# Patient Record
Sex: MALE | Race: WHITE | NOT HISPANIC OR LATINO | Employment: OTHER | URBAN - METROPOLITAN AREA
[De-identification: names, ages, dates, MRNs, and addresses within clinical notes are randomized per-mention and may not be internally consistent; named-entity substitution may affect disease eponyms.]

---

## 2017-09-06 ENCOUNTER — ALLSCRIPTS OFFICE VISIT (OUTPATIENT)
Dept: OTHER | Facility: OTHER | Age: 82
End: 2017-09-06

## 2017-09-06 DIAGNOSIS — I10 ESSENTIAL (PRIMARY) HYPERTENSION: ICD-10-CM

## 2017-09-14 ENCOUNTER — GENERIC CONVERSION - ENCOUNTER (OUTPATIENT)
Dept: OTHER | Facility: OTHER | Age: 82
End: 2017-09-14

## 2018-01-11 NOTE — PROGRESS NOTES
Chief Complaint  Patient here for blood pressure check  History of Present Illness  HPI: 81 y/o M presenting for blood pressure check  Patients only concern today is chest congestion and cold like symptoms that have been going on for 5 weeks  Denies headaches, vision changes, chest pain, palpitation, abdominal pain  Review of Systems    Constitutional: no fever and no chills  ENT: sore throat  Cardiovascular: no chest pain and no palpitations  Respiratory: cough, but no shortness of breath and no wheezing  Gastrointestinal: negative  Neurological: no headache and no dizziness  Active Problems    1  Abscess of buttock, right (682 5) (L02 31)   2  Abscess Of Wrist Bursa (727 89)   3  Acute upper respiratory infection (465 9) (J06 9)   4  Hypertension (401 9) (I10)   5  Presbycusis (388 01) (H91 10)   6  Screening for neurological condition (V80 09) (Z13 89)   7  Sinusitis (473 9) (J32 9)    Current Meds   1  Atenolol 50 MG Oral Tablet; TAKE 1 TABLET DAILY; Therapy: 22JOY9317 to (Evaluate:13May2016)  Requested for: 73UVA7040; Last   Rx:14Jan2016 Ordered   2  Fluticasone Propionate 50 MCG/ACT Nasal Suspension; INSTILL ONE SPRAY INTO   EACH NOSTRIL TWICE DAILY; Therapy: 62MLT1563 to (Evaluate:97Mam6135)  Requested for: 00NXF2183; Last   Rx:14Jan2016 Ordered   3  Lisinopril 40 MG Oral Tablet; take 1 tablet by mouth every day; Therapy: 14YKD5553 to (Evaluate:13May2016)  Requested for: 14MOR2449; Last   Rx:14Jan2016 Ordered   4  Ventolin  (90 Base) MCG/ACT Inhalation Aerosol Solution; INHALE 2 PUFFS   EVERY 4-6 HOURS AS NEEDED; Therapy: 09YNM8114 to (Complete:30Nov2016)  Requested for: 75VNL7899; Last   Rx:95Hgk4750 Ordered    Allergies    1   No Known Drug Allergies    Vitals  Signs [Data Includes: Current Encounter]    Systolic: 153  Diastolic: 72    Physical Exam    Ears, Nose, Mouth, and Throat   External inspection of ears and nose: Normal     Otoscopic examination: Tympanic membrance translucent with normal light reflex  Canals patent without erythema  Erythematous nares with clear mucus  Oropharynx: Abnormal   Oral mucosa was erythematous  Pulmonary   Respiratory effort: No increased work of breathing or signs of respiratory distress  Auscultation of lungs: Clear to auscultation  Cardiovascular   Palpation of heart: Normal PMI, no thrills  Auscultation of heart: Normal rate and rhythm, normal S1 and S2, without murmurs  Abdomen   Abdomen: Non-tender, no masses  Liver and spleen: No hepatomegaly or splenomegaly  Lymphatic   Palpation of lymph nodes in neck: No lymphadenopathy  Neurologic   Cranial nerves: Cranial nerves 2-12 intact  Assessment    1  Chest congestion (786 9) (R09 89)   2  Acute sinusitis (461 9) (J01 90)   3  Hypertension (401 9) (I10)    Plan  Acute sinusitis    · Sulfamethoxazole-Trimethoprim 800-160 MG Oral Tablet; TAKE 1 TABLET TWICE  DAILY  Acute upper respiratory infection    · Ventolin  (90 Base) MCG/ACT Inhalation Aerosol Solution  Chest congestion    · * XR CHEST PA & LATERAL; Status:Active; Requested VTZ:95EEU5085;   Hypertension    · *VB - Follow-up With Nurse or MA For BP Check Evaluation and Treatment  Follow-up   Status: Active - Retrospective By Protocol Authorization  Requested for: 35SKB1867    Discussion/Summary    Informed patient to stop taking his inhaler, Dimetapp and limit the amount of coffee he drinks  No changes will be made with medications at this time  Get x-ray for chest congestion  Treating patient for sinus infection  Follow up in 2 weeks  Informed patient of symptomatic hypertension and when to call or go to ER        Signatures   Electronically signed by : Noe Garcia; Jan 28 2016  4:05PM EST                       (Author)    Electronically signed by : FRANCO Rivers ; Jan 28 2016  4:07PM EST

## 2018-01-12 VITALS
WEIGHT: 130 LBS | OXYGEN SATURATION: 98 % | HEIGHT: 62 IN | RESPIRATION RATE: 20 BRPM | SYSTOLIC BLOOD PRESSURE: 174 MMHG | TEMPERATURE: 97.4 F | HEART RATE: 68 BPM | BODY MASS INDEX: 23.92 KG/M2 | DIASTOLIC BLOOD PRESSURE: 70 MMHG

## 2018-01-14 NOTE — PROGRESS NOTES
Assessment    1  Acute upper respiratory infection (465 9) (J06 9)   2  Hypertension (401 9) (I10)    Plan  Acute upper respiratory infection    · Fluticasone Propionate 50 MCG/ACT Nasal Suspension; USE 1 SPRAY IN EACH  NOSTRIL TWICE DAILY  Hypertension    · Atenolol 50 MG Oral Tablet; TAKE 1 TABLET DAILY   · Lisinopril 40 MG Oral Tablet; take 1 tablet by mouth every day    Discussion/Summary    Use nasal spray and take mucinex to help with congestion  Cut back on the coffee and increase the amount of water  Monitor blood pressure  Make sure you are taking the correct medication dosage  Informed patient symptoms of hypertension and hypotension  Follow up in 1 week to get BP checked  Possible side effects of new medications were reviewed with the patient/guardian today  The treatment plan was reviewed with the patient/guardian  The patient/guardian understands and agrees with the treatment plan   The patient was counseled regarding instructions for management, risks and benefits of treatment options  Chief Complaint    1  Cold Symptoms  Routine HTN F/U, needs med refills  Also complaining of cold x3 weeks, tried OTC meds but did not help  History of Present Illness  79 y/o M presenting to office for refill of blood pressure medication and chest congestion for the last 3 weeks  Patient states that he really has not been coughing but it feels like there is something in his chest  He has not taken anything OTC because his wife was concern about interactions with medications  He was prescribed an inhaler but does not like using it  He states he drinks a lot of coffee and not much water  Patient denies any headaches, dizziness, vision changes, chest pain, palpitations, SOB or abdominal pain  Pain Assessment   the patient states they do not have pain  Abuse And Domestic Violence Screen    Yes, the patient is safe at home  The patient states no one is hurting them  Depression And Suicide Screen  No, the patient has not had thoughts of hurting themself  No, the patient has not felt depressed in the past 7 days  Bernard Riso presents with complaints of cold symptoms  Associated symptoms include nasal congestion and sore throat, but no sneezing, no runny nose, no post nasal drainage, no hoarseness, no dry cough, no productive cough, no facial pressure, no facial pain, no headache, no plugged ear(s), no ear pain, no swollen lymph nodes, no wheezing, no shortness of breath, no fatigue, no weakness, no nausea, no vomiting, no diarrhea, no fever and no chills  Review of Systems    Constitutional: no fever and no chills  Eyes: no eye pain and no eyesight problems  ENT: sore throat, but no earache and no nasal discharge  Cardiovascular: no chest pain and no palpitations  Respiratory: no shortness of breath, no cough and no wheezing  Gastrointestinal: No complaints of abdominal pain, no constipation, no nausea or vomiting, no diarrhea or bloody stools  Neurological: no headache, no numbness, no tingling, no dizziness, no limb weakness and no fainting  Active Problems    1  Abscess of buttock, right (682 5) (L02 31)   2  Abscess Of Wrist Bursa (727 89)   3  Acute upper respiratory infection (465 9) (J06 9)   4  Hypertension (401 9) (I10)   5  Presbycusis (388 01) (H91 10)   6  Sinusitis (473 9) (J32 9)    Surgical History    1  Denied: History Of Prior Surgery    Family History    1  Family history of Mother  At Age 79    2  Family history of Father  At Age 74    1  Family history of Feeling Fine   4  Family history of Feeling Fine    5  Family history of 2nd Sister  At Age ___   10  Family history of 2rd Sister  At Age ___   9  Family history of Feeling Fine    Social History    · Alcohol Use (History)   · Never A Smoker    Current Meds   1  Lisinopril 40 MG Oral Tablet; take 1 tablet by mouth every day;    Therapy: 01OTS1396 to (Evaluate:03Ces5979) Requested for: 12Jan2016; Last   Rx:14Ukk4950; Status: ACTIVE - Renewal Denied Ordered   2  Ventolin  (90 Base) MCG/ACT Inhalation Aerosol Solution; INHALE 2 PUFFS   EVERY 4-6 HOURS AS NEEDED; Therapy: 41CED4674 to (Complete:30Nov2016)  Requested for: 34TDZ2162; Last   Rx:54Kjl2920 Ordered    Allergies    1  No Known Drug Allergies    Vitals  Vital Signs [Data Includes: Current Encounter]    Recorded: 72ZLE6797 04:22PM   Temperature 98 7 F   Heart Rate 76   Respiration 20   Systolic 154   Diastolic 94   Height 5 ft 2 in   Weight 129 lb 9 oz   BMI Calculated 23 7   BSA Calculated 1 59     Physical Exam    Constitutional   General appearance: No acute distress, well appearing and well nourished  Eyes   Conjunctiva and lids: No swelling, erythema, or discharge  Pupils and irises: Equal, round and reactive to light  Ears, Nose, Mouth, and Throat   External inspection of ears and nose: Normal     Otoscopic examination: Tympanic membrance translucent with normal light reflex  Canals patent without erythema  Nasal mucosa, septum, and turbinates: Abnormal   There was clear rhinorrhea from both nares  The bilateral nasal mucosa was edematous  Oropharynx: Normal with no erythema, edema, exudate or lesions  Pulmonary   Respiratory effort: No increased work of breathing or signs of respiratory distress  Auscultation of lungs: Clear to auscultation, equal breath sounds bilaterally, no wheezes, no rales, no rhonci  Cardiovascular   Palpation of heart: Normal PMI, no thrills  Auscultation of heart: Normal rate and rhythm, normal S1 and S2, without murmurs  Carotid pulses: Normal     Abdomen   Abdomen: Non-tender, no masses  Liver and spleen: No hepatomegaly or splenomegaly  Lymphatic   Palpation of lymph nodes in neck: No lymphadenopathy      Musculoskeletal   Gait and station: Normal     Inspection/palpation of joints, bones, and muscles: Normal     Neurologic   Cranial nerves: Cranial nerves 2-12 intact  Reflexes: 2+ and symmetric  Sensation: No sensory loss           Results/Data  Encounter Results   PHQ-2 Adult Depression Screening 08QVN5407 04:28PM User, Ahs     Test Name Result Flag Reference   PHQ-2 Adult Depression Score 0     Q1: 0, Q2: 0   PHQ-2 Adult Depression Screening Negative       Falls Risk Assessment (Dx V80 09 Screen for Neurologic Disorder) 41EKK1340 04:28PM User, Ahs     Test Name Result Flag Reference   Falls Risk      No falls in the past year       Signatures   Electronically signed by : BRYANT Peters; Jan 14 2016  6:36PM EST                       (Author)    Electronically signed by : FRANCO Harvey ; Jonny 15 2016 10:13AM EST

## 2018-01-15 NOTE — MISCELLANEOUS
Dear Coleen Ruiz:    Raj Fish have had  _1_NURSE No-Show appointments at our office (9/13/17 130PM )  A No-Show is defined as any patient who fails to arrive for a scheduled appointment without canceling the appointment prior to the scheduled time  A patient who has more than THREE No-Shows may be dismissed from an [address] [place] [address] practice  Please call in advance to cancel appointments  If you continue to No-Show for scheduled appointments, you may be dismissed from our practice  Thank  You  ted ha tenido _1 NURSE_  No-Show citas en nuestra oficina (9/13/17 130PM)  Un No-Show se define estuardo cualquier paciente que no llega a marco zhang programada sin cancelar la zhang antes de la hora programada  Un paciente que tiene más  de HADLEY No-Show puede ser despedido de un [facility] práctica  Por favor llame con anticipación para cancelar citas  Si continúa la "No-Show"  para las citas programadas, usted puede ser despedido de nuestra práctica  Ome

## 2018-01-16 NOTE — MISCELLANEOUS
Message  Patient's wife called because she was confused about medication that was ordered  She has been giving the patient Atenolol 25 mg daily, when he was increased to 50 mg daily in June 2014  Instead of increasing dose from 50 mg once daily to twice a day, it was changed back to 50 mg daily        Plan  Acute upper respiratory infection    · Fluticasone Propionate 50 MCG/ACT Nasal Suspension; USE 1 SPRAY IN EACH  NOSTRIL TWICE DAILY  Hypertension    · Atenolol 50 MG Oral Tablet; TAKE 1 TABLET DAILY   · Lisinopril 40 MG Oral Tablet; take 1 tablet by mouth every day  Screening for neurological condition    · *VB - Fall Risk Assessment  (Dx V80 09 Screen for Neurologic Disorder);  Status:Complete - Retrospective By Protocol Authorization;   Done: 21SWA5810 04:29PM    Signatures   Electronically signed by : BRYANT Ortiz; Jan 14 2016  6:26PM EST                       (Author)

## 2018-09-04 DIAGNOSIS — I15.9 SECONDARY HYPERTENSION: ICD-10-CM

## 2018-09-04 DIAGNOSIS — I15.9 SECONDARY HYPERTENSION: Primary | ICD-10-CM

## 2018-09-04 RX ORDER — LISINOPRIL 40 MG/1
TABLET ORAL
Qty: 90 TABLET | Refills: 0 | OUTPATIENT
Start: 2018-09-04

## 2018-09-04 RX ORDER — LISINOPRIL 40 MG/1
TABLET ORAL
Qty: 30 TABLET | Refills: 0 | Status: SHIPPED | OUTPATIENT
Start: 2018-09-04 | End: 2018-10-12 | Stop reason: HOSPADM

## 2018-09-17 ENCOUNTER — APPOINTMENT (EMERGENCY)
Dept: CT IMAGING | Facility: HOSPITAL | Age: 83
DRG: 871 | End: 2018-09-17
Payer: MEDICARE

## 2018-09-17 ENCOUNTER — APPOINTMENT (EMERGENCY)
Dept: RADIOLOGY | Facility: HOSPITAL | Age: 83
DRG: 871 | End: 2018-09-17
Payer: MEDICARE

## 2018-09-17 ENCOUNTER — HOSPITAL ENCOUNTER (INPATIENT)
Facility: HOSPITAL | Age: 83
LOS: 11 days | Discharge: NURSING, REHAB OR LONG-TERM CARE | DRG: 871 | End: 2018-09-28
Attending: EMERGENCY MEDICINE | Admitting: INTERNAL MEDICINE
Payer: MEDICARE

## 2018-09-17 DIAGNOSIS — K83.09 ACUTE CHOLANGITIS: ICD-10-CM

## 2018-09-17 DIAGNOSIS — A41.9 SEPSIS (HCC): ICD-10-CM

## 2018-09-17 DIAGNOSIS — N17.9 AKI (ACUTE KIDNEY INJURY) (HCC): ICD-10-CM

## 2018-09-17 DIAGNOSIS — R53.1 GENERALIZED WEAKNESS: Primary | ICD-10-CM

## 2018-09-17 PROBLEM — K80.33 CALCULUS OF BILE DUCT WITH ACUTE CHOLANGITIS WITH OBSTRUCTION: Status: ACTIVE | Noted: 2018-09-17

## 2018-09-17 PROBLEM — E87.2 METABOLIC ACIDOSIS: Status: ACTIVE | Noted: 2018-09-17

## 2018-09-17 PROBLEM — R74.01 TRANSAMINITIS: Status: ACTIVE | Noted: 2018-09-17

## 2018-09-17 PROBLEM — E87.20 METABOLIC ACIDOSIS: Status: ACTIVE | Noted: 2018-09-17

## 2018-09-17 PROBLEM — D68.9 COAGULOPATHY (HCC): Status: ACTIVE | Noted: 2018-09-17

## 2018-09-17 PROBLEM — D69.6 THROMBOCYTOPENIA (HCC): Status: ACTIVE | Noted: 2018-09-17

## 2018-09-17 LAB
ALBUMIN SERPL BCP-MCNC: 2.1 G/DL (ref 3.5–5)
ALP SERPL-CCNC: 323 U/L (ref 46–116)
ALT SERPL W P-5'-P-CCNC: 112 U/L (ref 12–78)
ANION GAP SERPL CALCULATED.3IONS-SCNC: 21 MMOL/L (ref 4–13)
ANISOCYTOSIS BLD QL SMEAR: PRESENT
APTT PPP: 35 SECONDS (ref 24–36)
AST SERPL W P-5'-P-CCNC: 110 U/L (ref 5–45)
BACTERIA UR QL AUTO: ABNORMAL /HPF
BASE EXCESS BLDA CALC-SCNC: -8 MMOL/L (ref -2–3)
BASOPHILS # BLD MANUAL: 0 THOUSAND/UL (ref 0–0.1)
BASOPHILS NFR MAR MANUAL: 0 % (ref 0–1)
BILIRUB SERPL-MCNC: 4.97 MG/DL (ref 0.2–1)
BILIRUB UR QL STRIP: ABNORMAL
BUN SERPL-MCNC: 51 MG/DL (ref 5–25)
CA-I BLD-SCNC: 1.05 MMOL/L (ref 1.12–1.32)
CALCIUM SERPL-MCNC: 7.9 MG/DL (ref 8.3–10.1)
CHLORIDE SERPL-SCNC: 106 MMOL/L (ref 100–108)
CK MB SERPL-MCNC: 2.7 NG/ML (ref 0–5)
CK MB SERPL-MCNC: <1 % (ref 0–2.5)
CK SERPL-CCNC: 1273 U/L (ref 39–308)
CLARITY UR: CLEAR
CO2 SERPL-SCNC: 12 MMOL/L (ref 21–32)
COLOR UR: ABNORMAL
CREAT SERPL-MCNC: 3.09 MG/DL (ref 0.6–1.3)
DS:DELIVERY SYSTEM: ABNORMAL
EOSINOPHIL # BLD MANUAL: 0 THOUSAND/UL (ref 0–0.4)
EOSINOPHIL NFR BLD MANUAL: 0 % (ref 0–6)
ERYTHROCYTE [DISTWIDTH] IN BLOOD BY AUTOMATED COUNT: 14.1 % (ref 11.6–15.1)
FINE GRAN CASTS URNS QL MICRO: ABNORMAL /LPF
FIO2 GAS DIL.REBREATH: 28 L
GFR SERPL CREATININE-BSD FRML MDRD: 17 ML/MIN/1.73SQ M
GLUCOSE SERPL-MCNC: 75 MG/DL (ref 65–140)
GLUCOSE SERPL-MCNC: 84 MG/DL (ref 65–140)
GLUCOSE SERPL-MCNC: 85 MG/DL (ref 65–140)
GLUCOSE UR STRIP-MCNC: NEGATIVE MG/DL
HCO3 BLDA-SCNC: 16.6 MMOL/L (ref 22–28)
HCT VFR BLD AUTO: 34.9 % (ref 36.5–49.3)
HCT VFR BLD CALC: 30 % (ref 36.5–49.3)
HGB BLD-MCNC: 11.3 G/DL (ref 12–17)
HGB BLDA-MCNC: 10.2 G/DL (ref 12–17)
HGB UR QL STRIP.AUTO: ABNORMAL
INR PPP: 2.05 (ref 0.86–1.17)
KETONES UR STRIP-MCNC: ABNORMAL MG/DL
LACTATE SERPL-SCNC: 10.6 MMOL/L (ref 0.5–2)
LACTATE SERPL-SCNC: 5.5 MMOL/L (ref 0.5–2)
LEUKOCYTE ESTERASE UR QL STRIP: NEGATIVE
LIPASE SERPL-CCNC: 72 U/L (ref 73–393)
LYMPHOCYTES # BLD AUTO: 0.24 THOUSAND/UL (ref 0.6–4.47)
LYMPHOCYTES # BLD AUTO: 2 % (ref 14–44)
MACROCYTES BLD QL AUTO: PRESENT
MCH RBC QN AUTO: 32.8 PG (ref 26.8–34.3)
MCHC RBC AUTO-ENTMCNC: 32.4 G/DL (ref 31.4–37.4)
MCV RBC AUTO: 102 FL (ref 82–98)
MONOCYTES # BLD AUTO: 0.24 THOUSAND/UL (ref 0–1.22)
MONOCYTES NFR BLD: 2 % (ref 4–12)
NEUTROPHILS # BLD MANUAL: 11.48 THOUSAND/UL (ref 1.85–7.62)
NEUTS BAND NFR BLD MANUAL: 6 % (ref 0–8)
NEUTS SEG NFR BLD AUTO: 90 % (ref 43–75)
NITRITE UR QL STRIP: NEGATIVE
NON-SQ EPI CELLS URNS QL MICRO: ABNORMAL /HPF
NRBC BLD AUTO-RTO: 0 /100 WBCS
PCO2 BLD: 17 MMOL/L (ref 21–32)
PCO2 BLD: 30.1 MM HG (ref 36–44)
PH BLD: 7.35 [PH] (ref 7.35–7.45)
PH UR STRIP.AUTO: 5.5 [PH] (ref 4.5–8)
PLATELET # BLD AUTO: 44 THOUSANDS/UL (ref 149–390)
PLATELET BLD QL SMEAR: ABNORMAL
PMV BLD AUTO: 11.2 FL (ref 8.9–12.7)
PO2 BLD: 93 MM HG (ref 75–129)
POTASSIUM BLD-SCNC: 3.3 MMOL/L (ref 3.5–5.3)
POTASSIUM SERPL-SCNC: 4.2 MMOL/L (ref 3.5–5.3)
PROT SERPL-MCNC: 5.7 G/DL (ref 6.4–8.2)
PROT UR STRIP-MCNC: ABNORMAL MG/DL
PROTHROMBIN TIME: 23.2 SECONDS (ref 11.8–14.2)
RBC # BLD AUTO: 3.44 MILLION/UL (ref 3.88–5.62)
RBC #/AREA URNS AUTO: ABNORMAL /HPF
SAO2 % BLD FROM PO2: 97 % (ref 95–98)
SODIUM BLD-SCNC: 141 MMOL/L (ref 136–145)
SODIUM SERPL-SCNC: 139 MMOL/L (ref 136–145)
SP GR UR STRIP.AUTO: >=1.03 (ref 1–1.03)
SPECIMEN SOURCE: ABNORMAL
TOTAL CELLS COUNTED SPEC: 100
TROPONIN I SERPL-MCNC: 0.58 NG/ML
UROBILINOGEN UR QL STRIP.AUTO: 1 E.U./DL
WBC # BLD AUTO: 11.96 THOUSAND/UL (ref 4.31–10.16)
WBC #/AREA URNS AUTO: ABNORMAL /HPF

## 2018-09-17 PROCEDURE — 87040 BLOOD CULTURE FOR BACTERIA: CPT | Performed by: EMERGENCY MEDICINE

## 2018-09-17 PROCEDURE — 85014 HEMATOCRIT: CPT

## 2018-09-17 PROCEDURE — 87077 CULTURE AEROBIC IDENTIFY: CPT | Performed by: EMERGENCY MEDICINE

## 2018-09-17 PROCEDURE — 85610 PROTHROMBIN TIME: CPT | Performed by: EMERGENCY MEDICINE

## 2018-09-17 PROCEDURE — 85027 COMPLETE CBC AUTOMATED: CPT | Performed by: EMERGENCY MEDICINE

## 2018-09-17 PROCEDURE — 36415 COLL VENOUS BLD VENIPUNCTURE: CPT | Performed by: EMERGENCY MEDICINE

## 2018-09-17 PROCEDURE — 82948 REAGENT STRIP/BLOOD GLUCOSE: CPT

## 2018-09-17 PROCEDURE — 83690 ASSAY OF LIPASE: CPT | Performed by: EMERGENCY MEDICINE

## 2018-09-17 PROCEDURE — 87493 C DIFF AMPLIFIED PROBE: CPT | Performed by: NURSE PRACTITIONER

## 2018-09-17 PROCEDURE — 71045 X-RAY EXAM CHEST 1 VIEW: CPT

## 2018-09-17 PROCEDURE — 85730 THROMBOPLASTIN TIME PARTIAL: CPT | Performed by: EMERGENCY MEDICINE

## 2018-09-17 PROCEDURE — 99285 EMERGENCY DEPT VISIT HI MDM: CPT

## 2018-09-17 PROCEDURE — 70450 CT HEAD/BRAIN W/O DYE: CPT

## 2018-09-17 PROCEDURE — 81001 URINALYSIS AUTO W/SCOPE: CPT | Performed by: EMERGENCY MEDICINE

## 2018-09-17 PROCEDURE — 96361 HYDRATE IV INFUSION ADD-ON: CPT

## 2018-09-17 PROCEDURE — 96360 HYDRATION IV INFUSION INIT: CPT

## 2018-09-17 PROCEDURE — 84484 ASSAY OF TROPONIN QUANT: CPT | Performed by: EMERGENCY MEDICINE

## 2018-09-17 PROCEDURE — 83605 ASSAY OF LACTIC ACID: CPT | Performed by: EMERGENCY MEDICINE

## 2018-09-17 PROCEDURE — 85007 BL SMEAR W/DIFF WBC COUNT: CPT | Performed by: EMERGENCY MEDICINE

## 2018-09-17 PROCEDURE — 82947 ASSAY GLUCOSE BLOOD QUANT: CPT

## 2018-09-17 PROCEDURE — 84295 ASSAY OF SERUM SODIUM: CPT

## 2018-09-17 PROCEDURE — 82553 CREATINE MB FRACTION: CPT | Performed by: EMERGENCY MEDICINE

## 2018-09-17 PROCEDURE — 99291 CRITICAL CARE FIRST HOUR: CPT | Performed by: NURSE PRACTITIONER

## 2018-09-17 PROCEDURE — 74176 CT ABD & PELVIS W/O CONTRAST: CPT

## 2018-09-17 PROCEDURE — 93005 ELECTROCARDIOGRAM TRACING: CPT

## 2018-09-17 PROCEDURE — 36600 WITHDRAWAL OF ARTERIAL BLOOD: CPT

## 2018-09-17 PROCEDURE — 84132 ASSAY OF SERUM POTASSIUM: CPT

## 2018-09-17 PROCEDURE — 80053 COMPREHEN METABOLIC PANEL: CPT | Performed by: EMERGENCY MEDICINE

## 2018-09-17 PROCEDURE — 87186 SC STD MICRODIL/AGAR DIL: CPT | Performed by: EMERGENCY MEDICINE

## 2018-09-17 PROCEDURE — 82330 ASSAY OF CALCIUM: CPT

## 2018-09-17 PROCEDURE — 82803 BLOOD GASES ANY COMBINATION: CPT

## 2018-09-17 PROCEDURE — 82550 ASSAY OF CK (CPK): CPT | Performed by: EMERGENCY MEDICINE

## 2018-09-17 RX ORDER — METOPROLOL TARTRATE 5 MG/5ML
5 INJECTION INTRAVENOUS EVERY 6 HOURS PRN
Status: DISCONTINUED | OUTPATIENT
Start: 2018-09-17 | End: 2018-09-28 | Stop reason: HOSPADM

## 2018-09-17 RX ORDER — METOPROLOL TARTRATE 50 MG/1
50 TABLET, FILM COATED ORAL EVERY 12 HOURS SCHEDULED
COMMUNITY
End: 2018-10-12 | Stop reason: HOSPADM

## 2018-09-17 RX ORDER — CHLORHEXIDINE GLUCONATE 0.12 MG/ML
15 RINSE ORAL EVERY 12 HOURS SCHEDULED
Status: DISCONTINUED | OUTPATIENT
Start: 2018-09-17 | End: 2018-09-19

## 2018-09-17 RX ORDER — SODIUM CHLORIDE, SODIUM LACTATE, POTASSIUM CHLORIDE, CALCIUM CHLORIDE 600; 310; 30; 20 MG/100ML; MG/100ML; MG/100ML; MG/100ML
125 INJECTION, SOLUTION INTRAVENOUS CONTINUOUS
Status: DISCONTINUED | OUTPATIENT
Start: 2018-09-17 | End: 2018-09-18

## 2018-09-17 RX ORDER — ACETAMINOPHEN 325 MG/1
650 TABLET ORAL ONCE
Status: COMPLETED | OUTPATIENT
Start: 2018-09-17 | End: 2018-09-17

## 2018-09-17 RX ADMIN — SODIUM CHLORIDE, SODIUM LACTATE, POTASSIUM CHLORIDE, AND CALCIUM CHLORIDE 125 ML/HR: .6; .31; .03; .02 INJECTION, SOLUTION INTRAVENOUS at 20:26

## 2018-09-17 RX ADMIN — ACETAMINOPHEN 650 MG: 325 TABLET, FILM COATED ORAL at 16:43

## 2018-09-17 RX ADMIN — SODIUM CHLORIDE 650 ML: 0.9 INJECTION, SOLUTION INTRAVENOUS at 17:40

## 2018-09-17 RX ADMIN — PIPERACILLIN SODIUM AND TAZOBACTAM SODIUM 3.38 G: 36; 4.5 INJECTION, POWDER, FOR SOLUTION INTRAVENOUS at 18:44

## 2018-09-17 RX ADMIN — SODIUM CHLORIDE 1000 ML: 0.9 INJECTION, SOLUTION INTRAVENOUS at 15:44

## 2018-09-17 NOTE — ED NOTES
Yue Stringer, daughter, would like to be called in patient is admitted   [facility], RN  09/17/18 5088

## 2018-09-17 NOTE — ED NOTES
Respiratory contacted at this time in regards to patient's ABG        Deann Atkinson RN  09/17/18 4468

## 2018-09-17 NOTE — SEPSIS NOTE
Sepsis Note   Mariah Peralta 80 y o  male MRN: [id number]  Unit/Bed#: ED 28 Encounter: [id number]            Initial Sepsis Screening     Row Name 09/17/18 9217                Is the patient's history suggestive of a new or worsening infection? (!)  Yes (Proceed)  -ML        Suspected source of infection urinary tract infection  -ML        Are two or more of the following signs & symptoms of infection both present and new to the patient? (!)  Yes (Proceed)  -ML        Indicate SIRS criteria Tachycardia > 90 bpm;Tachypnea > 20 resp per min  -ML        If the answer is yes to both questions, suspicion of sepsis is present          If severe sepsis is present AND tissue hypoperfusion perists in the hour after fluid resuscitation or lactate > 4, the patient meets criteria for SEPTIC SHOCK          Are any of the following organ dysfunction criteria present within 6 hours of suspected infection and SIRS criteria that are NOT considered to be chronic conditions?         Organ dysfunction Lactate > 2 0 mmol/L;Platelet count < 331,623/MOX  -ML        Date of presentation of severe sepsis          Time of presentation of severe sepsis          Tissue hypoperfusion persists in the hour after crystalloid fluid administration, evidenced, by either:          Was hypotension present within one hour of the conclusion of crystalloid fluid administration?  No  -ML        Date of presentation of septic shock          Time of presentation of septic shock            User Key  (r) = Recorded By, (t) = Taken By, (c) = Cosigned By    [address] Name Provider Type    Any Motta MD Physician          Given 30mL per kg of saline, work up for sepsis in progress

## 2018-09-17 NOTE — H&P
History & Physical Exam - Critical Care   Jessy Low 80 y o  male MRN: [id number]  Unit/Bed#: ED 28 Encounter: [id number]      Assessment/Plan:  1  Sepsis likely secondary to choledocholithiasis with possible acute cholangitis  · Given the patient's acute kidney injury and elevated lactic acid level he will be admitted to the step-down unit for evaluation and monitoring  This will likely require greater than a 2 midnight stay therefore the patient will be placed in inpatient status  · The patient was started on Zosyn which we will continue pending his culture results  · We will continue with fluid resuscitation monitoring the patient's blood pressure and urine output closely  · We will consult Gastroenterology given the possible need for ERCP  2  Gallstones with dilated hepatic and common bile duct, possible cholangitis  · Given the patient's elevated liver enzymes, acute renal failure, lactic acidosis and fever on admission we will treat him as if he has acute cholangitis for now  · We will continue Zosyn as noted above  · We will continue with fluid resuscitation  · A GI consult is pending  3  Acute kidney injury on unknown chronic kidney disease status  · The patient does not have baseline renal function labs for comparison however his creatinine is markedly elevated  · We will continue to monitor his renal indices and urine output closely  · We will hold his outpatient lisinopril as this was likely a contributing factor to the development of his acute kidney injury  4  Metabolic acidosis likely related to 1  And 2   · His lactic acid level was markedly elevated on admission but it has improved with fluid administration  · We will continue to monitor this for resolution  5  Transaminitis likely related to 2, possibly a component of shock liver  · We will monitor his liver enzymes closely  6  History of hypertension  · We will hold the patient's outpatient lisinopril for now    His initial blood pressure was in the 698 systolic range and has been in the low 1 100s to 120s  Though his maps are above 65 this may represent a relative hypotension  · We will monitor his blood pressure closely with a goal to maintain his mean arterial pressure greater than 70  · He may require vasopressor therapy      Critical Care Time:  42 minutes  Documented critical care time excludes any procedures documented elsewhere  It also excludes any family updates    _____________________________________________________________________      HPI:    Jessy Low is a 80 y o  male who presents with a complaint of vague gastrointestinal symptoms over the last few days  The patient states his symptoms started after he ate [employer]  He developed nausea and anorexia  He denies any vomiting however he did feel like he had some dry heaving  He denies diarrhea or constipation  He denies chest pain or shortness of breath  Currently he denies abdominal pain  The patient states that he has never had similar symptoms in the past after eating fried or greasy food  A CT scan done in the emergency department reveals a dilated gallbladder with evidence of gallstones and dilated hepatic and common bile duct  Additionally the patient was febrile to 101 5 and was in acute renal failure as evidence by his creatinine of 3 05  Review of Systems:    Full 14 point review of systems was performed  Aside from what was mentioned in the HPI, it is otherwise negative  Historical Information   Past Medical History:   Diagnosis Date    Hypertension     Medical history unknown      Past Surgical History:   Procedure Laterality Date    NO PAST SURGERIES       Social History   History   Alcohol Use    Yes     History   Drug Use No     History   Smoking Status    Never Smoker   Smokeless Tobacco    Never Used       Family History:   History reviewed  No pertinent family history      Medications:  Pertinent medications were reviewed    Current Facility-Administered Medications:  piperacillin-tazobactam 3 375 g Intravenous Q6H [facility] Last Rate: Stopped (09/17/18 1938)         No Known Allergies      Vitals:   /53 (BP Location: Right arm)   Pulse 88   Temp (!) 100 6 °F (38 1 °C) (Rectal)   Resp (!) 30   Wt 54 6 kg (120 lb 5 9 oz)   SpO2 96%   BMI 22 02 kg/m²   Body mass index is 22 02 kg/m²  SpO2: 96 %,   SpO2 Activity: At Rest,   O2 Device: Nasal cannula      Intake/Output Summary (Last 24 hours) at 09/17/18 1946  Last data filed at 09/17/18 1938   Gross per 24 hour   Intake             2350 ml   Output                0 ml   Net             2350 ml     Invasive Devices     Peripheral Intravenous Line            Peripheral IV 09/17/18 Left Hand less than 1 day    Peripheral IV 09/17/18 Right Forearm less than 1 day                Physical Exam:  Gen:  Ill-appearing  HEENT:  Pupils are equal round reactive to light  His sclerae are still icteric  His oropharynx is dry but without erythema  Neck:  Supple negative for lymphadenopathy  Chest:  Diminished in the bases bilaterally  Cor:  Regular rate and rhythm  Abd:  Soft and essentially nontender  Ext:  There is no significant edema clubbing or cyanosis  Neuro:  Ill-appearing but awake and alert  He is able to move his extremities but is weak  Skin:  Warm and dry      Diagnostic Data:  Lab: I have personally reviewed pertinent lab results  ,   CBC:    Results from last 7 days  Lab Units 09/17/18  1836 09/17/18  1546   WBC Thousand/uL  --  11 96*   HEMOGLOBIN g/dL  --  11 3*   I STAT HEMOGLOBIN g/dl 10 2*  --    HEMATOCRIT % 30* 34 9*   PLATELETS Thousands/uL  --  44*      CMP:   Lab Results   Component Value Date     09/17/2018    K 4 2 09/17/2018     09/17/2018    CO2 12 (L) 09/17/2018    BUN 51 (H) 09/17/2018    CREATININE 3 09 (H) 09/17/2018    GLUCOSE 75 09/17/2018    CALCIUM 7 9 (L) 09/17/2018     (H) 09/17/2018     (H) 09/17/2018    ALKPHOS 323 (H) 09/17/2018    EGFR 17 09/17/2018   ,   PT/INR:   Lab Results   Component Value Date    INR 2 05 (H) 09/17/2018   ,   Magnesium: No results found for: MAG,  Phosphorous: No results found for: PHOS    ABG: No results found for: PHART, CVG7QLQ, PO2ART, LJQ1OTV, G2IWMVKO, BEART, SOURCE,     Microbiology:  Blood cultures are pending    Imaging: I have personally reviewed the pertinent imaging studies on the PACS system  CT scan of the abdomen and pelvis was performed and reveals the following: There is marked intrahepatic and extrahepatic biliary dilatation  The common duct measures up to approximately 3 cm in transverse diameter with no calcified stones identified in the common duct      The gallbladder is mildly distended with innumerable small calcified stones layering within the dependent portion of the gallbladder      Correlate for signs of biliary obstruction  Recommend GI consultation      Examination limited by lack of oral and IV contrast and diffuse respiratory motion  Cardiac/EKG/telemetry/Echo:     Results from last 7 days  Lab Units 09/17/18  1549   CK TOTAL U/L 1,273*   TROPONIN I ng/mL 0 58*   CK MB INDEX % <1 0           VTE Prophylaxis:   SCDs    Code Status: No Order    Canda Line, CRNP    Portions of the record may have been created with voice recognition software  Occasional wrong word or "sound a like" substitutions may have occurred due to the inherent limitations of voice recognition software  Read the chart carefully and recognize, using context, where substitutions have occurred

## 2018-09-17 NOTE — SEPSIS NOTE
Sepsis Note   Nino Jerez 80 y o  male MRN: [id number]  Unit/Bed#: ED 28 Encounter: [id number]            Initial Sepsis Screening     Row Name 09/17/18 1638                Is the patient's history suggestive of a new or worsening infection? (!)  Yes (Proceed)  -ML        Suspected source of infection urinary tract infection  -ML        Are two or more of the following signs & symptoms of infection both present and new to the patient? (!)  Yes (Proceed)  -ML        Indicate SIRS criteria Tachycardia > 90 bpm;Tachypnea > 20 resp per min  -ML        If the answer is yes to both questions, suspicion of sepsis is present          If severe sepsis is present AND tissue hypoperfusion perists in the hour after fluid resuscitation or lactate > 4, the patient meets criteria for SEPTIC SHOCK          Are any of the following organ dysfunction criteria present within 6 hours of suspected infection and SIRS criteria that are NOT considered to be chronic conditions?         Organ dysfunction Lactate > 2 0 mmol/L;Platelet count < 243,229/NJG  -ML        Date of presentation of severe sepsis          Time of presentation of severe sepsis          Tissue hypoperfusion persists in the hour after crystalloid fluid administration, evidenced, by either:          Was hypotension present within one hour of the conclusion of crystalloid fluid administration?  No  -ML        Date of presentation of septic shock          Time of presentation of septic shock            User Key  (r) = Recorded By, (t) = Taken By, (c) = Cosigned By    [address] Name Provider Type    Rob Rees MD Physician               Default Flowsheet Data (last 720 hours)      Sepsis Reassess     Row Name 09/17/18 1806                   Repeat Volume Status and Tissue Perfusion Assessment Performed    Repeat Volume Status and Tissue Perfusion Assessment Performed             Volume Status and Tissue Perfusion Post Fluid Resuscitation- Must Document 5 of the Following 7:    Vital Signs Reviewed (HR, RR, BP, T) Yes  -DM        Arterial Oxygen Saturation Reviewed (POx, SaO2 or SpO2)          Cardio Normal S1/S2  -DM        Pulmonary Normal effort;Clear to auscultation  -DM        Capillary Refill Brisk  -DM        Peripheral Pulses Radial  -DM        Peripheral Pulse +2  -DM        Skin Warm;Dry  -DM        Urine output assessed             *OR*   Intensive Monitoring- Must Document One of the Following Four *:    Vital Signs Reviewed          * Central Venous Pressure (CVP or RAP)          * Central Venous Oxygen (SVO2, ScvO2 or Oxygen saturation via central catheter)          * Bedside Cardiovascular US in IVC diameter and % collapse          * Passive Leg Raise OR Crystalloid Challenge            User Key  (r) = Recorded By, (t) = Taken By, (c) = Cosigned By    Initials Name Provider Type    DM Thurlow Cockayne, CRNP Nurse Practitioner

## 2018-09-17 NOTE — ED NOTES
Patient able to void on his own, straight cath not necessary at this time per physician       Lorene Vigil RN  09/17/18 0068

## 2018-09-17 NOTE — ED PROVIDER NOTES
History  Chief Complaint   Patient presents with    Weakness - Generalized     Patient arrived EMS with increased weakness per wife  Patient temperature PTA was 101 3  Patient states he ate [employer] a few days ago and "has not been sitting well"  Patient has had poor appetite for 2 days  Pt  Has a hx of HTN, pt  Don Javiersmheather this morning because of he was weak -he was sitting on his bed, tried to get up and fell back onto the bed  No head injury, no LOC, no headaches  He ate [employer] 2 days ago and since then he's felt nauseous and feels weak  no vomiting/diarrhea/constipation  No urinary symptoms  No cp, no sob  Pt  States that he feels "off"  There was a fever reported pta  Pt  Hasn't eaten since yest , he is drinking fluids  Prior to Admission Medications   Prescriptions Last Dose Informant Patient Reported? Taking?   lisinopril (ZESTRIL) 40 mg tablet   No Yes   Sig: TAKE 1 TABLET BY MOUTH EVERY DAY      Facility-Administered Medications: None       Past Medical History:   Diagnosis Date    Hypertension     Medical history unknown        Past Surgical History:   Procedure Laterality Date    NO PAST SURGERIES         History reviewed  No pertinent family history  I have reviewed and agree with the history as documented  Social History   Substance Use Topics    Smoking status: Never Smoker    Smokeless tobacco: Never Used    Alcohol use Yes        Review of Systems   Constitutional: Positive for appetite change, fatigue and fever  HENT: Negative for rhinorrhea and sore throat  Respiratory: Negative for cough, shortness of breath and wheezing  Cardiovascular: Negative for chest pain and leg swelling  Gastrointestinal: Positive for nausea  Negative for abdominal pain, diarrhea and vomiting  Genitourinary: Negative for dysuria and flank pain  Musculoskeletal: Negative for back pain and neck pain  Skin: Negative for rash  Neurological: Positive for weakness   Negative for syncope and headaches  Psychiatric/Behavioral:        Mood normal       Physical Exam  Physical Exam   Constitutional: He is oriented to person, place, and time  He appears well-developed and well-nourished  HENT:   Head: Normocephalic and atraumatic  Dry MM   Neck: Normal range of motion  Neck supple  Cardiovascular:   tachycardic   Pulmonary/Chest: Effort normal and breath sounds normal  No respiratory distress  He has no wheezes  Abdominal: Soft  There is no tenderness  Genitourinary:   Genitourinary Comments: Rectal exam - heme occult neg , control positive  Musculoskeletal: Normal range of motion  Neurological: He is alert and oriented to person, place, and time  Skin: Skin is warm and dry  Nursing note and vitals reviewed        Vital Signs  ED Triage Vitals [09/17/18 1535]   Temperature Pulse Respirations Blood Pressure SpO2   98 3 °F (36 8 °C) 102 18 125/56 97 %      Temp Source Heart Rate Source Patient Position - Orthostatic VS BP Location FiO2 (%)   Oral Monitor Lying Right arm --      Pain Score       No Pain           Vitals:    09/17/18 1535 09/17/18 1648 09/17/18 1746   BP: 125/56 127/61 102/53   Pulse: 102 88 88   Patient Position - Orthostatic VS: Lying  Lying       Visual Acuity      ED Medications  Medications   piperacillin-tazobactam (ZOSYN) 3 375 g in sodium chloride 0 9 % 50 mL IVPB (not administered)   sodium chloride 0 9 % bolus 1,000 mL (1,000 mL Intravenous New Bag 9/17/18 1544)   sodium chloride 0 9 % bolus 650 mL (650 mL Intravenous New Bag 9/17/18 1740)   acetaminophen (TYLENOL) tablet 650 mg (650 mg Oral Given 9/17/18 1643)       Diagnostic Studies  Results Reviewed     Procedure Component Value Units Date/Time    POCT Blood Gas (CG8+) [[id number]]  (Abnormal) Collected:  09/17/18 1836    Lab Status:  Final result Updated:  09/17/18 1841     pH, Art i-STAT 7 348 (L)     pCO2, Art i-STAT 30 1 (L) mm HG      pO2, ART i-STAT 93 0 mm HG      BE, i-STAT -8 (L) mmol/L      HCO3, Art i-STAT 16 6 (LL) mmol/L      CO2, i-STAT 17 (L) mmol/L      O2 Sat, i-STAT 97 %      SODIUM, I-STAT 141 mmol/l      Potassium, i-STAT 3 3 (L) mmol/L      Calcium, Ionized i-STAT 1 05 (L) mmol/L      Hct, i-STAT 30 (L) %      Hgb, i-STAT 10 2 (L) g/dl      Glucose, i-STAT 75 mg/dl      POC FIO2 28 L      Specimen Type ARTERIAL     Delivery System Nasal Cannula    Lipase [[id number]]     Lab Status:  No result Specimen:  Blood     CKMB [[id number]]  (Normal) Collected:  09/17/18 1549    Lab Status:  Final result Specimen:  Blood from Arm, Right Updated:  09/17/18 1825     CK-MB Index <1 0 %      CK-MB FRACTION 2 7 ng/mL     Lactic Acid x2 [[id number]]  (Abnormal) Collected:  09/17/18 1738    Lab Status:  Final result Specimen:  Blood from Arm, Left Updated:  09/17/18 1810     LACTIC ACID 5 5 (HH) mmol/L     Narrative:         Result may be elevated if tourniquet was used during collection      Blood gas, arterial [[id number]] Updated:  09/17/18 1740    Lab Status:  No result Specimen:  Blood, Arterial from Brachial, Left     CK (with reflex to MB) [[id number]]  (Abnormal) Collected:  09/17/18 1549    Lab Status:  Final result Specimen:  Blood from Arm, Right Updated:  09/17/18 1737     Total CK 1,273 (H) U/L     Urine Microscopic [[id number]]  (Abnormal) Collected:  09/17/18 1649    Lab Status:  Final result Specimen:  Urine from Urine, Clean Catch Updated:  09/17/18 1716     RBC, UA 2-4 (A) /hpf      WBC, UA 1-2 (A) /hpf      Epithelial Cells Occasional /hpf      Bacteria, UA Occasional /hpf      Fine granular casts 1-2 /lpf     UA w Reflex to Microscopic w Reflex to Culture [[id number]]  (Abnormal) Collected:  09/17/18 1649    Lab Status:  Final result Specimen:  Urine from Urine, Clean Catch Updated:  09/17/18 1708     Color, UA Rivka     Clarity, UA Clear     Specific Gravity, UA >=1 030     pH, UA 5 5     Leukocytes, UA Negative     Nitrite, UA Negative     Protein,  (2+) (A) mg/dl      Glucose, UA Negative mg/dl Ketones, UA Trace (A) mg/dl      Urobilinogen, UA 1 0 E U /dl      Bilirubin, UA Interference- unable to analyze (A)     Blood, UA Large (A)    Comprehensive metabolic panel [[id number]]  (Abnormal) Collected:  09/17/18 1546    Lab Status:  Final result Specimen:  Blood from Arm, Right Updated:  09/17/18 1642     Sodium 139 mmol/L      Potassium 4 2 mmol/L      Chloride 106 mmol/L      CO2 12 (L) mmol/L      ANION GAP 21 (H) mmol/L      BUN 51 (H) mg/dL      Creatinine 3 09 (H) mg/dL      Glucose 85 mg/dL      Calcium 7 9 (L) mg/dL       (H) U/L       (H) U/L      Alkaline Phosphatase 323 (H) U/L      Total Protein 5 7 (L) g/dL      Albumin 2 1 (L) g/dL      Total Bilirubin 4 97 (H) mg/dL      eGFR 17 ml/min/1 73sq m     Narrative:         National Kidney Disease Education Program recommendations are as follows:  GFR calculation is accurate only with a steady state creatinine  Chronic Kidney disease less than 60 ml/min/1 73 sq  meters  Kidney failure less than 15 ml/min/1 73 sq  meters  Lactic Acid x2 [[id number]]  (Abnormal) Collected:  09/17/18 1546    Lab Status:  Final result Specimen:  Blood from Arm, Right Updated:  09/17/18 1630     LACTIC ACID 10 6 (HH) mmol/L     Narrative:         Result may be elevated if tourniquet was used during collection      Troponin I [[id number]]  (Abnormal) Collected:  09/17/18 1549    Lab Status:  Final result Specimen:  Blood from Arm, Right Updated:  09/17/18 1623     Troponin I 0 58 (H) ng/mL     CBC and differential [[id number]]  (Abnormal) Collected:  09/17/18 1546    Lab Status:  Final result Specimen:  Blood from Arm, Right Updated:  09/17/18 1620     WBC 11 96 (H) Thousand/uL      RBC 3 44 (L) Million/uL      Hemoglobin 11 3 (L) g/dL      Hematocrit 34 9 (L) %       (H) fL      MCH 32 8 pg      MCHC 32 4 g/dL      RDW 14 1 %      MPV 11 2 fL      Platelets 44 (LL) Thousands/uL      nRBC 0 /100 WBCs     APTT [[id number]]  (Normal) Collected:  09/17/18 1547 Lab Status:  Final result Specimen:  Blood from Arm, Right Updated:  09/17/18 1612     PTT 35 seconds     Protime-INR [[id number]]  (Abnormal) Collected:  09/17/18 1546    Lab Status:  Final result Specimen:  Blood from Arm, Right Updated:  09/17/18 1612     Protime 23 2 (H) seconds      INR 2 05 (H)    Blood culture #2 [[id number]] Collected:  09/17/18 1546    Lab Status: In process Specimen:  Blood from Arm, Right Updated:  09/17/18 1551    Blood culture #1 [[id number]] Collected:  09/17/18 1546    Lab Status:  No result Specimen:  Blood from Arm, Left                  CT abdomen pelvis wo contrast   Final Result by Mariola Calles DO (09/17 1834)      There is marked intrahepatic and extrahepatic biliary dilatation  The common duct measures up to approximately 3 cm in transverse diameter with no calcified stones identified in the common duct  The gallbladder is mildly distended with innumerable small calcified stones layering within the dependent portion of the gallbladder  Correlate for signs of biliary obstruction  Recommend GI consultation  Examination limited by lack of oral and IV contrast and diffuse respiratory motion  Workstation performed: [id number]         CT head without contrast   Final Result by Mariola Calles DO (09/17 1825)      No acute intracranial abnormality  Microangiopathic changes  Workstation performed: [id number]         XR chest 1 view portable   Final Result by Lazarus Robinson, MD (09/17 1737)      Mild right basilar airspace disease in keeping with atelectasis or pneumonia  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              Workstation performed: [id number]                    Procedures  ECG 12 Lead Documentation  Date/Time: 9/17/2018 3:42 PM  Performed by: FRANTZ Cisneros  Authorized by: FRANTZ Cisneros     Rate:     ECG rate:  97    ECG rate assessment: normal    Rhythm: Rhythm: sinus rhythm    ST segments:     ST segments:  Non-specific    CriticalCare Time  Performed by: FRANTZ Segovia  Authorized by: FRANTZ Segovia     Critical care provider statement:     Critical care time (minutes):  120    Critical care time was exclusive of:  Separately billable procedures and treating other patients    Critical care was necessary to treat or prevent imminent or life-threatening deterioration of the following conditions:  Circulatory failure, cardiac failure, dehydration, metabolic crisis, renal failure and sepsis    Critical care was time spent personally by me on the following activities:  Blood draw for specimens, development of treatment plan with patient or surrogate, discussions with primary provider, evaluation of patient's response to treatment, examination of patient, obtaining history from patient or surrogate, discussions with consultants, interpretation of cardiac output measurements, ordering and performing treatments and interventions, ordering and review of laboratory studies, ordering and review of radiographic studies, re-evaluation of patient's condition and review of old charts           Phone Contacts  ED Phone Contact    ED Course                         Initial Sepsis Screening     [address] Name 09/17/18 7986                Is the patient's history suggestive of a new or worsening infection? (!)  Yes (Proceed)  -ML        Suspected source of infection urinary tract infection  -ML        Are two or more of the following signs & symptoms of infection both present and new to the patient?  (!)  Yes (Proceed)  -ML        Indicate SIRS criteria Tachycardia > 90 bpm;Tachypnea > 20 resp per min  -ML        If the answer is yes to both questions, suspicion of sepsis is present          If severe sepsis is present AND tissue hypoperfusion perists in the hour after fluid resuscitation or lactate > 4, the patient meets criteria for SEPTIC SHOCK          Are any of the following organ dysfunction criteria present within 6 hours of suspected infection and SIRS criteria that are NOT considered to be chronic conditions?         Organ dysfunction Lactate > 2 0 mmol/L;Platelet count < 897,259/GOT  -ML        Date of presentation of severe sepsis          Time of presentation of severe sepsis          Tissue hypoperfusion persists in the hour after crystalloid fluid administration, evidenced, by either:          Was hypotension present within one hour of the conclusion of crystalloid fluid administration?  No  -ML        Date of presentation of septic shock          Time of presentation of septic shock            User Key  (r) = Recorded By, (t) = Taken By, (c) = Cosigned By    [address] Name Provider Type    Butch Ortiz MD Physician           Default Flowsheet Data (last 720 hours)      Sepsis Reassess     Row Name 09/17/18 1828 09/17/18 1804                Repeat Volume Status and Tissue Perfusion Assessment Performed    Repeat Volume Status and Tissue Perfusion Assessment Performed Yes  -ML            Volume Status and Tissue Perfusion Post Fluid Resuscitation- Must Document 5 of the Following 7:    Vital Signs Reviewed (HR, RR, BP, T) Yes  -ML Yes  -DM       Arterial Oxygen Saturation Reviewed (POx, SaO2 or SpO2)           Cardio   Normal S1/S2  -DM       Pulmonary   Normal effort;Clear to auscultation  -DM       Capillary Refill   Brisk  -DM       Peripheral Pulses   Radial  -DM       Peripheral Pulse   +2  -DM       Skin   Warm;Dry  -DM       Urine output assessed Adequate  -ML            *OR*   Intensive Monitoring- Must Document One of the Following Four *:    Vital Signs Reviewed           * Central Venous Pressure (CVP or RAP)           * Central Venous Oxygen (SVO2, ScvO2 or Oxygen saturation via central catheter)           * Bedside Cardiovascular US in IVC diameter and % collapse           * Passive Leg Raise OR Crystalloid Challenge  [employer]         User Key  (r) = Recorded By, (t) = Taken By, (c) = Cosigned By    [address] Name Provider Type    Amara Pickett MD Physician    DAVID Brewer, [facility] Nurse Practitioner                MDM  Number of Diagnoses or Management Options  Acute cholangitis:   Generalized weakness:   Sepsis [facility]):      Amount and/or Complexity of Data Reviewed  Clinical lab tests: ordered and reviewed  Tests in the radiology section of CPT®: ordered and reviewed    Risk of Complications, Morbidity, and/or Mortality  Presenting problems: moderate  General comments: Differential dx:  Dehydration, sepsis, cardiac ischemia  Pt  Was made a sepsis alert when lactic acid came back >10  ICU PA came and saw pt  Also agrees with plan  Wrote for iv zosyn for suspected cholangitis on CT a/p  Pt  Does not complain of abd  Pain  Admitted to stepdown on ICU service and they will consult GI  CritCare Time    Disposition  Final diagnoses:   Generalized weakness   Sepsis ([facility] [address] )   Acute cholangitis     Time reflects when diagnosis was documented in both MDM as applicable and the Disposition within this note     Time User Action Codes Description Comment    9/17/2018  4:37 PM Geronimo Harrison Add [R53 1] Generalized weakness     9/17/2018  6:23 PM Geronimo Harrison Add [A41 9] Sepsis ([facility] [address] )     9/17/2018  6:41 PM Geronimo Harrison Add [K83 0] Acute cholangitis       ED Disposition     ED Disposition Condition Comment    Admit  Case was discussed with ICU team and the patient's admission status was agreed to be stepdown/tele      Follow-up Information    None         Patient's Medications   Discharge Prescriptions    No medications on file     No discharge procedures on file      ED Provider  Electronically Signed by           Maria E Solomon MD  09/17/18 4746

## 2018-09-18 ENCOUNTER — APPOINTMENT (INPATIENT)
Dept: RADIOLOGY | Facility: HOSPITAL | Age: 83
DRG: 871 | End: 2018-09-18
Payer: MEDICARE

## 2018-09-18 ENCOUNTER — ANESTHESIA (INPATIENT)
Dept: PERIOP | Facility: HOSPITAL | Age: 83
DRG: 871 | End: 2018-09-18
Payer: MEDICARE

## 2018-09-18 ENCOUNTER — ANESTHESIA EVENT (INPATIENT)
Dept: PERIOP | Facility: HOSPITAL | Age: 83
DRG: 871 | End: 2018-09-18
Payer: MEDICARE

## 2018-09-18 LAB
ABO GROUP BLD: NORMAL
ALBUMIN SERPL BCP-MCNC: 2.2 G/DL (ref 3.5–5)
ALP SERPL-CCNC: 207 U/L (ref 46–116)
ALT SERPL W P-5'-P-CCNC: 115 U/L (ref 12–78)
ANION GAP SERPL CALCULATED.3IONS-SCNC: 14 MMOL/L (ref 4–13)
ANION GAP SERPL CALCULATED.3IONS-SCNC: 15 MMOL/L (ref 4–13)
ANISOCYTOSIS BLD QL SMEAR: PRESENT
AST SERPL W P-5'-P-CCNC: 150 U/L (ref 5–45)
ATRIAL RATE: 97 BPM
BASOPHILS # BLD MANUAL: 0 THOUSAND/UL (ref 0–0.1)
BASOPHILS NFR MAR MANUAL: 0 % (ref 0–1)
BILIRUB SERPL-MCNC: 5.22 MG/DL (ref 0.2–1)
BLD GP AB SCN SERPL QL: NEGATIVE
BUN SERPL-MCNC: 55 MG/DL (ref 5–25)
BUN SERPL-MCNC: 55 MG/DL (ref 5–25)
BURR CELLS BLD QL SMEAR: PRESENT
C DIFF TOX GENS STL QL NAA+PROBE: ABNORMAL
CA-I BLD-SCNC: 1 MMOL/L (ref 1.12–1.32)
CALCIUM SERPL-MCNC: 7.5 MG/DL (ref 8.3–10.1)
CALCIUM SERPL-MCNC: 7.8 MG/DL (ref 8.3–10.1)
CHLORIDE SERPL-SCNC: 107 MMOL/L (ref 100–108)
CHLORIDE SERPL-SCNC: 108 MMOL/L (ref 100–108)
CO2 SERPL-SCNC: 17 MMOL/L (ref 21–32)
CO2 SERPL-SCNC: 20 MMOL/L (ref 21–32)
CREAT SERPL-MCNC: 3.05 MG/DL (ref 0.6–1.3)
CREAT SERPL-MCNC: 3.15 MG/DL (ref 0.6–1.3)
EOSINOPHIL # BLD MANUAL: 0 THOUSAND/UL (ref 0–0.4)
EOSINOPHIL NFR BLD MANUAL: 0 % (ref 0–6)
ERYTHROCYTE [DISTWIDTH] IN BLOOD BY AUTOMATED COUNT: 14.3 % (ref 11.6–15.1)
GFR SERPL CREATININE-BSD FRML MDRD: 17 ML/MIN/1.73SQ M
GFR SERPL CREATININE-BSD FRML MDRD: 18 ML/MIN/1.73SQ M
GLUCOSE SERPL-MCNC: 106 MG/DL (ref 65–140)
GLUCOSE SERPL-MCNC: 107 MG/DL (ref 65–140)
GLUCOSE SERPL-MCNC: 118 MG/DL (ref 65–140)
GLUCOSE SERPL-MCNC: 134 MG/DL (ref 65–140)
GLUCOSE SERPL-MCNC: 55 MG/DL (ref 65–140)
GLUCOSE SERPL-MCNC: 59 MG/DL (ref 65–140)
GLUCOSE SERPL-MCNC: 61 MG/DL (ref 65–140)
GLUCOSE SERPL-MCNC: 85 MG/DL (ref 65–140)
HCT VFR BLD AUTO: 36.7 % (ref 36.5–49.3)
HGB BLD-MCNC: 12 G/DL (ref 12–17)
INR PPP: 1.89 (ref 0.86–1.17)
LYMPHOCYTES # BLD AUTO: 1.08 THOUSAND/UL (ref 0.6–4.47)
LYMPHOCYTES # BLD AUTO: 9 % (ref 14–44)
MAGNESIUM SERPL-MCNC: 1.5 MG/DL (ref 1.6–2.6)
MCH RBC QN AUTO: 32.3 PG (ref 26.8–34.3)
MCHC RBC AUTO-ENTMCNC: 32.7 G/DL (ref 31.4–37.4)
MCV RBC AUTO: 99 FL (ref 82–98)
MONOCYTES # BLD AUTO: 1.08 THOUSAND/UL (ref 0–1.22)
MONOCYTES NFR BLD: 9 % (ref 4–12)
NEUTROPHILS # BLD MANUAL: 9.82 THOUSAND/UL (ref 1.85–7.62)
NEUTS BAND NFR BLD MANUAL: 5 % (ref 0–8)
NEUTS SEG NFR BLD AUTO: 77 % (ref 43–75)
NRBC BLD AUTO-RTO: 0 /100 WBCS
P AXIS: 37 DEGREES
PHOSPHATE SERPL-MCNC: 4.8 MG/DL (ref 2.3–4.1)
PLATELET # BLD AUTO: 11 THOUSANDS/UL (ref 149–390)
PLATELET BLD QL SMEAR: ABNORMAL
POTASSIUM SERPL-SCNC: 3.8 MMOL/L (ref 3.5–5.3)
POTASSIUM SERPL-SCNC: 3.9 MMOL/L (ref 3.5–5.3)
PR INTERVAL: 136 MS
PROT SERPL-MCNC: 5.9 G/DL (ref 6.4–8.2)
PROTHROMBIN TIME: 21.8 SECONDS (ref 11.8–14.2)
QRS AXIS: 35 DEGREES
QRSD INTERVAL: 78 MS
QT INTERVAL: 384 MS
QTC INTERVAL: 487 MS
RBC # BLD AUTO: 3.71 MILLION/UL (ref 3.88–5.62)
RBC MORPH BLD: PRESENT
RH BLD: POSITIVE
SODIUM SERPL-SCNC: 140 MMOL/L (ref 136–145)
SODIUM SERPL-SCNC: 141 MMOL/L (ref 136–145)
SPECIMEN EXPIRATION DATE: NORMAL
T WAVE AXIS: 42 DEGREES
TOTAL CELLS COUNTED SPEC: 100
VENTRICULAR RATE: 97 BPM
WBC # BLD AUTO: 11.98 THOUSAND/UL (ref 4.31–10.16)

## 2018-09-18 PROCEDURE — 84100 ASSAY OF PHOSPHORUS: CPT | Performed by: NURSE PRACTITIONER

## 2018-09-18 PROCEDURE — 86850 RBC ANTIBODY SCREEN: CPT | Performed by: NURSE PRACTITIONER

## 2018-09-18 PROCEDURE — C2617 STENT, NON-COR, TEM W/O DEL: HCPCS | Performed by: INTERNAL MEDICINE

## 2018-09-18 PROCEDURE — 83735 ASSAY OF MAGNESIUM: CPT | Performed by: NURSE PRACTITIONER

## 2018-09-18 PROCEDURE — 82948 REAGENT STRIP/BLOOD GLUCOSE: CPT

## 2018-09-18 PROCEDURE — 93010 ELECTROCARDIOGRAM REPORT: CPT | Performed by: INTERNAL MEDICINE

## 2018-09-18 PROCEDURE — 82330 ASSAY OF CALCIUM: CPT | Performed by: NURSE PRACTITIONER

## 2018-09-18 PROCEDURE — 80048 BASIC METABOLIC PNL TOTAL CA: CPT | Performed by: NURSE PRACTITIONER

## 2018-09-18 PROCEDURE — 86900 BLOOD TYPING SEROLOGIC ABO: CPT | Performed by: NURSE PRACTITIONER

## 2018-09-18 PROCEDURE — C1769 GUIDE WIRE: HCPCS | Performed by: INTERNAL MEDICINE

## 2018-09-18 PROCEDURE — 43274 ERCP DUCT STENT PLACEMENT: CPT | Performed by: INTERNAL MEDICINE

## 2018-09-18 PROCEDURE — 86901 BLOOD TYPING SEROLOGIC RH(D): CPT | Performed by: NURSE PRACTITIONER

## 2018-09-18 PROCEDURE — 80053 COMPREHEN METABOLIC PANEL: CPT | Performed by: NURSE PRACTITIONER

## 2018-09-18 PROCEDURE — 99223 1ST HOSP IP/OBS HIGH 75: CPT | Performed by: INTERNAL MEDICINE

## 2018-09-18 PROCEDURE — 85027 COMPLETE CBC AUTOMATED: CPT | Performed by: NURSE PRACTITIONER

## 2018-09-18 PROCEDURE — 99233 SBSQ HOSP IP/OBS HIGH 50: CPT | Performed by: INTERNAL MEDICINE

## 2018-09-18 PROCEDURE — 86927 PLASMA FRESH FROZEN: CPT

## 2018-09-18 PROCEDURE — P9037 PLATE PHERES LEUKOREDU IRRAD: HCPCS

## 2018-09-18 PROCEDURE — P9017 PLASMA 1 DONOR FRZ W/IN 8 HR: HCPCS

## 2018-09-18 PROCEDURE — 85610 PROTHROMBIN TIME: CPT | Performed by: NURSE PRACTITIONER

## 2018-09-18 PROCEDURE — C2625 STENT, NON-COR, TEM W/DEL SY: HCPCS | Performed by: INTERNAL MEDICINE

## 2018-09-18 PROCEDURE — 74330 X-RAY BILE/PANC ENDOSCOPY: CPT

## 2018-09-18 PROCEDURE — 30233R1 TRANSFUSION OF NONAUTOLOGOUS PLATELETS INTO PERIPHERAL VEIN, PERCUTANEOUS APPROACH: ICD-10-PCS | Performed by: INTERNAL MEDICINE

## 2018-09-18 PROCEDURE — 94640 AIRWAY INHALATION TREATMENT: CPT

## 2018-09-18 PROCEDURE — 43273 ENDOSCOPIC PANCREATOSCOPY: CPT | Performed by: INTERNAL MEDICINE

## 2018-09-18 PROCEDURE — 85007 BL SMEAR W/DIFF WBC COUNT: CPT | Performed by: NURSE PRACTITIONER

## 2018-09-18 PROCEDURE — 0F798DZ DILATION OF COMMON BILE DUCT WITH INTRALUMINAL DEVICE, VIA NATURAL OR ARTIFICIAL OPENING ENDOSCOPIC: ICD-10-PCS | Performed by: INTERNAL MEDICINE

## 2018-09-18 PROCEDURE — 30233K1 TRANSFUSION OF NONAUTOLOGOUS FROZEN PLASMA INTO PERIPHERAL VEIN, PERCUTANEOUS APPROACH: ICD-10-PCS | Performed by: INTERNAL MEDICINE

## 2018-09-18 DEVICE — BILIARY STENT
Type: IMPLANTABLE DEVICE | Site: ABDOMEN | Status: NON-FUNCTIONAL
Brand: ADVANIX™ BILIARY
Removed: 2018-11-23

## 2018-09-18 RX ORDER — LIDOCAINE HYDROCHLORIDE 10 MG/ML
INJECTION, SOLUTION INFILTRATION; PERINEURAL AS NEEDED
Status: DISCONTINUED | OUTPATIENT
Start: 2018-09-18 | End: 2018-09-18 | Stop reason: SURG

## 2018-09-18 RX ORDER — DEXTROSE MONOHYDRATE 25 G/50ML
INJECTION, SOLUTION INTRAVENOUS
Status: COMPLETED
Start: 2018-09-18 | End: 2018-09-18

## 2018-09-18 RX ORDER — ONDANSETRON 2 MG/ML
INJECTION INTRAMUSCULAR; INTRAVENOUS
Status: COMPLETED
Start: 2018-09-18 | End: 2018-09-18

## 2018-09-18 RX ORDER — LEVALBUTEROL 1.25 MG/.5ML
1.25 SOLUTION, CONCENTRATE RESPIRATORY (INHALATION) EVERY 8 HOURS PRN
Status: DISCONTINUED | OUTPATIENT
Start: 2018-09-18 | End: 2018-09-26

## 2018-09-18 RX ORDER — DEXTROSE MONOHYDRATE 25 G/50ML
50 INJECTION, SOLUTION INTRAVENOUS ONCE
Status: COMPLETED | OUTPATIENT
Start: 2018-09-18 | End: 2018-09-18

## 2018-09-18 RX ORDER — SODIUM CHLORIDE 9 MG/ML
INJECTION, SOLUTION INTRAVENOUS CONTINUOUS PRN
Status: DISCONTINUED | OUTPATIENT
Start: 2018-09-18 | End: 2018-09-18 | Stop reason: SURG

## 2018-09-18 RX ORDER — ROCURONIUM BROMIDE 10 MG/ML
INJECTION, SOLUTION INTRAVENOUS AS NEEDED
Status: DISCONTINUED | OUTPATIENT
Start: 2018-09-18 | End: 2018-09-18 | Stop reason: SURG

## 2018-09-18 RX ORDER — FUROSEMIDE 10 MG/ML
20 INJECTION INTRAMUSCULAR; INTRAVENOUS ONCE
Status: COMPLETED | OUTPATIENT
Start: 2018-09-18 | End: 2018-09-18

## 2018-09-18 RX ORDER — PROPOFOL 10 MG/ML
INJECTION, EMULSION INTRAVENOUS AS NEEDED
Status: DISCONTINUED | OUTPATIENT
Start: 2018-09-18 | End: 2018-09-18 | Stop reason: SURG

## 2018-09-18 RX ORDER — ONDANSETRON 2 MG/ML
4 INJECTION INTRAMUSCULAR; INTRAVENOUS ONCE
Status: COMPLETED | OUTPATIENT
Start: 2018-09-18 | End: 2018-09-18

## 2018-09-18 RX ORDER — FENTANYL CITRATE 50 UG/ML
INJECTION, SOLUTION INTRAMUSCULAR; INTRAVENOUS AS NEEDED
Status: DISCONTINUED | OUTPATIENT
Start: 2018-09-18 | End: 2018-09-18 | Stop reason: SURG

## 2018-09-18 RX ORDER — MAGNESIUM SULFATE HEPTAHYDRATE 40 MG/ML
2 INJECTION, SOLUTION INTRAVENOUS ONCE
Status: COMPLETED | OUTPATIENT
Start: 2018-09-18 | End: 2018-09-18

## 2018-09-18 RX ORDER — ONDANSETRON 2 MG/ML
4 INJECTION INTRAMUSCULAR; INTRAVENOUS EVERY 4 HOURS PRN
Status: DISCONTINUED | OUTPATIENT
Start: 2018-09-18 | End: 2018-09-28 | Stop reason: HOSPADM

## 2018-09-18 RX ADMIN — LEVALBUTEROL HYDROCHLORIDE 1.25 MG: 1.25 SOLUTION, CONCENTRATE RESPIRATORY (INHALATION) at 15:17

## 2018-09-18 RX ADMIN — FUROSEMIDE 20 MG: 10 INJECTION, SOLUTION INTRAMUSCULAR; INTRAVENOUS at 15:21

## 2018-09-18 RX ADMIN — PIPERACILLIN SODIUM AND TAZOBACTAM SODIUM 3.38 G: 36; 4.5 INJECTION, POWDER, FOR SOLUTION INTRAVENOUS at 06:03

## 2018-09-18 RX ADMIN — VANCOMYCIN 125 MG: KIT at 13:43

## 2018-09-18 RX ADMIN — ONDANSETRON 4 MG: 2 INJECTION INTRAMUSCULAR; INTRAVENOUS at 06:36

## 2018-09-18 RX ADMIN — PIPERACILLIN SODIUM AND TAZOBACTAM SODIUM 3.38 G: 36; 4.5 INJECTION, POWDER, FOR SOLUTION INTRAVENOUS at 18:42

## 2018-09-18 RX ADMIN — PIPERACILLIN SODIUM AND TAZOBACTAM SODIUM 3.38 G: 36; 4.5 INJECTION, POWDER, FOR SOLUTION INTRAVENOUS at 12:28

## 2018-09-18 RX ADMIN — DEXTROSE MONOHYDRATE 25 ML: 25 INJECTION, SOLUTION INTRAVENOUS at 12:39

## 2018-09-18 RX ADMIN — MAGNESIUM SULFATE HEPTAHYDRATE 2 G: 40 INJECTION, SOLUTION INTRAVENOUS at 06:18

## 2018-09-18 RX ADMIN — FENTANYL CITRATE 50 MCG: 50 INJECTION, SOLUTION INTRAMUSCULAR; INTRAVENOUS at 10:32

## 2018-09-18 RX ADMIN — CHLORHEXIDINE GLUCONATE 0.12% ORAL RINSE 15 ML: 1.2 LIQUID ORAL at 08:14

## 2018-09-18 RX ADMIN — SODIUM CHLORIDE, SODIUM LACTATE, POTASSIUM CHLORIDE, AND CALCIUM CHLORIDE 500 ML: .6; .31; .03; .02 INJECTION, SOLUTION INTRAVENOUS at 01:36

## 2018-09-18 RX ADMIN — ONDANSETRON 4 MG: 2 INJECTION INTRAMUSCULAR; INTRAVENOUS at 13:43

## 2018-09-18 RX ADMIN — IPRATROPIUM BROMIDE 0.5 MG: 0.5 SOLUTION RESPIRATORY (INHALATION) at 15:17

## 2018-09-18 RX ADMIN — LIDOCAINE HYDROCHLORIDE 40 MG: 10 INJECTION, SOLUTION INFILTRATION; PERINEURAL at 10:32

## 2018-09-18 RX ADMIN — PIPERACILLIN SODIUM AND TAZOBACTAM SODIUM 3.38 G: 36; 4.5 INJECTION, POWDER, FOR SOLUTION INTRAVENOUS at 01:03

## 2018-09-18 RX ADMIN — PROPOFOL 50 MG: 10 INJECTION, EMULSION INTRAVENOUS at 10:32

## 2018-09-18 RX ADMIN — VANCOMYCIN 125 MG: KIT at 23:57

## 2018-09-18 RX ADMIN — METOPROLOL TARTRATE 5 MG: 5 INJECTION, SOLUTION INTRAVENOUS at 13:47

## 2018-09-18 RX ADMIN — ROCURONIUM BROMIDE 30 MG: 10 INJECTION INTRAVENOUS at 10:32

## 2018-09-18 RX ADMIN — DEXTROSE MONOHYDRATE 50 ML: 25 INJECTION, SOLUTION INTRAVENOUS at 01:35

## 2018-09-18 RX ADMIN — SODIUM CHLORIDE: 9 INJECTION, SOLUTION INTRAVENOUS at 10:29

## 2018-09-18 RX ADMIN — Medication 1 G: at 06:18

## 2018-09-18 RX ADMIN — PIPERACILLIN SODIUM AND TAZOBACTAM SODIUM 3.38 G: 36; 4.5 INJECTION, POWDER, FOR SOLUTION INTRAVENOUS at 23:57

## 2018-09-18 RX ADMIN — VANCOMYCIN 125 MG: KIT at 18:59

## 2018-09-18 RX ADMIN — IOHEXOL 3 ML: 240 INJECTION, SOLUTION INTRATHECAL; INTRAVASCULAR; INTRAVENOUS; ORAL at 13:04

## 2018-09-18 RX ADMIN — PHENYLEPHRINE HYDROCHLORIDE 20 MCG/MIN: 10 INJECTION INTRAVENOUS at 10:49

## 2018-09-18 RX ADMIN — INDOMETHACIN 100 MG: 50 SUPPOSITORY RECTAL at 11:27

## 2018-09-18 RX ADMIN — SUGAMMADEX 100 MG: 100 INJECTION, SOLUTION INTRAVENOUS at 11:33

## 2018-09-18 NOTE — CASE MANAGEMENT
Initial Clinical Review    Admission: Date/Time/Statement: 9/17/18 @ 1824     Orders Placed This Encounter   Procedures    Inpatient Admission (expected length of stay for this patient is greater than two midnights)     Standing Status:   Standing     Number of Occurrences:   1     Order Specific Question:   Admitting Physician     Answer:   Suzie Brown [[id number]]     Order Specific Question:   Level of Care     Answer:   Level 1 Stepdown [13]     Order Specific Question:   Estimated length of stay     Answer:   More than 2 Midnights     Order Specific Question:   Certification     Answer:   I certify that inpatient services are medically necessary for this patient for a duration of greater than two midnights  See H&P and MD Progress Notes for additional information about the patient's course of treatment  ED: Date/Time/Mode of Arrival:   ED Arrival Information     Expected Arrival Acuity Means of Arrival Escorted By Service Admission Type    - 9/17/2018 15:32 Urgent Ambulance [facility] Critical Care/ICU Urgent    Arrival Complaint    Fall          Chief Complaint:   Chief Complaint   Patient presents with    Weakness - Generalized     Patient arrived EMS with increased weakness per wife  Patient temperature PTA was 101 3  Patient states he ate [employer] a few days ago and "has not been sitting well"  Patient has had poor appetite for 2 days  History of Illness:   Pt  Presents with vague  Gi symptoms  Past week  States he  Ate  At  [facility] a few  Days  Prior  To adm and  Developed nausea and anorexia  Since then  Additionally,   Found with fever and elevated  Creatinine      ED Vital Signs:   ED Triage Vitals [09/17/18 1535]   Temperature Pulse Respirations Blood Pressure SpO2   98 3 °F (36 8 °C) 102 18 125/56 97 %      Temp Source Heart Rate Source Patient Position - Orthostatic VS BP Location FiO2 (%)   Oral Monitor Lying Right arm --      Pain Score       No Pain        Wt Readings from Last 1 Encounters:   09/17/18 54 6 kg (120 lb 5 9 oz)       Vital Signs (abnormal):      Temp max  101 7    Abnormal Labs/Diagnostic Test Results:   Lactic  Acid      10 6  Total  CK   1,273  Lipase   72  U/a    Tr ketone    2+ protein   lg  Blood  CO2  12  AG  21  BUN/Creat    51/3 09  AST  110  ALT   112  Alk phos   323  Albumin   2 1  Total  Bili   4 97  Troponin   0 58  WBC   11 96  H/H   11 3/34 9  Platelets   44  PT   23 2     INR   2 05  Ct  Head:    No acute intracranial abnormality  Ct  Abd/pelvis: There is marked intrahepatic and extrahepatic biliary dilatation   The common duct measures up to approximately 3 cm in transverse diameter with no calcified stones identified in the common duct  The gallbladder is mildly distended with innumerable small calcified stones layering within the dependent portion of the gallbladder  CXR:     Mild right basilar airspace disease in keeping with atelectasis or pneumonia  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      ED Treatment:   Medication Administration from 09/17/2018 1532 to 09/17/2018 2001       Date/Time Order Dose Route Action Action by Comments     09/17/2018 1848 sodium chloride 0 9 % bolus 1,000 mL 0 mL Intravenous Stopped Natasha Castaneda RN      09/17/2018 1544 sodium chloride 0 9 % bolus 1,000 mL 1,000 mL Intravenous New 1263 Rosalio Talley RN      09/17/2018 1848 sodium chloride 0 9 % bolus 650 mL 0 mL Intravenous Stopped Natasha Castaneda RN      09/17/2018 1740 sodium chloride 0 9 % bolus 650 mL 650 mL Intravenous New Bag Natasha Castaneda RN      09/17/2018 1643 acetaminophen (TYLENOL) tablet 650 mg 650 mg Oral Given Lucius Sarah RN      09/17/2018 1938 piperacillin-tazobactam (ZOSYN) 3 375 g in sodium chloride 0 9 % 50 mL IVPB 0 g Intravenous Stopped Natasha Castaneda RN      09/17/2018 1844 piperacillin-tazobactam (ZOSYN) 3 375 g in sodium chloride 0 9 % 50 mL IVPB 3 375 g Intravenous New Bag Leah GAMEZ Cee Jon RN           Past Medical/Surgical History: Active Ambulatory Problems     Diagnosis Date Noted    No Active Ambulatory Problems     Resolved Ambulatory Problems     Diagnosis Date Noted    No Resolved Ambulatory Problems     Past Medical History:   Diagnosis Date    Hypertension     Medical history unknown        Admitting Diagnosis: Acute cholangitis [K83 0]  Weakness [R53 1]  Generalized weakness [R53 1]  Sepsis ([facility] [address] ) [A41 9]    Age/Sex: 80 y o  male    1  Assessment/Plan: Sepsis likely secondary to choledocholithiasis with possible acute cholangitis  · Given the patient's acute kidney injury and elevated lactic acid level he will be admitted to the step-down unit for evaluation and monitoring  This will likely require greater than a 2 midnight stay therefore the patient will be placed in inpatient status  · The patient was started on Zosyn which we will continue pending his culture results  · We will continue with fluid resuscitation monitoring the patient's blood pressure and urine output closely  · We will consult Gastroenterology given the possible need for ERCP  2  Gallstones with dilated hepatic and common bile duct, possible cholangitis  · Given the patient's elevated liver enzymes, acute renal failure, lactic acidosis and fever on admission we will treat him as if he has acute cholangitis for now  · We will continue Zosyn as noted above  · We will continue with fluid resuscitation  · A GI consult is pending  3  Acute kidney injury on unknown chronic kidney disease status  · The patient does not have baseline renal function labs for comparison however his creatinine is markedly elevated  · We will continue to monitor his renal indices and urine output closely  · We will hold his outpatient lisinopril as this was likely a contributing factor to the development of his acute kidney injury  4  Metabolic acidosis likely related to 1    And 2   · His lactic acid level was markedly elevated on admission but it has improved with fluid administration  · We will continue to monitor this for resolution  5  Transaminitis likely related to 2, possibly a component of shock liver  We will monitor his liver enzymes closely   History of hypertension  ? We will hold the patient's outpatient lisinopril for now  His initial blood pressure was in the 784 systolic range and has been in the low 1 100s to 120s  Though his maps are above 65 this may represent a relative hypotension  ? We will monitor his blood pressure closely with a goal to maintain his mean arterial pressure greater than 70   ? He may require vasopressor therapy       Admission Orders:   IP    9/17  @   1825  Scheduled Meds:   Current Facility-Administered Medications:  chlorhexidine 15 mL Swish & Spit Q12H [facility] Kay Tonya, CRNP    lactated ringers 125 mL/hr Intravenous Continuous Kay Shuqualak, CRNP Last Rate: 125 mL/hr (09/17/18 2026)   metoprolol 5 mg Intravenous Q6H PRN Basil Sieving, CRNP    piperacillin-tazobactam 3 375 g Intravenous Q6H Kay Tonya, CRNP Last Rate: 3 375 g (09/18/18 0603)     Continuous Infusions:   lactated ringers 125 mL/hr Last Rate: 125 mL/hr (09/17/18 2026)     PRN Meds: metoprolol     BC  O2  2L  Cons  GI  Stool c/diff  2 U FFP  2  U  Platelets  ERCP    PER    GI CONSULT:  Sepsis secondary to cholangitis: He presented with fever, tachycardia, leukocytosis, lactic acidosis, JOSE E, elevated liver enzymes on presentation  Tmax 101 7  CT abdomen pelvis wo contrast shows marked intrahepatic and extrahepatic biliary dilation with CBD measuring approximately 3 cm  No calcified gallstones identified in the common duct  The gallbladder appears mildly distended with innumerable small calcified stones  He was started on IV Zosyn   His abdominal exam is benign at present time       -Plan for urgent ERCP this morning  -Continue IV Zosyn  -Monitor LFTs  -Monitor WBC count and temperature     2) Thrombocytopenia: Platelets dropped acutely overnight to 11,000--? DIC     -Plan to give 2 units platelets pre-procedure     3) Coagulopathy: INR elevated at 1 89     -Plan to give 2 units FFP pre-procedure    Thank you,  [address]  Utilization Review Department  Phone: [phone]; Fax [phone]  ATTENTION: Please call with any questions or concerns to [phone]  and carefully follow the prompts so that you are directed to the right person  Send all requests for admission clinical reviews, approved or denied determinations and any other requests to fax [phone]   All voicemails are confidential

## 2018-09-18 NOTE — OP NOTE
OPERATIVE REPORT  PATIENT NAME: Katarina Henry    :  1932  MRN: [id number]  Pt Location: [facility] OR ROOM 01    SURGERY DATE: 2018    Surgeon(s) and Role:     * Eve Neal MD - Primary    ENDOSCOPIC RETROGRADE CHOLAGIOPANCREATOGRAPHY    PROCEDURE: ERCP/ Biliary Plastic Stent Placement    INDICATIONS: Choledocholithiasis and cholangitis    POST-OP DIAGNOSIS: See the impression below    SEDATION: Monitored anesthesia care, check anesthesia records    PHYSICAL EXAM:    Blood pressure 135/68, pulse 92, temperature 99 2 °F (37 3 °C), resp  rate 20, height 5' 4" (1 626 m), weight 54 6 kg (120 lb 5 9 oz), SpO2 98 %  Body mass index is 20 66 kg/m²  General: NAD  Heart: S1 & S2 normal, RRR  Lungs: CTA, No rales or rhonchi  Abdomen: Soft, nontender, nondistended, good bowel sounds    CONSENT:  Informed consent was obtained for the procedure, including sedation after explaining the risks and benefits of the procedure  Risks including but not limited to severe pancreatitis, bleeding, perforation, infection, aspiration were discussed in detail  Also explained about less than 100% sensitivity with the exam and other alternatives  PREPARATION:   EKG tracing, pulse oximetry, blood pressure were monitored throughout the procedure  Patient was identified by myself both verbally and by visual inspection of ID band  Patient was given indomethacin 100 mg rectally prior to procedure  DESCRIPTION:   Patient was placed in the prone position and was sedated with the above medication  The standard lateral viewing gastroduodenoscope was introduced in to the oropharynx and the esophagus was intubated under direct visualization using sliding technique  Scope was passed down the esophagus up to the second part of the duodenum  The scope was withdrawn and noted the ampulla on the medial wall  Ampulla was cannulated with sphinctertome catheter and cholangiogram was performed  FINDINGS:    Bulging ampulla noted    Biliary cannulation was achieved on initial attempt  A 3 mL contrast was injected into bile duct  A 12 mm round filling defect was noted in the distal CBD  I did not perform sphincterotomy or sphincteroplasty because of coagulopathy and thrombocytopenia  A 10 [place]ian by 5 cm double pigtail plastic biliary stent was placed with good biliary drainage  Pancreatic duct was not cannulated or injected  IMPRESSIONS:      Choledocholithiasis  Large stone noted in the distal CBD  ERCP with plastic biliary stent placement  No sphincterotomy or sphincteroplasty was performed due to coagulopathy and thrombocytopenia  Good bile drainage noted after procedure  Pancreatic duct was not injected or cannulated  RECOMMENDATIONS:     Monitor LFTs  Continue IV antibiotics  ICU care  Elective ERCP with sphincterotomy and stone extraction  COMPLICATIONS: None; patient tolerated the procedure well          DISPOSITION: PACU           CONDITION: Stable  SIGNATURE: Cinthya Corrales MD  DATE: September 18, 2018  TIME: 11:07 AM

## 2018-09-18 NOTE — PROGRESS NOTES
Progress Note - Critical Care   Estevan Gil 80 y o  male MRN: [id number]  Unit/Bed#: ICU 13 Encounter: [id number]    Assessment/Plan:  1  Sepsis likely secondary to choledocholithiasis with possible acute cholangitis  · Patient has remained stable overnight, his physical exam is notable for tachypnea  He received one IVF bolus for acidosis, which is improving  Patient remained normotensive, afebrile  Still denies abdominal pain  · Continue zosyn  · Awaiting GI input  · Follow blood cultures, temps, WBC    2  Gallstones with dilated hepatic and CBD, possible cholangitis  · Treatment as above    3  Acute kidney injury on unknown kidney status  · Creatinine continues to increase, patient is making urine, approximately 30ml/hr  · Monitor renal indices, urine output  · Continue to hold lisinopril    4  Metabolic acidosis secondary to 1,2  · Improving    5  Transaminitis likely secondary to 2, possibly shock liver  · Will continue to monitor this    6  Thrombocytopenia, likely secondary to sepsis  · Platelets continue to decrease  · Monitor for s/s of bleeding  · Will transfuse platelets if has procedure   · Will consider DIC, TTP work up if patient becomes unstable    7  Coagulopathy  · INR increased at 1 89, ws 2 0  · Will continue to monitor    8  History of hypertension    _____________________________________________________________________    HPI/24hr events:   Patient remained stable overnight with no issues  Medications:    Current Facility-Administered Medications:  chlorhexidine 15 mL Swish & Spit Q12H [address] Essie Severs, CRNP    lactated ringers 125 mL/hr Intravenous Continuous Essie Severs, CRNP Last Rate: 125 mL/hr (09/17/18 2026)   metoprolol 5 mg Intravenous Q6H PRN BROOKLYN Farmer    piperacillin-tazobactam 3 375 g Intravenous Q6H Essie Severs, CRNP Last Rate: 3 375 g (09/18/18 0103)         lactated ringers 125 mL/hr Last Rate: 125 mL/hr (09/17/18 2026)         Physical exam:  Vitals:    Body mass index is 22 02 kg/m²  Blood pressure 139/65, pulse 100, temperature 98 4 °F (36 9 °C), temperature source Temporal, resp  rate (!) 33, weight 54 6 kg (120 lb 5 9 oz), SpO2 93 %  ,  Temp  Min: 98 1 °F (36 7 °C)  Max: 101 7 °F (38 7 °C)  IBW: -88 kg    SpO2: 93 %  SpO2 Activity: At Rest  O2 Device: Nasal cannula      Intake/Output Summary (Last 24 hours) at 09/18/18 0543  Last data filed at 09/18/18 0430   Gross per 24 hour   Intake          3047 92 ml   Output              300 ml   Net          2747 92 ml       Invasive/non-invasive ventilation settings:   Respiratory    Lab Data (Last 4 hours)    None         O2/Vent Data (Last 4 hours)    None              Invasive Devices     Peripheral Intravenous Line            Peripheral IV 09/17/18 Left Hand 1 day    Peripheral IV 09/17/18 Right Forearm less than 1 day                  Physical Exam:  Gen: pleasant, jaundice, ill appearing, in NAD  HEENT: normocephalic, atraumatic, oropharynx clear  Neck: no JVD, no lymphadenopathy, trachea midline  Chest: lung sounds clear, diminished  Cor: distant heart sounds, regular rate, trace edema of bLE  Abd: soft, non tender, non distended, normoactive bowel sounds  Ext: moves all extremities, equally  Neuro: alert and oriented x3, no focal deficits  Skin: jaundice, warm, dry    Diagnostic Data:  Lab: I have personally reviewed pertinent lab results     CBC:     Results from last 7 days  Lab Units 09/18/18  0441 09/17/18  1836 09/17/18  1546   WBC Thousand/uL 11 98*  --  11 96*   HEMOGLOBIN g/dL 12 0  --  11 3*   I STAT HEMOGLOBIN g/dl  --  10 2*  --    HEMATOCRIT % 36 7 30* 34 9*   PLATELETS Thousands/uL 11*  --  44*       CMP:     Results from last 7 days  Lab Units 09/18/18  0442 09/18/18  0016 09/17/18  1836 09/17/18  1546   SODIUM mmol/L 141 140  --  139   POTASSIUM mmol/L 3 8 3 9  --  4 2   CHLORIDE mmol/L 107 108  --  106   CO2 mmol/L 20* 17*  --  12*   BUN mg/dL 55* 55*  --  51*   CREATININE mg/dL 3 15* 3 05*  --  3 09* CALCIUM mg/dL 7 8* 7 5*  --  7 9*   ALK PHOS U/L 207*  --   --  323*   ALT U/L 115*  --   --  112*   AST U/L 150*  --   --  110*   GLUCOSE, ISTAT mg/dl  --   --  75  --      PT/INR:   Lab Results   Component Value Date    INR 1 89 (H) 09/18/2018    INR 2 05 (H) 09/17/2018   ,   Magnesium:     Results from last 7 days  Lab Units 09/18/18  0442   MAGNESIUM mg/dL 1 5*     Phosphorous:     Results from last 7 days  Lab Units 09/18/18  0442   PHOSPHORUS mg/dL 4 8*       Cardiac lab/EKG/telemetry/ECHO:   ST    VTE Prophylaxis: SCD    Code Status: Level 1 - Full Code    BROOKLYN Mccall    Portions of the record may have been created with voice recognition software  Occasional wrong word or "sound a like" substitutions may have occurred due to the inherent limitations of voice recognition software  Read the chart carefully and recognize, using context, where substitutions have occurred

## 2018-09-18 NOTE — PROGRESS NOTES
Patient noted to be C  diff positive, so we ordered oral vancomycin 125 mg every 6 hours  Discussed C  Diff results along with ERCP findings with patient's wife Valeria Pratima over the phone  Left voicemail update for patient's daughter Neeraj Angel

## 2018-09-18 NOTE — CONSULTS
Consultation - [facility]  Mai Brigido 80 y o  male MRN: [id number]  Unit/Bed#: ICU 13 Encounter: [id number]        Inpatient consult to gastroenterology  Consult performed by: Marvel Schultz ordered by: Regina Mendiola          Reason for Consult / Principal Problem: acute cholangitis    HPI: 42-year-old male with hypertension presenting for evaluation of generalized weakness  He ate [employer] 2 days ago and afterward developed nausea and poor appetite  He then developed progressive weakness and fell yesterday morning, which prompted call to EMS  He was noted to have fever of 101  He denies chest pain, SOB, vomiting, abdominal pain, urinary symptoms  He has been having loose stools so he is currently on C  Diff precautions  He was found to have leukocytosis, lactic acidosis, JOSE E, elevated liver enzymes on presentation  Tmax 101 7  CT abdomen pelvis wo contrast shows marked intrahepatic and extrahepatic biliary dilation with CBD measuring approximately 3 cm  No calcified gallstones identified in the common duct  The gallbladder appears mildly distended with innumerable small calcified stones  He was initiated on IV antibiotics for treatment of acute cholangitis  GI has been consulted for ERCP  He denies past abdominal surgeries  No known family history of GI or liver malignancies  No history of smoking, alcohol, or drug use  He reports having colonoscopy several years ago which was unremarkable, per the patient  REVIEW OF SYSTEMS:    CONSTITUTIONAL: + Fever + Poor appetite + Recent weight loss  HEENT: No earache or tinnitus  Denies hearing loss or visual disturbances  CARDIOVASCULAR: No chest pain or palpitations  RESPIRATORY: Denies any cough, hemoptysis, shortness of breath or dyspnea on exertion  GASTROINTESTINAL: As noted in the History of Present Illness  GENITOURINARY: No problems with urination  Denies any hematuria or dysuria    NEUROLOGIC: No dizziness or vertigo, denies headaches  MUSCULOSKELETAL: Denies any muscle or joint pain  SKIN: Denies skin rashes or itching  ENDOCRINE: Denies excessive thirst  Denies intolerance to heat or cold  PSYCHOSOCIAL: Denies depression or anxiety  Denies any recent memory loss  Historical Information   Past Medical History:   Diagnosis Date    Hypertension     Medical history unknown      Past Surgical History:   Procedure Laterality Date    NO PAST SURGERIES       Social History   History   Alcohol Use    Yes     History   Drug Use No     History   Smoking Status    Never Smoker   Smokeless Tobacco    Never Used     History reviewed  No pertinent family history  Meds/Allergies     Prescriptions Prior to Admission   Medication    lisinopril (ZESTRIL) 40 mg tablet    metoprolol tartrate (LOPRESSOR) 50 mg tablet     Current Facility-Administered Medications   Medication Dose Route Frequency    chlorhexidine (PERIDEX) 0 12 % oral rinse 15 mL  15 mL Swish & Spit Q12H [facility]    lactated ringers infusion  125 mL/hr Intravenous Continuous    metoprolol (LOPRESSOR) injection 5 mg  5 mg Intravenous Q6H PRN    piperacillin-tazobactam (ZOSYN) 3 375 g in sodium chloride 0 9 % 50 mL IVPB  3 375 g Intravenous Q6H       No Known Allergies        Objective     Blood pressure 99/50, pulse 91, temperature 98 2 °F (36 8 °C), resp  rate (!) 31, height 5' 4" (1 626 m), weight 54 6 kg (120 lb 5 9 oz), SpO2 94 %        Intake/Output Summary (Last 24 hours) at 09/18/18 0834  Last data filed at 09/18/18 0194   Gross per 24 hour   Intake          3047 92 ml   Output              375 ml   Net          2672 92 ml         PHYSICAL EXAM:      General Appearance:   Alert and oriented x 3, cooperative, no distress, appears stated age    HEENT:   Normocephalic, atraumatic  + Scleral icterus      Neck:  Supple, symmetrical, trachea midline, no adenopathy    Lungs:   Clear to auscultation bilaterally; no rales, rhonchi or wheezing; respirations unlabored    Heart[de-identified]   Tachycardic, regular rhythm   Abdomen:   Soft, non-tender, non-distended; normal bowel sounds; no masses, no organomegaly    Genitalia:   Deferred    Rectal:   Deferred    Extremities:  No cyanosis, clubbing or edema    Pulses:  2+ and symmetric all extremities    Skin:  + Jaundice    Lymph nodes:  No palpable cervical lymphadenopathy        Lab Results:     Results from last 7 days  Lab Units 09/18/18  0441   WBC Thousand/uL 11 98*   HEMOGLOBIN g/dL 12 0   HEMATOCRIT % 36 7   PLATELETS Thousands/uL 11*   LYMPHO PCT % 9*   MONO PCT MAN % 9   EOSINO PCT MANUAL % 0       Results from last 7 days  Lab Units 09/18/18  0442  09/17/18  1836   SODIUM mmol/L 141  < >  --    POTASSIUM mmol/L 3 8  < >  --    CHLORIDE mmol/L 107  < >  --    CO2 mmol/L 20*  < >  --    BUN mg/dL 55*  < >  --    CREATININE mg/dL 3 15*  < >  --    CALCIUM mg/dL 7 8*  < >  --    ALK PHOS U/L 207*  --   --    ALT U/L 115*  --   --    AST U/L 150*  --   --    GLUCOSE, ISTAT mg/dl  --   --  75   < > = values in this interval not displayed  Results from last 7 days  Lab Units 09/18/18  0442   INR  1 89*       Results from last 7 days  Lab Units 09/17/18  1546   LIPASE u/L 72*       Imaging Studies: I have personally reviewed pertinent imaging studies  Ct Abdomen Pelvis Wo Contrast    Result Date: 9/17/2018  Impression: There is marked intrahepatic and extrahepatic biliary dilatation  The common duct measures up to approximately 3 cm in transverse diameter with no calcified stones identified in the common duct  The gallbladder is mildly distended with innumerable small calcified stones layering within the dependent portion of the gallbladder  Correlate for signs of biliary obstruction  Recommend GI consultation  Examination limited by lack of oral and IV contrast and diffuse respiratory motion   Workstation performed: [id number]     Xr Chest 1 View Portable    Result Date: 9/17/2018  Impression: Mild right basilar airspace disease in keeping with atelectasis or pneumonia  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  Workstation performed: [id number]     Ct Head Without Contrast    Result Date: 9/17/2018  Impression: No acute intracranial abnormality  Microangiopathic changes  Workstation performed: [id number]       ASSESSMENT and PLAN:      Plan discussed with ICU team and Dr Angel Mack    I called and updated his wife Afua Don and his daughter Gen Tran    78-year-old male with hypertension presenting for evaluation of generalized weakness and nausea found to have sepsis secondary to cholangitis  1) Sepsis secondary to cholangitis: He presented with fever, tachycardia, leukocytosis, lactic acidosis, JOSE E, elevated liver enzymes on presentation  Tmax 101 7  CT abdomen pelvis wo contrast shows marked intrahepatic and extrahepatic biliary dilation with CBD measuring approximately 3 cm  No calcified gallstones identified in the common duct  The gallbladder appears mildly distended with innumerable small calcified stones  He was started on IV Zosyn  His abdominal exam is benign at present time      -Plan for urgent ERCP this morning  -Continue IV Zosyn  -Monitor LFTs  -Monitor WBC count and temperature    2) Thrombocytopenia: Platelets dropped acutely overnight to 11,000--? DIC    -Plan to give 2 units platelets pre-procedure    3) Coagulopathy: INR elevated at 1 89    -Plan to give 2 units FFP pre-procedure    Patient was seen and examined by Dr Angel Mack  All hunter medical decisions were made by Dr Angel Mack  Thank you for allowing us to participate in the care of this present patient  We will follow-up with you closely

## 2018-09-18 NOTE — ANESTHESIA PREPROCEDURE EVALUATION
Review of Systems/Medical History  Patient summary reviewed  Chart reviewed      Cardiovascular  EKG reviewed, Hypertension controlled,   Comment: EKG: Prolong QT, SR with PAC,  Pulmonary  Negative pulmonary ROS        GI/Hepatic      Comment: Acute Cholangitis - CT abdomen pelvis wo contrast shows marked intrahepatic and extrahepatic biliary dilation with CBD measuring approximately 3 cm    Negative  ROS        Endo/Other  Negative endo/other ROS      GYN       Hematology    Thrombocytopenia,   Comment: Thrombocytopenia - plts 11, receiving plts  INR - 1 89, receiving FFP  Musculoskeletal  Negative musculoskeletal ROS        Neurology  Negative neurology ROS      Psychology   Negative psychology ROS              Physical Exam    Airway    Mallampati score: II  TM Distance: <3 FB  Neck ROM: limited     Dental   upper dentures and lower dentures,     Cardiovascular  Rhythm: regular, Rate: normal,     Pulmonary  Breath sounds clear to auscultation,     Other Findings        Anesthesia Plan  ASA Score- 4 Emergent    Anesthesia Type- general with ASA Monitors  Additional Monitors:   Airway Plan: ETT  Comment: Pt to receive FFP, plts prior to OR  Called patients wife as well to discuss the plan        Plan Factors-Patient not instructed to abstain from smoking on day of procedure  Patient did not smoke on day of surgery  Induction- intravenous  Postoperative Plan-     Informed Consent- Anesthetic plan and risks discussed with patient  I personally reviewed this patient with the CRNA  Discussed and agreed on the Anesthesia Plan with the CRNA  Belinda Bueno

## 2018-09-19 LAB
ABO GROUP BLD BPU: NORMAL
ALBUMIN SERPL BCP-MCNC: 1.6 G/DL (ref 3.5–5)
ALP SERPL-CCNC: 85 U/L (ref 46–116)
ALT SERPL W P-5'-P-CCNC: 59 U/L (ref 12–78)
ANION GAP SERPL CALCULATED.3IONS-SCNC: 14 MMOL/L (ref 4–13)
AST SERPL W P-5'-P-CCNC: 75 U/L (ref 5–45)
BILIRUB DIRECT SERPL-MCNC: 3.13 MG/DL (ref 0–0.2)
BILIRUB SERPL-MCNC: 3.68 MG/DL (ref 0.2–1)
BPU ID: NORMAL
BUN SERPL-MCNC: 74 MG/DL (ref 5–25)
CALCIUM SERPL-MCNC: 7.6 MG/DL (ref 8.3–10.1)
CHLORIDE SERPL-SCNC: 108 MMOL/L (ref 100–108)
CO2 SERPL-SCNC: 20 MMOL/L (ref 21–32)
CREAT SERPL-MCNC: 3.96 MG/DL (ref 0.6–1.3)
ERYTHROCYTE [DISTWIDTH] IN BLOOD BY AUTOMATED COUNT: 14.2 % (ref 11.6–15.1)
GFR SERPL CREATININE-BSD FRML MDRD: 13 ML/MIN/1.73SQ M
GLUCOSE SERPL-MCNC: 81 MG/DL (ref 65–140)
GLUCOSE SERPL-MCNC: 84 MG/DL (ref 65–140)
HCT VFR BLD AUTO: 26.8 % (ref 36.5–49.3)
HGB BLD-MCNC: 9.3 G/DL (ref 12–17)
MAGNESIUM SERPL-MCNC: 2.1 MG/DL (ref 1.6–2.6)
MCH RBC QN AUTO: 33.3 PG (ref 26.8–34.3)
MCHC RBC AUTO-ENTMCNC: 34.7 G/DL (ref 31.4–37.4)
MCV RBC AUTO: 96 FL (ref 82–98)
PLATELET # BLD AUTO: 29 THOUSANDS/UL (ref 149–390)
PMV BLD AUTO: 10.9 FL (ref 8.9–12.7)
POTASSIUM SERPL-SCNC: 3.9 MMOL/L (ref 3.5–5.3)
PROT SERPL-MCNC: 4.7 G/DL (ref 6.4–8.2)
RBC # BLD AUTO: 2.79 MILLION/UL (ref 3.88–5.62)
SODIUM SERPL-SCNC: 142 MMOL/L (ref 136–145)
UNIT DISPENSE STATUS: NORMAL
UNIT PRODUCT CODE: NORMAL
UNIT RH: NORMAL
WBC # BLD AUTO: 19.94 THOUSAND/UL (ref 4.31–10.16)

## 2018-09-19 PROCEDURE — 99233 SBSQ HOSP IP/OBS HIGH 50: CPT | Performed by: INTERNAL MEDICINE

## 2018-09-19 PROCEDURE — 85027 COMPLETE CBC AUTOMATED: CPT | Performed by: NURSE PRACTITIONER

## 2018-09-19 PROCEDURE — 80048 BASIC METABOLIC PNL TOTAL CA: CPT | Performed by: NURSE PRACTITIONER

## 2018-09-19 PROCEDURE — 82948 REAGENT STRIP/BLOOD GLUCOSE: CPT

## 2018-09-19 PROCEDURE — 99232 SBSQ HOSP IP/OBS MODERATE 35: CPT | Performed by: INTERNAL MEDICINE

## 2018-09-19 PROCEDURE — 87040 BLOOD CULTURE FOR BACTERIA: CPT | Performed by: NURSE PRACTITIONER

## 2018-09-19 PROCEDURE — 80076 HEPATIC FUNCTION PANEL: CPT | Performed by: PHYSICIAN ASSISTANT

## 2018-09-19 PROCEDURE — 83735 ASSAY OF MAGNESIUM: CPT | Performed by: NURSE PRACTITIONER

## 2018-09-19 RX ORDER — FUROSEMIDE 10 MG/ML
80 INJECTION INTRAMUSCULAR; INTRAVENOUS ONCE
Status: COMPLETED | OUTPATIENT
Start: 2018-09-19 | End: 2018-09-19

## 2018-09-19 RX ORDER — ALBUMIN, HUMAN INJ 5% 5 %
25 SOLUTION INTRAVENOUS ONCE
Status: COMPLETED | OUTPATIENT
Start: 2018-09-19 | End: 2018-09-19

## 2018-09-19 RX ORDER — ATENOLOL 50 MG/1
TABLET ORAL
Qty: 90 TABLET | Refills: 0 | OUTPATIENT
Start: 2018-09-19

## 2018-09-19 RX ADMIN — VANCOMYCIN 125 MG: KIT at 18:02

## 2018-09-19 RX ADMIN — SODIUM CHLORIDE, SODIUM LACTATE, POTASSIUM CHLORIDE, AND CALCIUM CHLORIDE 1000 ML: .6; .31; .03; .02 INJECTION, SOLUTION INTRAVENOUS at 11:27

## 2018-09-19 RX ADMIN — SODIUM CHLORIDE, SODIUM LACTATE, POTASSIUM CHLORIDE, AND CALCIUM CHLORIDE 2000 ML: .6; .31; .03; .02 INJECTION, SOLUTION INTRAVENOUS at 15:48

## 2018-09-19 RX ADMIN — ALBUMIN HUMAN 25 G: 0.05 INJECTION, SOLUTION INTRAVENOUS at 09:28

## 2018-09-19 RX ADMIN — VANCOMYCIN 125 MG: KIT at 05:03

## 2018-09-19 RX ADMIN — PIPERACILLIN SODIUM AND TAZOBACTAM SODIUM 3.38 G: 36; 4.5 INJECTION, POWDER, FOR SOLUTION INTRAVENOUS at 18:02

## 2018-09-19 RX ADMIN — VANCOMYCIN 125 MG: KIT at 12:31

## 2018-09-19 RX ADMIN — FUROSEMIDE 80 MG: 10 INJECTION, SOLUTION INTRAMUSCULAR; INTRAVENOUS at 18:28

## 2018-09-19 RX ADMIN — PIPERACILLIN SODIUM AND TAZOBACTAM SODIUM 3.38 G: 36; 4.5 INJECTION, POWDER, FOR SOLUTION INTRAVENOUS at 05:46

## 2018-09-19 RX ADMIN — PIPERACILLIN SODIUM AND TAZOBACTAM SODIUM 3.38 G: 36; 4.5 INJECTION, POWDER, FOR SOLUTION INTRAVENOUS at 11:31

## 2018-09-19 NOTE — PROGRESS NOTES
Progress Note - Critical Care   Vida Winslow 80 y o  male MRN: [id number]  Unit/Bed#: ICU 13 Encounter: [id number]    Assessment/Plan:  1  Sepsis, gram negative bacteremia, multifactorial with components of choledocholithiasis, cholangitis and cdiff colitis; s/p ERCP with stent placement yesterday  · Patient remains stable  · Continue zosyn  · Follow cultures  · Monitor WBC, temps, procalcitonin  · GI following    2  Choledocholithiasis and cholangitis s/p ERCP with stent placment  · Treatment as above    3  C diff colitis  · Frequency of stools has decreased  · Continue oral vancomycin    4  Metabolic acidosis  · Serum bicarb continues to improve    5  Thrombocytopenia  · Received 2 units of platelets yesterday for procedure    6  Acute kidney injury, unknown kidney status  · Urine output is marginal  · Fluid bolus administered overnight given his low oral intake  · Renal indices worse this am    7  Coagulopathy  · Received FFP yesterday    8  History of hypertension    _____________________________________________________________________    HPI/24hr events:   No events overnight  Medications:    Current Facility-Administered Medications:  chlorhexidine 15 mL Swish & Spit Q12H [address] BROOKLYN Morales    ipratropium 0 5 mg Nebulization Q8H PRN Marty Dean MD    levalbuterol 1 25 mg Nebulization Q8H PRN BROOKLYN Russell    metoprolol 5 mg Intravenous Q6H PRN BROOKLYN Blakely    ondansetron 4 mg Intravenous Q4H PRN BROOKLYN Russell    piperacillin-tazobactam 3 375 g Intravenous Q6H BROOKLYN Morales Last Rate: 3 375 g (09/19/18 0546)   vancomycin 125 mg Oral Q6H [address] Dayan Tyler PA-C               Physical exam:  Vitals: Body mass index is 28 12 kg/m²  Blood pressure (!) 153/103, pulse 66, temperature 97 7 °F (36 5 °C), temperature source Tympanic, resp  rate 18, height 5' 4" (1 626 m), weight 74 3 kg (163 lb 12 8 oz), SpO2 99 %  ,  Temp  Min: 97 5 °F (36 4 °C)  Max: 101 7 °F (38 7 °C)  IBW: 59 2 kg    SpO2: 99 %  SpO2 Activity: At Rest  O2 Device: Nasal cannula      Intake/Output Summary (Last 24 hours) at 09/19/18 0735  Last data filed at 09/19/18 0001   Gross per 24 hour   Intake          2073 92 ml   Output              200 ml   Net          1873 92 ml       Invasive/non-invasive ventilation settings:   Respiratory    Lab Data (Last 4 hours)    None         O2/Vent Data (Last 4 hours)    None              Invasive Devices     Peripheral Intravenous Line            Peripheral IV 09/17/18 Left Hand 2 days    Peripheral IV 09/18/18 Right Forearm less than 1 day                  Physical Exam:  Gen: jaundice, unkept, in NAD  HEENT: normocephalic,atraumatic, oropharynx  Neck: no JVD, no lymphadenopathy, trachea clear  Chest: lung sounds clear, equal  Cor: audible S1,S2, no edema  Abd: soft, non tender, non distended, normoactive bowel sounds  Ext: moves all extremities, equally  Neuro: alert and oriented x 3, no focal deficits  Skin: warm, dry, multiple ecchymoses on BUEs      Diagnostic Data:  Lab: I have personally reviewed pertinent lab results     CBC:     Results from last 7 days  Lab Units 09/19/18  0456 09/18/18  0441 09/17/18  1836 09/17/18  1546   WBC Thousand/uL 19 94* 11 98*  --  11 96*   HEMOGLOBIN g/dL 9 3* 12 0  --  11 3*   I STAT HEMOGLOBIN g/dl  --   --  10 2*  --    HEMATOCRIT % 26 8* 36 7 30* 34 9*   PLATELETS Thousands/uL 29* 11*  --  44*       CMP:     Results from last 7 days  Lab Units 09/19/18  0456 09/18/18  0442 09/18/18  0016 09/17/18  1836 09/17/18  1546   SODIUM mmol/L 142 141 140  --  139   POTASSIUM mmol/L 3 9 3 8 3 9  --  4 2   CHLORIDE mmol/L 108 107 108  --  106   CO2 mmol/L 20* 20* 17*  --  12*   BUN mg/dL 74* 55* 55*  --  51*   CREATININE mg/dL 3 96* 3 15* 3 05*  --  3 09*   CALCIUM mg/dL 7 6* 7 8* 7 5*  --  7 9*   ALK PHOS U/L 85 207*  --   --  323*   ALT U/L 59 115*  --   --  112*   AST U/L 75* 150*  --   --  110*   GLUCOSE, ISTAT mg/dl  --   --   --  75  -- PT/INR:   No results found for: PT, INR,   Magnesium:     Results from last 7 days  Lab Units 09/18/18  0442   MAGNESIUM mg/dL 1 5*     Phosphorous:     Results from last 7 days  Lab Units 09/18/18  0442   PHOSPHORUS mg/dL 4 8*       Microbiology:    Results from last 7 days  Lab Units 09/17/18  2206 09/17/18  1546   GRAM STAIN RESULT   --  Gram negative rods  Gram negative rods   C DIFF TOXIN B  POSITIVE for C difficle toxin by PCR  *  --        Cardiac lab/EKG/telemetry/ECHO:   NSR    VTE Prophylaxis: SCD    Code Status: Level 1 - Full Code    Orval Mouse, CRNP    Portions of the record may have been created with voice recognition software  Occasional wrong word or "sound a like" substitutions may have occurred due to the inherent limitations of voice recognition software  Read the chart carefully and recognize, using context, where substitutions have occurred

## 2018-09-19 NOTE — PROGRESS NOTES
Progress Note - Diogenes Kern 80 y o  male MRN: [id number]    Unit/Bed#: ICU 13 Encounter: [id number]    Subjective:     Patient seen and examined at bedside  He reports feeling well today  He denies abdominal pain  His nurse reports a small smear of stool this morning  Objective:     Vitals: Blood pressure 114/58, pulse 62, temperature 97 7 °F (36 5 °C), temperature source Tympanic, resp  rate 16, height 5' 4" (1 626 m), weight 74 3 kg (163 lb 12 8 oz), SpO2 97 %  ,Body mass index is 28 12 kg/m²        Intake/Output Summary (Last 24 hours) at 09/19/18 1023  Last data filed at 09/19/18 0001   Gross per 24 hour   Intake           906 17 ml   Output              200 ml   Net           706 17 ml       Physical Exam:     General Appearance: Alert pleasant male, wearing nasal cannula, appears stated age and cooperative  Lungs: Clear to auscultation bilaterally, no rales or rhonchi, no labored breathing/accessory muscle use  Heart: Regular rate and rhythm, S1, S2 normal, no murmur, click, rub or gallop  Abdomen: Soft, non-tender, non-distended; bowel sounds normal; no masses or no organomegaly  Extremities: No cyanosis, edema    Invasive Devices     Peripheral Intravenous Line            Peripheral IV 09/17/18 Left Hand 2 days    Peripheral IV 09/18/18 Right Forearm 1 day                Lab Results:    Results from last 7 days  Lab Units 09/19/18  0456 09/18/18  0441   WBC Thousand/uL 19 94* 11 98*   HEMOGLOBIN g/dL 9 3* 12 0   HEMATOCRIT % 26 8* 36 7   PLATELETS Thousands/uL 29* 11*   LYMPHO PCT %  --  9*   MONO PCT MAN %  --  9   EOSINO PCT MANUAL %  --  0       Results from last 7 days  Lab Units 09/19/18  0456  09/17/18  1836   SODIUM mmol/L 142  < >  --    POTASSIUM mmol/L 3 9  < >  --    CHLORIDE mmol/L 108  < >  --    CO2 mmol/L 20*  < >  --    BUN mg/dL 74*  < >  --    CREATININE mg/dL 3 96*  < >  --    CALCIUM mg/dL 7 6*  < >  --    ALK PHOS U/L 85  < >  --    ALT U/L 59  < >  --    AST U/L 75*  < >  -- GLUCOSE, ISTAT mg/dl  --   --  75   < > = values in this interval not displayed  Results from last 7 days  Lab Units 09/18/18  0442   INR  1 89*       Results from last 7 days  Lab Units 09/17/18  1546   LIPASE u/L 72*       Imaging Studies: I have personally reviewed pertinent imaging studies  Ct Abdomen Pelvis Wo Contrast    Result Date: 9/17/2018  Impression: There is marked intrahepatic and extrahepatic biliary dilatation  The common duct measures up to approximately 3 cm in transverse diameter with no calcified stones identified in the common duct  The gallbladder is mildly distended with innumerable small calcified stones layering within the dependent portion of the gallbladder  Correlate for signs of biliary obstruction  Recommend GI consultation  Examination limited by lack of oral and IV contrast and diffuse respiratory motion  Workstation performed: [id number]     Xr Chest 1 View Portable    Result Date: 9/17/2018  Impression: Mild right basilar airspace disease in keeping with atelectasis or pneumonia  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  Workstation performed: [id number]     Ct Head Without Contrast    Result Date: 9/17/2018  Impression: No acute intracranial abnormality  Microangiopathic changes  Workstation performed: [id number]     Fl Ercp Biliary And Pancreatic    Result Date: 9/18/2018  Impression: Fluoroscopic guidance provided for surgical procedure  Please refer to the separate procedure notes for additional details  Workstation performed: [id number]       Assessment and Plan:    1) Sepsis secondary to cholangitis: Status post ERCP yesterday which showed a large stone in the distal CBD for which a plastic biliary stent was placed  No sphincterotomy or sphincteroplasty was performed due to coagulopathy and thrombocytopenia  Fevers resolved  WBC count increased to 20,000   Liver enzymes down trending today total bilirubin 3 68, Alk Phos 85, AST 75, ALT 59  He is hemodynamically stable       -Continue IV Zosyn  -Monitor LFTs  -Monitor WBC count and temperature  -Advance to regular diet today  -Patient will need elective ERCP with sphincterotomy, stone, and stent extraction in 6-8 weeks    2) C  Diff infection: His wife reports diarrhea for the past few days  He did test positive for C  Diff yesterday   His diarrhea is improving on antibiotics     -Continue IV Zosyn and PO vancomycin 125 every 6 hours  -Monitor stool output

## 2018-09-20 ENCOUNTER — APPOINTMENT (INPATIENT)
Dept: ULTRASOUND IMAGING | Facility: HOSPITAL | Age: 83
DRG: 871 | End: 2018-09-20
Payer: MEDICARE

## 2018-09-20 ENCOUNTER — TELEPHONE (OUTPATIENT)
Dept: FAMILY MEDICINE CLINIC | Facility: CLINIC | Age: 83
End: 2018-09-20

## 2018-09-20 LAB
ABO GROUP BLD BPU: NORMAL
ANION GAP SERPL CALCULATED.3IONS-SCNC: 14 MMOL/L (ref 4–13)
ANION GAP SERPL CALCULATED.3IONS-SCNC: 16 MMOL/L (ref 4–13)
APTT PPP: 37 SECONDS (ref 24–36)
BACTERIA BLD CULT: ABNORMAL
BLD SMEAR INTERP: NORMAL
BPU ID: NORMAL
BUN SERPL-MCNC: 83 MG/DL (ref 5–25)
BUN SERPL-MCNC: 87 MG/DL (ref 5–25)
CALCIUM SERPL-MCNC: 7.8 MG/DL (ref 8.3–10.1)
CALCIUM SERPL-MCNC: 8 MG/DL (ref 8.3–10.1)
CHLORIDE SERPL-SCNC: 107 MMOL/L (ref 100–108)
CHLORIDE SERPL-SCNC: 109 MMOL/L (ref 100–108)
CO2 SERPL-SCNC: 20 MMOL/L (ref 21–32)
CO2 SERPL-SCNC: 20 MMOL/L (ref 21–32)
CREAT SERPL-MCNC: 4.84 MG/DL (ref 0.6–1.3)
CREAT SERPL-MCNC: 5.14 MG/DL (ref 0.6–1.3)
DEPRECATED AT III PPP: 31 % OF NORMAL (ref 92–136)
DEPRECATED D DIMER PPP: ABNORMAL NG/ML (FEU) (ref 0–424)
ERYTHROCYTE [DISTWIDTH] IN BLOOD BY AUTOMATED COUNT: 14.4 % (ref 11.6–15.1)
FDP BLD QL AGGL: >40 <80
FIBRINOGEN PPP-MCNC: 393 MG/DL (ref 227–495)
GFR SERPL CREATININE-BSD FRML MDRD: 10 ML/MIN/1.73SQ M
GFR SERPL CREATININE-BSD FRML MDRD: 9 ML/MIN/1.73SQ M
GLUCOSE SERPL-MCNC: 84 MG/DL (ref 65–140)
GLUCOSE SERPL-MCNC: 92 MG/DL (ref 65–140)
GRAM STN SPEC: ABNORMAL
GRAM STN SPEC: ABNORMAL
HCT VFR BLD AUTO: 27.4 % (ref 36.5–49.3)
HGB BLD-MCNC: 9.6 G/DL (ref 12–17)
INR PPP: 1.22 (ref 0.86–1.17)
MCH RBC QN AUTO: 32.8 PG (ref 26.8–34.3)
MCHC RBC AUTO-ENTMCNC: 35 G/DL (ref 31.4–37.4)
MCV RBC AUTO: 94 FL (ref 82–98)
PLATELET # BLD AUTO: 10 THOUSANDS/UL (ref 149–390)
PLATELET # BLD AUTO: 9 THOUSANDS/UL (ref 149–390)
POTASSIUM SERPL-SCNC: 3.4 MMOL/L (ref 3.5–5.3)
POTASSIUM SERPL-SCNC: 4 MMOL/L (ref 3.5–5.3)
PROTHROMBIN TIME: 15.5 SECONDS (ref 11.8–14.2)
RBC # BLD AUTO: 2.93 MILLION/UL (ref 3.88–5.62)
SODIUM SERPL-SCNC: 143 MMOL/L (ref 136–145)
SODIUM SERPL-SCNC: 143 MMOL/L (ref 136–145)
TPA PPP QL CHRO: 45 % OF NORMAL (ref 77–138)
UNIT DISPENSE STATUS: NORMAL
UNIT PRODUCT CODE: NORMAL
UNIT RH: NORMAL
WBC # BLD AUTO: 14.27 THOUSAND/UL (ref 4.31–10.16)

## 2018-09-20 PROCEDURE — 85384 FIBRINOGEN ACTIVITY: CPT | Performed by: NURSE PRACTITIONER

## 2018-09-20 PROCEDURE — 94640 AIRWAY INHALATION TREATMENT: CPT

## 2018-09-20 PROCEDURE — 85300 ANTITHROMBIN III ACTIVITY: CPT | Performed by: NURSE PRACTITIONER

## 2018-09-20 PROCEDURE — 85049 AUTOMATED PLATELET COUNT: CPT | Performed by: NURSE PRACTITIONER

## 2018-09-20 PROCEDURE — 80048 BASIC METABOLIC PNL TOTAL CA: CPT | Performed by: NURSE PRACTITIONER

## 2018-09-20 PROCEDURE — P9037 PLATE PHERES LEUKOREDU IRRAD: HCPCS

## 2018-09-20 PROCEDURE — 85027 COMPLETE CBC AUTOMATED: CPT | Performed by: NURSE PRACTITIONER

## 2018-09-20 PROCEDURE — 85362 FIBRIN DEGRADATION PRODUCTS: CPT | Performed by: NURSE PRACTITIONER

## 2018-09-20 PROCEDURE — 85420 FIBRINOLYTIC PLASMINOGEN: CPT | Performed by: NURSE PRACTITIONER

## 2018-09-20 PROCEDURE — 85610 PROTHROMBIN TIME: CPT | Performed by: NURSE PRACTITIONER

## 2018-09-20 PROCEDURE — 76770 US EXAM ABDO BACK WALL COMP: CPT

## 2018-09-20 PROCEDURE — 99232 SBSQ HOSP IP/OBS MODERATE 35: CPT | Performed by: INTERNAL MEDICINE

## 2018-09-20 PROCEDURE — 85379 FIBRIN DEGRADATION QUANT: CPT | Performed by: NURSE PRACTITIONER

## 2018-09-20 PROCEDURE — 85730 THROMBOPLASTIN TIME PARTIAL: CPT | Performed by: NURSE PRACTITIONER

## 2018-09-20 PROCEDURE — 99233 SBSQ HOSP IP/OBS HIGH 50: CPT | Performed by: INTERNAL MEDICINE

## 2018-09-20 RX ORDER — POTASSIUM CHLORIDE 20 MEQ/1
40 TABLET, EXTENDED RELEASE ORAL 2 TIMES DAILY
Status: DISCONTINUED | OUTPATIENT
Start: 2018-09-20 | End: 2018-09-20

## 2018-09-20 RX ORDER — POTASSIUM CHLORIDE 20MEQ/15ML
40 LIQUID (ML) ORAL ONCE
Status: COMPLETED | OUTPATIENT
Start: 2018-09-20 | End: 2018-09-20

## 2018-09-20 RX ORDER — FUROSEMIDE 10 MG/ML
20 SYRINGE (ML) INJECTION CONTINUOUS
Status: DISCONTINUED | OUTPATIENT
Start: 2018-09-20 | End: 2018-09-21

## 2018-09-20 RX ORDER — HYDRALAZINE HYDROCHLORIDE 20 MG/ML
10 INJECTION INTRAMUSCULAR; INTRAVENOUS EVERY 6 HOURS PRN
Status: DISCONTINUED | OUTPATIENT
Start: 2018-09-20 | End: 2018-09-28 | Stop reason: HOSPADM

## 2018-09-20 RX ORDER — DEXTROSE AND SODIUM CHLORIDE 5; .45 G/100ML; G/100ML
50 INJECTION, SOLUTION INTRAVENOUS CONTINUOUS
Status: DISCONTINUED | OUTPATIENT
Start: 2018-09-20 | End: 2018-09-21

## 2018-09-20 RX ADMIN — POTASSIUM CHLORIDE 40 MEQ: 20 SOLUTION ORAL at 18:17

## 2018-09-20 RX ADMIN — Medication 20 MG/HR: at 14:32

## 2018-09-20 RX ADMIN — HYDRALAZINE HYDROCHLORIDE 10 MG: 20 INJECTION INTRAMUSCULAR; INTRAVENOUS at 09:22

## 2018-09-20 RX ADMIN — Medication 20 MG/HR: at 01:03

## 2018-09-20 RX ADMIN — METOPROLOL TARTRATE 25 MG: 25 TABLET ORAL at 10:29

## 2018-09-20 RX ADMIN — ONDANSETRON 4 MG: 2 INJECTION INTRAMUSCULAR; INTRAVENOUS at 02:19

## 2018-09-20 RX ADMIN — VANCOMYCIN 125 MG: KIT at 05:26

## 2018-09-20 RX ADMIN — DEXTROSE AND SODIUM CHLORIDE 50 ML/HR: 5; .45 INJECTION, SOLUTION INTRAVENOUS at 16:43

## 2018-09-20 RX ADMIN — PIPERACILLIN SODIUM AND TAZOBACTAM SODIUM 3.38 G: 36; 4.5 INJECTION, POWDER, FOR SOLUTION INTRAVENOUS at 05:30

## 2018-09-20 RX ADMIN — IPRATROPIUM BROMIDE 0.5 MG: 0.5 SOLUTION RESPIRATORY (INHALATION) at 09:13

## 2018-09-20 RX ADMIN — PIPERACILLIN SODIUM AND TAZOBACTAM SODIUM 3.38 G: 36; 4.5 INJECTION, POWDER, FOR SOLUTION INTRAVENOUS at 00:02

## 2018-09-20 RX ADMIN — CEFAZOLIN SODIUM 1000 MG: 1 SOLUTION INTRAVENOUS at 10:14

## 2018-09-20 RX ADMIN — METOPROLOL TARTRATE 25 MG: 25 TABLET ORAL at 20:25

## 2018-09-20 RX ADMIN — POTASSIUM CHLORIDE 40 MEQ: 1500 TABLET, EXTENDED RELEASE ORAL at 13:29

## 2018-09-20 RX ADMIN — VANCOMYCIN 125 MG: KIT at 17:28

## 2018-09-20 RX ADMIN — METOPROLOL TARTRATE 5 MG: 5 INJECTION, SOLUTION INTRAVENOUS at 08:13

## 2018-09-20 RX ADMIN — VANCOMYCIN 125 MG: KIT at 00:02

## 2018-09-20 RX ADMIN — LEVALBUTEROL HYDROCHLORIDE 1.25 MG: 1.25 SOLUTION, CONCENTRATE RESPIRATORY (INHALATION) at 09:13

## 2018-09-20 RX ADMIN — HYDRALAZINE HYDROCHLORIDE 10 MG: 20 INJECTION INTRAMUSCULAR; INTRAVENOUS at 15:08

## 2018-09-20 RX ADMIN — VANCOMYCIN 125 MG: KIT at 11:15

## 2018-09-20 NOTE — PROGRESS NOTES
Progress Note - Randall Portillo 80 y o  male MRN: [id number]    Unit/Bed#: ICU 13 Encounter: [id number]    Subjective:     Patient seen and examined at bedside  Nursing reports several small loose stools yesterday  He denies abdominal pain  He has been tolerating oral intake  No nausea or vomiting  Objective:     Vitals: Blood pressure (!) 201/97, pulse 80, temperature 97 5 °F (36 4 °C), resp  rate (!) 26, height 5' 4" (1 626 m), weight 74 3 kg (163 lb 12 8 oz), SpO2 95 %  ,Body mass index is 28 12 kg/m²  Intake/Output Summary (Last 24 hours) at 09/20/18 1001  Last data filed at 09/20/18 0900   Gross per 24 hour   Intake           4390 9 ml   Output              465 ml   Net           3925 9 ml       Physical Exam:     General Appearance: Alert, pleasant male, appears stated age and cooperative  Lungs:   Wheezing bilaterally  Heart: Regular rate and rhythm, S1, S2 normal, no murmur, click, rub or gallop  Abdomen: Soft, non-tender, non-distended; bowel sounds normal; no masses or no organomegaly  Extremities: No cyanosis, edema    Invasive Devices     Peripheral Intravenous Line            Peripheral IV 09/19/18 Left Forearm less than 1 day    Peripheral IV 09/20/18 Right Forearm less than 1 day          Drain            Urethral Catheter Temperature probe 16 Fr  less than 1 day                Lab Results:    Results from last 7 days  Lab Units 09/20/18  0755 09/20/18  0543  09/18/18  0441   WBC Thousand/uL  --  14 27*  < > 11 98*   HEMOGLOBIN g/dL  --  9 6*  < > 12 0   HEMATOCRIT %  --  27 4*  < > 36 7   PLATELETS Thousands/uL 10* 9*  < > 11*   LYMPHO PCT %  --   --   --  9*   MONO PCT MAN %  --   --   --  9   EOSINO PCT MANUAL %  --   --   --  0   < > = values in this interval not displayed      Results from last 7 days  Lab Units 09/20/18  0543 09/19/18  0456  09/17/18  1836   SODIUM mmol/L 143 142  < >  --    POTASSIUM mmol/L 4 0 3 9  < >  --    CHLORIDE mmol/L 109* 108  < >  --    CO2 mmol/L 20* 20*  < > --    BUN mg/dL 83* 74*  < >  --    CREATININE mg/dL 4 84* 3 96*  < >  --    CALCIUM mg/dL 7 8* 7 6*  < >  --    ALK PHOS U/L  --  85  < >  --    ALT U/L  --  59  < >  --    AST U/L  --  75*  < >  --    GLUCOSE, ISTAT mg/dl  --   --   --  75   < > = values in this interval not displayed  Results from last 7 days  Lab Units 09/20/18  0754   INR  1 22*       Results from last 7 days  Lab Units 09/17/18  1546   LIPASE u/L 72*       Imaging Studies: I have personally reviewed pertinent imaging studies  Ct Abdomen Pelvis Wo Contrast    Result Date: 9/17/2018  Impression: There is marked intrahepatic and extrahepatic biliary dilatation  The common duct measures up to approximately 3 cm in transverse diameter with no calcified stones identified in the common duct  The gallbladder is mildly distended with innumerable small calcified stones layering within the dependent portion of the gallbladder  Correlate for signs of biliary obstruction  Recommend GI consultation  Examination limited by lack of oral and IV contrast and diffuse respiratory motion  Workstation performed: [id number]     Xr Chest 1 View Portable    Result Date: 9/17/2018  Impression: Mild right basilar airspace disease in keeping with atelectasis or pneumonia  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  Workstation performed: [id number]     Ct Head Without Contrast    Result Date: 9/17/2018  Impression: No acute intracranial abnormality  Microangiopathic changes  Workstation performed: [id number]     Fl Ercp Biliary And Pancreatic    Result Date: 9/18/2018  Impression: Fluoroscopic guidance provided for surgical procedure  Please refer to the separate procedure notes for additional details  Workstation performed: [id number]       Assessment and Plan:    1) Sepsis secondary to GNR bacteremia in setting of cholangitis and C  Diff colitis: Initial blood cultures positive for Klebsiella and E coli  Leukocytosis improving  Patient remains afebrile and hemodynamically stable      -Continue IV Zosyn and PO vancomycin  -Follow-up repeat blood cultures  -Monitor WBC count and temperature    2) Choledocholithiasis and cholangitis: Status post ERCP 9/18 which showed a large stone in the distal CBD for which a plastic biliary stent was placed  No sphincterotomy or sphincteroplasty was performed due to coagulopathy and thrombocytopenia  Fevers resolved  Leukocytosis and liver enzymes improving     -Monitor LFTs  -Continue IV Zosyn  -Patient will need elective ERCP with sphincterotomy, stone, and stent extraction in 6-8 weeks     3) C   Diff colitis: Nursing reports several small loose stools in the past 24 hrs     -Continue IV Zosyn and PO vancomycin 125 every 6 hours x 14 days  -Monitor stool output

## 2018-09-20 NOTE — TELEPHONE ENCOUNTER
Audra Carrion- ICU called looking for Labs prior to this hospital stay for PT  -Specifically labs for Creatinine and Platelet Counts  Caro Malone MA tried helping me find lab results in [employer]  There were labs (dated: 3/2/18)  under ALL ORDERS in [employer]  I also contacted   Labs to assist me but  stated we may not be able to see them because [facility] [phone]

## 2018-09-20 NOTE — PROGRESS NOTES
Progress Note - Critical Care   Spencer Rose 80 y o  male MRN: [id number]  Unit/Bed#: ICU 13 Encounter: [id number]    Assessment/Plan:  1  Sepsis secondary to gram-negative bacteremia in the setting of cholangitis versus C diff colitis  · Initial blood cultures preliminarily positive for Klebsiella and E coli  Continue Zosyn for now pending sensitivities  Repeat blood cultures were drawn are pending  · Remains afebrile and hemodynamically stable  2  Choledocholelithiasis and Cholangitis status post ERCP with stent placement  · GI is following  Management per their recommendations  Plan for elective ERCP after discharge  3  C diff colitis  · Continue oral vancomycin  4  Thrombocytopenia  · Appears chronic  Platelets down to 5196 this morning  Will transfuse platelets  5  Acute kidney injury with oliguria likely secondary to #1  · Patient received a fluid bolus and Lasix 80 mg yesterday the without response  Started on Lasix infusion overnight with improvement in output which is now 20-30mL per hour  Quantification has been somewhat difficult as patient has had episodes of incontinence  · Creatinine worse this morning  Mild metabolic acidosis does not appear to be worsening  Continue to trend renal indices and monitor intake and output    _____________________________________________________________________    HPI/24hr events:   Afebrile  No acute events overnight      Medications:    Current Facility-Administered Medications:  furosemide 20 mg/hr Intravenous Continuous Jeris Kiang Spatzer, CRNP Last Rate: 20 mg/hr (09/20/18 0103)   ipratropium 0 5 mg Nebulization Q8H PRN Josh Subramanian MD    levalbuterol 1 25 mg Nebulization Q8H PRN Aurora Hirsch, BROOKLYN    metoprolol 5 mg Intravenous Q6H PRN BROOKLYN Espinoza    ondansetron 4 mg Intravenous Q4H PRN Elspeverónica Joycelyn, MILAGROSNP    piperacillin-tazobactam 3 375 g Intravenous Q6H BROOKLYN Ulrich Last Rate: Stopped (09/20/18 0055)   vancomycin 125 mg Oral Q6H [address] Elena Díaz PA-C          furosemide 20 mg/hr Last Rate: 20 mg/hr (09/20/18 0103)         Physical exam:  Vitals: Body mass index is 28 12 kg/m²  Blood pressure 115/69, pulse 70, temperature 98 2 °F (36 8 °C), temperature source Temporal, resp  rate 18, height 5' 4" (1 626 m), weight 74 3 kg (163 lb 12 8 oz), SpO2 96 %  ,  Temp  Min: 95 6 °F (35 3 °C)  Max: 101 7 °F (38 7 °C)  IBW: 59 2 kg    SpO2: 96 %  SpO2 Activity: At Rest  O2 Device: Nasal cannula      Intake/Output Summary (Last 24 hours) at 09/20/18 0209  Last data filed at 09/20/18 0055   Gross per 24 hour   Intake             3730 ml   Output               50 ml   Net             3680 ml       Invasive/non-invasive ventilation settings:   Respiratory    Lab Data (Last 4 hours)    None         O2/Vent Data (Last 4 hours)    None              Invasive Devices     Peripheral Intravenous Line            Peripheral IV 09/19/18 Left Forearm less than 1 day                  Physical Exam:  Gen:  Awake, alert, appropriate, no acute distress  HEENT:  Atraumatic, normocephalic, extraocular movements intact, pupils 3 mm equal and reactive, oropharynx clear  Neck:  Supple, trachea midline, no JVD, no lymphadenopathy  Chest:  Clear to auscultation bilaterally, no wheeze, rales, rhonchi  Cor:  Single S1/S2, no murmurs, rubs, gallops, regular rate and rhythm  Abd:  Soft, nontender, nondistended, bowel sounds normoactive  Ext:  No edema, no clubbing or cyanosis  Neuro:  Oriented x3, cranial nerves 2-12 grossly intact, no focal deficits  Skin:  Warm, dry      Diagnostic Data:  Lab: I have personally reviewed pertinent lab results     CBC:     Results from last 7 days  Lab Units 09/19/18  0456 09/18/18  0441 09/17/18  1836 09/17/18  1546   WBC Thousand/uL 19 94* 11 98*  --  11 96*   HEMOGLOBIN g/dL 9 3* 12 0  --  11 3*   I STAT HEMOGLOBIN g/dl  --   --  10 2*  --    HEMATOCRIT % 26 8* 36 7 30* 34 9*   PLATELETS Thousands/uL 29* 11*  --  44*       CMP:     Results from last 7 days  Lab Units 09/19/18  0456 09/18/18  0442 09/18/18  0016 09/17/18  1836 09/17/18  1546   SODIUM mmol/L 142 141 140  --  139   POTASSIUM mmol/L 3 9 3 8 3 9  --  4 2   CHLORIDE mmol/L 108 107 108  --  106   CO2 mmol/L 20* 20* 17*  --  12*   BUN mg/dL 74* 55* 55*  --  51*   CREATININE mg/dL 3 96* 3 15* 3 05*  --  3 09*   CALCIUM mg/dL 7 6* 7 8* 7 5*  --  7 9*   ALK PHOS U/L 85 207*  --   --  323*   ALT U/L 59 115*  --   --  112*   AST U/L 75* 150*  --   --  110*   GLUCOSE, ISTAT mg/dl  --   --   --  75  --      PT/INR:   No results found for: PT, INR,   Magnesium:   Results from last 7 days  Lab Units 09/19/18  0456 09/18/18  0442   MAGNESIUM mg/dL 2 1 1 5*     Phosphorous:   Results from last 7 days  Lab Units 09/18/18  0442   PHOSPHORUS mg/dL 4 8*       Microbiology:    Results from last 7 days  Lab Units 09/17/18  2206 09/17/18  1546   BLOOD CULTURE   --  Klebsiella pneumoniae*  Escherichia coli*  Klebsiella pneumoniae*  Escherichia coli*   GRAM STAIN RESULT   --  Gram negative rods  Gram negative rods   C DIFF TOXIN B  POSITIVE for C difficle toxin by PCR  *  --        Imaging:  No new imaging    Cardiac lab/EKG/telemetry/ECHO:   Normal sinus rhythm on telemetry    VTE Prophylaxis:  SCDs    Code Status: Level 1 - Full Code    Maceo Peto Spatzer, CRNP    Portions of the record may have been created with voice recognition software  Occasional wrong word or "sound a like" substitutions may have occurred due to the inherent limitations of voice recognition software  Read the chart carefully and recognize, using context, where substitutions have occurred

## 2018-09-21 LAB
ABO GROUP BLD BPU: NORMAL
ALBUMIN SERPL BCP-MCNC: 2.1 G/DL (ref 3.5–5)
ALP SERPL-CCNC: 141 U/L (ref 46–116)
ALT SERPL W P-5'-P-CCNC: 49 U/L (ref 12–78)
ANION GAP SERPL CALCULATED.3IONS-SCNC: 15 MMOL/L (ref 4–13)
AST SERPL W P-5'-P-CCNC: 50 U/L (ref 5–45)
BILIRUB DIRECT SERPL-MCNC: 2.21 MG/DL (ref 0–0.2)
BILIRUB SERPL-MCNC: 2.92 MG/DL (ref 0.2–1)
BPU ID: NORMAL
BUN SERPL-MCNC: 94 MG/DL (ref 5–25)
CALCIUM SERPL-MCNC: 8.2 MG/DL (ref 8.3–10.1)
CHLORIDE SERPL-SCNC: 108 MMOL/L (ref 100–108)
CO2 SERPL-SCNC: 19 MMOL/L (ref 21–32)
CREAT SERPL-MCNC: 5.53 MG/DL (ref 0.6–1.3)
ERYTHROCYTE [DISTWIDTH] IN BLOOD BY AUTOMATED COUNT: 14.6 % (ref 11.6–15.1)
GFR SERPL CREATININE-BSD FRML MDRD: 9 ML/MIN/1.73SQ M
GLUCOSE SERPL-MCNC: 118 MG/DL (ref 65–140)
HCT VFR BLD AUTO: 30.2 % (ref 36.5–49.3)
HGB BLD-MCNC: 10.4 G/DL (ref 12–17)
MCH RBC QN AUTO: 31.8 PG (ref 26.8–34.3)
MCHC RBC AUTO-ENTMCNC: 34.4 G/DL (ref 31.4–37.4)
MCV RBC AUTO: 92 FL (ref 82–98)
PLATELET # BLD AUTO: 34 THOUSANDS/UL (ref 149–390)
PMV BLD AUTO: 11.2 FL (ref 8.9–12.7)
POTASSIUM SERPL-SCNC: 4.3 MMOL/L (ref 3.5–5.3)
PROT SERPL-MCNC: 5.6 G/DL (ref 6.4–8.2)
RBC # BLD AUTO: 3.27 MILLION/UL (ref 3.88–5.62)
SODIUM SERPL-SCNC: 142 MMOL/L (ref 136–145)
UNIT DISPENSE STATUS: NORMAL
UNIT PRODUCT CODE: NORMAL
UNIT RH: NORMAL
WBC # BLD AUTO: 10.77 THOUSAND/UL (ref 4.31–10.16)

## 2018-09-21 PROCEDURE — 99233 SBSQ HOSP IP/OBS HIGH 50: CPT | Performed by: INTERNAL MEDICINE

## 2018-09-21 PROCEDURE — 99232 SBSQ HOSP IP/OBS MODERATE 35: CPT | Performed by: INTERNAL MEDICINE

## 2018-09-21 PROCEDURE — 92610 EVALUATE SWALLOWING FUNCTION: CPT

## 2018-09-21 PROCEDURE — 85027 COMPLETE CBC AUTOMATED: CPT | Performed by: NURSE PRACTITIONER

## 2018-09-21 PROCEDURE — 80048 BASIC METABOLIC PNL TOTAL CA: CPT | Performed by: NURSE PRACTITIONER

## 2018-09-21 PROCEDURE — G8996 SWALLOW CURRENT STATUS: HCPCS

## 2018-09-21 PROCEDURE — G8997 SWALLOW GOAL STATUS: HCPCS

## 2018-09-21 PROCEDURE — 99222 1ST HOSP IP/OBS MODERATE 55: CPT | Performed by: INTERNAL MEDICINE

## 2018-09-21 PROCEDURE — 80076 HEPATIC FUNCTION PANEL: CPT | Performed by: PHYSICIAN ASSISTANT

## 2018-09-21 PROCEDURE — G8998 SWALLOW D/C STATUS: HCPCS

## 2018-09-21 RX ADMIN — VANCOMYCIN 500 MG: KIT at 17:52

## 2018-09-21 RX ADMIN — VANCOMYCIN 125 MG: KIT at 11:07

## 2018-09-21 RX ADMIN — VANCOMYCIN 125 MG: KIT at 05:52

## 2018-09-21 RX ADMIN — METOPROLOL TARTRATE 25 MG: 25 TABLET ORAL at 08:13

## 2018-09-21 RX ADMIN — CEFAZOLIN SODIUM 1000 MG: 1 SOLUTION INTRAVENOUS at 11:07

## 2018-09-21 RX ADMIN — SODIUM BICARBONATE 125 ML/HR: 84 INJECTION, SOLUTION INTRAVENOUS at 11:32

## 2018-09-21 RX ADMIN — ONDANSETRON 4 MG: 2 INJECTION INTRAMUSCULAR; INTRAVENOUS at 01:17

## 2018-09-21 RX ADMIN — VANCOMYCIN 125 MG: KIT at 01:00

## 2018-09-21 RX ADMIN — METOPROLOL TARTRATE 25 MG: 25 TABLET ORAL at 21:25

## 2018-09-21 RX ADMIN — Medication 20 MG/HR: at 05:52

## 2018-09-21 RX ADMIN — HYDRALAZINE HYDROCHLORIDE 10 MG: 20 INJECTION INTRAMUSCULAR; INTRAVENOUS at 02:38

## 2018-09-21 RX ADMIN — SODIUM BICARBONATE 125 ML/HR: 84 INJECTION, SOLUTION INTRAVENOUS at 20:44

## 2018-09-21 NOTE — PROGRESS NOTES
Progress Note - Critical Care   Nikolai Corley 80 y o  male MRN: [id number]  Unit/Bed#: ICU 13 Encounter: [id number]    Assessment/Plan:  1  Severe sepsis secondary to gram-negative bacteremia in the setting of cholangitis and C diff colitis  · Continue Ancef based on culture sensitivities  Today is day 5 of antibiotics  · Remains afebrile and hemodynamically stable  · Follow temps and WBC count  2  Choledocholithiasis and cholangitis status post ERCP with biliary stent placement  · Management per GI  Trend LFTs  3  JOSE E likely secondary to ATN in the setting of sepsis  · Urine output significantly improved on lasix gtt, averaging approximately 100mL/hr  · Creatinine worse this am and pt is more uremic  He is not significantly acidotic  Consider nephrology consult  4  C diff colitis  · Continue PO vancomycin  Diarrhea decreasing  5  Thrombocytopenia  · DIC panel was negative, no schistocytes on peripheral smear  This is likely secondary to sepsis  Uncertain chronicity  · Pt received 2 units of platelets yesterday  Platelets up to 588[address] this am     _____________________________________________________________________    HPI/24hr events:   Afebrile  Episodes of dry heaving overnight  No acute events      Medications:    Current Facility-Administered Medications:  cefazolin 1,000 mg Intravenous Q24H BROOKLYN Murillo Last Rate: Stopped (09/20/18 1044)   dextrose 5 % and sodium chloride 0 45 % 50 mL/hr Intravenous Continuous Blondell Goldberg Bilofsky, CRNP Last Rate: 50 mL/hr (09/20/18 1643)   furosemide 20 mg/hr Intravenous Continuous Antonetta Gimenez Spatzer, CRNP Last Rate: 20 mg/hr (09/21/18 0552)   hydrALAZINE 10 mg Intravenous Q6H PRN Blondell Goldberg Bilofsky, CRNP    ipratropium 0 5 mg Nebulization Q8H PRN Zahira Zarco MD    levalbuterol 1 25 mg Nebulization Q8H PRN BROOKLYN Aguilera    metoprolol 5 mg Intravenous Q6H PRN BROOKLYN Mccall    metoprolol tartrate 25 mg Oral Q12H [address] Ciro Lemus ondansetron 4 mg Intravenous Q4H PRN BROOKLYN Villarreal    vancomycin 125 mg Oral Q6H [facility] Dayan Tyler PA-C          dextrose 5 % and sodium chloride 0 45 % 50 mL/hr Last Rate: 50 mL/hr (09/20/18 1643)   furosemide 20 mg/hr Last Rate: 20 mg/hr (09/21/18 0552)         Physical exam:  Vitals: Body mass index is 28 12 kg/m²  Blood pressure 133/60, pulse 76, temperature (!) 97 2 °F (36 2 °C), resp  rate 20, height 5' 4" (1 626 m), weight 74 3 kg (163 lb 12 8 oz), SpO2 97 %  ,  Temp  Min: 95 6 °F (35 3 °C)  Max: 101 7 °F (38 7 °C)  IBW: 59 2 kg    SpO2: 97 %  SpO2 Activity: At Rest  O2 Device: None (Room air)      Intake/Output Summary (Last 24 hours) at 09/21/18 0647  Last data filed at 09/21/18 0600   Gross per 24 hour   Intake          1363 17 ml   Output             2705 ml   Net         -1341 83 ml       Invasive/non-invasive ventilation settings:   Respiratory    Lab Data (Last 4 hours)    None         O2/Vent Data (Last 4 hours)    None              Invasive Devices     Peripheral Intravenous Line            Peripheral IV 09/19/18 Left Forearm 1 day    Peripheral IV 09/20/18 Right Forearm less than 1 day          Drain            Urethral Catheter Temperature probe 16 Fr  less than 1 day                  Physical Exam:  Gen:  Awake, alert, appropriate, no acute distress  HEENT:  Atraumatic, normocephalic, extraocular movements intact, pupils 2 mm equal and reactive, oropharynx clear  Neck:  Supple, trachea midline, no JVD, no lymphadenopathy  Chest:  Slightly diminished anteriorly with some fine bibasilar crackles posteriorly, no rales or wheeze  Cor:  Single S1/S2, no murmurs, rubs, gallops, regular rate and rhythm  Abd:  Soft, nontender, nondistended, bowel sounds normoactive  Ext:  No edema, no clubbing or cyanosis  Neuro:  Oriented x3, cranial nerves 2-12 grossly intact, grossly nonfocal  Skin:  Warm, dry      Diagnostic Data:  Lab: I have personally reviewed pertinent lab results     CBC:     Results from last 7 days  Lab Units 09/21/18  0446 09/20/18  0755 09/20/18  0543 09/19/18  0456   WBC Thousand/uL 10 77*  --  14 27* 19 94*   HEMOGLOBIN g/dL 10 4*  --  9 6* 9 3*   HEMATOCRIT % 30 2*  --  27 4* 26 8*   PLATELETS Thousands/uL 34* 10* 9* 29*       CMP:     Results from last 7 days  Lab Units 09/21/18  0446 09/20/18  1145 09/20/18  0543 09/19/18  0456 09/18/18  0442  09/17/18  1836 09/17/18  1546   SODIUM mmol/L 142 143 143 142 141  < >  --  139   POTASSIUM mmol/L 4 3 3 4* 4 0 3 9 3 8  < >  --  4 2   CHLORIDE mmol/L 108 107 109* 108 107  < >  --  106   CO2 mmol/L 19* 20* 20* 20* 20*  < >  --  12*   BUN mg/dL 94* 87* 83* 74* 55*  < >  --  51*   CREATININE mg/dL 5 53* 5 14* 4 84* 3 96* 3 15*  < >  --  3 09*   CALCIUM mg/dL 8 2* 8 0* 7 8* 7 6* 7 8*  < >  --  7 9*   ALK PHOS U/L  --   --   --  85 207*  --   --  323*   ALT U/L  --   --   --  59 115*  --   --  112*   AST U/L  --   --   --  75* 150*  --   --  110*   GLUCOSE, ISTAT mg/dl  --   --   --   --   --   --  75  --    < > = values in this interval not displayed  PT/INR:   Lab Results   Component Value Date    INR 1 22 (H) 09/20/2018   ,   Magnesium:     Results from last 7 days  Lab Units 09/19/18  0456 09/18/18  0442   MAGNESIUM mg/dL 2 1 1 5*     Phosphorous:     Results from last 7 days  Lab Units 09/18/18  0442   PHOSPHORUS mg/dL 4 8*       Microbiology:    Results from last 7 days  Lab Units 09/19/18  1746 09/17/18  2206 09/17/18  1546   BLOOD CULTURE  No Growth at 24 hrs  No Growth at 24 hrs   --  Klebsiella pneumoniae*  Escherichia coli*  Klebsiella pneumoniae*  Escherichia coli*   GRAM STAIN RESULT   --   --  Gram negative rods  Gram negative rods   C DIFF TOXIN B   --  POSITIVE for C difficle toxin by PCR  *  --        Imaging:  Renal U/S - No hydronephrosis  Gallstones of perihepatic ascites       Cardiac lab/EKG/telemetry/ECHO:   NSR on telemetry    VTE Prophylaxis: SCDs    Code Status: Level 1 - Full Code    Vijaya CRNP    Portions of the record may have been created with voice recognition software  Occasional wrong word or "sound a like" substitutions may have occurred due to the inherent limitations of voice recognition software  Read the chart carefully and recognize, using context, where substitutions have occurred

## 2018-09-21 NOTE — CASE MANAGEMENT
Continued Stay Review    Date:    9/21/2018    Vital Signs: /72   Pulse 68   Temp (!) 97 2 °F (36 2 °C)   Resp 14   Ht 5' 4" (1 626 m)   Wt 74 3 kg (163 lb 12 8 oz)   SpO2 98%   BMI 28 12 kg/m²     Medications:   Scheduled Meds:   Current Facility-Administered Medications:  cefazolin 1,000 mg Intravenous Q24H BROOKLYN Mera Last Rate: 1,000 mg (09/21/18 1107)   hydrALAZINE 10 mg Intravenous Q6H PRN BROOKLYN Mera    ipratropium 0 5 mg Nebulization Q8H PRN Jluis West MD    levalbuterol 1 25 mg Nebulization Q8H PRN BROOKLYN Cotto    metoprolol 5 mg Intravenous Q6H PRN BROOKLYN Maloney    metoprolol tartrate 25 mg Oral Q12H [facility] Karla BROOKLYN Dasilva    ondansetron 4 mg Intravenous Q4H PRN BROOKLYN Cotto    sodium bicarbonate infusion 125 mL/hr Intravenous Continuous Mary Shaw MD Last Rate: 125 mL/hr (09/21/18 1132)   vancomycin 125 mg Oral Q6H [facility] Dayan Tyler PA-C      Continuous Infusions:   sodium bicarbonate infusion 125 mL/hr Last Rate: 125 mL/hr (09/21/18 1132)     PRN Meds: hydrALAZINE    ipratropium    levalbuterol    metoprolol    ondansetron    Abnormal Labs/Diagnostic Results:    WBC   10 77  H/H  10 4/30 2  Platelets   34  CO2  18  BUN/Creat    94/5 53    Age/Sex: 80 y o  male     1  Assessment/Plan: Severe sepsis secondary to gram-negative bacteremia in the setting of cholangitis and C diff colitis  · Continue Ancef based on culture sensitivities  Today is day 5 of antibiotics  · Remains afebrile and hemodynamically stable  · Follow temps and WBC count  2  Choledocholithiasis and cholangitis status post ERCP with biliary stent placement  · Management per GI  Trend LFTs  3  JOSE E likely secondary to ATN in the setting of sepsis  · Urine output significantly improved on lasix gtt, averaging approximately 100mL/hr  · Creatinine worse this am and pt is more uremic  He is not significantly acidotic  Consider nephrology consult     4  C diff colitis  · Continue PO vancomycin  Diarrhea decreasing  5  Thrombocytopenia  · DIC panel was negative, no schistocytes on peripheral smear  This is likely secondary to sepsis  Uncertain chronicity  Pt received 2 units of platelets yesterday    [place]atelets up to [address] this am     Discharge Plan:    home

## 2018-09-21 NOTE — PLAN OF CARE

## 2018-09-21 NOTE — PLAN OF CARE
Problem: SLP ADULT - SWALLOWING, IMPAIRED  Goal: Initial SLP swallow eval performed  Outcome: Completed Date Met: 09/21/18

## 2018-09-21 NOTE — CONSULTS
2           Consultation - Nephrology   Herminio Otero 80 y o  male MRN: [id number]  Unit/Bed#: ICU 13 Encounter: [id number]      Assessment/Plan     Assessment / Plan:  1  Renal  The patient presented to the hospital was found to have biliary obstruction with fever and septicemia  Earlier in the hospitalization he was admitted with a creatinine of 3 and I can't really find records of previous baseline creatinine although patient says he has no history of kidney problems but he does not appear to see physicians very often  The patient initially had lower urine output but urine output has improved on diuretic infusion  At this point with septicemia the patient likely has nonoliguric ATN  The patient was responsive to diuretic infusion with improved urine output  The patient is hemodynamically stable without pressor support  He has developed some metabolic acidosis likely related to sodium chloride infusion  As well renal failure where impair ability to excrete hydrogen ion  There is no urgent indication for renal replacement therapy  No hydronephrosis was seen  Discontinue IV Lasix drip and monitor urine output  Change IV fluids to half-normal saline with 75 mEq of sodium bicarb per L to make this isotonic for improved intravascular volume expansion  Monitor renal function electrolytes and volume status    2  Biliary obstruction with septicemia  Patient is afebrile hemodynamically stable  On antibiotic treatment status post gastroenterology intervention to relieve biliary obstruction  3   C diff colitis  Diarrhea seems to be slowly improving per GI is note  On oral vancomycin  History of Present Illness   Physician Requesting Consult: Colin Gamboa MD  Reason for Consult / Principal Problem:   Acute renal failure  Hx and PE limited by:   HPI: Herminio Otero is a 80y o  year old male who presented to the hospital 4 days ago with complaints of vague abdominal discomfort    Workup revealed that he had biliary obstruction and fever with acute renal failure  He had distal common bile duct obstruction and underwent ERCP with sphincterotomy and passage of the stone  Since admission he had low urine output was given volume repletion as well as antibiotics and diuretic infusion and had improved urine output but unfortunate creatinine continues to increase and we were asked to see him regarding this  History obtained from chart review and the patient  Consults    Review of Systems   Constitutional: Negative for chills and fever  HENT: Negative  Eyes: Negative  Respiratory: Negative for cough, chest tightness and shortness of breath  Cardiovascular: Negative for chest pain and leg swelling  Gastrointestinal: Negative for abdominal distention, abdominal pain, diarrhea and vomiting  Genitourinary: Ruvalcaba catheter in  Musculoskeletal: Negative  Skin: Negative for rash  Neurological: Negative for dizziness and light-headedness  Historical Information   Patient Active Problem List   Diagnosis    Metabolic acidosis    JOSE E [facility] [address] [facility]    Transaminitis    Coagulopathy (HCC)    Thrombocytopenia (HCC)    Sepsis ([facility] [address] [facility]    Acute cholangitis    Calculus of bile duct with acute cholangitis with obstruction     Past Medical History:   Diagnosis Date    Hypertension     Medical history unknown      Past Surgical History:   Procedure Laterality Date    ERCP N/A 9/18/2018    Procedure: ENDOSCOPIC RETROGRADE CHOLANGIOPANCREATOGRAPHY (ERCP) with stent placement;  Surgeon: Donovan Garland MD;  Location: [facility];  Service: Gastroenterology    NO PAST SURGERIES       Social History   History   Alcohol Use    Yes     History   Drug Use No     History   Smoking Status    Never Smoker   Smokeless Tobacco    Never Used     History reviewed  No pertinent family history      Meds/Allergies   current meds:   Current Facility-Administered Medications   Medication Dose Route Frequency    ceFAZolin (ANCEF) IVPB (premix) 1,000 mg  1,000 mg Intravenous Q24H    hydrALAZINE (APRESOLINE) injection 10 mg  10 mg Intravenous Q6H PRN    ipratropium (ATROVENT) 0 02 % inhalation solution 0 5 mg  0 5 mg Nebulization Q8H PRN    levalbuterol (XOPENEX) inhalation solution 1 25 mg  1 25 mg Nebulization Q8H PRN    metoprolol (LOPRESSOR) injection 5 mg  5 mg Intravenous Q6H PRN    metoprolol tartrate (LOPRESSOR) tablet 25 mg  25 mg Oral Q12H [facility]    ondansetron (ZOFRAN) injection 4 mg  4 mg Intravenous Q4H PRN    sodium bicarbonate 75 mEq in sodium chloride 0 45 % 1,000 mL infusion  125 mL/hr Intravenous Continuous    vancomycin (VANCOCIN) oral solution 125 mg  125 mg Oral Q6H [facility]     No Known Allergies    Objective     Intake/Output Summary (Last 24 hours) at 09/21/18 1022  Last data filed at 09/21/18 1001   Gross per 24 hour   Intake          1063 03 ml   Output             2950 ml   Net         -1886 97 ml     Body mass index is 28 12 kg/m²  Invasive Devices:   Urethral Catheter Temperature probe 16 Fr  (Active)   Reasons to continue Urinary Catheter  Accurate I&O assessment in critically ill patients (48 hr  max) 9/21/2018  8:17 AM   Site Assessment Clean;Skin intact 9/21/2018  8:01 AM   Collection Container Standard drainage bag 9/21/2018  8:01 AM   Securement Method Securing device (Describe) 9/21/2018  8:01 AM   Output (mL) 225 mL 9/21/2018 10:01 AM       PHYSICAL EXAM:  /72   Pulse 68   Temp (!) 97 2 °F (36 2 °C)   Resp 14   Ht 5' 4" (1 626 m)   Wt 74 3 kg (163 lb 12 8 oz)   SpO2 98%   BMI 28 12 kg/m²     Physical Exam   Constitutional: No distress  HENT:   Mouth/Throat: No oropharyngeal exudate  Neck: Neck supple  No JVD present  Cardiovascular: Normal rate and regular rhythm  Exam reveals no friction rub  No significant edema  Pulmonary/Chest: Effort normal and breath sounds normal  No respiratory distress  He has no wheezes  He has no rales  Abdominal: Soft  Bowel sounds are normal  He exhibits no distension  There is no tenderness  There is no rebound  Current Weight: Weight - Scale: 74 3 kg (163 lb 12 8 oz)  First Weight: Weight - Scale: 54 6 kg (120 lb 5 9 oz)    Lab Results:      Results from last 7 days  Lab Units 09/21/18 0446   WBC Thousand/uL 10 77*   HEMOGLOBIN g/dL 10 4*   HEMATOCRIT % 30 2*   PLATELETS Thousands/uL 34*       Results from last 7 days  Lab Units 09/21/18 0446 09/17/18 1836   SODIUM mmol/L 142  < >  --    POTASSIUM mmol/L 4 3  < >  --    CHLORIDE mmol/L 108  < >  --    CO2 mmol/L 19*  < >  --    BUN mg/dL 94*  < >  --    CREATININE mg/dL 5 53*  < >  --    GLUCOSE, ISTAT mg/dl  --   --  75   CALCIUM mg/dL 8 2*  < >  --    < > = values in this interval not displayed  Results from last 7 days  Lab Units 09/21/18 0446 09/19/18 0456 09/17/18 1836   SODIUM mmol/L 142  < > 142  < >  --    POTASSIUM mmol/L 4 3  < > 3 9  < >  --    CHLORIDE mmol/L 108  < > 108  < >  --    CO2 mmol/L 19*  < > 20*  < >  --    BUN mg/dL 94*  < > 74*  < >  --    CREATININE mg/dL 5 53*  < > 3 96*  < >  --    CALCIUM mg/dL 8 2*  < > 7 6*  < >  --    ALK PHOS U/L  --   --  85  < >  --    ALT U/L  --   --  59  < >  --    AST U/L  --   --  75*  < >  --    GLUCOSE, ISTAT mg/dl  --   --   --   --  75   < > = values in this interval not displayed

## 2018-09-21 NOTE — SOCIAL WORK
Cm called the patients wife to do a general SW assessment as the patient was sleeping  The patient lives with his wife in a single story home  Their daughter, Milas Meckel, lives across the street  The patient is independent with ADLs and ambulation  No hx of VNA or STR  They have a RW and BSC at home if needed  PCP identified as Dr Halie Blank  They use the [facility] on [address] in [facility] for rx needs  CM following for any discharge needs

## 2018-09-21 NOTE — MEDICAL STUDENT
Progress Note - Critical Care   Luis Enrique Hemphill 80 y o  male MRN: [id number]  Unit/Bed#: ICU 13 Encounter: [id number]    Assessment/Plan:  1  Severe sepsis secondary to gram negative bacteremia in the setting of c-diff colitis and cholangitis   ·       On admission, pt had temp of 101 7, CK- 1,272, trop 0 58, WBC- 11 96, and lactic- 10 6  · Continue Ancef based on culture sensitivities, today is day 5 of therapy  · Continue PO Vanco for c-diff treatment, patient denies any diarrhea overnight  · Patient afebrile and BP stable  · Repeat blood cultures negative for 24 hours  · WBCs today trending down, 10 77  · Continue to monitor WBCs and temps  2  Choledocholithiasis and cholangitis status post ERCP with biliary stent placement  · GI following, patient will require elective ERCP out-pt for sphincterotomy and stone extraction  · Patient has poor appetite, with dry heaving overnight  Patient was advanced to regular diet, encourage PO fluid intake  · Renal US- showed No hydronephrosis  Gallstones or perihepatic ascites  · LFTs trending down, continue to trend  2  Acute kidney injury secondary to ATN in the setting of sepsis  · On admission creatinine was 3 09, worsening today 5 53  Unsure of baseline  · Continue lasix gtt, will improvement in urine output, with slight IVF hydration  · Metabolic acidsois improving  · Continue to hold lisinopril  · Nephrology consulted for possible HD  · Continue to monitor creatinine  · Continue sandoval cath for accurate Is/Os  3  C-diff colitis  · Continue Po Vanco   · Denies any diarrhea over night  · Continue contact precautions  4  Thrombocytopenia   · On admission platelet count- 44    · Unable to see if this is chronic, patient received multiple units of FFP and platelets  · Today platelet count 34  · No signs of active bleeding  · INR today 1 22  · Hemolysis smear- showed no schistocytes  DIC panel negative     · Continue to monitor CBC      Critical Care Time:  Documented critical care time excludes any procedures documented elsewhere  It also excludes any family updates    _____________________________________________________________________    HPI/24hr events:   Patient afebrile, denies any diarrhea  C/o dry heaving, but no vomiting, denies abdominal pain  No acute events overnight  Medications:    Current Facility-Administered Medications:  cefazolin 1,000 mg Intravenous Q24H BROOK[place]N Henry Last Rate: Stopped (09/20/18 1044)   dextrose 5 % and sodium chloride 0 45 % 50 mL/hr Intravenous Continuous BROOKLYN Curran Last Rate: 50 mL/hr (09/20/18 1643)   furosemide 20 mg/hr Intravenous Continuous Annett Read Spatzer, CRNP Last Rate: 20 mg/hr (09/21/18 0552)   hydrALAZINE 10 mg Intravenous Q6H PRN BROOKLYN Curran    ipratropium 0 5 mg Nebulization Q8H PRN Warren Cabrera MD    levalbuterol 1 25 mg Nebulization Q8H PRN BROOKLYN Carlos    metoprolol 5 mg Intravenous Q6H PRN BROOKLYN Haider    metoprolol tartrate 25 mg Oral Q12H [address] KarlaBROOKLYN Goncalves    ondansetron 4 mg Intravenous Q4H PRN BROOK[place]N Carlos    vancomycin 125 mg Oral Q6H [address] Dayan Tyler PA-C          dextrose 5 % and sodium chloride 0 45 % 50 mL/hr Last Rate: 50 mL/hr (09/20/18 1643)   furosemide 20 mg/hr Last Rate: 20 mg/hr (09/21/18 0552)         Physical exam:  Vitals: Body mass index is 28 12 kg/m²  Blood pressure 156/72, pulse 68, temperature (!) 97 2 °F (36 2 °C), resp   rate 14, height 5' 4" (1 626 m), weight 74 3 kg (163 lb 12 8 oz), SpO2 98 % ,  Temp  Min: 95 6 °F (35 3 °C)  Max: 101 7 °F (38 7 °C)  IBW: 59 2 kg    SpO2: 98 %  SpO2 Activity: At Rest  O2 Device: None (Room air)      Intake/Output Summary (Last 24 hours) at 09/21/18 0818  Last data filed at 09/21/18 0801   Gross per 24 hour   Intake          1460 03 ml   Output             2930 ml   Net         -1469 97 ml       Invasive/non-invasive ventilation settings:   Respiratory    Lab Data (Last 4 hours)    None         O2/Vent Data (Last 4 hours)    None              Invasive Devices     Peripheral Intravenous Line            Peripheral IV 09/19/18 Left Forearm 1 day    Peripheral IV 09/20/18 Right Forearm 1 day          Drain            Urethral Catheter Temperature probe 16 Fr  1 day                  Physical Exam:  Gen: Awake, alert, appropriate, in no acute distress   HEENT: Atraumatic, normocephalic, oropharynx clear  Neck: Supple, trachea midline, no JVD, no lymphadenopathy   Chest: Clear diminished breath sounds, no crackles, rales or wheeze  Cor: Single S1/S2, no murmurs, rubs, gallops, regular rate and rhythm  Abd: Soft, nontender, nondistended, bowel sounds normoactive  Ext: No edema, no clubbing or cyanosis  Neuro: Oriented x3  Skin: Warm, dry, intact, some ecchymosis areas on UEs      Diagnostic Data:  Lab: I have personally reviewed pertinent lab results     CBC:     Results from last 7 days  Lab Units 09/21/18  0446 09/20/18  0755 09/20/18  0543 09/19/18  0456   WBC Thousand/uL 10 77*  --  14 27* 19 94*   HEMOGLOBIN g/dL 10 4*  --  9 6* 9 3*   HEMATOCRIT % 30 2*  --  27 4* 26 8*   PLATELETS Thousands/uL 34* 10* 9* 29*       CMP:     Results from last 7 days  Lab Units 09/21/18  0446 09/20/18  1145 09/20/18  0543 09/19/18  0456 09/18/18  0442  09/17/18  1836 09/17/18  1546   SODIUM mmol/L 142 143 143 142 141  < >  --  139   POTASSIUM mmol/L 4 3 3 4* 4 0 3 9 3 8  < >  --  4 2   CHLORIDE mmol/L 108 107 109* 108 107  < >  --  106   CO2 mmol/L 19* 20* 20* 20* 20*  < >  --  12*   BUN mg/dL 94* 87* 83* 74* 55*  < >  --  51*   CREATININE mg/dL 5 53* 5 14* 4 84* 3 96* 3 15*  < >  --  3 09*   CALCIUM mg/dL 8 2* 8 0* 7 8* 7 6* 7 8*  < >  --  7 9*   ALK PHOS U/L  --   --   --  85 207*  --   --  323*   ALT U/L  --   --   --  59 115*  --   --  112*   AST U/L  --   --   --  75* 150*  --   --  110*   GLUCOSE, ISTAT mg/dl  --   --   --   --   --   --  75  --    < > = values in this interval not displayed  PT/INR:   No results found for: PT, INR,   Magnesium:   Results from last 7 days  Lab Units 09/19/18  0456 09/18/18  0442   MAGNESIUM mg/dL 2 1 1 5*     Phosphorous:   Results from last 7 days  Lab Units 09/18/18  0442   PHOSPHORUS mg/dL 4 8*       Microbiology:    Results from last 7 days  Lab Units 09/19/18  1746 09/17/18  2206 09/17/18  1546   BLOOD CULTURE  No Growth at 24 hrs  No Growth at 24 hrs   --  Klebsiella pneumoniae*  Escherichia coli*  Klebsiella pneumoniae*  Escherichia coli*   GRAM STAIN RESULT   --   --  Gram negative rods  Gram negative rods   C DIFF TOXIN B   --  POSITIVE for C difficle toxin by PCR  *  --        Imaging:  Renal US- No hydronephrosis  Gallstones of perihepatic ascites  CT head w/o contrast- No acute intracranial abnormality   Microangiopathic changes  CT abdomen pelvis w/o contrast-  There is marked intrahepatic and extrahepatic biliary dilatation   The common duct measures up to approximately 3 cm in transverse diameter with no calcified stones identified in the common duct  The gallbladder is mildly distended with innumerable small calcified stones layering within the dependent portion of the gallbladder  Correlate for signs of biliary obstruction       CXR-Mild right basilar airspace disease in keeping with atelectasis or pneumonia  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  Cardiac lab/EKG/telemetry/ECHO:   NSR    VTE Prophylaxis: SCDs    Code Status: Level 1 - Full Code    Britton Black RN    Portions of the record may have been created with voice recognition software  Occasional wrong word or "sound a like" substitutions may have occurred due to the inherent limitations of voice recognition software  Read the chart carefully and recognize, using context, where substitutions have occurred

## 2018-09-21 NOTE — SPEECH THERAPY NOTE
Speech Language/Pathology  Speech/Language Pathology  Assessment    Patient Name: Shalom Barillas  [id number] Date: 9/21/2018     Problem List  Patient Active Problem List   Diagnosis    Metabolic acidosis    JOSE E [facility]) ([facility] [address] )    Transaminitis    Coagulopathy (HCC)    Thrombocytopenia (HCC)    [facility]    Acute cholangitis    Calculus of bile duct with acute cholangitis with obstruction     Past Medical History  Past Medical History:   Diagnosis Date    Hypertension     Medical history unknown      Past Surgical History  Past Surgical History:   Procedure Laterality Date    ERCP N/A 9/18/2018    Procedure: ENDOSCOPIC RETROGRADE CHOLANGIOPANCREATOGRAPHY (ERCP) with stent placement;  Surgeon: Lv Mendez MD;  Location: [facility] OR;  Service: Gastroenterology    NO PAST SURGERIES       HPI:    Shalom Barillas is a 80 y o  male who presents with a complaint of vague gastrointestinal symptoms over the last few days  The patient states his symptoms started after he ate [employer]  He developed nausea and anorexia  He denies any vomiting however he did feel like he had some dry heaving  He denies diarrhea or constipation  He denies chest pain or shortness of breath  Currently he denies abdominal pain  The patient states that he has never had similar symptoms in the past after eating fried or greasy food  A CT scan done in the emergency department reveals a dilated gallbladder with evidence of gallstones and dilated hepatic and common bile duct  Additionally the patient was febrile to 101 5 and was in acute renal failure as evidence by his creatinine of 3 05  Per ICU crnp (not yet cosigned)  Assessment/Plan:  1  Severe sepsis secondary to gram-negative bacteremia in the setting of cholangitis and C diff colitis  · Continue Ancef based on culture sensitivities  Today is day 5 of antibiotics  · Remains afebrile and hemodynamically stable    · Follow temps and WBC count   2  Choledocholithiasis and cholangitis status post ERCP with biliary stent placement  · Management per GI  Trend LFTs  3  JOSE E likely secondary to ATN in the setting of sepsis  · Urine output significantly improved on lasix gtt, averaging approximately 100mL/hr  · Creatinine worse this am and pt is more uremic  He is not significantly acidotic  Consider nephrology consult  4  C diff colitis  · Continue PO vancomycin  Diarrhea decreasing  5  Thrombocytopenia  · DIC panel was negative, no schistocytes on peripheral smear  This is likely secondary to sepsis  Uncertain chronicity  · Pt received 2 units of platelets yesterday  Platelets up to 82937 this am   ____________________________________________________________________  HPI/24hr events:   Afebrile  Episodes of dry heaving overnight  No acute events  Summary:  Pt presents w/ poor appetite "you know when you don't feel good you don't feel like eating, and I don't like the food here, I have krimpets for later"  Refused to eat anything but agreed to drinking  (refused jello and gingerale) Took thin liquids wnl  Nurse reported same  The pt denied he has any difficulty swallowing  States he just doesn't feel like eating  Recommendations:  Diet: ites from home as desired and tolerated  Recommend softer as needed  Liquid:thin  Meds:as tolerated  No concerns from nurse at this time  Supervision: encourage intake  Positioning:Upright  Strategies: Pt to take PO/Meds only when fully alert and upright  Oral care  Aspiration precautions  Reflux precautions  Eval only, No f/u tx indicated  If specific oral/pharyngeal concerns arise, please reconsult  Patient's goal: "id like some fresh water" (done)    Consider consult w/:  Nutrition ? Supplements or clear liquid supplements       Reason for consult:  poor intake    Precautions:  Contact  Current diet:  Dysphagia 3 and thin  Premorbid diet[de-identified]  similar  Previous VBS:  none  O2 requirement:  none  Voice/Speech:  wnl  Social:  Home w wife  Follows commands:  yes                      Cognitive Status:  Alert, pleasant, able to converse appropriately and make needs known  Oral mech exam:  Lower plate, did not want to put it in  Tongue deviated initially  When asked to stick it out straight he was able to  Noted some posterior mucous  Items administered:  Cranberry juice and water  Liquids were taken by straw  Oral stage:  wnl w/ liquids  Pharyngeal stage:   WNL  Swallow promptness: prompt  Laryngeal rise:appeaars adequate  Wet voice:no  Throat clear:no  Cough:no  Secondary swallows:no  Audible swallows:no  No s/s aspiration    Esophageal stage:  No s/s reported    Results d/w:  Pt, nursing

## 2018-09-21 NOTE — PROGRESS NOTES
Progress Note - Jae Lora 80 y o  male MRN: [id number]    Unit/Bed#: ICU 13 Encounter: [id number]    Subjective:     Patient seen and examined at bedside  He reports feeling the same  He denies abdominal pain  He had several episodes of watery/mucousy stool last night and this morning  He denies abdominal pain  No nausea or vomiting  Objective:     Vitals: Blood pressure 156/72, pulse 68, temperature (!) 97 2 °F (36 2 °C), resp  rate 14, height 5' 4" (1 626 m), weight 74 3 kg (163 lb 12 8 oz), SpO2 98 %  ,Body mass index is 28 12 kg/m²  Intake/Output Summary (Last 24 hours) at 09/21/18 1312  Last data filed at 09/21/18 1107   Gross per 24 hour   Intake          1026 23 ml   Output             2690 ml   Net         -1663 77 ml       Physical Exam:     General Appearance: Alert, appears stated age and cooperative  Lungs: Decreased breath sounds bilaterally  Heart: Regular rate and rhythm, S1, S2 normal, no murmur, click, rub or gallop  Abdomen: Non-distended  Normal bowel sounds  Soft  Non-tender to palpation  Extremities: No cyanosis, edema    Invasive Devices     Peripheral Intravenous Line            Peripheral IV 09/19/18 Left Forearm 1 day    Peripheral IV 09/20/18 Right Forearm 1 day          Drain            Urethral Catheter Temperature probe 16 Fr  1 day                Lab Results:    Results from last 7 days  Lab Units 09/21/18 0446  09/18/18  0441   WBC Thousand/uL 10 77*  < > 11 98*   HEMOGLOBIN g/dL 10 4*  < > 12 0   HEMATOCRIT % 30 2*  < > 36 7   PLATELETS Thousands/uL 34*  < > 11*   LYMPHO PCT %  --   --  9*   MONO PCT MAN %  --   --  9   EOSINO PCT MANUAL %  --   --  0   < > = values in this interval not displayed      Results from last 7 days  Lab Units 09/21/18  0446  09/19/18  0456  09/17/18  1836   SODIUM mmol/L 142  < > 142  < >  --    POTASSIUM mmol/L 4 3  < > 3 9  < >  --    CHLORIDE mmol/L 108  < > 108  < >  --    CO2 mmol/L 19*  < > 20*  < >  --    BUN mg/dL 94*  < > 74*  < > --    CREATININE mg/dL 5 53*  < > 3 96*  < >  --    CALCIUM mg/dL 8 2*  < > 7 6*  < >  --    ALK PHOS U/L  --   --  85  < >  --    ALT U/L  --   --  59  < >  --    AST U/L  --   --  75*  < >  --    GLUCOSE, ISTAT mg/dl  --   --   --   --  75   < > = values in this interval not displayed  Results from last 7 days  Lab Units 09/20/18  0754   INR  1 22*       Results from last 7 days  Lab Units 09/17/18  1546   LIPASE u/L 72*       Imaging Studies: I have personally reviewed pertinent imaging studies  Ct Abdomen Pelvis Wo Contrast    Result Date: 9/17/2018  Impression: There is marked intrahepatic and extrahepatic biliary dilatation  The common duct measures up to approximately 3 cm in transverse diameter with no calcified stones identified in the common duct  The gallbladder is mildly distended with innumerable small calcified stones layering within the dependent portion of the gallbladder  Correlate for signs of biliary obstruction  Recommend GI consultation  Examination limited by lack of oral and IV contrast and diffuse respiratory motion  Workstation performed: [id number]     Xr Chest 1 View Portable    Result Date: 9/17/2018  Impression: Mild right basilar airspace disease in keeping with atelectasis or pneumonia  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  Workstation performed: [id number]     Ct Head Without Contrast    Result Date: 9/17/2018  Impression: No acute intracranial abnormality  Microangiopathic changes  Workstation performed: [id number]     Fl Ercp Biliary And Pancreatic    Result Date: 9/18/2018  Impression: Fluoroscopic guidance provided for surgical procedure  Please refer to the separate procedure notes for additional details  Workstation performed: [id number]     Us Kidney And Bladder    Result Date: 9/20/2018  Impression: No hydronephrosis  Gallstones of perihepatic ascites   Workstation performed: [id number]       Assessment and Plan:    1) Sepsis secondary to GNR bacteremia in setting of cholangitis and C  Diff colitis: Initial blood cultures positive for Klebsiella and E coli, but repeat blood cultures negative from 9/19  Leukocytosis continues to improve  Patient remains afebrile and hemodynamically stable      -Continue IV Ancef and PO vancomycin  -Monitor WBC count and temperature     2) Choledocholithiasis and cholangitis: Status post ERCP 9/18 which showed a large stone in the distal CBD for which a plastic biliary stent was placed  No sphincterotomy or sphincteroplasty was performed due to coagulopathy and thrombocytopenia  Fevers resolved  Leukocytosis and liver enzymes improving      -Add on LFTs to this morning's blood work   -Continue IV Ancef  -Patient will need elective ERCP with sphincterotomy, stone, and stent extraction in 6-8 weeks     3) C   Diff colitis: Nursing reports several episodes of watery/mucousy stool last night and this morning      -Continue IV Ancef and change to PO vancomycin 500 every 6 hours x total of 14 days  -Monitor stool output

## 2018-09-22 LAB
ANION GAP SERPL CALCULATED.3IONS-SCNC: 13 MMOL/L (ref 4–13)
BACTERIA UR QL AUTO: ABNORMAL /HPF
BILIRUB UR QL STRIP: NEGATIVE
BUN SERPL-MCNC: 98 MG/DL (ref 5–25)
CALCIUM SERPL-MCNC: 7.8 MG/DL (ref 8.3–10.1)
CHLORIDE SERPL-SCNC: 104 MMOL/L (ref 100–108)
CK SERPL-CCNC: 32 U/L (ref 39–308)
CLARITY UR: ABNORMAL
CO2 SERPL-SCNC: 25 MMOL/L (ref 21–32)
COARSE GRAN CASTS URNS QL MICRO: ABNORMAL /LPF
COLOR UR: YELLOW
CREAT SERPL-MCNC: 5.99 MG/DL (ref 0.6–1.3)
GFR SERPL CREATININE-BSD FRML MDRD: 8 ML/MIN/1.73SQ M
GLUCOSE SERPL-MCNC: 98 MG/DL (ref 65–140)
GLUCOSE UR STRIP-MCNC: NEGATIVE MG/DL
HGB UR QL STRIP.AUTO: ABNORMAL
KETONES UR STRIP-MCNC: NEGATIVE MG/DL
LEUKOCYTE ESTERASE UR QL STRIP: NEGATIVE
NITRITE UR QL STRIP: NEGATIVE
NON-SQ EPI CELLS URNS QL MICRO: ABNORMAL /HPF
PH UR STRIP.AUTO: 6.5 [PH] (ref 4.5–8)
POTASSIUM SERPL-SCNC: 4 MMOL/L (ref 3.5–5.3)
PROT UR STRIP-MCNC: NEGATIVE MG/DL
RBC #/AREA URNS AUTO: ABNORMAL /HPF
SODIUM SERPL-SCNC: 142 MMOL/L (ref 136–145)
SP GR UR STRIP.AUTO: 1.01 (ref 1–1.03)
UROBILINOGEN UR QL STRIP.AUTO: 0.2 E.U./DL
WBC #/AREA URNS AUTO: ABNORMAL /HPF

## 2018-09-22 PROCEDURE — 81001 URINALYSIS AUTO W/SCOPE: CPT | Performed by: PHYSICIAN ASSISTANT

## 2018-09-22 PROCEDURE — 80048 BASIC METABOLIC PNL TOTAL CA: CPT | Performed by: INTERNAL MEDICINE

## 2018-09-22 PROCEDURE — 99232 SBSQ HOSP IP/OBS MODERATE 35: CPT | Performed by: INTERNAL MEDICINE

## 2018-09-22 PROCEDURE — 99233 SBSQ HOSP IP/OBS HIGH 50: CPT | Performed by: INTERNAL MEDICINE

## 2018-09-22 PROCEDURE — 82550 ASSAY OF CK (CPK): CPT | Performed by: PHYSICIAN ASSISTANT

## 2018-09-22 RX ORDER — SODIUM CHLORIDE, SODIUM GLUCONATE, SODIUM ACETATE, POTASSIUM CHLORIDE, MAGNESIUM CHLORIDE, SODIUM PHOSPHATE, DIBASIC, AND POTASSIUM PHOSPHATE .53; .5; .37; .037; .03; .012; .00082 G/100ML; G/100ML; G/100ML; G/100ML; G/100ML; G/100ML; G/100ML
50 INJECTION, SOLUTION INTRAVENOUS CONTINUOUS
Status: DISCONTINUED | OUTPATIENT
Start: 2018-09-22 | End: 2018-09-28 | Stop reason: HOSPADM

## 2018-09-22 RX ADMIN — METOPROLOL TARTRATE 25 MG: 25 TABLET ORAL at 08:26

## 2018-09-22 RX ADMIN — SODIUM BICARBONATE 125 ML/HR: 84 INJECTION, SOLUTION INTRAVENOUS at 11:11

## 2018-09-22 RX ADMIN — VANCOMYCIN 500 MG: KIT at 05:05

## 2018-09-22 RX ADMIN — CEFAZOLIN SODIUM 1000 MG: 1 SOLUTION INTRAVENOUS at 09:49

## 2018-09-22 RX ADMIN — SODIUM BICARBONATE 125 ML/HR: 84 INJECTION, SOLUTION INTRAVENOUS at 05:05

## 2018-09-22 RX ADMIN — VANCOMYCIN 500 MG: KIT at 17:37

## 2018-09-22 RX ADMIN — METOPROLOL TARTRATE 25 MG: 25 TABLET ORAL at 21:14

## 2018-09-22 RX ADMIN — VANCOMYCIN 500 MG: KIT at 00:24

## 2018-09-22 RX ADMIN — SODIUM CHLORIDE, SODIUM GLUCONATE, SODIUM ACETATE, POTASSIUM CHLORIDE, MAGNESIUM CHLORIDE, SODIUM PHOSPHATE, DIBASIC, AND POTASSIUM PHOSPHATE 75 ML/HR: .53; .5; .37; .037; .03; .012; .00082 INJECTION, SOLUTION INTRAVENOUS at 13:27

## 2018-09-22 RX ADMIN — VANCOMYCIN 500 MG: KIT at 11:11

## 2018-09-22 NOTE — PROGRESS NOTES
Progress Note - Vida Winslow 80 y o  male MRN: [id number]    Unit/Bed#: ICU 13 Encounter: [id number]    Assessment/Plan:  1) Sepsis secondary to GNR bacteremia/cholangitis/C  Diff colitis - repeat blood cultures negative from 9/19  Leukocytosis continues to trend down  Continue IV Ancef and PO vancomycin  Monitor CBC with diff      2) Choledocholithiasis/cholangitis: s/p ERCP on 9/18 - large stone in the distal CBD for which a plastic biliary stent was placed  Sphincterotomy or sphincteroplasty was not performed due to coagulopathy/thrombocytopenia  LFTs/bilirubin trending down  Plan for elective ERCP with sphincterotomy/stent extraction in 6-8 weeks     3) C  Diff colitis - no further episodes noted per nursing for the past 2 days  Continue PO Vanco 500mg Q6H x 14 days total   Continue to observe      Problem List Items Addressed This Visit        Digestive    Acute cholangitis    Relevant Orders    Inpatient consult to gastroenterology (Completed)    Case request operating room: ENDOSCOPIC RETROGRADE CHOLANGIOPANCREATOGRAPHY (ERCP) (Completed)    [place] ERCP biliary and pancreatic (Completed)       Genitourinary    JOSE E (acute kidney injury) ([facility] [address] )    Relevant Medications    furosemide (LASIX) injection 20 mg (Completed)    furosemide (LASIX) injection 80 mg (Completed)    Other Relevant Orders    Inpatient consult to Nephrology (Completed)       Other    Sepsis ([facility] [address] )    Relevant Medications    ceFAZolin (ANCEF) IVPB (premix) 1,000 mg      Other Visit Diagnoses     Generalized weakness    -  Primary        Subjective:  Patient denies any loose stools for the past 2 days  Denies abdominal pain  Denies constipation  Denies fever/chills  Denies chest pain or shortness of breath   +tolerating PO diet  Objective:   Vitals: Blood pressure 160/76, pulse 58, temperature 97 6 °F (36 4 °C), temperature source Temporal, resp  rate 12, height 5' 4" (1 626 m), weight 74 3 kg (163 lb 12 8 oz), SpO2 97 %  ,Body mass index is 28 12 kg/m²  Admission on 09/17/2018   No results displayed because visit has over 200 results  Current Facility-Administered Medications:  cefazolin 1,000 mg Intravenous Q24H BROOKLYN Huitron Last Rate: 1,000 mg (09/22/18 0949)   hydrALAZINE 10 mg Intravenous Q6H PRN Héctor Cruz, BROOKLYN    ipratropium 0 5 mg Nebulization Q8H PRN Antonella Cazares MD    levalbuterol 1 25 mg Nebulization Q8H PRN Valentina Ruff, CRNP    metoprolol 5 mg Intravenous Q6H PRN BROOKLYN Arvizu    metoprolol tartrate 25 mg Oral Q12H [address] Karla K Bilofangelic, CRNP    ondansetron 4 mg Intravenous Q4H PRN Valentina Ruff, CRNP    sodium bicarbonate infusion 125 mL/hr Intravenous Continuous Brooklyn Silva MD Last Rate: 125 mL/hr (09/22/18 1111)   vancomycin 500 mg Oral Q6H [address] Dayan Tyler PA-C      hydrALAZINE    ipratropium    levalbuterol    metoprolol    ondansetron    Lab Results:   Admission on 09/17/2018   No results displayed because visit has over 200 results  Imaging: Ct Abdomen Pelvis Wo Contrast    Result Date: 9/17/2018  Narrative: CT ABDOMEN AND PELVIS WITHOUT IV CONTRAST INDICATION:   sepsis, no source  COMPARISON:  None  TECHNIQUE:  CT examination of the abdomen and pelvis was performed without intravenous contrast   Axial, sagittal, and coronal 2D reformatted images were created from the source data and submitted for interpretation  Radiation dose length product (DLP) for this visit:  216 mGy-cm   This examination, like all CT scans performed in the [facility], was performed utilizing techniques to minimize radiation dose exposure, including the use of iterative reconstruction and automated exposure control  Enteric contrast was not administered  FINDINGS: ABDOMEN LOWER CHEST:  Lung bases are limited by patient motion throughout the examination  Mild atelectatic change  Visualized cardiac silhouette is slightly enlarged   LIVER/BILIARY TREE:  No focal parenchymal abnormality identified  There is moderate intrahepatic biliary dilatation  There is pronounced extrahepatic biliary dilatation involving the common hepatic duct and common bile duct  Common duct on coronal imaging measures up to 3 cm  GALLBLADDER:  The gallbladder is moderately distended with numerous calcified gallstones layering in the dependent portion of the gallbladder  SPLEEN:  Unremarkable  PANCREAS:  Unremarkable  ADRENAL GLANDS:  Unremarkable  KIDNEYS/URETERS:  Unremarkable  No hydronephrosis  STOMACH AND BOWEL:  Unremarkable  APPENDIX:  A normal appendix was visualized  ABDOMINOPELVIC CAVITY:  No ascites or free intraperitoneal air  No lymphadenopathy  VESSELS:  Unremarkable for patient's age  PELVIS REPRODUCTIVE ORGANS:  Unremarkable for patient's age  URINARY BLADDER:  Unremarkable  ABDOMINAL WALL/INGUINAL REGIONS:  Unremarkable  OSSEOUS STRUCTURES:  Diffuse thoracolumbar spondylitic degenerative change  Vacuum phenomenon with mild endplate and posterior element hypertrophic change noted at multiple levels  Multilevel mild canal stenosis  Moderate foraminal narrowing at L4-5  Impression: There is marked intrahepatic and extrahepatic biliary dilatation  The common duct measures up to approximately 3 cm in transverse diameter with no calcified stones identified in the common duct  The gallbladder is mildly distended with innumerable small calcified stones layering within the dependent portion of the gallbladder  Correlate for signs of biliary obstruction  Recommend GI consultation  Examination limited by lack of oral and IV contrast and diffuse respiratory motion  Workstation performed: [id number]     Xr Chest 1 View Portable    Result Date: 9/17/2018  Narrative: CHEST INDICATION:   " weakness and poor appetite for the past 2 days"  COMPARISON:  None EXAM PERFORMED/VIEWS:  XR CHEST PORTABLE FINDINGS: Cardiomediastinal silhouette appears unremarkable   Mild right basilar airspace disease in keeping with atelectasis or pneumonia  No pneumothorax or pleural effusion  Advanced bilateral right shoulder degenerative changes with prominent superior migration of the humeral head in keeping with chronic bilateral rotator cuff injuries  Impression: Mild right basilar airspace disease in keeping with atelectasis or pneumonia  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  Workstation performed: [id number]     Ct Head Without Contrast    Result Date: 9/17/2018  Narrative: CT BRAIN - WITHOUT CONTRAST INDICATION:   weak, unable to walk  COMPARISON:  None  TECHNIQUE:  CT examination of the brain was performed  In addition to axial images, coronal 2D reformatted images were created and submitted for interpretation  Radiation dose length product (DLP) for this visit:  1031 mGy-cm   This examination, like all CT scans performed in the [facility], was performed utilizing techniques to minimize radiation dose exposure, including the use of iterative reconstruction and automated exposure control  IMAGE QUALITY:  Diagnostic  FINDINGS: PARENCHYMA: Decreased attenuation is noted in periventricular and subcortical white matter demonstrating an appearance that is statistically most likely to represent mild microangiopathic change  No CT signs of acute infarction  No intracranial mass, mass effect or midline shift  No acute parenchymal hemorrhage  VENTRICLES AND EXTRA-AXIAL SPACES:  Ventricles and extra-axial CSF spaces are prominent commensurate with the degree of volume loss  No hydrocephalus  No acute extra-axial hemorrhage  VISUALIZED ORBITS AND PARANASAL SINUSES:  Unremarkable  CALVARIUM AND EXTRACRANIAL SOFT TISSUES:  Normal      Impression: No acute intracranial abnormality  Microangiopathic changes  Workstation performed: [id number]     Fl Ercp Biliary And Pancreatic    Result Date: 9/18/2018  Narrative: C-ARM - INDICATION: K83 0: Cholangitis    Procedure guidance  COMPARISON:  None TECHNIQUE: FLUOROSCOPY TIME:   33 seconds 4 FLUOROSCOPIC IMAGES FINDINGS: Fluoroscopic guidance provided for surgical procedure  Osseous and soft tissue detail limited by technique  Impression: Fluoroscopic guidance provided for surgical procedure  Please refer to the separate procedure notes for additional details  Workstation performed: [id number]     Us Kidney And Bladder    Result Date: 9/20/2018  Narrative: RENAL ULTRASOUND INDICATION:   renal failure  COMPARISON: CT scan 9/17/2018 TECHNIQUE:   Ultrasound of the retroperitoneum was performed with a curvilinear transducer utilizing volumetric sweeps and still imaging techniques  FINDINGS: KIDNEYS: Symmetric and normal size  Right kidney:  9 3 x 4 2 cm  Left kidney:  8 8 x 4 9 cm  Right kidney Normal echogenicity and contour  No suspicious masses detected  No hydronephrosis  No shadowing calculi  No perinephric fluid collections  Left kidney Normal echogenicity and contour  No suspicious masses detected  No hydronephrosis  No shadowing calculi  No perinephric fluid collections  URETERS: Nonvisualized  BLADDER: Ruvalcaba catheter in the bladder  Gallstones  Perihepatic ascites noted  Impression: No hydronephrosis  Gallstones of perihepatic ascites  Workstation performed: [id number]     Physical Exam:  General: Alert and oriented male in NAD lying down in bed  No focal deficits on exam  Speech clear  Mood and affect WNL  HEENT: atraumatic, normocephalic, sclera anicteric, PERRLA  MMM, tongue midline, no LAD, neck supple  SKIN: warm and dry, no rashes or lesions, mild PVD of BLE  CV: Irregularly regular, no M/R/G  Lungs: CTA b/l respirations easy and non-labored, no wheezes, rhonchi or crackles,   Abd: soft, tenderness to upper quadrants mostly in midepigastric region  Extrem: no c/c/e, pedal pulses palpable    Counseling / Coordination of Care  Total floor / unit time spent today 30 minutes    Greater than 50% of total time was spent with the patient and / or family counseling and / or coordination of care

## 2018-09-22 NOTE — PROGRESS NOTES
NEPHROLOGY PROGRESS NOTE   Pepper Lockett 80 y o  male MRN: [id number]  Unit/Bed#: ICU 13 Encounter: [id number]  Reason for Consult: JOSE E    ASSESSMENT/PLAN:  1  Elevated creatinine, POA- potential etiology could be prerenal/ATN secondary to sepsis vs intrinsic etiology with active UA  - less likely post renal with borderline PVRs but no improvement with sandoval catheter insertion  - unknown baseline creatinine  - nonoliguric, good urine output  - UA: large blood (2-4 rbc), 2+ protein, trace ketones,   - renal ultrasound: no hydronephrosis, normal echogenicity and contour bilaterally  - significantly elevated D dimer  - CPK elevated at 1273 on 9/17, recheck CPK  - consider checking intrinsic workup (will d/w Dr Zaira Robert)  - creatinine continues to worsen, no decrease in rate of rise  - no indication for dialysis, electrolytes stable  2  Metabolic Acidosis- improved with bicarbonate drip, change to isolyte and decrease rate  3  Anemia- likely partially secondary to dilution  4  Hypertension- BP elevated above goal, will monitor for now  - avoid hypotension  5  Sepsis secondary to GNR Bacteremia/Colangitis/C diff- GI following  - on IV ancef and PO vancomycin  - s/p ERCP on 9/18 with biliary stent placement  - eventual sphincterotomy with repeat ERCP in 6-8 weeks    SUBJECTIVE:  Patient feeling well but now complaining of blurry vision in his left eye  Cannot see my fingers far or close to his face with covered right eye  States this has been going on for a few weeks        OBJECTIVE:  Current Weight: Weight - Scale: 74 3 kg (163 lb 12 8 oz)  Vitals:    09/22/18 0307 09/22/18 0500 09/22/18 0900 09/22/18 1100   BP:  147/61 148/96 160/76   Pulse:  68 62 58   Resp:  12 (!) 11 12   Temp: 97 6 °F (36 4 °C)      TempSrc: Temporal      SpO2:  96% 97% 97%   Weight:       Height:           Intake/Output Summary (Last 24 hours) at 09/22/18 1227  Last data filed at 09/22/18 1111   Gross per 24 hour   Intake          2715 84 ml Output             2475 ml   Net           240 84 ml     General: NAD  Skin: no rash  HEENT: left eye blurriness  Neck: supple  Chest: CTAB  Heart: RRR  Abdomen: soft nt nd  Extremities: no edema  Neuro: alert awake  Psych: mood and affect appropriate    Medications:    Current Facility-Administered Medications:     ceFAZolin (ANCEF) IVPB (premix) 1,000 mg, 1,000 mg, Intravenous, Q24H, BROOKLYN Dobson, Stopped at 09/22/18 1019    hydrALAZINE (APRESOLINE) injection 10 mg, 10 mg, Intravenous, Q6H PRN, BROOKLYN Dobson, 10 mg at 09/21/18 0238    ipratropium (ATROVENT) 0 02 % inhalation solution 0 5 mg, 0 5 mg, Nebulization, Q8H PRN, Sandrita Solomon MD, 0 5 mg at 09/20/18 0913    levalbuterol (XOPENEX) inhalation solution 1 25 mg, 1 25 mg, Nebulization, Q8H PRN, BROOKLYN Barrera, 1 25 mg at 09/20/18 0913    metoprolol (LOPRESSOR) injection 5 mg, 5 mg, Intravenous, Q6H PRN, BROOKLYN Baird, 5 mg at 09/20/18 0813    metoprolol tartrate (LOPRESSOR) tablet 25 mg, 25 mg, Oral, Q12H [address], BROOKLYN Dobosn, 25 mg at 09/22/18 0826    ondansetron (ZOFRAN) injection 4 mg, 4 mg, Intravenous, Q4H PRN, BROOKLYN Barrera, 4 mg at 09/21/18 0117    sodium bicarbonate 50 mEq in sodium chloride 0 45 % 1,000 mL infusion, 125 mL/hr, Intravenous, Continuous, Shawna Chapman MD, Stopped at 09/22/18 1145    vancomycin (VANCOCIN) oral solution 500 mg, 500 mg, Oral, Q6H [address], Dayan Tyler PA-C, 500 mg at 09/22/18 1111    Laboratory Results:    Results from last 7 days  Lab Units 09/22/18  0428 09/21/18  0446 09/20/18  1145 09/20/18  0755 09/20/18  0543 09/19/18  0456 09/18/18  0442 09/18/18  0441 09/18/18  0016 09/17/18  1836 09/17/18  1546   WBC Thousand/uL  --  10 77*  --   --  14 27* 19 94*  --  11 98*  --   --  11 96*   HEMOGLOBIN g/dL  --  10 4*  --   --  9 6* 9 3*  --  12 0  --   --  11 3*   I STAT HEMOGLOBIN g/dl  --   --   --   --   --   --   --   --   --  10 2*  --    HEMATOCRIT %  --  30 2*  --   -- 27 4* 26 8*  --  36 7  --  30* 34 9*   PLATELETS Thousands/uL  --  34*  --  10* 9* 29*  --  11*  --   --  44*   SODIUM mmol/L 142 142 143  --  143 142 141  --  140  --  139   POTASSIUM mmol/L 4 0 4 3 3 4*  --  4 0 3 9 3 8  --  3 9  --  4 2   CHLORIDE mmol/L 104 108 107  --  109* 108 107  --  108  --  106   CO2 mmol/L 25 19* 20*  --  20* 20* 20*  --  17*  --  12*   BUN mg/dL 98* 94* 87*  --  83* 74* 55*  --  55*  --  51*   CREATININE mg/dL 5 99* 5 53* 5 14*  --  4 84* 3 96* 3 15*  --  3 05*  --  3 09*   CALCIUM mg/dL 7 8* 8 2* 8 0*  --  7 8* 7 6* 7 8*  --  7 5*  --  7 9*   MAGNESIUM mg/dL  --   --   --   --   --  2 1 1 5*  --   --   --   --    PHOSPHORUS mg/dL  --   --   --   --   --   --  4 8*  --   --   --   --    GLUCOSE, ISTAT mg/dl  --   --   --   --   --   --   --   --   --  75  --

## 2018-09-22 NOTE — PROGRESS NOTES
Progress Note - Critical Care   Anival Escalante 80 y o  male MRN: [id number]  Unit/Bed#: ICU 13 Encounter: [id number]    Assessment/Plan:  1  Severe sepsis secondary to gram negative bacteremia in the setting of c-diff and cholangitis  · Continue ancef, day # 3, total antibiotic day 6  · Monitor temps, WBC  · Follow cultures, repeat blood cultures negative x48 hours    2  Choledocholithiasis and cholangitis s/p ERCP with biliary stent placement  · GI following, patient will require elective ERCP as an outpatient for sphincterotomy and stone extraction  · Continue to trend LFTs    3  Acute kidney injury secondary to ATN in the setting of sepsis  · Acidosis improved, will discontinue bicarb gtt  · Urine output 1000 overnight  · Nephrology following    4  Cdiff colitis  · Continue oral vancomycin, day # 5    5  Thrombocytopenia  · Platelets given yesterday  · Will recheck platelets tomorrow  _____________________________________________________________________    HPI/24hr events:   No events overnight  Medications:    Current Facility-Administered Medications:  cefazolin 1,000 mg Intravenous Q24H BROOKLYN Virgen Last Rate: Stopped (09/21/18 1855)   hydrALAZINE 10 mg Intravenous Q6H PRN BROOKLYN Chaudhry    ipratropium 0 5 mg Nebulization Q8H PRN Janeth Handley MD    levalbuterol 1 25 mg Nebulization Q8H PRN BROOKLYN Chavez    metoprolol 5 mg Intravenous Q6H PRN BROOKLYN Ulloa    metoprolol tartrate 25 mg Oral Q12H [address] BROOKLYN Dobson    ondansetron 4 mg Intravenous Q4H PRN BROOKLYN Chavez    sodium bicarbonate infusion 125 mL/hr Intravenous Continuous Luther Zavaleta MD Last Rate: 125 mL/hr (09/22/18 0505)   vancomycin 500 mg Oral Q6H [address] Dayan Tyler PA-C          sodium bicarbonate infusion 125 mL/hr Last Rate: 125 mL/hr (09/22/18 0505)         Physical exam:  Vitals: Body mass index is 28 12 kg/m²    Blood pressure 147/61, pulse 68, temperature 97 6 °F (36 4 °C), temperature source Temporal, resp  rate 12, height 5' 4" (1 626 m), weight 74 3 kg (163 lb 12 8 oz), SpO2 96 %  ,  Temp  Min: 95 6 °F (35 3 °C)  Max: 101 7 °F (38 7 °C)  IBW: 59 2 kg    SpO2: 96 %  SpO2 Activity: At Rest  O2 Device: None (Room air)      Intake/Output Summary (Last 24 hours) at 09/22/18 0531  Last data filed at 09/22/18 0400   Gross per 24 hour   Intake           2601 4 ml   Output             2625 ml   Net            -23 6 ml       Invasive/non-invasive ventilation settings:   Respiratory    Lab Data (Last 4 hours)    None         O2/Vent Data (Last 4 hours)    None              Invasive Devices     Peripheral Intravenous Line            Peripheral IV 09/19/18 Left Forearm 2 days    Peripheral IV 09/20/18 Right Forearm 1 day          Drain            Urethral Catheter Temperature probe 16 Fr  1 day                  Physical Exam:  Gen: flat affect, no complaints, in NAD  HEENT: normocephalic, atraumatic, oropharynx clear  Neck: no JVD, no lymphadenopathy, trachea midline  Chest: lung sounds clear, equal  Cor: systolic murmur 3/6,  no edema  Abd: soft, non tender, distended, hyperactive bowel sounds  Ext: moves all extremities equally  Neuro: alert and oriented x 3, no focal deficits  Skin: warm, dry      Diagnostic Data:  Lab: I have personally reviewed pertinent lab results     CBC:     Results from last 7 days  Lab Units 09/21/18  0446 09/20/18  0755 09/20/18  0543 09/19/18  0456   WBC Thousand/uL 10 77*  --  14 27* 19 94*   HEMOGLOBIN g/dL 10 4*  --  9 6* 9 3*   HEMATOCRIT % 30 2*  --  27 4* 26 8*   PLATELETS Thousands/uL 34* 10* 9* 29*       CMP:     Results from last 7 days  Lab Units 09/22/18  0428 09/21/18  0446 09/20/18  1145  09/19/18  0456 09/18/18  0442  09/17/18  1836   SODIUM mmol/L 142 142 143  < > 142 141  < >  --    POTASSIUM mmol/L 4 0 4 3 3 4*  < > 3 9 3 8  < >  --    CHLORIDE mmol/L 104 108 107  < > 108 107  < >  --    CO2 mmol/L 25 19* 20*  < > 20* 20*  < >  --    BUN mg/dL 98* 94* 87*  < > 74* 55* < >  --    CREATININE mg/dL 5 99* 5 53* 5 14*  < > 3 96* 3 15*  < >  --    CALCIUM mg/dL 7 8* 8 2* 8 0*  < > 7 6* 7 8*  < >  --    ALK PHOS U/L  --  141*  --   --  85 207*  --   --    ALT U/L  --  49  --   --  59 115*  --   --    AST U/L  --  50*  --   --  75* 150*  --   --    GLUCOSE, ISTAT mg/dl  --   --   --   --   --   --   --  75   < > = values in this interval not displayed  PT/INR:   No results found for: PT, INR,   Magnesium:     Results from last 7 days  Lab Units 09/19/18  0456 09/18/18  0442   MAGNESIUM mg/dL 2 1 1 5*     Phosphorous:     Results from last 7 days  Lab Units 09/18/18  0442   PHOSPHORUS mg/dL 4 8*       Microbiology:    Results from last 7 days  Lab Units 09/19/18  1746 09/17/18  2206 09/17/18  1546   BLOOD CULTURE  No Growth at 48 hrs  No Growth at 48 hrs  --  Klebsiella pneumoniae*  Escherichia coli*  Klebsiella pneumoniae*  Escherichia coli*   GRAM STAIN RESULT   --   --  Gram negative rods  Gram negative rods   C DIFF TOXIN B   --  POSITIVE for C difficle toxin by PCR  *  --        Cardiac lab/EKG/telemetry/ECHO:   NSR    VTE Prophylaxis: SCD    Code Status: Level 1 - Full Code    BROOKLYN Grimaldo    Portions of the record may have been created with voice recognition software  Occasional wrong word or "sound a like" substitutions may have occurred due to the inherent limitations of voice recognition software  Read the chart carefully and recognize, using context, where substitutions have occurred

## 2018-09-23 LAB
ALBUMIN SERPL BCP-MCNC: 1.8 G/DL (ref 3.5–5)
ALP SERPL-CCNC: 151 U/L (ref 46–116)
ALT SERPL W P-5'-P-CCNC: 13 U/L (ref 12–78)
ANION GAP SERPL CALCULATED.3IONS-SCNC: 12 MMOL/L (ref 4–13)
AST SERPL W P-5'-P-CCNC: 24 U/L (ref 5–45)
BILIRUB SERPL-MCNC: 1.64 MG/DL (ref 0.2–1)
BUN SERPL-MCNC: 98 MG/DL (ref 5–25)
CALCIUM SERPL-MCNC: 7.8 MG/DL (ref 8.3–10.1)
CHLORIDE SERPL-SCNC: 101 MMOL/L (ref 100–108)
CO2 SERPL-SCNC: 27 MMOL/L (ref 21–32)
CREAT SERPL-MCNC: 6.21 MG/DL (ref 0.6–1.3)
GFR SERPL CREATININE-BSD FRML MDRD: 8 ML/MIN/1.73SQ M
GLUCOSE SERPL-MCNC: 94 MG/DL (ref 65–140)
POTASSIUM SERPL-SCNC: 3.6 MMOL/L (ref 3.5–5.3)
PROT SERPL-MCNC: 5.2 G/DL (ref 6.4–8.2)
SODIUM SERPL-SCNC: 140 MMOL/L (ref 136–145)

## 2018-09-23 PROCEDURE — 99232 SBSQ HOSP IP/OBS MODERATE 35: CPT | Performed by: INTERNAL MEDICINE

## 2018-09-23 PROCEDURE — 80053 COMPREHEN METABOLIC PANEL: CPT | Performed by: NURSE PRACTITIONER

## 2018-09-23 PROCEDURE — 99233 SBSQ HOSP IP/OBS HIGH 50: CPT | Performed by: INTERNAL MEDICINE

## 2018-09-23 RX ORDER — POTASSIUM CHLORIDE 20MEQ/15ML
40 LIQUID (ML) ORAL ONCE
Status: COMPLETED | OUTPATIENT
Start: 2018-09-23 | End: 2018-09-23

## 2018-09-23 RX ADMIN — VANCOMYCIN 500 MG: KIT at 23:39

## 2018-09-23 RX ADMIN — METOPROLOL TARTRATE 25 MG: 25 TABLET ORAL at 21:37

## 2018-09-23 RX ADMIN — VANCOMYCIN 500 MG: KIT at 17:41

## 2018-09-23 RX ADMIN — CEFAZOLIN SODIUM 1000 MG: 1 SOLUTION INTRAVENOUS at 12:21

## 2018-09-23 RX ADMIN — VANCOMYCIN 500 MG: KIT at 00:57

## 2018-09-23 RX ADMIN — SODIUM CHLORIDE, SODIUM GLUCONATE, SODIUM ACETATE, POTASSIUM CHLORIDE, MAGNESIUM CHLORIDE, SODIUM PHOSPHATE, DIBASIC, AND POTASSIUM PHOSPHATE 75 ML/HR: .53; .5; .37; .037; .03; .012; .00082 INJECTION, SOLUTION INTRAVENOUS at 17:45

## 2018-09-23 RX ADMIN — VANCOMYCIN 500 MG: KIT at 12:21

## 2018-09-23 RX ADMIN — VANCOMYCIN 500 MG: KIT at 05:57

## 2018-09-23 RX ADMIN — SODIUM CHLORIDE, SODIUM GLUCONATE, SODIUM ACETATE, POTASSIUM CHLORIDE, MAGNESIUM CHLORIDE, SODIUM PHOSPHATE, DIBASIC, AND POTASSIUM PHOSPHATE 75 ML/HR: .53; .5; .37; .037; .03; .012; .00082 INJECTION, SOLUTION INTRAVENOUS at 01:00

## 2018-09-23 RX ADMIN — POTASSIUM CHLORIDE 40 MEQ: 20 SOLUTION ORAL at 05:56

## 2018-09-23 RX ADMIN — METOPROLOL TARTRATE 25 MG: 25 TABLET ORAL at 08:03

## 2018-09-23 NOTE — PROGRESS NOTES
NEPHROLOGY PROGRESS NOTE   Jesus White 80 y o  male MRN: [id number]  Unit/Bed#: ICU 13 Encounter: [id number]  Reason for Consult: JOSE E    ASSESSMENT/PLAN:  1  Elevated creatinine, POA- likely etiology is prerenal/ATN secondary to sepsis vs less likely intrinsic etiology  - less likely post renal with borderline PVRs but no improvement with sandoval catheter insertion  - unknown baseline creatinine  - nonoliguric, good urine output  - UA: large blood (2-4 rbc), 2+ protein, trace ketones  - repeat UA: large blood (innumerable RBC), no protein  - renal ultrasound: no hydronephrosis, normal echogenicity and contour bilaterally  - significantly elevated D dimer  - CPK elevated at 1273 on 9/17, repeat CPK better at 32  - creatinine continues to worsen, but seems there is a decrease in the rate of rise, hopeful plateau  - no indication for dialysis, electrolytes stable  2  Metabolic Acidosis- improved with bicarbonate drip, now on isolyte since 9/22  3  Anemia- likely partially secondary to dilution  4  Hypertension- BP labile, continue to monitor  - avoid hypotension  5  Sepsis secondary to GNR Bacteremia/Colangitis/C diff- GI following  - on IV ancef and PO vancomycin  - s/p ERCP on 9/18 with biliary stent placement  - eventual sphincterotomy with repeat ERCP in 6-8 weeks      SUBJECTIVE:  Patient feeling well without acute complaints  States his breathing is good even when he lays flat      OBJECTIVE:  Current Weight: Weight - Scale: 74 3 kg (163 lb 12 8 oz)  Vitals:    09/23/18 0100 09/23/18 0300 09/23/18 0306 09/23/18 0500   BP: (!) 174/81 126/57  145/58   Pulse: 72 62  66   Resp: 19 14  (!) 11   Temp:   98 4 °F (36 9 °C)    TempSrc:       SpO2:    97%   Weight:       Height:           Intake/Output Summary (Last 24 hours) at 09/23/18 0728  Last data filed at 09/23/18 0600   Gross per 24 hour   Intake             2130 ml   Output             2850 ml   Net             -720 ml     General: NAD  Skin: no rash  HEENT: normocephalic  Neck: supple  Chest: CTAB  Heart: RRR  Abdomen: soft nt nd  Extremities: no edema  Neuro: alert awake  Psych: mood and affect appropriate    Medications:    Current Facility-Administered Medications:     ceFAZolin (ANCEF) IVPB (premix) 1,000 mg, 1,000 mg, Intravenous, Q24H, BROOKLYN Dobson, Stopped at 09/22/18 1019    hydrALAZINE (APRESOLINE) injection 10 mg, 10 mg, Intravenous, Q6H PRN, BROOKLYN Dobson, 10 mg at 09/21/18 0238    ipratropium (ATROVENT) 0 02 % inhalation solution 0 5 mg, 0 5 mg, Nebulization, Q8H PRN, Colin Gamboa MD, 0 5 mg at 09/20/18 0913    levalbuterol (XOPENEX) inhalation solution 1 25 mg, 1 25 mg, Nebulization, Q8H PRN, BROOKLYN Denny, 1 25 mg at 09/20/18 0913    metoprolol (LOPRESSOR) injection 5 mg, 5 mg, Intravenous, Q6H PRN, BROOKLYN Oshea, 5 mg at 09/20/18 0813    metoprolol tartrate (LOPRESSOR) tablet 25 mg, 25 mg, Oral, Q12H [address], BROOKLYN Dobson, 25 mg at 09/22/18 2114    multi-electrolyte (ISOLYTE-S PH 7 4 equivalent) IV solution, 75 mL/hr, Intravenous, Continuous, Maryan Fuentes PA-C, Last Rate: 75 mL/hr at 09/23/18 0100, 75 mL/hr at 09/23/18 0100    ondansetron (ZOFRAN) injection 4 mg, 4 mg, Intravenous, Q4H PRN, BROOKLYN Denny, 4 mg at 09/21/18 0117    vancomycin (VANCOCIN) oral solution 500 mg, 500 mg, Oral, Q6H [address], Dayan Tyler PA-C, 500 mg at 09/23/18 0557    Laboratory Results:    Results from last 7 days  Lab Units 09/23/18  0438 09/22/18  0428 09/21/18  0446 09/20/18  1145 09/20/18  0755 09/20/18  0543 09/19/18  0456 09/18/18  0442 09/18/18  0441  09/17/18  1836 09/17/18  1546   WBC Thousand/uL  --   --  10 77*  --   --  14 27* 19 94*  --  11 98*  --   --  11 96*   HEMOGLOBIN g/dL  --   --  10 4*  --   --  9 6* 9 3*  --  12 0  --   --  11 3*   I STAT HEMOGLOBIN g/dl  --   --   --   --   --   --   --   --   --   --  10 2*  --    HEMATOCRIT %  --   --  30 2*  --   --  27 4* 26 8*  --  36 7  --  30* 34 9*   PLATELETS Thousands/uL  --   --  34*  --  10* 9* 29*  --  11*  --   --  44*   SODIUM mmol/L 140 142 142 143  --  143 142 141  --   < >  --  139   POTASSIUM mmol/L 3 6 4 0 4 3 3 4*  --  4 0 3 9 3 8  --   < >  --  4 2   CHLORIDE mmol/L 101 104 108 107  --  109* 108 107  --   < >  --  106   CO2 mmol/L 27 25 19* 20*  --  20* 20* 20*  --   < >  --  12*   BUN mg/dL 98* 98* 94* 87*  --  83* 74* 55*  --   < >  --  51*   CREATININE mg/dL 6 21* 5 99* 5 53* 5 14*  --  4 84* 3 96* 3 15*  --   < >  --  3 09*   CALCIUM mg/dL 7 8* 7 8* 8 2* 8 0*  --  7 8* 7 6* 7 8*  --   < >  --  7 9*   MAGNESIUM mg/dL  --   --   --   --   --   --  2 1 1 5*  --   --   --   --    PHOSPHORUS mg/dL  --   --   --   --   --   --   --  4 8*  --   --   --   --    GLUCOSE, ISTAT mg/dl  --   --   --   --   --   --   --   --   --   --  75  --    < > = values in this interval not displayed

## 2018-09-23 NOTE — PROGRESS NOTES
Progress Note - Critical Care   Pepper Lockett 80 y o  male MRN: [id number]  Unit/Bed#: ICU 13 Encounter: [id number]    Assessment/Plan:  1  Severe sepsis secondary to klebsiella and e coli bacteremia with c-diff colitis and cholangitis  · Blood cultures positive for klebsiella and e coli and treated with Ancef day #4, abx d #6  · Repeat blood cultures now negative  · No signs of septic shock   2  Choledocholithiasis and cholangitis s/p ERCP with bilary stent placement  · GI following  · Plan for ERCP with sphincterectomy in 6 weeks  · Tolerating regular diet  3  Acute kidney injury secondary to ATN   · Urine output has been consistent 100 ml/hr   · Nephrology following creatinine continues to rise  · Continue on isolyte  · Does not appear to be acidotic, hold on bicarb- no urgent need for dialysis  4  C-diff colitis  · Continue on PO vancomycin for 14 days, currently day #6  5  Thrombocytopenia  · Platelets have been stable  · No active bleeding noted    _____________________________________________________________________    HPI/24hr events:   No issues overnight  Urine output 100 ml/hr overnight    Medications:    Current Facility-Administered Medications:  cefazolin 1,000 mg Intravenous Q24H BROOKLYN Huitron Last Rate: Stopped (09/22/18 1019)   hydrALAZINE 10 mg Intravenous Q6H PRN BROOKLYN Huitron    ipratropium 0 5 mg Nebulization Q8H PRN Antonella Cazares MD    levalbuterol 1 25 mg Nebulization Q8H PRN BROOKLYN Horne    metoprolol 5 mg Intravenous Q6H PRN BROOKLYN Arvizu    metoprolol tartrate 25 mg Oral Q12H [address] BROOKLYN Huitron    multi-electrolyte 75 mL/hr Intravenous Continuous Maryan Fuentes PA-C Last Rate: 75 mL/hr (09/23/18 0100)   ondansetron 4 mg Intravenous Q4H PRN [employer], CRNP    vancomycin 500 mg Oral Q6H [address] Dayan Tyler PA-C          multi-electrolyte 75 mL/hr Last Rate: 75 mL/hr (09/23/18 0100)         Physical exam:  Vitals:    Body mass index is 28 12 kg/m²  Blood pressure 126/57, pulse 62, temperature 98 4 °F (36 9 °C), resp  rate 14, height 5' 4" (1 626 m), weight 74 3 kg (163 lb 12 8 oz), SpO2 98 % ,  Temp  Min: 95 6 °F (35 3 °C)  Max: 101 7 °F (38 7 °C)  IBW: 59 2 kg    SpO2: 98 %  SpO2 Activity: At Rest  O2 Device: None (Room air)      Intake/Output Summary (Last 24 hours) at 09/23/18 0514  Last data filed at 09/23/18 0400   Gross per 24 hour   Intake           2172 5 ml   Output             2625 ml   Net           -452 5 ml       Invasive/non-invasive ventilation settings:   Respiratory    Lab Data (Last 4 hours)    None         O2/Vent Data (Last 4 hours)    None              Invasive Devices     Peripheral Intravenous Line            Peripheral IV 09/19/18 Left Forearm 3 days    Peripheral IV 09/20/18 Right Forearm 2 days          Drain            Urethral Catheter Temperature probe 16 Fr  2 days                  Physical Exam:  Gen: Alert and oriented, NAD  HEENT:  PERRL, EOMI, normocephalic, atraumatic, o/p clear   Neck:  Supple, no JVD, no adenopathy  Chest:  CTA  Cor:  RRR, 3/6 murmurs  Abd:  Soft, non-tender, non-distended, bowel sounds present  Ext:  HAGEN, no edema  Neuro:  CN II-XII grossly intact  Skin:  Warm, dry, intact      Diagnostic Data:  Lab: I have personally reviewed pertinent lab results     CBC:     Results from last 7 days  Lab Units 09/21/18  0446 09/20/18  0755 09/20/18  0543 09/19/18  0456   WBC Thousand/uL 10 77*  --  14 27* 19 94*   HEMOGLOBIN g/dL 10 4*  --  9 6* 9 3*   HEMATOCRIT % 30 2*  --  27 4* 26 8*   PLATELETS Thousands/uL 34* 10* 9* 29*       CMP:     Results from last 7 days  Lab Units 09/23/18  0438 09/22/18  0428 09/21/18  0446  09/19/18  0456  09/17/18  1836   SODIUM mmol/L 140 142 142  < > 142  < >  --    POTASSIUM mmol/L 3 6 4 0 4 3  < > 3 9  < >  --    CHLORIDE mmol/L 101 104 108  < > 108  < >  --    CO2 mmol/L 27 25 19*  < > 20*  < >  --    BUN mg/dL 98* 98* 94*  < > 74*  < >  --    CREATININE mg/dL 6 21* 5 99* 5 53*  < > 3 96*  < >  --    CALCIUM mg/dL 7 8* 7 8* 8 2*  < > 7 6*  < >  --    ALK PHOS U/L 151*  --  141*  --  85  < >  --    ALT U/L 13  --  49  --  59  < >  --    AST U/L 24  --  50*  --  75*  < >  --    GLUCOSE, ISTAT mg/dl  --   --   --   --   --   --  75   < > = values in this interval not displayed  PT/INR:   No results found for: PT, INR,   Magnesium:   Results from last 7 days  Lab Units 09/19/18  0456 09/18/18  0442   MAGNESIUM mg/dL 2 1 1 5*     Phosphorous:   Results from last 7 days  Lab Units 09/18/18  0442   PHOSPHORUS mg/dL 4 8*       Microbiology:    Results from last 7 days  Lab Units 09/19/18  1746 09/17/18  2206 09/17/18  1546   BLOOD CULTURE  No Growth at 72 hrs  No Growth at 72 hrs  --  Klebsiella pneumoniae*  Escherichia coli*  Klebsiella pneumoniae*  Escherichia coli*   GRAM STAIN RESULT   --   --  Gram negative rods  Gram negative rods   C DIFF TOXIN B   --  POSITIVE for C difficle toxin by PCR  *  --        Imaging:   No new imaging    Cardiac lab/EKG/telemetry/ECHO:    NSR    VTE Prophylaxis: SCD due to thrombocytopenia    Code Status: Level 1 - Full Code    BROOKLYN [place] Se    Portions of the record may have been created with voice recognition software  Occasional wrong word or "sound a like" substitutions may have occurred due to the inherent limitations of voice recognition software  Read the chart carefully and recognize, using context, where substitutions have occurred

## 2018-09-24 LAB
ANION GAP SERPL CALCULATED.3IONS-SCNC: 12 MMOL/L (ref 4–13)
BACTERIA BLD CULT: NORMAL
BACTERIA BLD CULT: NORMAL
BUN SERPL-MCNC: 96 MG/DL (ref 5–25)
CALCIUM SERPL-MCNC: 7.8 MG/DL (ref 8.3–10.1)
CHLORIDE SERPL-SCNC: 100 MMOL/L (ref 100–108)
CO2 SERPL-SCNC: 28 MMOL/L (ref 21–32)
CREAT SERPL-MCNC: 6.11 MG/DL (ref 0.6–1.3)
ERYTHROCYTE [DISTWIDTH] IN BLOOD BY AUTOMATED COUNT: 14.5 % (ref 11.6–15.1)
GFR SERPL CREATININE-BSD FRML MDRD: 8 ML/MIN/1.73SQ M
GLUCOSE SERPL-MCNC: 116 MG/DL (ref 65–140)
HCT VFR BLD AUTO: 25.8 % (ref 36.5–49.3)
HGB BLD-MCNC: 9 G/DL (ref 12–17)
MCH RBC QN AUTO: 31.8 PG (ref 26.8–34.3)
MCHC RBC AUTO-ENTMCNC: 34.9 G/DL (ref 31.4–37.4)
MCV RBC AUTO: 91 FL (ref 82–98)
PLATELET # BLD AUTO: 50 THOUSANDS/UL (ref 149–390)
PMV BLD AUTO: 12.8 FL (ref 8.9–12.7)
POTASSIUM SERPL-SCNC: 3.4 MMOL/L (ref 3.5–5.3)
RBC # BLD AUTO: 2.83 MILLION/UL (ref 3.88–5.62)
SODIUM SERPL-SCNC: 140 MMOL/L (ref 136–145)
WBC # BLD AUTO: 11.92 THOUSAND/UL (ref 4.31–10.16)

## 2018-09-24 PROCEDURE — G8979 MOBILITY GOAL STATUS: HCPCS

## 2018-09-24 PROCEDURE — G8978 MOBILITY CURRENT STATUS: HCPCS

## 2018-09-24 PROCEDURE — G8988 SELF CARE GOAL STATUS: HCPCS

## 2018-09-24 PROCEDURE — 97167 OT EVAL HIGH COMPLEX 60 MIN: CPT

## 2018-09-24 PROCEDURE — 97110 THERAPEUTIC EXERCISES: CPT

## 2018-09-24 PROCEDURE — 99232 SBSQ HOSP IP/OBS MODERATE 35: CPT | Performed by: INTERNAL MEDICINE

## 2018-09-24 PROCEDURE — 85027 COMPLETE CBC AUTOMATED: CPT | Performed by: NURSE PRACTITIONER

## 2018-09-24 PROCEDURE — G8987 SELF CARE CURRENT STATUS: HCPCS

## 2018-09-24 PROCEDURE — 80048 BASIC METABOLIC PNL TOTAL CA: CPT | Performed by: NURSE PRACTITIONER

## 2018-09-24 PROCEDURE — 97163 PT EVAL HIGH COMPLEX 45 MIN: CPT

## 2018-09-24 RX ORDER — POTASSIUM CHLORIDE 20 MEQ/1
40 TABLET, EXTENDED RELEASE ORAL ONCE
Status: COMPLETED | OUTPATIENT
Start: 2018-09-24 | End: 2018-09-24

## 2018-09-24 RX ADMIN — METOPROLOL TARTRATE 25 MG: 25 TABLET ORAL at 09:01

## 2018-09-24 RX ADMIN — VANCOMYCIN 500 MG: KIT at 05:36

## 2018-09-24 RX ADMIN — SODIUM CHLORIDE, SODIUM GLUCONATE, SODIUM ACETATE, POTASSIUM CHLORIDE, MAGNESIUM CHLORIDE, SODIUM PHOSPHATE, DIBASIC, AND POTASSIUM PHOSPHATE 75 ML/HR: .53; .5; .37; .037; .03; .012; .00082 INJECTION, SOLUTION INTRAVENOUS at 09:20

## 2018-09-24 RX ADMIN — CEFAZOLIN SODIUM 1000 MG: 1 SOLUTION INTRAVENOUS at 11:11

## 2018-09-24 RX ADMIN — VANCOMYCIN 500 MG: KIT at 11:10

## 2018-09-24 RX ADMIN — POTASSIUM CHLORIDE 40 MEQ: 1500 TABLET, EXTENDED RELEASE ORAL at 09:01

## 2018-09-24 RX ADMIN — HYDRALAZINE HYDROCHLORIDE 10 MG: 20 INJECTION INTRAMUSCULAR; INTRAVENOUS at 00:37

## 2018-09-24 RX ADMIN — VANCOMYCIN 500 MG: KIT at 17:47

## 2018-09-24 RX ADMIN — METOPROLOL TARTRATE 25 MG: 25 TABLET ORAL at 21:22

## 2018-09-24 NOTE — PROGRESS NOTES
Patient Name: Nikhil Sal  Patient MRN: [id number]  Date: 09/24/18  Service: [facility]    Subjective   No diarrhea noted since 09/21  Patient reports no abdominal pain, nausea  He does still have depressed appetite  He does eat some food his family brings in from home  He is afebrile  No events reported by RN  Vitals  Blood pressure 141/52, pulse 68, temperature 98 2 °F (36 8 °C), temperature source Probe, resp  rate (!) 11, height 5' 4" (1 626 m), weight 74 3 kg (163 lb 12 8 oz), SpO2 95 %  Physical Exam:     General Appearance:    Awake, alert, oriented x3, no distress, well developed, well    Nourished  Seen sitting OOB  Head:    Normocephalic without obvious abnormality   Eyes:    PERRL, conjunctiva/corneas clear, EOM's intact        Neck:   Supple, no adenopathy   Throat:   Mucous membranes moist   Lungs:     Clear to auscultation bilaterally, no wheezing or rhonchi   Heart:    Regular rate and rhythm, S1 and S2 normal, no murmur   Abdomen:     Soft, non-tender, mildly distended  bowel sounds active  No    masses, rebound or guarding  Extremities:   Extremities normal  No clubbing, cyanosis or edema   Psych  Derm:   Normal affect    No jaundice   Neurologic:   CNII-XII grossly intact   Speech intact         Laboratory Studies    Results from last 7 days  Lab Units 09/24/18  0458 09/21/18  0446 09/20/18  0755 09/20/18  0754 09/20/18  0543 09/19/18  0456 09/18/18  0442 09/18/18  0441 09/17/18  1836 09/17/18  1546   WBC Thousand/uL 11 92* 10 77*  --   --  14 27* 19 94*  --  11 98*  --  11 96*   HEMOGLOBIN g/dL 9 0* 10 4*  --   --  9 6* 9 3*  --  12 0  --  11 3*   I STAT HEMOGLOBIN g/dl  --   --   --   --   --   --   --   --  10 2*  --    HEMATOCRIT % 25 8* 30 2*  --   --  27 4* 26 8*  --  36 7 30* 34 9*   PLATELETS Thousands/uL 50* 34* 10*  --  9* 29*  --  11*  --  44*   INR   --   --   --  1 22*  --   --  1 89*  --   --  2 05*       Results from last 7 days  Lab Units 09/24/18  0458 09/23/18  3477 09/22/18  0428 09/21/18  0446 09/20/18  1145  09/19/18  0456 09/18/18  0442  09/17/18  1836 09/17/18  1546   SODIUM mmol/L 140 140 142 142 143  < > 142 141  < >  --  139   POTASSIUM mmol/L 3 4* 3 6 4 0 4 3 3 4*  < > 3 9 3 8  < >  --  4 2   CHLORIDE mmol/L 100 101 104 108 107  < > 108 107  < >  --  106   CO2 mmol/L 28 27 25 19* 20*  < > 20* 20*  < >  --  12*   BUN mg/dL 96* 98* 98* 94* 87*  < > 74* 55*  < >  --  51*   CREATININE mg/dL 6 11* 6 21* 5 99* 5 53* 5 14*  < > 3 96* 3 15*  < >  --  3 09*   CALCIUM mg/dL 7 8* 7 8* 7 8* 8 2* 8 0*  < > 7 6* 7 8*  < >  --  7 9*   ALK PHOS U/L  --  151*  --  141*  --   --  85 207*  --   --  323*   ALT U/L  --  13  --  49  --   --  59 115*  --   --  112*   AST U/L  --  24  --  50*  --   --  75* 150*  --   --  110*   GLUCOSE, ISTAT mg/dl  --   --   --   --   --   --   --   --   --  75  --    < > = values in this interval not displayed      Imaging and Other Studies      Inhouse Medications     Current Facility-Administered Medications:     ceFAZolin (ANCEF) IVPB (premix) 1,000 mg, 1,000 mg, Intravenous, Q24H, Stopped at 09/23/18 1401    hydrALAZINE (APRESOLINE) injection 10 mg, 10 mg, Intravenous, Q6H PRN, 10 mg at 09/24/18 0037    ipratropium (ATROVENT) 0 02 % inhalation solution 0 5 mg, 0 5 mg, Nebulization, Q8H PRN, 0 5 mg at 09/20/18 0913    levalbuterol (XOPENEX) inhalation solution 1 25 mg, 1 25 mg, Nebulization, Q8H PRN, 1 25 mg at 09/20/18 0913    metoprolol (LOPRESSOR) injection 5 mg, 5 mg, Intravenous, Q6H PRN, 5 mg at 09/20/18 0813    metoprolol tartrate (LOPRESSOR) tablet 25 mg, 25 mg, Oral, Q12H BIGG, 25 mg at 09/24/18 0901    multi-electrolyte (ISOLYTE-S PH 7 4 equivalent) IV solution, 75 mL/hr, Intravenous, Continuous, 75 mL/hr at 09/24/18 0920    ondansetron (ZOFRAN) injection 4 mg, 4 mg, Intravenous, Q4H PRN, 4 mg at 09/21/18 0117    vancomycin (VANCOCIN) oral solution 500 mg, 500 mg, Oral, Q6H [facility], 500 mg at 09/24/18 6203      Assessment/Plan:  1  Sepsis secondary to Klebsiella and E coli bacteremia with cholangitis/C diff colitis  Repeat blood cultures 9/19 negative  Leukocytosis overall trending down  Continue IV Ancef (day 5) and PO Vancomycin (day 8)  Monitor labs  2  Choledocholithiasis/cholangitis status post ERCP with biliary stent 9/18 with large stone in distal CBD noted  Sphincterotomy not performed due to coagulopathy/thrombocytopenia  Liver enzymes trending down  Plan for elective ERCP with sphincterotomy/stone/stent extraction in 6-8 weeks with [facility]  Diet as tolerated  3  C difficile colitis, improving on PO Vanco 500 mg Q6H, day 7/14  Continue to monitor stool output  Okay to discontinue contact precautions-no diarrhea documented since 09/21  4  JOSE E secondary to ATN  Nephrology following  Adequate urine output noted        Montserrat Coreas PA-C

## 2018-09-24 NOTE — PLAN OF CARE
Problem: PHYSICAL THERAPY ADULT  Goal: Performs mobility at highest level of function for planned discharge setting  See evaluation for individualized goals  Treatment/Interventions: Functional transfer training, LE strengthening/ROM, Elevations, Therapeutic exercise, Endurance training, Patient/family training, Equipment eval/education, Bed mobility, Gait training, Spoke to nursing, OT  Equipment Recommended: Brannon Calles (MERLYN)       See flowsheet documentation for full assessment, interventions and recommendations  Prognosis: Fair  Problem List: Decreased strength, Decreased range of motion, Decreased endurance, Impaired balance, Decreased mobility, Decreased safety awareness, Decreased cognition  Assessment: Pt is 80 y o  male seen for PT evaluation s/p admit to [address] on 9/17/2018  Two pt identifiers were used to confirm  Pt presented w/ vague gastrointestinal symptoms after eating  Pt was admitted with a primary dx of: sepsis, gallstones with dilated hepatic and common bial duct, metabolic acidosis,  Pt underwent   PT now consulted for assessment of mobility and d/c needs  Pt OOB with nsg at time of PT eval   Pts current co morbidities effecting treatment include: HTN, and personal factors including MONA home, steps to manage in the home and being alone at times during the day   Pts current clinical presentation is Unstable/ Unpredictable (high complexity) due to Ongoing medical management for primary dx, Increased reliance on more restrictive AD compared to baseline, Decreased activity tolerance compared to baseline, Fall risk, Trending lab values    Prior to admission, pt was I with ambulation without the use of an AD as per pt  Upon evaluation, pt currently is requiring ; min A for transfers and min A for ambulation w/ RW   Pt denies any lightheadedness or dizziness with ambulation   Pt presents at PT eval functioning below baseline and currently w/ overall mobility deficits 2* to: BLE weakness, decreased ROM, impaired balance, decreased endurance, gait deviations, decreased safety awareness, fall risk, decreased cognition  Pt currently at a fall risk 2* to impairments listed above  Based on the aforementioned PT evaluation, pt will continue to benefit from skilled Acute PT interventions to address stated impairments; to maximize functional mobility; for ongoing pt/ family training; and DME needs  At conclusion of PT session pt returned back in chair and chair alarm engaged with phone and call bell within reach  Pt denies any further questions at this time  PT is currently recommending home PT  Pt/ family agreeable to plan and goals as stated on evaluation  PT will continue to follow during hospital stay  Barriers to Discharge: Inaccessible home environment  Barriers to Discharge Comments: MONA home  Recommendation: Home PT     PT - OK to Discharge: No (need to increase ambulation distances and attempt stairs )    See flowsheet documentation for full assessment

## 2018-09-24 NOTE — PHYSICAL THERAPY NOTE
PHYSICAL THERAPY EVALUATION  NAME:  Randall Lot  DATE: 09/24/18    AGE:   80 y o  Mrn:   [id number]  ADMIT DX:  Acute cholangitis [K83 0]  Weakness [R53 1]  Generalized weakness [R53 1]  Sepsis ([facility] [address] ) [A41 9]    Past Medical History:   Diagnosis Date    Hypertension     Medical history unknown        Past Surgical History:   Procedure Laterality Date    ERCP N/A 9/18/2018    Procedure: ENDOSCOPIC RETROGRADE CHOLANGIOPANCREATOGRAPHY (ERCP) with stent placement;  Surgeon: Evelyn Hooks MD;  Location: [facility] OR;  Service: Gastroenterology    NO PAST SURGERIES         Length Of Stay: 7    PHYSICAL THERAPY EVALUATION:      09/24/18 1214   Note Type   Note type Eval/Treat   Pain Assessment   Pain Assessment No/denies pain   Home Living   Type of [address] [place] [address] Two level; Able to live on main level with bedroom/bathroom;Stairs to enter with rails  (1 MONA )   [address] [facility],[address]; Other (Comment)  (BSC)   Additional Comments Pt reports living with wife who is able to assist pt if needed    Prior Function   Level of [place] Independent with ADLs and functional mobility   Lives With Spouse   Receives Help From Family;Friend(s)   Falls in the last 6 months 1 to 4  (1 as per pt )   Vocational Retired   Comments Pt denies the use of an AD for ambulation PTA    Restrictions/Precautions   Other Precautions Contact/isolation;Cognitive;Multiple lines; Fall Risk;Pain; Chair Alarm; Bed Alarm  (chair alarm on post session )   General   Additional Pertinent History Pt OOB with nsg in chair at time of PT eval    Cognition   Overall Cognitive Status Impaired   Arousal/Participation Alert   Orientation Level Oriented to person;Oriented to place;Oriented to time   Memory Within functional limits   Following Commands Follows one step commands without difficulty   RUE Assessment   RUE Assessment X   LUE Assessment   LUE Assessment X   RLE Assessment   RLE Assessment WFL   Strength RLE   RLE Overall Strength 4/5   LLE Assessment   LLE Assessment WFL   Strength LLE   LLE Overall Strength 4/5   Bed Mobility   Additional Comments NA, Pt seated OOB in chair with nsg at time of PT eval    Transfers   Sit to Stand 4  Minimal assistance   Additional items Assist x 1; Increased time required;Verbal cues   Stand to Sit 4  Minimal assistance   Additional items Assist x 1; Increased time required   Additional Comments VC needed for hand placement and safety    Ambulation/Elevation   Gait pattern Excessively slow; Foward flexed; Inconsistent jennyfer   Gait Assistance 4  Minimal assist   Additional items Assist x 1   Assistive Device Rolling walker   Distance 15ft x 2    Balance   Static Sitting Fair +   Static Standing Fair   Ambulatory Fair -   Endurance Deficit   Endurance Deficit Yes   Endurance Deficit Description fatigue    Activity Tolerance   Activity Tolerance Patient limited by fatigue   Nurse Made Aware Pt appropriate to be seen and mobilize per Graham Wilkes RN   Assessment   Prognosis Fair   Problem List Decreased strength;Decreased range of motion;Decreased endurance; Impaired balance;Decreased mobility; Decreased safety awareness;Decreased cognition   Assessment Pt is 80 y o  male seen for PT evaluation s/p admit to [address] [place] [place] [address] on 9/17/2018  Two pt identifiers were used to confirm  Pt presented w/ vague gastrointestinal symptoms after eating  Pt was admitted with a primary dx of: sepsis, gallstones with dilated hepatic and common bial duct, metabolic acidosis,  Pt underwent   PT now consulted for assessment of mobility and d/c needs  Pt OOB with nsg at time of PT eval   Pts current co morbidities effecting treatment include: HTN, and personal factors including MONA home, steps to manage in the home and being alone at times during the day    Pts current clinical presentation is Unstable/ Unpredictable (high complexity) due to Ongoing medical management for primary dx, Increased reliance on more restrictive AD compared to baseline, Decreased activity tolerance compared to baseline, Fall risk, Trending lab values    Prior to admission, pt was I with ambulation without the use of an AD as per pt  Upon evaluation, pt currently is requiring ; min A for transfers and min A for ambulation w/ RW   Pt denies any lightheadedness or dizziness with ambulation  Pt presents at PT eval functioning below baseline and currently w/ overall mobility deficits 2* to: BLE weakness, decreased ROM, impaired balance, decreased endurance, gait deviations, decreased safety awareness, fall risk, decreased cognition  Pt currently at a fall risk 2* to impairments listed above  Based on the aforementioned PT evaluation, pt will continue to benefit from skilled Acute PT interventions to address stated impairments; to maximize functional mobility; for ongoing pt/ family training; and DME needs  At conclusion of PT session pt returned back in chair and chair alarm engaged with phone and call bell within reach  Pt denies any further questions at this time  PT is currently recommending STR  Pt/ family agreeable to plan and goals as stated on evaluation  PT will continue to follow during hospital stay  Barriers to Discharge Inaccessible home environment   Barriers to Discharge Comments MONA home   Goals   Patient Goals to go home    STG Expiration Date 10/04/18   Short Term Goal #1 In 10 days pt will complete: 1) Bed mobility skills with mod I to increase safety and independence as well as decrease caregiver burden  2) Functional transfers with mod I to promote increased independence, safety, and QOL in the home environment  3) Ambulate 150' using least restrictive AD with mod I without LOB and stable vitals so that pt can negotiate home environment safely and promote independence with functional mobility and return to PLOF  4) Stair training up/ down 1 step/s using rail/s with S so that pt can enter/negotiate home environment safely and decrease fall risk   5) Improve balance grades to Good to increase safety with all mobility and decrease fall risk  6) Improve BLE strength by 1/2 grade to help increase overall functional mobility and decrease fall risk  7) PT for ongoing pt and family education; DME needs and D/C planning to promote highest level of function in least restrictive environment  Plan   Treatment/Interventions Functional transfer training;LE strengthening/ROM; Elevations; Therapeutic exercise; Endurance training;Patient/family training;Equipment eval/education; Bed mobility;Gait training;Spoke to nursing;OT   PT Frequency Other (Comment)  (4-5x a week )   Recommendation   Recommendation STR   Equipment Recommended Walker  (RW)   PT - OK to Discharge No  (need to increase ambulation distances and attempt stairs )   Modified Ralph Scale   Modified Ralph Scale 4   Barthel Index   Feeding 10   Bathing 0   Grooming Score 5   Dressing Score 5   Bladder Score 10   Bowels Score 10   Toilet Use Score 5   Transfers (Bed/Chair) Score 10   Mobility (Level Surface) Score 0   Stairs Score 0   Barthel Index Score 55   Tx Note:  Time In: 1158  Time Out:1214  Total Time:16min  S: Pt willing to participate in PT session post PT eval  O: Pt performed LAQ, Ankle pumps, Hip abduction, Marches 2 sets of 10 reps for each exercise  A: Pt agreeable to take part in PT treatment following evaluation  PT treatment to follow to facilitate therex due to strength deficits/ functional mobility deficits noted during evaluation  Pt was able to perform LAQ, Ankle pumps, Hip abduction, Marches  All exercises were performed 2 sets of 10 reps  Pt continues to require VC for proper form and pacing with all exercises  Pt denies any new complaints with therex  At conclusion of PT session pt returned back in chair and chair alarm engaged with all needs within reach  Pt denies any further questions at this time  PT will continue to follow   D/C recommendation when medically cleared is home PT   P: Continue with POC as outlined in pts initial evaluation    Wili Sethi PT

## 2018-09-24 NOTE — PROGRESS NOTES
Progress Note - ICU Transfer to SD/MS tele/MS   Erika Vides 80 y o  male MRN: [id number]  [facility]   Unit/Bed#: ICU 13 Encounter: [id number]    Code Status: Level 1 - Full Code  POA:    Referring Physician: Dr Merly Muro  Accepting Physician: Dr Ariana Blakely  _____________________________________________________________________    Reason for ICU/SD admission: Sepsis due to ecoli bacteremia and Cdiff colitis    History of Present Illness:   Patient is an 80year old male admitted to [place] [address] with abdominal pain and nausea and vomiting  Ct abdomen showed dilated gallbladder with evidence of gallstones    Summary of clinical course: Patient underwent ERCP with stent placement on 9/18/2018  Blood cultures revealed klebsiella and ecoli bactermia  He is also positive for Cdiff  The patient was treated with zoysn and vancomycin PO  On 9/20/2018 zosyn was discontinued and ancef was started  Over th course of his hospital stay the patients urine output decreased and his creatinine increased  Nephrology was consulted  The patient was treated with isolyte and lasix infusion with improvement in urine output  At his time the patient is not requiring lasix and creatine appears to have pleatued       Consultants: Gastroenterology and nephrology  _____________________________________________________________________    Diagnostic Data:  CBC:    Results from last 7 days  Lab Units 09/24/18  0458   WBC Thousand/uL 11 92*   HEMOGLOBIN g/dL 9 0*   HEMATOCRIT % 25 8*   PLATELETS Thousands/uL 50*      CMP: Lab Results   Component Value Date     09/24/2018    K 3 4 (L) 09/24/2018     09/24/2018    CO2 28 09/24/2018    BUN 96 (H) 09/24/2018    CREATININE 6 11 (H) 09/24/2018    CALCIUM 7 8 (L) 09/24/2018    EGFR 8 09/24/2018   ,   PT/INR: No results found for: PT, INR,   Magnesium: No results found for: MAG,  Phosphorous: No results found for: PHOS    ABG: No results found for: PHART, DYH8GBC, PO2ART, BNM5CHB, E6PZAILG, BEART, SOURCE,     Microbiology:    Results from last 7 days  Lab Units 09/19/18  1746 09/17/18  2206 09/17/18  1546   BLOOD CULTURE  No Growth After 4 Days  No Growth After 4 Days  --  Klebsiella pneumoniae*  Escherichia coli*  Klebsiella pneumoniae*  Escherichia coli*   GRAM STAIN RESULT   --   --  Gram negative rods  Gram negative rods   C DIFF TOXIN B   --  POSITIVE for C difficle toxin by PCR  *  --        Imaging: I have personally reviewed the pertinent imaging studies on the PACS system      Cardiac/EKG/telemetry/Echo:     Results from last 7 days  Lab Units 09/22/18  1613 09/17/18  1549   CK TOTAL U/L 32* 1,273*   TROPONIN I ng/mL  --  0 58*   CK MB INDEX %  --  <1 0       _____________________________________________________________________    Recent or scheduled procedures:   9/18/2018 ERCP    Outstanding/pending diagnostics: none     Mobilization Plan: PT/Ot OOB    Home medications that are not reordered and reason why: lisinopril due to renal failure    Spoke with Dr Charla Almonte  regarding transfer  Please call [phone] with any questions or concerns  Portions of the record may have been created with voice recognition software  Occasional wrong word or "sound a like" substitutions may have occurred due to the inherent limitations of voice recognition software  Read the chart carefully and recognize, using context, where substitutions have occurred      BROOKLYN Schumacher

## 2018-09-24 NOTE — OCCUPATIONAL THERAPY NOTE
OccupationalTherapy Evaluation(time=6231-7626)     Patient Name: Jose Martin Harris  FFHAZ'Y Date: 9/24/2018  Problem List  Patient Active Problem List   Diagnosis    Metabolic acidosis    JOSE E (acute kidney injury) ([facility] [address] )    Transaminitis    Coagulopathy (HCC)    Thrombocytopenia (HCC)    Sepsis ([facility] [address] )    Acute cholangitis    Calculus of bile duct with acute cholangitis with obstruction     Past Medical History  Past Medical History:   Diagnosis Date    Hypertension     Medical history unknown      Past Surgical History  Past Surgical History:   Procedure Laterality Date    ERCP N/A 9/18/2018    Procedure: ENDOSCOPIC RETROGRADE CHOLANGIOPANCREATOGRAPHY (ERCP) with stent placement;  Surgeon: Shelley Valladares MD;  Location: [facility];  Service: Gastroenterology    NO PAST SURGERIES             09/24/18 1205   Note Type   Note type Eval only   Restrictions/Precautions   Other Precautions Contact/isolation; Chair Alarm;Cognitive; Fall Risk  (C-diff)   Pain Assessment   Pain Assessment No/denies pain   Home Living   Type of [address] [facility] Two level; Able to live on main level with bedroom/bathroom  (1 sofy)   [address]   Prior Function   Lives With [address] [facility] in the last 6 months 1 to 4   Lifestyle   Autonomy PTA pt states independence with his ADLs, transfers, ambulation--w/o device; neg home alone, +falls=1   Reciprocal Relationships 1 dtr   Service to Others worked as a    Intrinsic Gratification watching [address] ([place]) [address] "My wife can help me at home "   ADL   Where Assessed Chair   Eating Assistance 5  Supervision/Setup   Grooming Assistance 5  Supervision/Setup   [address]  Moderate [address] 3  Moderate Assistance   Transfers   Sit to Stand 4  Minimal assistance   Additional items Assist x 1; Increased time required;Verbal cues   Stand to Sit 4  Minimal assistance   Additional items Assist x 1;Trapeze bar;Verbal cues; Increased time required   Functional Mobility   Functional Mobility 4  Minimal assistance   Additional Comments x1   Additional items Rolling walker   Balance   Static Sitting Fair +   Dynamic Sitting Fair   Static Standing Fair   Dynamic Standing Fair -   Activity Tolerance   Activity Tolerance Patient limited by fatigue   Medical Staff Made Aware nsg, P T     RUE Assessment   RUE Assessment X  (limited shr AROM(i e flex=30-40*)elbow-distal=WFLs)   RUE Strength   RUE Overall Strength (shr=2-/5, elbow-distal=4/5)   LUE Assessment   LUE Assessment X  (limited shr AROM(i e flex=40-50*);elbow-distal=WFLs)   LUE Strength   LUE Overall Strength (shr=3-/5, elbow-distal=4/5)   Hand Function   Gross Motor Coordination Functional   Fine Motor Coordination Functional   Sensation   Light Touch No apparent deficits   Proprioception   Proprioception No apparent deficits   Vision-Basic Assessment   Current Vision Wears glasses all the time   Vision - Complex Assessment   Acuity Able to read clock/calendar on wall without difficulty   Perception   Inattention/Neglect Appears intact   Cognition   Overall Cognitive Status Impaired   Arousal/Participation Alert   Attention Attends with cues to redirect   Orientation Level Oriented X4   Memory Decreased short term memory;Decreased recall of precautions   Following Commands Follows one step commands with increased time or repetition   Assessment   Limitation Decreased ADL status; Decreased UE ROM; Decreased UE strength;Decreased Safe judgement during ADL;Decreased cognition;Decreased endurance;Decreased high-level ADLs   Prognosis Fair   Assessment Pt is a 86y/o male admitted to the hospital 2* abdominal pain  Pt noted with gallstones, metabolic acidosis, and sepsis 2* E-coli and C-diff   Pt required a s/p ERCP with stent placement(9/18)  PTA pt states independence with his ADLs, transfers, ambulation--w/o device; neg home alone, +falls=1  During initial eval, pt demonstrated deficits withh his functional balance, functional mobility, ADL status, activity tolerance(currently fair=15-20mins), b/l UE strength, and cognition(i e memory, problem-solving)  Pt would benefit from continued OT tx for the above deficits  3-5xwk/1-2wks  Goals   Patient Goals "to be able to go home "   STG Time Frame 3-5   Short Term Goal #1 Pt will tolerate continued cognitive/home-safety assessment and appropriate d/c recommendations will be provided  Short Term Goal #2 Pt will demonstrate mod I with their sit-stand transfers to assist with completion of their LE dressing  Short Term Goal  Pt will demonstrate improved activity tolerance to good(20-30mins) and standing tolerance to 3-5mins to assist with ADLs  LTG Time Frame (5-10days)   Long Term Goal #1 Pt will demonstrate proper walker/transfer safety 100% of the time  Long Term Goal #2 Pt will demonstrate g/g- balance with all functional activities  Long Term Goal Pt will demonstrate mod I with their UE and LE bathing/dresssing  Plan   Treatment Interventions ADL retraining;Functional transfer training;UE strengthening/ROM; Endurance training;Cognitive reorientation;Patient/family training;Equipment evaluation/education; Compensatory technique education;Continued evaluation   Goal Expiration Date 10/04/18   Treatment Day 0   OT Frequency 3-5x/wk   Recommendation   OT Discharge Recommendation Short Term Rehab  (Pt prefers home with therapy services)   Barthel Index   Feeding 10   Bathing 0   Grooming Score 5   Dressing Score 5   Bladder Score 10   Bowels Score 10   Toilet Use Score 5   Transfers (Bed/Chair) Score 10   Mobility (Level Surface) Score 0   Stairs Score 0   Barthel Index Score 55   Modified Ralph Scale   Modified [place] Scale 4   Shannon Hollis, OT

## 2018-09-24 NOTE — PROGRESS NOTES
Progress Note - Critical Care   Cass Rowe 80 y o  male MRN: [id number]  Unit/Bed#: ICU 13 Encounter: [id number]    Assessment/Plan:  1  Severe sepsis 2/2 klebsiella and ecoli bacteremia with cdiff colitis and cholangitis  · Continue ancef, day 5, antibiotic day #8  · Repeat blood cultures negative    2  Choledocholithiasis and cholangitis s/p ERCP with biliary stent placement  · GI following  · Plan for ERCP with sphincterotomy in 6 weeks  · Continue regular diet    3  Acute kidney injury secondary to ATN  · Continues to have adequate urine output  · Nephrology is following  · Monitor renal indices and urine output  · Continue isolyte    4  Cdiff colitis  · Continue vancomycin oral for 14 days total, currently day #7    5  Thrombocytopenia  · Platelets have increased  · Will continue to monitor  _____________________________________________________________________    HPI/24hr events:   No events overnight  Medications:    Current Facility-Administered Medications:  cefazolin 1,000 mg Intravenous Q24H BROOKLYN Veronica Last Rate: Stopped (09/23/18 1401)   hydrALAZINE 10 mg Intravenous Q6H PRN BROOKLYN [place]    ipratropium 0 5 mg Nebulization Q8H PRN Chris Aguirre MD    levalbuterol 1 25 mg Nebulization Q8H PRN BROOKLYN Sousa    metoprolol 5 mg Intravenous Q6H PRN BROOKLYN Grimaldo    metoprolol tartrate 25 mg Oral Q12H [address] BROOKLYN [place]    multi-electrolyte 75 mL/hr Intravenous Continuous Maryan Fuentes PA-C Last Rate: 75 mL/hr (09/23/18 1745)   ondansetron 4 mg Intravenous Q4H PRN BROOKLYN Sousa    potassium chloride 40 mEq Oral Once BROOKLYN Grimaldo    vancomycin 500 mg Oral Q6H [address] Dayan Tyler PA-C          multi-electrolyte 75 mL/hr Last Rate: 75 mL/hr (09/23/18 1745)         Physical exam:  Vitals: Body mass index is 28 12 kg/m²  Blood pressure (!) 104/49, pulse 84, temperature 98 4 °F (36 9 °C), resp   rate 13, height 5' 4" (1 626 m), weight 74 3 kg (163 lb 12 8 oz), SpO2 97 %  ,  Temp  Min: 95 6 °F (35 3 °C)  Max: 101 7 °F (38 7 °C)  IBW: 59 2 kg    SpO2: 97 %  SpO2 Activity: At Rest  O2 Device: None (Room air)      Intake/Output Summary (Last 24 hours) at 09/24/18 6602  Last data filed at 09/24/18 0501   Gross per 24 hour   Intake          2026 25 ml   Output             2045 ml   Net           -18 75 ml       Invasive/non-invasive ventilation settings:   Respiratory    Lab Data (Last 4 hours)    None         O2/Vent Data (Last 4 hours)    None              Invasive Devices     Peripheral Intravenous Line            Peripheral IV 09/20/18 Right Forearm 3 days          Drain            Urethral Catheter Temperature probe 16 Fr  3 days                  Physical Exam:  Gen: pleasant, in NAD  HEENT: normocephalic, atraumatic, oropharynx clear  Neck: no JVD, no lymphadenopathy, trachea midline  Chest: lung sounds clear, equal  Cor: audible S1,S2, no edema  Abd: soft, non tender, non distended  Ext: moves all extremities equally  Neuro: alert and oriented x3  Skin: warm, dry, diffuse ecchymoses of BUEs      Diagnostic Data:  Lab: I have personally reviewed pertinent lab results     CBC:     Results from last 7 days  Lab Units 09/21/18  0446 09/20/18  0755 09/20/18  0543 09/19/18  0456   WBC Thousand/uL 10 77*  --  14 27* 19 94*   HEMOGLOBIN g/dL 10 4*  --  9 6* 9 3*   HEMATOCRIT % 30 2*  --  27 4* 26 8*   PLATELETS Thousands/uL 34* 10* 9* 29*       CMP:     Results from last 7 days  Lab Units 09/24/18  0458 09/23/18  0438 09/22/18  0428 09/21/18  0446  09/19/18  0456  09/17/18  1836   SODIUM mmol/L 140 140 142 142  < > 142  < >  --    POTASSIUM mmol/L 3 4* 3 6 4 0 4 3  < > 3 9  < >  --    CHLORIDE mmol/L 100 101 104 108  < > 108  < >  --    CO2 mmol/L 28 27 25 19*  < > 20*  < >  --    BUN mg/dL 96* 98* 98* 94*  < > 74*  < >  --    CREATININE mg/dL 6 11* 6 21* 5 99* 5 53*  < > 3 96*  < >  --    CALCIUM mg/dL 7 8* 7 8* 7 8* 8 2*  < > 7 6*  < >  --    ALK PHOS U/L  --  151*  -- 141*  --  85  < >  --    ALT U/L  --  13  --  49  --  59  < >  --    AST U/L  --  24  --  50*  --  75*  < >  --    GLUCOSE, ISTAT mg/dl  --   --   --   --   --   --   --  75   < > = values in this interval not displayed  PT/INR:   No results found for: PT, INR,   Magnesium:     Results from last 7 days  Lab Units 09/19/18  0456 09/18/18  0442   MAGNESIUM mg/dL 2 1 1 5*     Phosphorous:     Results from last 7 days  Lab Units 09/18/18  0442   PHOSPHORUS mg/dL 4 8*       Microbiology:    Results from last 7 days  Lab Units 09/19/18  1746 09/17/18  2206 09/17/18  1546   BLOOD CULTURE  No Growth After 4 Days  No Growth After 4 Days  --  Klebsiella pneumoniae*  Escherichia coli*  Klebsiella pneumoniae*  Escherichia coli*   GRAM STAIN RESULT   --   --  Gram negative rods  Gram negative rods   C DIFF TOXIN B   --  POSITIVE for C difficle toxin by PCR  *  --        Cardiac lab/EKG/telemetry/ECHO: NSR    VTE Prophylaxis: SCD    Code Status: Level 1 - Full Code    BROOKLYN Jenkins    Portions of the record may have been created with voice recognition software  Occasional wrong word or "sound a like" substitutions may have occurred due to the inherent limitations of voice recognition software  Read the chart carefully and recognize, using context, where substitutions have occurred

## 2018-09-24 NOTE — PLAN OF CARE
Problem: OCCUPATIONAL THERAPY ADULT  Goal: Performs self-care activities at highest level of function for planned discharge setting  See evaluation for individualized goals  Treatment Interventions: ADL retraining, Functional transfer training, UE strengthening/ROM, Endurance training, Cognitive reorientation, Patient/family training, Equipment evaluation/education, Compensatory technique education, Continued evaluation          See flowsheet documentation for full assessment, interventions and recommendations  Limitation: Decreased ADL status, Decreased UE ROM, Decreased UE strength, Decreased Safe judgement during ADL, Decreased cognition, Decreased endurance, Decreased high-level ADLs  Prognosis: Fair  Assessment: Pt is a 84y/o male admitted to the hospital 2* abdominal pain  Pt noted with gallstones, metabolic acidosis, and sepsis 2* E-coli and C-diff  Pt required a s/p ERCP with stent placement(9/18)  PTA pt states independence with his ADLs, transfers, ambulation--w/o device; neg home alone, +falls=1  During initial eval, pt demonstrated deficits withh his functional balance, functional mobility, ADL status, activity tolerance(currently fair=15-20mins), b/l UE strength, and cognition(i e memory, problem-solving)  Pt would benefit from continued OT tx for the above deficits  3-5xwk/1-2wks        OT Discharge Recommendation: Short Term Rehab (Pt prefers home with therapy services)

## 2018-09-24 NOTE — CONSULTS
Pt reported not liking much of the food, discussed supplements to help w/ meeting estimated needs and pt is willing to try as he does like milkshakes and ice cream  Will add ensure enlive vanilla mixed w/ ice cream TID, pt also likes banana and puddings will add banana w/ pb for 10am snack and ensure pudding for 2pm  Offered other snacks like greek yogurt but pt doesn't like yogurt  Consider banatrol (banana flakes w/ probiotics) if Cdiff persist, but per RN pt w/ more formed stool today  Can possibly just add probiotic  Will continue to monitor po and for any snack/supplement changes to help assist pt w/ meeting nutrient needs

## 2018-09-25 LAB
ANION GAP SERPL CALCULATED.3IONS-SCNC: 10 MMOL/L (ref 4–13)
BUN SERPL-MCNC: 89 MG/DL (ref 5–25)
C3 SERPL-MCNC: 59 MG/DL (ref 90–180)
C4 SERPL-MCNC: 23 MG/DL (ref 10–40)
CALCIUM SERPL-MCNC: 8.1 MG/DL (ref 8.3–10.1)
CHLORIDE SERPL-SCNC: 101 MMOL/L (ref 100–108)
CO2 SERPL-SCNC: 30 MMOL/L (ref 21–32)
CREAT SERPL-MCNC: 5.66 MG/DL (ref 0.6–1.3)
ERYTHROCYTE [DISTWIDTH] IN BLOOD BY AUTOMATED COUNT: 14.6 % (ref 11.6–15.1)
GFR SERPL CREATININE-BSD FRML MDRD: 8 ML/MIN/1.73SQ M
GLUCOSE SERPL-MCNC: 108 MG/DL (ref 65–140)
HCT VFR BLD AUTO: 24.2 % (ref 36.5–49.3)
HGB BLD-MCNC: 8.6 G/DL (ref 12–17)
MCH RBC QN AUTO: 32.6 PG (ref 26.8–34.3)
MCHC RBC AUTO-ENTMCNC: 35.5 G/DL (ref 31.4–37.4)
MCV RBC AUTO: 92 FL (ref 82–98)
PLATELET # BLD AUTO: 70 THOUSANDS/UL (ref 149–390)
PMV BLD AUTO: 13.4 FL (ref 8.9–12.7)
POTASSIUM SERPL-SCNC: 3.9 MMOL/L (ref 3.5–5.3)
RBC # BLD AUTO: 2.64 MILLION/UL (ref 3.88–5.62)
SODIUM SERPL-SCNC: 141 MMOL/L (ref 136–145)
WBC # BLD AUTO: 11.59 THOUSAND/UL (ref 4.31–10.16)

## 2018-09-25 PROCEDURE — 99232 SBSQ HOSP IP/OBS MODERATE 35: CPT | Performed by: INTERNAL MEDICINE

## 2018-09-25 PROCEDURE — 85027 COMPLETE CBC AUTOMATED: CPT | Performed by: NURSE PRACTITIONER

## 2018-09-25 PROCEDURE — 97116 GAIT TRAINING THERAPY: CPT

## 2018-09-25 PROCEDURE — 97110 THERAPEUTIC EXERCISES: CPT

## 2018-09-25 PROCEDURE — 97530 THERAPEUTIC ACTIVITIES: CPT

## 2018-09-25 PROCEDURE — 80048 BASIC METABOLIC PNL TOTAL CA: CPT | Performed by: INTERNAL MEDICINE

## 2018-09-25 PROCEDURE — 86160 COMPLEMENT ANTIGEN: CPT | Performed by: INTERNAL MEDICINE

## 2018-09-25 PROCEDURE — 86255 FLUORESCENT ANTIBODY SCREEN: CPT | Performed by: INTERNAL MEDICINE

## 2018-09-25 RX ADMIN — VANCOMYCIN 500 MG: KIT at 05:40

## 2018-09-25 RX ADMIN — VANCOMYCIN 500 MG: KIT at 00:12

## 2018-09-25 RX ADMIN — VANCOMYCIN 500 MG: KIT at 13:54

## 2018-09-25 RX ADMIN — VANCOMYCIN 500 MG: KIT at 18:06

## 2018-09-25 RX ADMIN — SODIUM CHLORIDE, SODIUM GLUCONATE, SODIUM ACETATE, POTASSIUM CHLORIDE, MAGNESIUM CHLORIDE, SODIUM PHOSPHATE, DIBASIC, AND POTASSIUM PHOSPHATE 50 ML/HR: .53; .5; .37; .037; .03; .012; .00082 INJECTION, SOLUTION INTRAVENOUS at 15:41

## 2018-09-25 RX ADMIN — METOPROLOL TARTRATE 25 MG: 25 TABLET ORAL at 09:21

## 2018-09-25 RX ADMIN — SODIUM CHLORIDE, SODIUM GLUCONATE, SODIUM ACETATE, POTASSIUM CHLORIDE, MAGNESIUM CHLORIDE, SODIUM PHOSPHATE, DIBASIC, AND POTASSIUM PHOSPHATE 75 ML/HR: .53; .5; .37; .037; .03; .012; .00082 INJECTION, SOLUTION INTRAVENOUS at 00:02

## 2018-09-25 RX ADMIN — METOPROLOL TARTRATE 25 MG: 25 TABLET ORAL at 20:28

## 2018-09-25 RX ADMIN — CEFAZOLIN SODIUM 1000 MG: 1 SOLUTION INTRAVENOUS at 11:43

## 2018-09-25 NOTE — PROGRESS NOTES
[facility] Internal Medicine Progress Note  Patient: Kay Hernandez 80 y o  male   MRN: [id number]  PCP: Amy Baker PA-C  Unit/Bed#: [facility] 68 2 -01 Encounter: [id number]  Date Of Visit: 09/25/18    Assessment:    Principal Problem:    Metabolic acidosis  Active Problems:    JOSE E (acute kidney injury) ([facility] [address] )    Transaminitis    Coagulopathy (HCC)    Thrombocytopenia (HCC)    Sepsis ([facility] )    Acute cholangitis    Calculus of bile duct with acute cholangitis with obstruction      Plan: This is an 79-year-old female with past medical history significant for hypertension, admitted on 09/17/2018 for evaluation of nausea and anorexia  Patient was admitted to ICU for sepsis likely secondary to choledocholithiasis with possible acute cholangitis  Patient also had acute kidney injury and metabolic acidosis  GI consulted, patient was taken for ERCP and prior to that was given 2 units of FFP and 2 units of platelet due to coagulopathy and thrombocytopenia  Patient found to have a large stone in the distal CBD, ERCP with plastic biliary stent placement  Patient developed decreased renal function and decreased urination defer started on IV Lasix drip and nephrology consulted, patient started on a bicarb drip      1 ) Sepsis secondary to Klebsiella and E coli bacteremia secondary to choledocholithiasis, cholangitis and C diff colitis  - improved with antibiotics  - repeat blood cultures negative    2 ) Choledocholithiasis and cholangitis status post ERCP with stent placement  - GI follow-up appreciated  - Ancef antibiotic day 9, will complete 14 days of therapy    3 ) C diff colitis  - on vancomycin day 8, will complete 14 days of therapy  -contact isolation    4 ) Metabolic acidosis  - secondary to renal failure, improved    5 ) Thrombocytopenia  - likely secondary to sepsis, gradually improving  - will monitor platelet count, avoid any antiplatelet or anticoagulation    6 ) Acute  Renal failure  - secondary to ATN with sepsis  - creatinine today 5 66 from 6 11  - nephrology follow-up appreciated  - ANCA pending  - will monitor renal function, avoid nephrotoxin    7 ) hematuria likely secondary to Ruvalcaba catheter- will continue to monitor    VTE Pharmacologic Prophylaxis:   Pharmacologic:  Contraindicated due to thrombocytopenia    Patient Centered Rounds: I have performed bedside rounds with nursing staff today  Time Spent for Care: 20 minutes  More than 50% of total time spent on counseling and coordination of care as described above  Current Length of Stay: 8 day(s)    Current Patient Status: Inpatient   Certification Statement: The patient will continue to require additional inpatient hospital stay due to C diff, cholangitis, thrombocytopenia, acute renal failure    Discharge Plan / Estimated Discharge Date: TBD    Code Status: Level 1 - Full Code      Subjective:   Patient seen and examined at bedside,  Currently denies any abdominal pain, nausea, vomiting, chest pain, dyspnea  Objective:     Vitals:   Temp (24hrs), Av 8 °F (36 6 °C), Min:96 7 °F (35 9 °C), Max:98 2 °F (36 8 °C)    HR:  [67-83] 73  Resp:  [-18] 18  BP: (140-186)/(52-84) 162/77  SpO2:  [94 %-98 %] 94 %  Body mass index is 28 12 kg/m²  Input and Output Summary (last 24 hours): Intake/Output Summary (Last 24 hours) at 18 9039  Last data filed at 18 8847   Gross per 24 hour   Intake          1161 25 ml   Output              425 ml   Net           736 25 ml       Physical Exam:    Constitutional: Patient is oriented to person, place and time, no acute distress  HEENT:  Normocephalic, atraumatic, EOMI, PERRLA, no scleral icterus, no pallor, moist oral mucosa  Neck:  Supple, no masses, no thyromegaly, no bruits Normal range of motion  Lymph nodes:  No lymphadenopathy  Cardiovascular: Normal S1S2, RRR, No murmurs/rubs/gallops appreciated    Pulmonary: Clear to auscultation bilaterally, No rhonchi/rales/wheezing appreciated  Abdominal: Soft, Bowel sounds present, Non-tender, Non-distended, No rebound/guarding, no hepatomegaly   Musculoskeletal: No tenderness/abnormality   Extremities:  No cyanosis, clubbing or edema  Peripheral pulses palpable and equal bilaterally  Neurological: Cranial nerves II-XII grossly intact, sensation intact, otherwise no focal neurological symptoms  Skin: Skin is warm and dry, no rashes  Additional Data:     Labs:      Results from last 7 days  Lab Units 09/25/18  0446   WBC Thousand/uL 11 59*   HEMOGLOBIN g/dL 8 6*   HEMATOCRIT % 24 2*   PLATELETS Thousands/uL 70*       Results from last 7 days  Lab Units 09/25/18  0446  09/23/18  0438   SODIUM mmol/L 141  < > 140   POTASSIUM mmol/L 3 9  < > 3 6   CHLORIDE mmol/L 101  < > 101   CO2 mmol/L 30  < > 27   BUN mg/dL 89*  < > 98*   CREATININE mg/dL 5 66*  < > 6 21*   CALCIUM mg/dL 8 1*  < > 7 8*   ALK PHOS U/L  --   --  151*   ALT U/L  --   --  13   AST U/L  --   --  24   < > = values in this interval not displayed  Results from last 7 days  Lab Units 09/20/18  0754   INR  1 22*        I Have Reviewed All Lab Data Listed Above  Recent Cultures (last 7 days):       Results from last 7 days  Lab Units 09/19/18  1746   BLOOD CULTURE  No Growth After 5 Days  No Growth After 5 Days         Last 24 Hours Medication List:     Current Facility-Administered Medications:  cefazolin 1,000 mg Intravenous Q24H BROOKLYN Wiggins Last Rate: Stopped (09/24/18 1130)   hydrALAZINE 10 mg Intravenous Q6H PRN BROOKLYN Garcia    ipratropium 0 5 mg Nebulization Q8H PRN Per Rodgers MD    levalbuterol 1 25 mg Nebulization Q8H PRN BROOKLYN Rincon    metoprolol 5 mg Intravenous Q6H PRN BROOKLYN Esparza    metoprolol tartrate 25 mg Oral Q12H [facility] BROOKLYN Garcia    multi-electrolyte 75 mL/hr Intravenous Continuous Maryan Fuentes PA-C Last Rate: 75 mL/hr (09/25/18 0002)   ondansetron 4 mg Intravenous Q4H PRN BROOKLYN Rincon    vancomycin 500 mg Oral Q6H [address] Keith Osborne PA-C         Today, Patient Was Seen By: Aly Hahn MD

## 2018-09-25 NOTE — PLAN OF CARE
DISCHARGE PLANNING     Discharge to home or other facility with appropriate resources Progressing        DISCHARGE PLANNING - CARE MANAGEMENT     Discharge to post-acute care or home with appropriate resources Progressing        INFECTION - ADULT     Absence or prevention of progression during hospitalization Progressing     Absence of fever/infection during neutropenic period Progressing        Knowledge Deficit     Patient/family/caregiver demonstrates understanding of disease process, treatment plan, medications, and discharge instructions Progressing        Nutrition/Hydration-ADULT     Nutrient/Hydration intake appropriate for improving, restoring or maintaining nutritional needs Progressing        Potential for Falls     Patient will remain free of falls Progressing        Prexisting or High Potential for Compromised Skin Integrity     Skin integrity is maintained or improved Progressing        SAFETY ADULT     Maintain or return to baseline ADL function Progressing     Maintain or return mobility status to optimal level Progressing

## 2018-09-25 NOTE — PLAN OF CARE
DISCHARGE PLANNING     Discharge to home or other facility with appropriate resources Progressing        INFECTION - ADULT     Absence or prevention of progression during hospitalization Progressing     Absence of fever/infection during neutropenic period Progressing        Knowledge Deficit     Patient/family/caregiver demonstrates understanding of disease process, treatment plan, medications, and discharge instructions Progressing        Nutrition/Hydration-ADULT     Nutrient/Hydration intake appropriate for improving, restoring or maintaining nutritional needs Progressing        Potential for Falls     Patient will remain free of falls Progressing        Prexisting or High Potential for Compromised Skin Integrity     Skin integrity is maintained or improved Progressing        SAFETY ADULT     Maintain or return to baseline ADL function Progressing     Maintain or return mobility status to optimal level Progressing

## 2018-09-25 NOTE — PHYSICAL THERAPY NOTE
PHYSICAL THERAPY NOTE          Patient Name: Herminio Otero  [id number] Date: 9/25/2018 09/25/18 1115   Pain Assessment   Pain Assessment No/denies pain   Restrictions/Precautions   Weight Bearing Precautions Per Order No   Other Precautions Contact/isolation;Cognitive; Chair Alarm; Bed Alarm;Multiple lines; Fall Risk  (bed alarm on post session )   General   Chart Reviewed Yes   Response to Previous Treatment Patient with no complaints from previous session  Family/Caregiver Present No   Cognition   Overall Cognitive Status Impaired   Arousal/Participation Alert   Attention Attends with cues to redirect   Orientation Level Oriented to person;Oriented to place;Oriented to time   Memory Within functional limits   Following Commands Follows one step commands without difficulty   Subjective   Subjective Pt willing to participate in PT treatment session    Bed Mobility   Supine to Sit 4  Minimal assistance   Additional items Assist x 1; Increased time required;Verbal cues   Sit to Supine 4  Minimal assistance   Additional items Assist x 1; Increased time required;Verbal cues   Transfers   Sit to Stand 4  Minimal assistance   Additional items Assist x 1; Increased time required;Verbal cues   Stand to Sit 4  Minimal assistance   Additional items Assist x 1; Increased time required;Verbal cues   Additional Comments VC needed for hand placement and safety    Ambulation/Elevation   Gait pattern Excessively slow; Short stride; Foward flexed; Inconsistent jennyfer   Gait Assistance 4  Minimal assist   Additional items Assist x 1   Assistive Device Rolling walker   Distance 15ft x 4    Balance   Static Sitting Fair +   Static Standing Fair   Ambulatory Fair -   Endurance Deficit   Endurance Deficit Yes   Endurance Deficit Description fatigue    Activity Tolerance   Activity Tolerance Patient limited by fatigue   Nurse Made Aware Pt appropriate to be seen and mobilize per nsg    Exercises   TKR Sitting;10 reps;AAROM;AROM; Bilateral  (x 2 sets )   Assessment   Prognosis Fair   Problem List Decreased strength;Decreased range of motion;Decreased endurance; Impaired balance;Decreased mobility; Decreased cognition; Impaired judgement;Decreased safety awareness   Assessment Pt resting in bed at time of PT treatment session  Pt reports feeling " better" today and is willing to participate in PT treatment session  Pt able to perform all bed mobility and transfers with slightly less A required compared to previous session, however pt continues to require min A x 1 at this time  Increased carryover of hand placement noted with transfers, however continued cueing required at this time  Pt noted to have f/f- seated balance EOB which is slightly improved compared to previous session  Pt able to tolerate and progress ambulation distances this session with min A and the use of a RW, however ambulation distances continue to be limited by fatigue  No gross LOB noted, however decreased step length and foot clearance noted as well as  gait speed  Slight VC needed for proper gait sequence with the use of a RW as well as proper RW management with direction changes  All therex performed OOB in chair this session without any increase in complaints  Slight VC and TC required for proper form and pacing as well as to redirect attention  Pt requesting to go BTB at conclusion of PT session  Pt assisted into supine position with all needs within reach  Pt denies any further questions at this time  PT will continue to follow during hospital stay  D/C recommendation when medically cleared is rehab   Barriers to Discharge Inaccessible home environment   Barriers to Discharge Comments [facility] home    Goals   Patient Goals " to go home"   STG Expiration Date 10/04/18   Treatment Day 2   Plan   Treatment/Interventions Functional transfer training;LE strengthening/ROM; Elevations; Therapeutic exercise; Endurance training;Patient/family training;Equipment eval/education; Bed mobility;Gait training;Spoke to nursing;Spoke to MD   Progress Progressing toward goals   PT Frequency Other (Comment)  (4-5x )   Recommendation   Recommendation Short-term skilled PT   Equipment Recommended Walker  (RW)   PT - OK to Discharge Yes  (to rehab when medically cleared )   Jase Schaefer, PT

## 2018-09-25 NOTE — PROGRESS NOTES
NEPHROLOGY PROGRESS NOTE   Nino Jerez 80 y o  male MRN: [id number]  Unit/Bed#: [facility] [address] [id number] Encounter: [id number]  Reason for Consult: JOSE E    ASSESSMENT/PLAN:  1  Acute kidney injury, suspecting ATN, patient remains nonoliguric  2  Hematuria, likely secondary to Ruvalcaba catheter, removed Ruvalcaba catheter  3  Recent sepsis secondary to Klebsiella and E coli  4  Cholelithiasis with cholangitis, status post ERCP with stent placement   5  Anemia, continue to monitor    PLAN:  · Overall renal function appears to be improving  · Low C3, C4 is normal,? Infection related glomerulonephritis  · Continue to monitor renal function closely  · ANCA pending    SUBJECTIVE:  Seen examined  Patient awake alert  Overall is feeling fairly well  No chest pain or shortness of Breath  Appetite seems to be improving  Denies any abdominal pain  States he has been urinating without difficulty since his catheter has been removed    Review of Systems    OBJECTIVE:  Current Weight: Weight - Scale: 74 3 kg (163 lb 12 8 oz)  Vitals:    09/24/18 2122 09/24/18 2300 09/24/18 2342 09/25/18 0742   BP: (!) 186/84 165/72 158/75 162/77   BP Location: Left arm  Right arm Right arm   Pulse: 72 67 83 73   Resp:  18  18   Temp:  98 °F (36 7 °C)  (!) 96 7 °F (35 9 °C)   TempSrc:  Temporal  Temporal   SpO2:  96%  94%   Weight:       Height:           Intake/Output Summary (Last 24 hours) at 09/25/18 1121  Last data filed at 09/25/18 1612   Gross per 24 hour   Intake             1110 ml   Output              350 ml   Net              760 ml       Physical Exam   Constitutional: No distress  HENT:   Head: Normocephalic  Eyes: No scleral icterus  Neck: Neck supple  Cardiovascular: Normal rate and regular rhythm  Pulmonary/Chest: Effort normal and breath sounds normal    Abdominal: He exhibits no distension  Musculoskeletal: He exhibits no edema  Neurological: He is alert  Skin: Skin is warm and dry     Psychiatric: He has a normal mood and affect         Medications:    Current Facility-Administered Medications:     ceFAZolin (ANCEF) IVPB (premix) 1,000 mg, 1,000 mg, Intravenous, Q24H, Meryle Jacquet Bilofsky, CRNP, Stopped at 09/24/18 1130    hydrALAZINE (APRESOLINE) injection 10 mg, 10 mg, Intravenous, Q6H PRN, Meryle Jacquet Bilofsky, CRNP, 10 mg at 09/24/18 0037    ipratropium (ATROVENT) 0 02 % inhalation solution 0 5 mg, 0 5 mg, Nebulization, Q8H PRN, Nandini Hoskins MD, 0 5 mg at 09/20/18 0913    levalbuterol ([place]) inhalation solution 1 25 mg, 1 25 mg, Nebulization, Q8H PRN, MILAGROS VacaNP, 1 25 mg at 09/20/18 0913    metoprolol (LOPRESSOR) injection 5 mg, 5 mg, Intravenous, Q6H PRN, BROOKLYN Barton, 5 mg at 09/20/18 0813    metoprolol tartrate (LOPRESSOR) tablet 25 mg, 25 mg, Oral, Q12H [address], Karla K BROOKLYN Cruz, 25 mg at 09/25/18 0921    multi-electrolyte (ISOLYTE-S PH 7 4 equivalent) IV solution, 75 mL/hr, Intravenous, Continuous, Maryan Fuentes PA-C, Last Rate: 75 mL/hr at 09/25/18 0002, 75 mL/hr at 09/25/18 0002    ondansetron (ZOFRAN) injection 4 mg, 4 mg, Intravenous, Q4H PRN, MILAGROS VacaNP, 4 mg at 09/21/18 0117    vancomycin (VANCOCIN) oral solution 500 mg, 500 mg, Oral, Q6H [address], Dayan Tyler PA-C, 500 mg at 09/25/18 0540    Laboratory Results:    Results from last 7 days  Lab Units 09/25/18  0446 09/24/18  0458 09/23/18  0438 09/22/18  0428 09/21/18  0446 09/20/18  1145 09/20/18  0755 09/20/18  0543 09/19/18  0456   WBC Thousand/uL 11 59* 11 92*  --   --  10 77*  --   --  14 27* 19 94*   HEMOGLOBIN g/dL 8 6* 9 0*  --   --  10 4*  --   --  9 6* 9 3*   HEMATOCRIT % 24 2* 25 8*  --   --  30 2*  --   --  27 4* 26 8*   PLATELETS Thousands/uL 70* 50*  --   --  34*  --  10* 9* 29*   SODIUM mmol/L 141 140 140 142 142 143  --  143 142   POTASSIUM mmol/L 3 9 3 4* 3 6 4 0 4 3 3 4*  --  4 0 3 9   CHLORIDE mmol/L 101 100 101 104 108 107  --  109* 108   CO2 mmol/L 30 28 27 25 19* 20*  --  20* 20*   BUN mg/dL 89* 96* 98* 98* 94* 87* --  83* 74*   CREATININE mg/dL 5 66* 6 11* 6 21* 5 99* 5 53* 5 14*  --  4 84* 3 96*   CALCIUM mg/dL 8 1* 7 8* 7 8* 7 8* 8 2* 8 0*  --  7 8* 7 6*   MAGNESIUM mg/dL  --   --   --   --   --   --   --   --  2 1

## 2018-09-25 NOTE — PLAN OF CARE
Problem: PHYSICAL THERAPY ADULT  Goal: Performs mobility at highest level of function for planned discharge setting  See evaluation for individualized goals  Treatment/Interventions: Functional transfer training, LE strengthening/ROM, Elevations, Therapeutic exercise, Endurance training, Patient/family training, Equipment eval/education, Bed mobility, Gait training, Spoke to nursing, OT  Equipment Recommended: Merlin Sovereign (RW)       See flowsheet documentation for full assessment, interventions and recommendations  Outcome: Progressing  Prognosis: Fair  Problem List: Decreased strength, Decreased range of motion, Decreased endurance, Impaired balance, Decreased mobility, Decreased cognition, Impaired judgement, Decreased safety awareness  Assessment: Pt resting in bed at time of PT treatment session  Pt reports feeling " better" today and is willing to participate in PT treatment session  Pt able to perform all bed mobility and transfers with slightly less A required compared to previous session, however pt continues to require min A x 1 at this time  Increased carryover of hand placement noted with transfers, however continued cueing required at this time  Pt noted to have f/f- seated balance EOB which is slightly improved compared to previous session  Pt able to tolerate and progress ambulation distances this session with min A and the use of a RW, however ambulation distances continue to be limited by fatigue  No gross LOB noted, however decreased step length and foot clearance noted as well as  gait speed  Slight VC needed for proper gait sequence with the use of a RW as well as proper RW management with direction changes  All therex performed OOB in chair this session without any increase in complaints  Slight VC and TC required for proper form and pacing as well as to redirect attention  Pt requesting to go BTB at conclusion of PT session  Pt assisted into supine position with all needs within reach   Pt denies any further questions at this time  PT will continue to follow during hospital stay  D/C recommendation when medically cleared is rehab  Barriers to Discharge: Inaccessible home environment  Barriers to Discharge Comments: [facility] home   Recommendation: Short-term skilled PT     PT - OK to Discharge: Yes (to rehab when medically cleared )    See flowsheet documentation for full assessment

## 2018-09-26 LAB
ANION GAP SERPL CALCULATED.3IONS-SCNC: 10 MMOL/L (ref 4–13)
BUN SERPL-MCNC: 77 MG/DL (ref 5–25)
CALCIUM SERPL-MCNC: 7.7 MG/DL (ref 8.3–10.1)
CHLORIDE SERPL-SCNC: 103 MMOL/L (ref 100–108)
CO2 SERPL-SCNC: 30 MMOL/L (ref 21–32)
CREAT SERPL-MCNC: 4.71 MG/DL (ref 0.6–1.3)
GFR SERPL CREATININE-BSD FRML MDRD: 10 ML/MIN/1.73SQ M
GLUCOSE SERPL-MCNC: 92 MG/DL (ref 65–140)
POTASSIUM SERPL-SCNC: 3.5 MMOL/L (ref 3.5–5.3)
SODIUM SERPL-SCNC: 143 MMOL/L (ref 136–145)

## 2018-09-26 PROCEDURE — 80048 BASIC METABOLIC PNL TOTAL CA: CPT | Performed by: INTERNAL MEDICINE

## 2018-09-26 PROCEDURE — 99232 SBSQ HOSP IP/OBS MODERATE 35: CPT | Performed by: INTERNAL MEDICINE

## 2018-09-26 PROCEDURE — 97535 SELF CARE MNGMENT TRAINING: CPT

## 2018-09-26 PROCEDURE — 97110 THERAPEUTIC EXERCISES: CPT

## 2018-09-26 PROCEDURE — 97116 GAIT TRAINING THERAPY: CPT

## 2018-09-26 PROCEDURE — 86038 ANTINUCLEAR ANTIBODIES: CPT | Performed by: INTERNAL MEDICINE

## 2018-09-26 RX ORDER — NYSTATIN 100000 U/G
CREAM TOPICAL 2 TIMES DAILY
Status: DISCONTINUED | OUTPATIENT
Start: 2018-09-26 | End: 2018-09-28 | Stop reason: HOSPADM

## 2018-09-26 RX ORDER — AMLODIPINE BESYLATE 5 MG/1
5 TABLET ORAL DAILY
Status: DISCONTINUED | OUTPATIENT
Start: 2018-09-26 | End: 2018-09-27

## 2018-09-26 RX ADMIN — VANCOMYCIN 500 MG: KIT at 18:20

## 2018-09-26 RX ADMIN — VANCOMYCIN 500 MG: KIT at 13:48

## 2018-09-26 RX ADMIN — NYSTATIN: 100000 CREAM TOPICAL at 14:54

## 2018-09-26 RX ADMIN — SODIUM CHLORIDE, SODIUM GLUCONATE, SODIUM ACETATE, POTASSIUM CHLORIDE, MAGNESIUM CHLORIDE, SODIUM PHOSPHATE, DIBASIC, AND POTASSIUM PHOSPHATE 50 ML/HR: .53; .5; .37; .037; .03; .012; .00082 INJECTION, SOLUTION INTRAVENOUS at 11:57

## 2018-09-26 RX ADMIN — CEFAZOLIN SODIUM 1000 MG: 1 SOLUTION INTRAVENOUS at 10:48

## 2018-09-26 RX ADMIN — METOPROLOL TARTRATE 25 MG: 25 TABLET ORAL at 09:42

## 2018-09-26 RX ADMIN — NYSTATIN: 100000 CREAM TOPICAL at 18:25

## 2018-09-26 RX ADMIN — VANCOMYCIN 500 MG: KIT at 05:20

## 2018-09-26 RX ADMIN — METOPROLOL TARTRATE 5 MG: 5 INJECTION, SOLUTION INTRAVENOUS at 00:32

## 2018-09-26 RX ADMIN — VANCOMYCIN 500 MG: KIT at 00:02

## 2018-09-26 RX ADMIN — METOPROLOL TARTRATE 25 MG: 25 TABLET ORAL at 21:36

## 2018-09-26 RX ADMIN — AMLODIPINE BESYLATE 5 MG: 5 TABLET ORAL at 09:42

## 2018-09-26 NOTE — PHYSICAL THERAPY NOTE
PHYSICAL THERAPY NOTE          Patient Name: Mariah Peralta  IJZFS'R Date: 9/26/2018 09/26/18 7284   Pain Assessment   Pain Assessment No/denies pain   Restrictions/Precautions   Weight Bearing Precautions Per Order No   Other Precautions Contact/isolation;Cognitive; Chair Alarm; Bed Alarm; Fall Risk;Multiple lines  (bed alarm on post PT session )   General   Chart Reviewed Yes   Response to Previous Treatment Patient with no complaints from previous session  Family/Caregiver Present No   Cognition   Overall Cognitive Status Impaired   Arousal/Participation Alert   Attention Attends with cues to redirect   Orientation Level Oriented to person;Oriented to place   Memory Decreased short term memory   Following Commands Follows one step commands without difficulty   Subjective   Subjective Pt willing to participate in PT treatment session    Bed Mobility   Additional Comments NA, Pt seated OOB in chair at time of PT treatment session    Transfers   Sit to Stand 4  Minimal assistance   Additional items Assist x 1; Increased time required;Verbal cues   Stand to Sit 4  Minimal assistance   Additional items Assist x 1; Increased time required;Verbal cues   Additional Comments VC needed for hand placement and safety    Ambulation/Elevation   Gait pattern Excessively slow; Short stride; Foward flexed; Inconsistent jennyfer   Gait Assistance 4  Minimal assist   Additional items Assist x 1   Assistive Device Rolling walker   Distance 20ft x 3    Balance   Static Sitting Fair +   Static Standing Fair   Ambulatory Fair -   Endurance Deficit   Endurance Deficit Yes   Endurance Deficit Description fatigue    Activity Tolerance   Activity Tolerance Patient limited by fatigue   Nurse Made Aware Pt appropriate to be seen and mobilize per Soumya RN   Exercises   TKR Sitting;10 reps;AROM;AAROM; Bilateral  (x 2 sets )   Assessment   Prognosis Fair   Problem List Decreased strength;Decreased endurance; Impaired balance;Decreased mobility; Decreased safety awareness; Impaired judgement;Decreased cognition   Assessment Pt resting in chair at time of PT treatment session  Pt reports feeling pretty good at time of PT session and is willing to participate  Pt able to perform all transfers with slightly less A required compared to previous session, however continues to require min A for safety and to promote proper wait shifts with sit to stand transfers  Pt noted to have posterior lean in standing, and needs TC to correct  Pt able to tolerate increased ambulation distances this session with Min A and the use of a RW  VC needed for proper gait sequence as well as RW negotiation with direction changes  Cueing also required to prevent RW from advancing too far in front of pt  Pt currently demonstrating step through gait pattern with decreased foot clearance and step length  No gross LOB noted with gait training this session  Pt able to perform all therex seated OOB in chair with a focus on static and dynamic seated balance  Pt continues to require VC and TC for proper from and pacing as well as to redirect attention, however slight carryover of exercised noted from previous PT session  Pt requesting to return to bed at conclusion of PT session  Pt assisted back into supine position with all needs within reach  Pt denies any further questions at this time  PT will continue to follow  D/C recommendation when medically cleared is rehab due to decreased functional mobility compared to baseline and increased A needed from caregiver at current time  Barriers to Discharge Inaccessible home environment   Goals   Patient Goals " to go home"   STG Expiration Date 10/04/18   Treatment Day 3   Plan   Treatment/Interventions Functional transfer training;LE strengthening/ROM; Therapeutic exercise; Endurance training;Elevations; Patient/family training;Equipment eval/education; Bed mobility;Gait training;Spoke to nursing   Progress Progressing toward goals   PT Frequency Other (Comment)  (4-5x a week )   Recommendation   Recommendation Short-term skilled PT   Equipment Recommended Walker  (RW)   PT - OK to Discharge Yes  (to rehab when medically cleared )   Eduardo Ferris, JEROME

## 2018-09-26 NOTE — PROGRESS NOTES
Follow up Consultation    Nephrology   Nikolai Found 80 y o  male MRN: [id number]  Unit/Bed#: [place] [address] [id number] Encounter: [id number]      Physician Requesting Consult: Tammy Galvez MD  Reason for Consult: management of JOSE E    HOSPITAL COURSE: 80 yr old male with history of hypertension was admitted and is being managed for sepsis likely secondary to choledocholithiasis with possible acute cholangitis  Baseline creatinine is unknown  Managed for JOSE E  ASSESSMENT/PLAN:   80 yr old male with history of hypertension was admitted and is being managed for sepsis likely secondary to choledocholithiasis with possible acute cholangitis  1)Acute kidney injury (POA):   JOSE E most likely secondary to ATN + sepsis   After review of records no baseline cr is known, however the patient was admitted with a creatinine of 3 09mg/dL  During the course of the hospitalization it peaked to 6 21mg/dL   Cr stable and improving today down to 4 71mg/dL   Avoid nephrotoxins, adjust meds to appropriate GFR  Acid base and lytes stable except mild hypokalemia- advised patient to increase potassium food intake  Clinically patient is not uremic and there is no acute indication for renal replacement therapy (dialysis)  Optimize hemodynamic status to avoid delay in renal recovery  Await renal recovery  Place on a renal diet when allowed diet order  Strict I/O  Workup sent for ANCA is pending  2)Blood pressure / HTN:   Optimize hemodynamics  Maintain MAP > 65mmHg   Avoid BP fluctuations  On metoprolol 25mg po bid   Please add norvasc 5mg po Q24    3)H/H:   Stable  Management as per primary team     4)Volume status:   Clinically patient appears to be eu to hypo-volemic  Please continue plasmalyte at 50cc/hr for additional 24hr  then DC  5) C diff positive: On PO vanc as per primary team     Please call the office to arrange for outpatient follow up appointment upon discharge      Thanks for the consult  Will continue to follow  Please call with questions/ concerns  Betsy Barillas MD, FASN, 2018, 10:44 AM          Review of Systems as per objective, rest all reviewed and negative    Scheduled Meds:  Current Facility-Administered Medications:  amLODIPine 5 mg Oral Daily Aarti Sanches MD    cefazolin 1,000 mg Intravenous Q24H Willeaby Tierney, CRNP Last Rate: Stopped (18 1213)   hydrALAZINE 10 mg Intravenous Q6H PRN Koleen Every Bilofsky, CRNP    metoprolol 5 mg Intravenous Q6H PRN Jenn Acron, CRNP    metoprolol tartrate 25 mg Oral Q12H [facility] Ryne Dine, CRNP    multi-electrolyte 50 mL/hr Intravenous Continuous Leonarda Nam DO Last Rate: 50 mL/hr (18 1541)   ondansetron 4 mg Intravenous Q4H PRN Sisi Brilliant, CRNP    vancomycin 500 mg Oral Q6H [facility] Dayan Tyler PA-C        PRN Meds: hydrALAZINE    metoprolol    ondansetron    Continuous Infusions:  multi-electrolyte 50 mL/hr Last Rate: 50 mL/hr (18 1541)             Objective :  Patient seen and examined in his room  Sitting in bed, having breakfast  Is happy to hear that his renal parameters are improving  Reports slight increase in appetite  No sob, cp, nausea, vomiting, diarrhea, cough, chills  Hemodynamically stable  No overnight events  Remains afebrile  PHYSICAL EXAM  BP (!) 186/81 (BP Location: Right arm)   Pulse 72   Temp 98 4 °F (36 9 °C) (Temporal)   Resp 16   Ht 5' 4" (1 626 m)   Wt 74 3 kg (163 lb 12 8 oz)   SpO2 95%   BMI 28 12 kg/m²   Temp (24hrs), Av 6 °F (36 4 °C), Min:96 8 °F (36 °C), Max:98 4 °F (36 9 °C)        Intake/Output Summary (Last 24 hours) at 18 1044  Last data filed at 18 0018   Gross per 24 hour   Intake           925 83 ml   Output              207 ml   Net           718 83 ml       I/O last 24 hours:   In: 925 8 [I V :875 8; IV Piggyback:50]  Out: 207 [Urine:207]      Current Weight: Weight - Scale: 74 3 kg (163 lb 12 8 oz)  First Weight: Weight - Scale: 54 6 kg (120 lb 5 9 oz)  GENERAL: NAD, AAOx3, male, elderly, looks his age, frail  HEENT:  EOMI, MMM, no pallor, no icterus  NECK: No increased JVD  HEART: Normal S1 and S2  Regular rhythm  No murmurs  No rubs  LUNGS: Clear to auscultation bilateral  No wheezes, rales, or rhonchi  ABDOMEN: +BS, Soft, nontender and nondistended  No rebound or guarding present  : No CVA tenderness  EXTREMITIES: No edema  No cyanosis or clubbing  No asterixis    NEURO : moving all extremities  SKIN: No obvious skin rashes  LABORATORY:      Results from last 7 days  Lab Units 09/26/18  0456 09/25/18  0446 09/24/18  0458 09/23/18  0438 09/22/18  0428 09/21/18  0446 09/20/18  1145 09/20/18  0755 09/20/18  0543   WBC Thousand/uL  --  11 59* 11 92*  --   --  10 77*  --   --  14 27*   HEMOGLOBIN g/dL  --  8 6* 9 0*  --   --  10 4*  --   --  9 6*   HEMATOCRIT %  --  24 2* 25 8*  --   --  30 2*  --   --  27 4*   PLATELETS Thousands/uL  --  70* 50*  --   --  34*  --  10* 9*   SODIUM mmol/L 143 141 140 140 142 142 143  --  143   POTASSIUM mmol/L 3 5 3 9 3 4* 3 6 4 0 4 3 3 4*  --  4 0   CHLORIDE mmol/L 103 101 100 101 104 108 107  --  109*   CO2 mmol/L 30 30 28 27 25 19* 20*  --  20*   BUN mg/dL 77* 89* 96* 98* 98* 94* 87*  --  83*   CREATININE mg/dL 4 71* 5 66* 6 11* 6 21* 5 99* 5 53* 5 14*  --  4 84*   CALCIUM mg/dL 7 7* 8 1* 7 8* 7 8* 7 8* 8 2* 8 0*  --  7 8*      rest all reviewed    RADIOLOGY:  US kidney and bladder   Final Result by Angel Montez MD (09/20 5872)      No hydronephrosis  Gallstones of perihepatic ascites  Workstation performed: [id number]         FL ERCP biliary and pancreatic   Final Result by Jose Alfredo Virk MD (09/18 3424)      Fluoroscopic guidance provided for surgical procedure  Please refer to the separate procedure notes for additional details            Workstation performed: [id number]         CT abdomen pelvis wo contrast   Final Result by Destini Santiago DO (09/17 9091)      There is marked intrahepatic and extrahepatic biliary dilatation  The common duct measures up to approximately 3 cm in transverse diameter with no calcified stones identified in the common duct  The gallbladder is mildly distended with innumerable small calcified stones layering within the dependent portion of the gallbladder  Correlate for signs of biliary obstruction  Recommend GI consultation  Examination limited by lack of oral and IV contrast and diffuse respiratory motion  Workstation performed: [id number]         CT head without contrast   Final Result by Rosa Garces DO (09/17 1825)      No acute intracranial abnormality  Microangiopathic changes  Workstation performed: [id number]         XR chest 1 view portable   Final Result by Vijay Delarosa MD (09/17 9715)      Mild right basilar airspace disease in keeping with atelectasis or pneumonia  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

## 2018-09-26 NOTE — PROGRESS NOTES
[facility] Progress Note  Patient: Nalini Weldon 80 y o  male   MRN: [id number]  PCP: Nancy Purvis PA-C  Unit/Bed#: [facility] [address] [place] 2 -01 Encounter: [id number]  Date Of Visit: 09/26/18    Assessment:    Principal Problem:    Metabolic acidosis  Active Problems:    JOSE E (acute kidney injury) ([facility] [address] )    Transaminitis    Coagulopathy (HCC)    Thrombocytopenia (HCC)    Sepsis ([facility] [address] )    Acute cholangitis    Calculus of bile duct with acute cholangitis with obstruction    Essential hypertension    Hypokalemia      Plan: This is an 80-year-old female with past medical history significant for hypertension, admitted on 09/17/2018 for evaluation of nausea and anorexia  Patient was admitted to ICU for sepsis likely secondary to choledocholithiasis with possible acute cholangitis  Patient also had acute kidney injury and metabolic acidosis  GI consulted, patient was taken for ERCP and prior to that was given 2 units of FFP and 2 units of platelet due to coagulopathy and thrombocytopenia  Patient found to have a large stone in the distal CBD, ERCP with plastic biliary stent placement    Patient developed decreased renal function and decreased urination defer started on IV Lasix drip and nephrology consulted, patient started on a bicarb drip      1 ) Sepsis secondary to Klebsiella and E coli bacteremia secondary to choledocholithiasis, cholangitis and C diff colitis  - improved with antibiotics, today is Day 10 of anitbiotics  - repeat blood cultures negative     2 ) Choledocholithiasis and cholangitis status post ERCP with stent placement  - GI follow-up appreciated  - Antibiotic Day 10, will complete 14 days of therapy     3 ) C diff colitis  - on vancomycin day 9, will complete 14 days of therapy  -contact isolation     4 ) Metabolic acidosis  - secondary to renal failure, improved     5 ) Thrombocytopenia  - likely secondary to sepsis, gradually improving  - will monitor platelet count, avoid any antiplatelet or anticoagulation     6 ) Acute  Renal failure  - secondary to ATN with sepsis  - creatinine today is 4 71, gradually trending down  - nephrology follow-up appreciated  - ANCA pending  - will monitor renal function, avoid nephrotoxin     7 ) hematuria likely secondary to Ruvalcaba catheter- will continue to monitor    8 ) Disposition  -PT recommending rehab      VTE Pharmacologic Prophylaxis:   Pharmacologic: contraindicated due to thrombocytopenia    Patient Centered Rounds: I have performed bedside rounds with nursing staff today  Discussions with Specialists or Other Care Team Provider: Nephrology    Time Spent for Care: 20 minutes  More than 50% of total time spent on counseling and coordination of care as described above  Current Length of Stay: 9 day(s)    Current Patient Status: Inpatient   Certification Statement: The patient will continue to require additional inpatient hospital stay due to awaiting placement,     Discharge Plan / Estimated Discharge Date: 2-3 days    Code Status: Level 1 - Full Code      Subjective:   Patient seen and examined at bedside, currently denies any chest pain, palpitations, dyspnea  States he has gotten his appetite back  Objective:     Vitals:   Temp (24hrs), Av 6 °F (36 4 °C), Min:96 8 °F (36 °C), Max:98 4 °F (36 9 °C)    HR:  [70-80] 78  Resp:  [16-18] 16  BP: (154-186)/(72-81) 158/77  SpO2:  [95 %-96 %] 95 %  Body mass index is 28 12 kg/m²  Input and Output Summary (last 24 hours):        Intake/Output Summary (Last 24 hours) at 18 1310  Last data filed at 18 1221   Gross per 24 hour   Intake                0 ml   Output              709 ml   Net             -709 ml       Physical Exam:    Constitutional: Patient is oriented to person, place and time, no acute distress  HEENT:  Normocephalic, atraumatic, EOMI, PERRLA, no scleral icterus, no pallor, moist oral mucosa  Neck:  Supple, no masses, no thyromegaly, no bruits Normal range of motion  Lymph nodes:  No lymphadenopathy  Cardiovascular: Normal S1S2, RRR, No murmurs/rubs/gallops appreciated  Pulmonary: Clear to auscultation bilaterally, No rhonchi/rales/wheezing appreciated  Abdominal: Soft, Bowel sounds present, Non-tender, Non-distended, No rebound/guarding, no hepatomegaly   Musculoskeletal: No tenderness/abnormality   Extremities:  No cyanosis, clubbing or edema  Peripheral pulses palpable and equal bilaterally  Neurological: Cranial nerves II-XII grossly intact, sensation intact, otherwise no focal neurological symptoms  Skin: Skin is warm and dry, no rashes  Additional Data:     Labs:      Results from last 7 days  Lab Units 09/25/18  0446   WBC Thousand/uL 11 59*   HEMOGLOBIN g/dL 8 6*   HEMATOCRIT % 24 2*   PLATELETS Thousands/uL 70*       Results from last 7 days  Lab Units 09/26/18  0456  09/23/18  0438   SODIUM mmol/L 143  < > 140   POTASSIUM mmol/L 3 5  < > 3 6   CHLORIDE mmol/L 103  < > 101   CO2 mmol/L 30  < > 27   BUN mg/dL 77*  < > 98*   CREATININE mg/dL 4 71*  < > 6 21*   CALCIUM mg/dL 7 7*  < > 7 8*   ALK PHOS U/L  --   --  151*   ALT U/L  --   --  13   AST U/L  --   --  24   < > = values in this interval not displayed  Results from last 7 days  Lab Units 09/20/18  0754   INR  1 22*        I Have Reviewed All Lab Data Listed Above  Recent Cultures (last 7 days):       Results from last 7 days  Lab Units 09/19/18  1746   BLOOD CULTURE  No Growth After 5 Days  No Growth After 5 Days         Last 24 Hours Medication List:     Current Facility-Administered Medications:  amLODIPine 5 mg Oral Daily Tammy Galvez MD    cefazolin 1,000 mg Intravenous Q24H BROOKLYN Murillo Last Rate: 1,000 mg (09/26/18 1048)   hydrALAZINE 10 mg Intravenous Q6H PRN Blondell Goldberg Bilofsky, CRNP    metoprolol 5 mg Intravenous Q6H PRN BROOKLYN Mccall    metoprolol tartrate 25 mg Oral Q12H [address] BROOKLYN Murillo    multi-electrolyte 50 mL/hr Intravenous Continuous [place] Aloe DO Viv Last Rate: 50 mL/hr (09/26/18 1157)   nystatin  Topical BID Varsha Burnett MD    ondansetron 4 mg Intravenous Q4H PRN BROOKLYN Carlos    vancomycin 500 mg Oral Q6H [address] Jasmin Chow PA-C         Today, Patient Was Seen By: Varsha Burnett MD

## 2018-09-26 NOTE — SOCIAL WORK
Cm spoke to patient about short term rehab choices  Pt is agreeable and wishes to send referrals to rehabs throughout the [facility]  Cm sent these referrals and let Dr Nohemi Napoles know  Pt discharge most likely tomorrow pending nephrology clearance  Pt states he will not need transport upon discharge, his wife will transport  Cm will call wife to confirm

## 2018-09-26 NOTE — PLAN OF CARE
Problem: PHYSICAL THERAPY ADULT  Goal: Performs mobility at highest level of function for planned discharge setting  See evaluation for individualized goals  Treatment/Interventions: Functional transfer training, LE strengthening/ROM, Elevations, Therapeutic exercise, Endurance training, Patient/family training, Equipment eval/education, Bed mobility, Gait training, Spoke to nursing, OT  Equipment Recommended: Arabella Mendez (MERLYN)       See flowsheet documentation for full assessment, interventions and recommendations  Outcome: Progressing  Prognosis: Fair  Problem List: Decreased strength, Decreased endurance, Impaired balance, Decreased mobility, Decreased safety awareness, Impaired judgement, Decreased cognition  Assessment: Pt resting in chair at time of PT treatment session  Pt reports feeling pretty good at time of PT session and is willing to participate  Pt able to perform all transfers with slightly less A required compared to previous session, however continues to require min A for safety and to promote proper wait shifts with sit to stand transfers  Pt noted to have posterior lean in standing, and needs TC to correct  Pt able to tolerate increased ambulation distances this session with Min A and the use of a RW  VC needed for proper gait sequence as well as RW negotiation with direction changes  Cueing also required to prevent RW from advancing too far in front of pt  Pt currently demonstrating step through gait pattern with decreased foot clearance and step length  No gross LOB noted with gait training this session  Pt able to perform all therex seated OOB in chair with a focus on static and dynamic seated balance  Pt continues to require VC and TC for proper from and pacing as well as to redirect attention, however slight carryover of exercised noted from previous PT session  Pt requesting to return to bed at conclusion of PT session  Pt assisted back into supine position with all needs within reach   Pt denies any further questions at this time  PT will continue to follow  D/C recommendation when medically cleared is rehab due to decreased functional mobility compared to baseline and increased A needed from caregiver at current time  Barriers to Discharge: Inaccessible home environment  Barriers to Discharge Comments: [facility] home   Recommendation: Short-term skilled PT     PT - OK to Discharge: Yes (to rehab when medically cleared )    See flowsheet documentation for full assessment

## 2018-09-26 NOTE — OCCUPATIONAL THERAPY NOTE
Occupational Therapy Treatment Note:         09/26/18 1525   Restrictions/Precautions   Weight Bearing Precautions Per Order No   Other Precautions Contact/isolation;Cognitive; Chair Alarm; Bed Alarm; Fall Risk;Pain;Multiple lines   Pain Assessment   Pain Assessment No/denies pain   Pain Score No Pain   ADL   Where Assessed Chair   Grooming Assistance 4  Minimal Assistance   Grooming Deficit Setup   LB Bathing Assistance 3  Moderate Assistance   LB Bathing Deficit Setup;Steadying;Verbal cueing;Supervision/safety; Increased time to complete;Perineal area; Buttocks   LB Bathing Comments Limited safety awareness noted  LB Dressing Assistance 3  Moderate Assistance   LB Dressing Deficit Setup;Steadying; Requires assistive device for steadying;Supervision/safety;Verbal cueing; Increased time to complete; Don/doff L sock; Don/doff R sock; Thread LLE into underwear; Thread RLE into underwear;Pull up over hips   Toileting Assistance  3  Moderate Assistance   Toileting Deficit Setup;Steadying;Verbal cueing;Supervison/safety; Increased time to complete; Bedside commode;Clothing management up;Clothing management down;Perineal hygiene   Functional Standing Tolerance   Time 3 mins   Activity dynamic stand balance activity  Comments Poor safety awareness noted  Bed Mobility   Supine to Sit 4  Minimal assistance   Additional items Assist x 1   Transfers   Sit to Stand 4  Minimal assistance   Additional items Assist x 1   Stand to Sit 4  Minimal assistance   Additional items Assist x 1   Stand pivot 4  Minimal assistance   Additional items Assist x 1   Toilet transfer 4  Minimal assistance   Additional items Assist x 1   Functional Mobility   Functional Mobility 4  Minimal assistance   Additional Comments x1   Additional items Rolling walker   Cognition   Overall Cognitive Status Impaired   Arousal/Participation Alert; Responsive; Cooperative   Attention Attends with cues to redirect   Orientation Level Oriented to person;Oriented to place   Memory Decreased short term memory;Decreased recall of recent events;Decreased recall of precautions   Following Commands Follows one step commands without difficulty   Comments Pt with need for slow, simple commands  Activity Tolerance   Activity Tolerance Patient limited by fatigue   Medical Staff Made Aware reported all findings to nursing staff  Assessment   Assessment Pt was seen for skilled OT with focus on completion of self care tasks, functional mobility, review of fall prevention and review of current plan of care  Pt with need for use of bedside commode this tx session  Min A overall for commode transfer with support of RW  Step by step verbal cues required with increased A for shawn anal hygiene  See above levels of A required for all functional tasks  Pt with off topic comments  Pt is aware of current location  Difficulty noted with sequencing tasks  Further cognitive screening will be required  Pt may benefit from further rehab with focus on achieving optimal performance levels with all functional tasks  Continue to recommend Inpatient rehab   Plan   Treatment Interventions ADL retraining;Functional transfer training; Endurance training;Cognitive reorientation   Goal Expiration Date 10/04/18   Treatment Day 1   OT Frequency 3-5x/wk   Recommendation   OT Discharge Recommendation Short Term Rehab   Equipment Recommended (Pt prefers home with therapies  )   Barthel Index   Feeding 10   Bathing 0   Grooming Score 5   Dressing Score 5   Bladder Score 10   Bowels Score 10   Toilet Use Score 5   Transfers (Bed/Chair) Score 10   Mobility (Level Surface) Score 0   Stairs Score 0   Barthel Index Score 55   Modified Ralph Scale   Modified [place] Scale 4   Benedicto White, [address]

## 2018-09-26 NOTE — CASE MANAGEMENT
Continued Stay Review    Date:  9/25/2018    Vital Signs: 96 7 - 73 - 18   162/77;  RA    Medications:   Scheduled Meds:   Current Facility-Administered Medications:  amLODIPine 5 mg Oral Daily Briana Chan MD    cefazolin 1,000 mg Intravenous Q24H NeoBROOKLYN iDal Last Rate: 1,000 mg (09/26/18 1048)                   metoprolol tartrate 25 mg Oral Q12H [facility] BROOKLYN Dobson            nystatin  Topical BID Briana Chan MD            vancomycin 500 mg Oral Q6H [facility] Dayan Tyler PA-C      Continuous Infusions:   multi-electrolyte 50 mL/hr Last Rate: 50 mL/hr (09/26/18 1157)     PRN Meds: hydrALAZINE    metoprolol    ondansetron    Abnormal Labs/Diagnostic Results:   Wbc's 11 59  H&H 8 6 / 24 2  Plats 70  BUN 89,   Cr 5 66    Age/Sex: 80 y o  male     Assessment/Plan:    ) Sepsis secondary to Klebsiella and E coli bacteremia secondary to choledocholithiasis, cholangitis and C diff colitis  - improved with antibiotics  - repeat blood cultures negative     2 ) Choledocholithiasis and cholangitis status post ERCP with stent placement  - GI follow-up appreciated  - Ancef antibiotic day 9, will complete 14 days of therapy     3 ) C diff colitis  - on vancomycin day 8, will complete 14 days of therapy  -contact isolation     4 ) Metabolic acidosis  - secondary to renal failure, improved     5 ) Thrombocytopenia  - likely secondary to sepsis, gradually improving  - will monitor platelet count, avoid any antiplatelet or anticoagulation     6 ) Acute  Renal failure  - secondary to ATN with sepsis  - creatinine today 5 66 from 6 11  - nephrology follow-up appreciated  - ANCA pending  - will monitor renal function, avoid nephrotoxin     7 ) hematuria likely secondary to Ruvalcaba catheter- will continue to monitor    Discharge Plan:   To be determined

## 2018-09-26 NOTE — PLAN OF CARE
Problem: OCCUPATIONAL THERAPY ADULT  Goal: Performs self-care activities at highest level of function for planned discharge setting  See evaluation for individualized goals  Treatment Interventions: ADL retraining, Functional transfer training, UE strengthening/ROM, Endurance training, Cognitive reorientation, Patient/family training, Equipment evaluation/education, Compensatory technique education, Continued evaluation          See flowsheet documentation for full assessment, interventions and recommendations  Outcome: Progressing  Limitation: Decreased ADL status, Decreased UE ROM, Decreased UE strength, Decreased Safe judgement during ADL, Decreased cognition, Decreased endurance, Decreased high-level ADLs  Prognosis: Fair  Assessment: Pt was seen for skilled OT with focus on completion of self care tasks, functional mobility, review of fall prevention and review of current plan of care  Pt with need for use of bedside commode this tx session  Min A overall for commode transfer with support of RW  Step by step verbal cues required with increased A for shawn anal hygiene  See above levels of A required for all functional tasks  Pt with off topic comments  Pt is aware of current location  Difficulty noted with sequencing tasks  Further cognitive screening will be required  Pt may benefit from further rehab with focus on achieving optimal performance levels with all functional tasks   Continue to recommend Inpatient rehab     OT Discharge Recommendation: Short Term Rehab         Comments: Sharon [place], [address]

## 2018-09-27 LAB
ANION GAP SERPL CALCULATED.3IONS-SCNC: 6 MMOL/L (ref 4–13)
BASOPHILS # BLD AUTO: 0.02 THOUSANDS/ΜL (ref 0–0.1)
BASOPHILS NFR BLD AUTO: 0 % (ref 0–1)
BUN SERPL-MCNC: 62 MG/DL (ref 5–25)
C-ANCA TITR SER IF: NORMAL TITER
CALCIUM SERPL-MCNC: 7.5 MG/DL (ref 8.3–10.1)
CHLORIDE SERPL-SCNC: 105 MMOL/L (ref 100–108)
CO2 SERPL-SCNC: 30 MMOL/L (ref 21–32)
CREAT SERPL-MCNC: 3.69 MG/DL (ref 0.6–1.3)
EOSINOPHIL # BLD AUTO: 0.18 THOUSAND/ΜL (ref 0–0.61)
EOSINOPHIL NFR BLD AUTO: 2 % (ref 0–6)
ERYTHROCYTE [DISTWIDTH] IN BLOOD BY AUTOMATED COUNT: 15.6 % (ref 11.6–15.1)
GFR SERPL CREATININE-BSD FRML MDRD: 14 ML/MIN/1.73SQ M
GLUCOSE SERPL-MCNC: 101 MG/DL (ref 65–140)
HCT VFR BLD AUTO: 23.1 % (ref 36.5–49.3)
HGB BLD-MCNC: 7.7 G/DL (ref 12–17)
IMM GRANULOCYTES # BLD AUTO: 0.12 THOUSAND/UL (ref 0–0.2)
IMM GRANULOCYTES NFR BLD AUTO: 1 % (ref 0–2)
LYMPHOCYTES # BLD AUTO: 0.88 THOUSANDS/ΜL (ref 0.6–4.47)
LYMPHOCYTES NFR BLD AUTO: 10 % (ref 14–44)
MCH RBC QN AUTO: 31.6 PG (ref 26.8–34.3)
MCHC RBC AUTO-ENTMCNC: 33.3 G/DL (ref 31.4–37.4)
MCV RBC AUTO: 95 FL (ref 82–98)
MONOCYTES # BLD AUTO: 0.83 THOUSAND/ΜL (ref 0.17–1.22)
MONOCYTES NFR BLD AUTO: 9 % (ref 4–12)
MYELOPEROXIDASE AB SER IA-ACNC: <9 U/ML (ref 0–9)
NEUTROPHILS # BLD AUTO: 7.2 THOUSANDS/ΜL (ref 1.85–7.62)
NEUTS SEG NFR BLD AUTO: 78 % (ref 43–75)
NRBC BLD AUTO-RTO: 0 /100 WBCS
P-ANCA ATYPICAL TITR SER IF: NORMAL TITER
P-ANCA TITR SER IF: NORMAL TITER
PLATELET # BLD AUTO: 113 THOUSANDS/UL (ref 149–390)
PMV BLD AUTO: 12.2 FL (ref 8.9–12.7)
POTASSIUM SERPL-SCNC: 3.4 MMOL/L (ref 3.5–5.3)
PROTEINASE3 AB SER IA-ACNC: <3.5 U/ML (ref 0–3.5)
RBC # BLD AUTO: 2.44 MILLION/UL (ref 3.88–5.62)
SODIUM SERPL-SCNC: 141 MMOL/L (ref 136–145)
WBC # BLD AUTO: 9.23 THOUSAND/UL (ref 4.31–10.16)

## 2018-09-27 PROCEDURE — 99232 SBSQ HOSP IP/OBS MODERATE 35: CPT | Performed by: INTERNAL MEDICINE

## 2018-09-27 PROCEDURE — 85025 COMPLETE CBC W/AUTO DIFF WBC: CPT | Performed by: INTERNAL MEDICINE

## 2018-09-27 PROCEDURE — 97110 THERAPEUTIC EXERCISES: CPT

## 2018-09-27 PROCEDURE — 97535 SELF CARE MNGMENT TRAINING: CPT

## 2018-09-27 PROCEDURE — 80048 BASIC METABOLIC PNL TOTAL CA: CPT | Performed by: INTERNAL MEDICINE

## 2018-09-27 PROCEDURE — 97530 THERAPEUTIC ACTIVITIES: CPT

## 2018-09-27 RX ORDER — AMLODIPINE BESYLATE 5 MG/1
5 TABLET ORAL 2 TIMES DAILY
Status: DISCONTINUED | OUTPATIENT
Start: 2018-09-27 | End: 2018-09-28 | Stop reason: HOSPADM

## 2018-09-27 RX ORDER — POTASSIUM CHLORIDE 20 MEQ/1
20 TABLET, EXTENDED RELEASE ORAL 2 TIMES DAILY
Status: COMPLETED | OUTPATIENT
Start: 2018-09-27 | End: 2018-09-27

## 2018-09-27 RX ADMIN — AMLODIPINE BESYLATE 5 MG: 5 TABLET ORAL at 08:01

## 2018-09-27 RX ADMIN — POTASSIUM CHLORIDE 20 MEQ: 1500 TABLET, EXTENDED RELEASE ORAL at 17:30

## 2018-09-27 RX ADMIN — NYSTATIN: 100000 CREAM TOPICAL at 17:24

## 2018-09-27 RX ADMIN — VANCOMYCIN 500 MG: KIT at 12:54

## 2018-09-27 RX ADMIN — NYSTATIN: 100000 CREAM TOPICAL at 10:45

## 2018-09-27 RX ADMIN — VANCOMYCIN 500 MG: KIT at 05:50

## 2018-09-27 RX ADMIN — METOPROLOL TARTRATE 25 MG: 25 TABLET ORAL at 08:01

## 2018-09-27 RX ADMIN — VANCOMYCIN 500 MG: KIT at 00:42

## 2018-09-27 RX ADMIN — AMLODIPINE BESYLATE 5 MG: 5 TABLET ORAL at 17:30

## 2018-09-27 RX ADMIN — SODIUM CHLORIDE, SODIUM GLUCONATE, SODIUM ACETATE, POTASSIUM CHLORIDE, MAGNESIUM CHLORIDE, SODIUM PHOSPHATE, DIBASIC, AND POTASSIUM PHOSPHATE 50 ML/HR: .53; .5; .37; .037; .03; .012; .00082 INJECTION, SOLUTION INTRAVENOUS at 07:47

## 2018-09-27 RX ADMIN — VANCOMYCIN 500 MG: KIT at 17:30

## 2018-09-27 RX ADMIN — METOPROLOL TARTRATE 25 MG: 25 TABLET ORAL at 20:48

## 2018-09-27 RX ADMIN — CEFAZOLIN SODIUM 1000 MG: 1 SOLUTION INTRAVENOUS at 10:45

## 2018-09-27 RX ADMIN — POTASSIUM CHLORIDE 20 MEQ: 1500 TABLET, EXTENDED RELEASE ORAL at 12:54

## 2018-09-27 NOTE — SOCIAL WORK
CM spoke with pt, pt's wife, and pt's dtr at length regarding STR choices  Pt and family agreed on [address]  [place] is accepting  Per Dr Roxy Ponce, pt most likely will be medically cleared tomorrow  Cm set up tentative w/c [facility] transport for 3:15pm tomorrow

## 2018-09-27 NOTE — OCCUPATIONAL THERAPY NOTE
[address] Progress Note     Patient Name: Savana Braswell  [id number] Date: 9/27/2018  Problem List  Patient Active Problem List   Diagnosis    Metabolic acidosis    JOSE E [facility]acute kidney injury) ([facility] )    Transaminitis    Coagulopathy (HCC)    Thrombocytopenia (HCC)    Sepsis ([facility] )    Acute cholangitis    Calculus of bile duct with acute cholangitis with obstruction    Essential hypertension    Hypokalemia           09/27/18 1813   Restrictions/Precautions   Weight Bearing Precautions Per Order No   Other Precautions Contact/isolation;Cognitive; Chair Alarm; Bed Alarm; Fall Risk;Multiple lines   Lifestyle   Autonomy PTA pt states independence with his ADLs, transfers, ambulation--w/o device; neg home alone, +falls=1   Reciprocal Relationships 1 dtr   Service to Others worked as a    Intrinsic Gratification watching TV   Pain Assessment   Pain Assessment No/denies pain   Pain Score No Pain   ADL   Where Assessed Edge of bed   Grooming Assistance 5  Supervision/Setup   Grooming Deficit Setup;Verbal cueing;Supervision/safety; Increased time to complete   Grooming Comments Seated EOB   UB Dressing Assistance 4  Minimal Assistance   UB Dressing Deficit Setup;Verbal cueing;Supervision/safety; Increased time to complete;Pull around back;Pull down in back   UB Dressing Comments UB dressing completed with Min A 2* assist to bring gown down/around in back   LB Dressing Assistance 3  Moderate Assistance   LB Dressing Deficit Setup;Verbal cueing;Supervision/safety; Increased time to complete; Don/doff R sock; Don/doff L sock; Thread RLE into underwear; Thread LLE into underwear;Pull up over hips;Steadying; Requires assistive device for steadying   LB Dressing Comments LB dressing completed with Mod A with increased assist required to pull underwear over hips 2* decreased dynamic standing balance demonstrated  Underwear doffed at end of session 2* incontinence of bowels noted     Toileting Assistance  3  Moderate Assistance   Toileting Deficit Setup;Verbal cueing;Supervison/safety; Increased time to complete;Grab bar use;Perineal hygiene   Toileting Comments Toileting tasks completed with Mod A 2* assist for thoroughness of perianal hygiene 2* decreased functional reach and decreased dynamic standing balance demonstrated during task  Functional Standing Tolerance   Time 5 mins   Activity Functional mobility, self care tasks   Comments No LOB noted   Bed Mobility   Supine to Sit 4  Minimal assistance   Additional items Assist x 1;Bedrails; Increased time required;Verbal cues;LE management   Sit to Supine 4  Minimal assistance   Additional items Assist x 1; Increased time required;Verbal cues   Additional Comments Pt lying supine at end of session with bed alarm activated  Call bell and phone within reach  Pt's spouse and dtr present at end of session  All needs met and pt and pt's family report no further questions for OT  Transfers   Sit to Stand 4  Minimal assistance   Additional items Assist x 1; Increased time required;Verbal cues   Stand to Sit 4  Minimal assistance   Additional items Assist x 1; Increased time required;Bed elevated   Stand pivot 4  Minimal assistance   Additional items Assist x 1; Increased time required;Verbal cues   Toilet transfer 4  Minimal assistance   Additional items Assist x 1; Increased time required;Verbal cues;Standard toilet  (grab bar use)   Additional Comments Cues for safe technique and hand placement   Functional Mobility   Functional Mobility 4  Minimal assistance   Additional Comments Assist x1   Additional items Rolling walker   Toilet Transfers   Toilet Transfer From Rolling walker   Toilet Transfer Type To and from   Toilet Transfer to Standard toilet   Toilet Transfer Technique Stand pivot; Ambulating   Toilet Transfers Minimal assistance   Toilet Transfers Comments Assist x1 w/ grab bar use   Therapeutic Exercise - ROM   UE-ROM Yes   ROM- Right Upper Extremities   R Shoulder AROM;Flexion; Extension;Horizontal ABduction   R Elbow AROM;Elbow flexion;Elbow extension   R Position Supine   R Weight/Reps/Sets 10 reps x2   RUE ROM Comment UE exercises completed while lying supine to increase UE strength/ROM needed for ADLs and transfers  Educated pt on proper form, pacing, and breathing for all exercises with pt demonstrating understanding of education  No c/o pain or fatigue reported  ROM - Left Upper Extremities    L Shoulder AROM; Flexion; Extension;Horizontal ABduction   L Elbow AROM;Elbow flexion;Elbow extension   L Position Supine   L Weight/Reps/Sets 10 reps x2   LUE ROM Comment UE exercises completed while lying supine to increase UE strength/ROM needed for ADLs and transfers  Educated pt on proper form, pacing, and breathing for all exercises with pt demonstrating understanding of education  No c/o pain or fatigue reported  Cognition   Overall Cognitive Status Impaired   Arousal/Participation Alert; Cooperative   Attention Attends with cues to redirect   Orientation Level Oriented to person;Oriented to place   Memory Decreased recall of precautions;Decreased recall of recent events;Decreased short term memory   Following Commands Follows one step commands with increased time or repetition   Comments max cues required for sequencing throughout session with pt demonstrating increased understanding with simple, one step commands   Activity Tolerance   Activity Tolerance Patient limited by fatigue   Medical Staff Made Aware Pt appropriate to be seen per Freddi Koyanagi, RN   Assessment   Assessment Pt seen for OT treatment session this PM focusing on functional activity tolerance, bed mobility, ADLs,  functional mobility/transfers, and UE ROM/strengthening  Pt alert and cooperative throughout session  Pt lying supine at start of session, requiring Min A for supine>sit 2* assist for trunk control and max cues for safe technique   Transfers (sit<>stand, RW<>standard toilet) completed with Min A with assist to initiate forward weight shift for sit>stand and assist for controlled descent to surface for stand>sit  Max cues required for safe technique, body mechanics, and hand placement during all transfers 2* decreased safety awareness demonstrated  Min A required for functional mobility with use of RW 2* assist for balance/steadying and cues for posture and safe use of RW  Toileting tasks completed with Mod A 2* assist for thoroughness of perianal hygiene 2* decreased functional reach and decreased dynamic standing balance demonstrated during task  Pt returned to EOB for dressing tasks  UB dressing completed with Min A 2* assist to bring gown down/around in back  LB dressing completed with Mod A with increased assist required to pull underwear over hips 2* decreased dynamic standing balance demonstrated  Underwear doffed at end of session 2* incontinence of bowels noted  Pt returned to supine position with Min A  UE exercises completed while lying supine to increase UE strength/ROM needed for ADLs and transfers  Educated pt on proper form, pacing, and breathing for all exercises with pt demonstrating understanding of education  No c/o pain or fatigue reported  Pt lying supine at end of session with bed alarm activated  Call bell and phone within reach  Pt's spouse and dtr present at end of session  All needs met and pt and pt's family report no further questions for OT  Continue to recommend STR upon D/C  OT to follow pt on caseload  Plan   Treatment Interventions ADL retraining;Functional transfer training;UE strengthening/ROM; Endurance training;Patient/family training;Equipment evaluation/education; Compensatory technique education; Energy conservation; Activityengagement   Goal Expiration Date 10/04/18   Treatment Day 2   OT Frequency 3-5x/wk   Recommendation   OT Discharge Recommendation Short Term Rehab   OT - OK to Discharge Yes  (when medically cleared to rehab)   Barthel Index   Feeding 10   Bathing 0 Grooming Score 5   Dressing Score 5   Bladder Score 5   Bowels Score 5   Toilet Use Score 5   Transfers (Bed/Chair) Score 10   Mobility (Level Surface) Score 0   Stairs Score 0   Barthel Index Score 45   Modified [place] Scale   Modified Ralph Scale 4       Zaria Alegre, OTR/L

## 2018-09-27 NOTE — PROGRESS NOTES
NEPHROLOGY PROGRESS NOTE   Spencer Rose 80 y o  male MRN: [id number]  Unit/Bed#: [facility] -01 Encounter: [id number]  Reason for Consult:   Acute kidney injury    ASSESSMENT/PLAN:  1  Acute kidney injury, most likely secondary to ATN given gradual improvement renal function, low C3,? Post infectious GN, RUY, ANCA pending  2  Hematuria likely secondary to Ruvalcaba catheter  3  Recent sepsis secondary to Klebsiella and E coli  4  Cholelithiasis status post ERCP with stent placement  5  Anemia of chronic disease      6  Hypertension, suboptimal, increase calcium channel    PLAN:  · Overall renal function continues to improve  · Increase amlodipine to b i d   · Continue gentle fluids for next 24-48 hours  · Replace potassium    SUBJECTIVE:  Seen examined  Patient awake alert  States that his appetite is improving  Denies any chest pain, shortness of Breath  No abdominal pain  Review of Systems    OBJECTIVE:  Current Weight: Weight - Scale: 74 3 kg (163 lb 12 8 oz)  Vitals:    09/26/18 2354 09/27/18 0025 09/27/18 0748 09/27/18 1045   BP: 165/75 154/70 (!) 205/74 150/71   BP Location: Right arm Left arm Right arm Right arm   Pulse: 85  88    Resp: 18  17    Temp: 97 7 °F (36 5 °C)  98 1 °F (36 7 °C)    TempSrc: Temporal  Temporal    SpO2: 96%  96%    Weight:       Height:           Intake/Output Summary (Last 24 hours) at 09/27/18 1231  Last data filed at 09/27/18 1045   Gross per 24 hour   Intake                0 ml   Output             1207 ml   Net            -1207 ml       Physical Exam   Constitutional: No distress  HENT:   Head: Normocephalic  Eyes: No scleral icterus  Neck: Neck supple  Cardiovascular: Normal rate and regular rhythm  Pulmonary/Chest: Effort normal and breath sounds normal  No respiratory distress  Abdominal: Soft  He exhibits no distension  Musculoskeletal: He exhibits edema (1+ edema bilaterally)  Neurological: He is alert  Skin: Skin is warm and dry     Psychiatric: He has a normal mood and affect         Medications:    Current Facility-Administered Medications:     amLODIPine (NORVASC) tablet 5 mg, 5 mg, Oral, Daily, Varsha Burnett MD, 5 mg at 09/27/18 0801    ceFAZolin (ANCEF) IVPB (premix) 1,000 mg, 1,000 mg, Intravenous, Q24H, BROOKLYN Dobson, Last Rate: 100 mL/hr at 09/27/18 1045, 1,000 mg at 09/27/18 1045    hydrALAZINE (APRESOLINE) injection 10 mg, 10 mg, Intravenous, Q6H PRN, MILAGROS CurranNP, 10 mg at 09/24/18 0037    metoprolol (LOPRESSOR) injection 5 mg, 5 mg, Intravenous, Q6H PRN, MILAGROS HaiderNP, 5 mg at 09/26/18 0032    metoprolol tartrate (LOPRESSOR) tablet 25 mg, 25 mg, Oral, Q12H [address], BROOKLYN Dobson, 25 mg at 09/27/18 0801    multi-electrolyte (ISOLYTE-S PH 7 4 equivalent) IV solution, 50 mL/hr, Intravenous, Continuous, Raudel Nam DO, Last Rate: 50 mL/hr at 09/27/18 0747, 50 mL/hr at 09/27/18 0747    nystatin (MYCOSTATIN) cream, , Topical, BID, Varsha Burnett MD    ondansetron [facility]) injection 4 mg, 4 mg, Intravenous, Q4H PRN, BROOKLYN Carlos, 4 mg at 09/21/18 0117    vancomycin (VANCOCIN) oral solution 500 mg, 500 mg, Oral, Q6H [address], Dayan Tyler PA-C, 500 mg at 09/27/18 0550    Laboratory Results:    Results from last 7 days  Lab Units 09/27/18  0537 09/26/18  0456 09/25/18  0446 09/24/18  0458 09/23/18  0438 09/22/18  0428 09/21/18  0446   WBC Thousand/uL 9 23  --  11 59* 11 92*  --   --  10 77*   HEMOGLOBIN g/dL 7 7*  --  8 6* 9 0*  --   --  10 4*   HEMATOCRIT % 23 1*  --  24 2* 25 8*  --   --  30 2*   PLATELETS Thousands/uL 113*  --  70* 50*  --   --  34*   SODIUM mmol/L 141 143 141 140 140 142 142   POTASSIUM mmol/L 3 4* 3 5 3 9 3 4* 3 6 4 0 4 3   CHLORIDE mmol/L 105 103 101 100 101 104 108   CO2 mmol/L 30 30 30 28 27 25 19*   BUN mg/dL 62* 77* 89* 96* 98* 98* 94*   CREATININE mg/dL 3 69* 4 71* 5 66* 6 11* 6 21* 5 99* 5 53*   CALCIUM mg/dL 7 5* 7 7* 8 1* 7 8* 7 8* 7 8* 8 2*

## 2018-09-27 NOTE — PLAN OF CARE
Problem: OCCUPATIONAL THERAPY ADULT  Goal: Performs self-care activities at highest level of function for planned discharge setting  See evaluation for individualized goals  Treatment Interventions: ADL retraining, Functional transfer training, UE strengthening/ROM, Endurance training, Cognitive reorientation, Patient/family training, Equipment evaluation/education, Compensatory technique education, Continued evaluation          See flowsheet documentation for full assessment, interventions and recommendations  Outcome: Progressing  Limitation: Decreased ADL status, Decreased UE ROM, Decreased UE strength, Decreased Safe judgement during ADL, Decreased cognition, Decreased endurance, Decreased high-level ADLs  Prognosis: Fair  Assessment: Pt seen for OT treatment session this PM focusing on functional activity tolerance, bed mobility, ADLs,  functional mobility/transfers, and UE ROM/strengthening  Pt alert and cooperative throughout session  Pt lying supine at start of session, requiring Min A for supine>sit 2* assist for trunk control and max cues for safe technique  Transfers (sit<>stand, RW<>standard toilet) completed with Min A with assist to initiate forward weight shift for sit>stand and assist for controlled descent to surface for stand>sit  Max cues required for safe technique, body mechanics, and hand placement during all transfers 2* decreased safety awareness demonstrated  Min A required for functional mobility with use of RW 2* assist for balance/steadying and cues for posture and safe use of RW  Toileting tasks completed with Mod A 2* assist for thoroughness of perianal hygiene 2* decreased functional reach and decreased dynamic standing balance demonstrated during task  Pt returned to EOB for dressing tasks  UB dressing completed with Min A 2* assist to bring gown down/around in back   LB dressing completed with Mod A with increased assist required to pull underwear over hips 2* decreased dynamic standing balance demonstrated  Underwear doffed at end of session 2* incontinence of bowels noted  Pt returned to supine position with Min A  UE exercises completed while lying supine to increase UE strength/ROM needed for ADLs and transfers  Educated pt on proper form, pacing, and breathing for all exercises with pt demonstrating understanding of education  No c/o pain or fatigue reported  Pt lying supine at end of session with bed alarm activated  Call bell and phone within reach  Pt's spouse and dtr present at end of session  All needs met and pt and pt's family report no further questions for OT  Continue to recommend STR upon D/C  OT to follow pt on caseload        OT Discharge Recommendation: Short Term Rehab  OT - OK to Discharge: Yes (when medically cleared to rehab)

## 2018-09-27 NOTE — PROGRESS NOTES
[facility] Progress Note  Patient: Jodie Ray 80 y o  male   MRN: [id number]  PCP: Miladis Cho PA-C  Unit/Bed#: [facility] 2 -01 Encounter: [id number]  Date Of Visit: 09/27/18    Assessment:    Principal Problem:    Metabolic acidosis  Active Problems:    JOSE E (acute kidney injury) ([facility] )    Transaminitis    Coagulopathy (HCC)    Thrombocytopenia (HCC)    Sepsis ([facility] )    Acute cholangitis    Calculus of bile duct with acute cholangitis with obstruction    Essential hypertension    Hypokalemia      Plan:       This is an 80-year-old female with past medical history significant for hypertension, admitted on 09/17/2018 for evaluation of nausea and anorexia   Patient was admitted to ICU for sepsis likely secondary to choledocholithiasis with possible acute cholangitis   Patient also had acute kidney injury and metabolic acidosis   GI consulted, patient was taken for ERCP and prior to that was given 2 units of FFP and 2 units of platelet due to coagulopathy and thrombocytopenia   Patient found to have a large stone in the distal CBD, ERCP with plastic biliary stent placement   Patient developed decreased renal function and decreased urination defer started on IV Lasix drip and nephrology consulted, patient started on a bicarb drip      1  ) Sepsis secondary to Klebsiella and E coli bacteremia secondary to choledocholithiasis, cholangitis and C diff colitis  - improved with antibiotics, today is Day 11 of anitbiotics  - repeat blood cultures negative     2  ) Choledocholithiasis and cholangitis status post ERCP with stent placement  - GI follow-up appreciated  - Antibiotic Day 11, will complete 14 days of therapy     3  ) C diff colitis  - on vancomycin day 10, will complete 14 days of therapy  -contact isolation     4  ) Metabolic acidosis  - secondary to renal failure, improved     5  ) Thrombocytopenia  - likely secondary to sepsis, gradually improving  - will monitor platelet count, avoid any antiplatelet or anticoagulation     6  ) Acute  Renal failure  - secondary to ATN with sepsis  - creatinine today is 3 69, gradually trending down  - nephrology follow-up appreciated  - will monitor renal function, avoid nephrotoxin     7 ) hematuria likely secondary to Ruvalcaba catheter  -no further episodes,  - will continue to monitor     8 ) Disposition  -PT recommending rehab    9 ) Anemia  -hemoglobin 7 7, likely component of recent sepsis, acute renal failure  -no signs of any active bleeding, will monitor H&H       VTE Pharmacologic Prophylaxis:   Pharmacologic: contraindicated due to thrombocytopenia    Patient Centered Rounds: I have performed bedside rounds with nursing staff today  Education and Discussions with Family / Patient:  Daughter, Hansa Leyva    Time Spent for Care: 20 minutes  More than 50% of total time spent on counseling and coordination of care as described above  Current Length of Stay: 10 day(s)    Current Patient Status: Inpatient   Certification Statement: The patient will continue to require additional inpatient hospital stay due to anemia    Discharge Plan / Estimated Discharge Date: 2-3 days    Code Status: Level 1 - Full Code      Subjective:   Patient seen and examined at bedside, denies any nausea, vomiting, abdominal pain, fever, chills  Objective:     Vitals:   Temp (24hrs), Av °F (36 7 °C), Min:97 7 °F (36 5 °C), Max:98 2 °F (36 8 °C)    HR:  [78-91] 88  Resp:  [17-20] 17  BP: (136-205)/(70-83) 150/71  SpO2:  [96 %] 96 %  Body mass index is 28 12 kg/m²  Input and Output Summary (last 24 hours):        Intake/Output Summary (Last 24 hours) at 18 1221  Last data filed at 18 1045   Gross per 24 hour   Intake                0 ml   Output             1207 ml   Net            -1207 ml       Physical Exam:    Constitutional: Patient is oriented to person, place and time, no acute distress  HEENT:  Normocephalic, atraumatic, EOMI, PERRLA, no scleral icterus, no pallor, moist oral mucosa  Neck:  Supple, no masses, no thyromegaly, no bruits Normal range of motion  Lymph nodes:  No lymphadenopathy  Cardiovascular: Normal S1S2, RRR, No murmurs/rubs/gallops appreciated  Pulmonary: Clear to auscultation bilaterally, No rhonchi/rales/wheezing appreciated  Abdominal: Soft, Bowel sounds present, Non-tender, Non-distended, No rebound/guarding, no hepatomegaly   Musculoskeletal: No tenderness/abnormality   Extremities:  No cyanosis, clubbing or edema  Peripheral pulses palpable and equal bilaterally  Neurological: Cranial nerves II-XII grossly intact, sensation intact, otherwise no focal neurological symptoms  Skin: Skin is warm and dry, no rashes  Additional Data:     Labs:      Results from last 7 days  Lab Units 09/27/18  0537   WBC Thousand/uL 9 23   HEMOGLOBIN g/dL 7 7*   HEMATOCRIT % 23 1*   PLATELETS Thousands/uL 113*   NEUTROS PCT % 78*   LYMPHS PCT % 10*   MONOS PCT % 9   EOS PCT % 2       Results from last 7 days  Lab Units 09/27/18  0537  09/23/18  0438   SODIUM mmol/L 141  < > 140   POTASSIUM mmol/L 3 4*  < > 3 6   CHLORIDE mmol/L 105  < > 101   CO2 mmol/L 30  < > 27   BUN mg/dL 62*  < > 98*   CREATININE mg/dL 3 69*  < > 6 21*   CALCIUM mg/dL 7 5*  < > 7 8*   ALK PHOS U/L  --   --  151*   ALT U/L  --   --  13   AST U/L  --   --  24   < > = values in this interval not displayed  I Have Reviewed All Lab Data Listed Above          Recent Cultures (last 7 days):           Last 24 Hours Medication List:     Current Facility-Administered Medications:  amLODIPine 5 mg Oral Daily Briana Chan MD    cefazolin 1,000 mg Intravenous Q24H BROOKLYN Aceves Last Rate: 1,000 mg (09/27/18 1045)   hydrALAZINE 10 mg Intravenous Q6H PRN BROOKLYN Aceves    metoprolol 5 mg Intravenous Q6H PRN BROOKLYN Bay    metoprolol tartrate 25 mg Oral Q12H [facility] BROOKLYN Dickens    multi-electrolyte 50 mL/hr Intravenous Continuous Felecia Maximiliano Nam DO Last Rate: 50 mL/hr (09/27/18 0747)   nystatin  Topical BID Kim Johnson MD    ondansetron 4 mg Intravenous Q4H PRN BROOKLYN Mccain    vancomycin 500 mg Oral Q6H [facility] Patient, CHANEL         Today, Patient Was Seen By: Kim Johnson MD

## 2018-09-28 ENCOUNTER — HOSPITAL ENCOUNTER (INPATIENT)
Facility: HOSPITAL | Age: 83
LOS: 14 days | Discharge: HOME HEALTH | DRG: 092 | End: 2018-10-12
Attending: FAMILY MEDICINE | Admitting: FAMILY MEDICINE
Payer: MEDICARE

## 2018-09-28 VITALS
DIASTOLIC BLOOD PRESSURE: 57 MMHG | WEIGHT: 163.8 LBS | HEIGHT: 64 IN | HEART RATE: 77 BPM | OXYGEN SATURATION: 96 % | SYSTOLIC BLOOD PRESSURE: 149 MMHG | RESPIRATION RATE: 18 BRPM | BODY MASS INDEX: 27.96 KG/M2 | TEMPERATURE: 99.1 F

## 2018-09-28 DIAGNOSIS — I10 ESSENTIAL HYPERTENSION: Chronic | ICD-10-CM

## 2018-09-28 DIAGNOSIS — L60.2 ONYCHOGRYPHOSIS: ICD-10-CM

## 2018-09-28 DIAGNOSIS — D64.9 ANEMIA: ICD-10-CM

## 2018-09-28 DIAGNOSIS — R26.2 AMBULATORY DYSFUNCTION: Primary | ICD-10-CM

## 2018-09-28 DIAGNOSIS — R53.1 GENERALIZED WEAKNESS: ICD-10-CM

## 2018-09-28 PROBLEM — N17.9 ACUTE RENAL FAILURE (ARF) (HCC): Status: ACTIVE | Noted: 2018-09-28

## 2018-09-28 PROBLEM — R74.01 TRANSAMINITIS: Status: RESOLVED | Noted: 2018-09-17 | Resolved: 2018-09-28

## 2018-09-28 PROBLEM — E87.2 METABOLIC ACIDOSIS: Status: RESOLVED | Noted: 2018-09-17 | Resolved: 2018-09-28

## 2018-09-28 PROBLEM — K80.21 CHOLELITHIASIS WITH BILIARY OBSTRUCTION: Status: ACTIVE | Noted: 2018-09-28

## 2018-09-28 PROBLEM — K80.33 CALCULUS OF BILE DUCT WITH ACUTE CHOLANGITIS WITH OBSTRUCTION: Status: RESOLVED | Noted: 2018-09-17 | Resolved: 2018-09-28

## 2018-09-28 PROBLEM — E87.20 METABOLIC ACIDOSIS: Status: RESOLVED | Noted: 2018-09-17 | Resolved: 2018-09-28

## 2018-09-28 PROBLEM — K83.09 ACUTE CHOLANGITIS: Status: RESOLVED | Noted: 2018-09-17 | Resolved: 2018-09-28

## 2018-09-28 PROBLEM — N17.9 AKI (ACUTE KIDNEY INJURY) (HCC): Status: RESOLVED | Noted: 2018-09-17 | Resolved: 2018-09-28

## 2018-09-28 PROBLEM — D69.6 THROMBOCYTOPENIA (HCC): Status: RESOLVED | Noted: 2018-09-17 | Resolved: 2018-09-28

## 2018-09-28 PROBLEM — A41.9 SEPSIS (HCC): Status: RESOLVED | Noted: 2018-09-17 | Resolved: 2018-09-28

## 2018-09-28 PROBLEM — D68.9 COAGULOPATHY (HCC): Status: RESOLVED | Noted: 2018-09-17 | Resolved: 2018-09-28

## 2018-09-28 PROBLEM — A04.72 CLOSTRIDIUM DIFFICILE COLITIS: Status: ACTIVE | Noted: 2018-09-28

## 2018-09-28 LAB
ANION GAP SERPL CALCULATED.3IONS-SCNC: 8 MMOL/L (ref 4–13)
BASOPHILS # BLD AUTO: 0.01 THOUSANDS/ΜL (ref 0–0.1)
BASOPHILS NFR BLD AUTO: 0 % (ref 0–1)
BUN SERPL-MCNC: 48 MG/DL (ref 5–25)
CALCIUM SERPL-MCNC: 7.7 MG/DL (ref 8.3–10.1)
CHLORIDE SERPL-SCNC: 107 MMOL/L (ref 100–108)
CO2 SERPL-SCNC: 28 MMOL/L (ref 21–32)
CREAT SERPL-MCNC: 2.96 MG/DL (ref 0.6–1.3)
EOSINOPHIL # BLD AUTO: 0.21 THOUSAND/ΜL (ref 0–0.61)
EOSINOPHIL NFR BLD AUTO: 3 % (ref 0–6)
ERYTHROCYTE [DISTWIDTH] IN BLOOD BY AUTOMATED COUNT: 15.9 % (ref 11.6–15.1)
GFR SERPL CREATININE-BSD FRML MDRD: 18 ML/MIN/1.73SQ M
GLUCOSE SERPL-MCNC: 98 MG/DL (ref 65–140)
HCT VFR BLD AUTO: 24.8 % (ref 36.5–49.3)
HGB BLD-MCNC: 8.4 G/DL (ref 12–17)
IMM GRANULOCYTES # BLD AUTO: 0.05 THOUSAND/UL (ref 0–0.2)
IMM GRANULOCYTES NFR BLD AUTO: 1 % (ref 0–2)
LYMPHOCYTES # BLD AUTO: 1.04 THOUSANDS/ΜL (ref 0.6–4.47)
LYMPHOCYTES NFR BLD AUTO: 13 % (ref 14–44)
MCH RBC QN AUTO: 32.6 PG (ref 26.8–34.3)
MCHC RBC AUTO-ENTMCNC: 33.9 G/DL (ref 31.4–37.4)
MCV RBC AUTO: 96 FL (ref 82–98)
MONOCYTES # BLD AUTO: 0.71 THOUSAND/ΜL (ref 0.17–1.22)
MONOCYTES NFR BLD AUTO: 9 % (ref 4–12)
NEUTROPHILS # BLD AUTO: 6.27 THOUSANDS/ΜL (ref 1.85–7.62)
NEUTS SEG NFR BLD AUTO: 74 % (ref 43–75)
NRBC BLD AUTO-RTO: 0 /100 WBCS
PLATELET # BLD AUTO: 126 THOUSANDS/UL (ref 149–390)
PMV BLD AUTO: 11.6 FL (ref 8.9–12.7)
POTASSIUM SERPL-SCNC: 4 MMOL/L (ref 3.5–5.3)
RBC # BLD AUTO: 2.58 MILLION/UL (ref 3.88–5.62)
RYE IGE QN: NEGATIVE
SODIUM SERPL-SCNC: 143 MMOL/L (ref 136–145)
WBC # BLD AUTO: 8.29 THOUSAND/UL (ref 4.31–10.16)

## 2018-09-28 PROCEDURE — 97110 THERAPEUTIC EXERCISES: CPT

## 2018-09-28 PROCEDURE — 97530 THERAPEUTIC ACTIVITIES: CPT

## 2018-09-28 PROCEDURE — 99232 SBSQ HOSP IP/OBS MODERATE 35: CPT | Performed by: INTERNAL MEDICINE

## 2018-09-28 PROCEDURE — 85025 COMPLETE CBC W/AUTO DIFF WBC: CPT | Performed by: INTERNAL MEDICINE

## 2018-09-28 PROCEDURE — 90662 IIV NO PRSV INCREASED AG IM: CPT | Performed by: INTERNAL MEDICINE

## 2018-09-28 PROCEDURE — G0008 ADMIN INFLUENZA VIRUS VAC: HCPCS | Performed by: INTERNAL MEDICINE

## 2018-09-28 PROCEDURE — 99239 HOSP IP/OBS DSCHRG MGMT >30: CPT | Performed by: INTERNAL MEDICINE

## 2018-09-28 PROCEDURE — 97116 GAIT TRAINING THERAPY: CPT

## 2018-09-28 PROCEDURE — 99306 1ST NF CARE HIGH MDM 50: CPT | Performed by: HOSPITALIST

## 2018-09-28 PROCEDURE — 97535 SELF CARE MNGMENT TRAINING: CPT

## 2018-09-28 PROCEDURE — 80048 BASIC METABOLIC PNL TOTAL CA: CPT | Performed by: INTERNAL MEDICINE

## 2018-09-28 RX ORDER — AMLODIPINE BESYLATE 5 MG/1
5 TABLET ORAL 2 TIMES DAILY
Status: DISCONTINUED | OUTPATIENT
Start: 2018-09-28 | End: 2018-10-12 | Stop reason: HOSPADM

## 2018-09-28 RX ORDER — ONDANSETRON 2 MG/ML
4 INJECTION INTRAMUSCULAR; INTRAVENOUS EVERY 6 HOURS PRN
Status: DISCONTINUED | OUTPATIENT
Start: 2018-09-28 | End: 2018-10-12 | Stop reason: HOSPADM

## 2018-09-28 RX ORDER — NYSTATIN 100000 U/G
CREAM TOPICAL 2 TIMES DAILY
Qty: 30 G | Refills: 0 | Status: SHIPPED | OUTPATIENT
Start: 2018-09-28 | End: 2018-10-12 | Stop reason: HOSPADM

## 2018-09-28 RX ORDER — LISINOPRIL 20 MG/1
40 TABLET ORAL DAILY
Status: DISCONTINUED | OUTPATIENT
Start: 2018-09-29 | End: 2018-10-01

## 2018-09-28 RX ORDER — ACETAMINOPHEN 325 MG/1
650 TABLET ORAL EVERY 6 HOURS PRN
Status: DISCONTINUED | OUTPATIENT
Start: 2018-09-28 | End: 2018-10-12 | Stop reason: HOSPADM

## 2018-09-28 RX ORDER — HEPARIN SODIUM 5000 [USP'U]/ML
5000 INJECTION, SOLUTION INTRAVENOUS; SUBCUTANEOUS EVERY 8 HOURS SCHEDULED
Status: DISCONTINUED | OUTPATIENT
Start: 2018-09-28 | End: 2018-09-30

## 2018-09-28 RX ORDER — AMLODIPINE BESYLATE 5 MG/1
5 TABLET ORAL 2 TIMES DAILY
Qty: 60 TABLET | Refills: 0 | Status: ON HOLD | OUTPATIENT
Start: 2018-09-28 | End: 2018-10-12

## 2018-09-28 RX ORDER — CIPROFLOXACIN 250 MG/1
500 TABLET, FILM COATED ORAL EVERY 12 HOURS SCHEDULED
Status: COMPLETED | OUTPATIENT
Start: 2018-09-28 | End: 2018-09-29

## 2018-09-28 RX ORDER — METOPROLOL TARTRATE 50 MG/1
50 TABLET, FILM COATED ORAL EVERY 12 HOURS SCHEDULED
Status: DISCONTINUED | OUTPATIENT
Start: 2018-09-28 | End: 2018-10-12 | Stop reason: HOSPADM

## 2018-09-28 RX ORDER — CIPROFLOXACIN 500 MG/1
500 TABLET, FILM COATED ORAL EVERY 12 HOURS SCHEDULED
Qty: 4 TABLET | Refills: 0 | Status: SHIPPED | OUTPATIENT
Start: 2018-09-28 | End: 2018-10-12 | Stop reason: HOSPADM

## 2018-09-28 RX ORDER — NYSTATIN 100000 U/G
CREAM TOPICAL 2 TIMES DAILY
Status: DISCONTINUED | OUTPATIENT
Start: 2018-09-28 | End: 2018-10-01

## 2018-09-28 RX ADMIN — NYSTATIN: 100000 CREAM TOPICAL at 20:47

## 2018-09-28 RX ADMIN — CEFAZOLIN SODIUM 1000 MG: 1 SOLUTION INTRAVENOUS at 09:30

## 2018-09-28 RX ADMIN — INFLUENZA A VIRUS A/MICHIGAN/45/2015 X-275 (H1N1) ANTIGEN (FORMALDEHYDE INACTIVATED), INFLUENZA A VIRUS A/SINGAPORE/INFIMH-16-0019/2016 IVR-186 (H3N2) ANTIGEN (FORMALDEHYDE INACTIVATED), AND INFLUENZA B VIRUS B/MARYLAND/15/2016 BX-69A (A B/COLORADO/6/2017-LIKE VIRUS) ANTIGEN (FORMALDEHYDE INACTIVATED) 0.5 ML: 60; 60; 60 INJECTION, SUSPENSION INTRAMUSCULAR at 13:37

## 2018-09-28 RX ADMIN — AMLODIPINE BESYLATE 5 MG: 5 TABLET ORAL at 09:16

## 2018-09-28 RX ADMIN — CIPROFLOXACIN HYDROCHLORIDE 500 MG: 250 TABLET, FILM COATED ORAL at 20:46

## 2018-09-28 RX ADMIN — VANCOMYCIN 500 MG: KIT at 00:22

## 2018-09-28 RX ADMIN — AMLODIPINE BESYLATE 5 MG: 5 TABLET ORAL at 20:45

## 2018-09-28 RX ADMIN — METOPROLOL TARTRATE 25 MG: 25 TABLET ORAL at 09:16

## 2018-09-28 RX ADMIN — VANCOMYCIN 500 MG: KIT at 06:18

## 2018-09-28 RX ADMIN — VANCOMYCIN 500 MG: KIT at 12:13

## 2018-09-28 RX ADMIN — SODIUM CHLORIDE, SODIUM GLUCONATE, SODIUM ACETATE, POTASSIUM CHLORIDE, MAGNESIUM CHLORIDE, SODIUM PHOSPHATE, DIBASIC, AND POTASSIUM PHOSPHATE 50 ML/HR: .53; .5; .37; .037; .03; .012; .00082 INJECTION, SOLUTION INTRAVENOUS at 06:19

## 2018-09-28 RX ADMIN — METOPROLOL TARTRATE 50 MG: 50 TABLET ORAL at 20:46

## 2018-09-28 RX ADMIN — HEPARIN SODIUM 5000 UNITS: 5000 INJECTION, SOLUTION INTRAVENOUS; SUBCUTANEOUS at 21:33

## 2018-09-28 RX ADMIN — NYSTATIN: 100000 CREAM TOPICAL at 09:30

## 2018-09-28 RX ADMIN — VANCOMYCIN HYDROCHLORIDE 500 MG: 500 INJECTION, POWDER, LYOPHILIZED, FOR SOLUTION INTRAVENOUS at 20:47

## 2018-09-28 NOTE — PROGRESS NOTES
NEPHROLOGY PROGRESS NOTE   Nalini Weldon 80 y o  male MRN: [id number]  Unit/Bed#: [address] [id number] Encounter: [id number]  Reason for Consult: JOSE E    ASSESSMENT/PLAN:  1  Acute kidney injury, most likely secondary to ATN given gradual improvement renal function, low C3,? Post infectious GN, RUY, ANCA pending  2  Hematuria likely secondary to Ruvalcaba catheter, Anca negative, RUY pending  3  Recent sepsis secondary to Klebsiella and E coli  4  Cholelithiasis status post ERCP with stent placement  5  Anemia of chronic disease      6  Hypertension, moderately improved    PLAN:  · Overall renal function appears to be improving  · Volume status acceptable  · Off IV fluids  · Stable for discharge from Nephrology standpoint  · Would repeat renal function in 1 week post discharge  SUBJECTIVE:  Seen and examined  Patient awake alert  Offers no new complaints  Appetite seems to be improved  No active chest pain shortness of breath  No abdominal pain  Review of Systems    OBJECTIVE:  Current Weight: Weight - Scale: 74 3 kg (163 lb 12 8 oz)  Vitals:    09/27/18 1551 09/27/18 2048 09/28/18 0109 09/28/18 0723   BP: 156/74 142/77 153/68 149/57   BP Location: Right arm  Right arm Right arm   Pulse: 73 88 73 77   Resp: 18 18 18   Temp: 98 2 °F (36 8 °C)  99 °F (37 2 °C) 99 1 °F (37 3 °C)   TempSrc: Temporal  Tympanic Core Temporal   SpO2: 96%  97% 96%   Weight:       Height:           Intake/Output Summary (Last 24 hours) at 09/28/18 1310  Last data filed at 09/28/18 0630   Gross per 24 hour   Intake                0 ml   Output              200 ml   Net             -200 ml       Physical Exam   Constitutional: No distress  HENT:   Head: Normocephalic  Eyes: No scleral icterus  Neck: Neck supple  Cardiovascular: Normal rate and regular rhythm  Pulmonary/Chest: Effort normal and breath sounds normal    Abdominal: He exhibits no distension  Musculoskeletal: He exhibits edema (Trace to 1+ edema)  Neurological: He is alert  Skin: Skin is warm and dry  Psychiatric: He has a normal mood and affect         Medications:    Current Facility-Administered Medications:     amLODIPine (NORVASC) tablet 5 mg, 5 mg, Oral, BID, Kimberly Nam DO, 5 mg at 09/28/18 7616    ceFAZolin (ANCEF) IVPB (premix) 1,000 mg, 1,000 mg, Intravenous, Q24H, BROOKLYN Dobson, Last Rate: 100 mL/hr at 09/28/18 0930, 1,000 mg at 09/28/18 0930    hydrALAZINE (APRESOLINE) injection 10 mg, 10 mg, Intravenous, Q6H PRN, Meryle Jacquet Bilofsky, CRNP, 10 mg at 09/24/18 0037    influenza vaccine, high-dose (FLUZONE HIGH-DOSE) IM injection FERNANDA 0 5 mL, 0 5 mL, Intramuscular, Prior to discharge, James Franklin MD    metoprolol (LOPRESSOR) injection 5 mg, 5 mg, Intravenous, Q6H PRN, BROOKLYN Barton, 5 mg at 09/26/18 0032    metoprolol tartrate (LOPRESSOR) tablet 25 mg, 25 mg, Oral, Q12H [address], BROOKLYN Dobson, 25 mg at 09/28/18 0916    multi-electrolyte (ISOLYTE-S PH 7 4 equivalent) IV solution, 50 mL/hr, Intravenous, Continuous, Kimberly Nam DO, Last Rate: 50 mL/hr at 09/28/18 0619, 50 mL/hr at 09/28/18 2905    nystatin (MYCOSTATIN) cream, , Topical, BID, James Franklin MD    [facility]etron [facility] injection 4 mg, 4 mg, Intravenous, Q4H PRN, BROOKLYN Vaca, 4 mg at 09/21/18 0117    vancomycin (VANCOCIN) oral solution 500 mg, 500 mg, Oral, Q6H [address], Dayan Tyler PA-C, 500 mg at 09/28/18 1213    Laboratory Results:    Results from last 7 days  Lab Units 09/28/18  0451 09/27/18  0537 09/26/18  0456 09/25/18  0446 09/24/18  0458 09/23/18  0438 09/22/18 0428   WBC Thousand/uL 8 29 9 23  --  11 59* 11 92*  --   --    HEMOGLOBIN g/dL 8 4* 7 7*  --  8 6* 9 0*  --   --    HEMATOCRIT % 24 8* 23 1*  --  24 2* 25 8*  --   --    PLATELETS Thousands/uL 126* 113*  --  70* 50*  --   --    SODIUM mmol/L 143 141 143 141 140 140 142   POTASSIUM mmol/L 4 0 3 4* 3 5 3 9 3 4* 3 6 4 0   CHLORIDE mmol/L 107 105 103 101 100 101 104   CO2 mmol/L 28 30 30 30 28 27 25   BUN mg/dL 48* 62* 77* 89* 96* 98* 98*   CREATININE mg/dL 2 96* 3 69* 4 71* 5 66* 6 11* 6 21* 5 99*   CALCIUM mg/dL 7 7* 7 5* 7 7* 8 1* 7 8* 7 8* 7 8*

## 2018-09-28 NOTE — PLAN OF CARE
Problem: OCCUPATIONAL THERAPY ADULT  Goal: Performs self-care activities at highest level of function for planned discharge setting  See evaluation for individualized goals  Treatment Interventions: ADL retraining, Functional transfer training, UE strengthening/ROM, Endurance training, Cognitive reorientation, Patient/family training, Equipment evaluation/education, Compensatory technique education, Continued evaluation          See flowsheet documentation for full assessment, interventions and recommendations  Outcome: Progressing  Limitation: Decreased ADL status, Decreased UE ROM, Decreased UE strength, Decreased Safe judgement during ADL, Decreased cognition, Decreased endurance, Decreased high-level ADLs  Prognosis: Fair  Assessment: Pt seen for skilled OT session focused on ADLs, functional transfers and mobility, bed mobility and b/l UE exercises  Pt w/ MIN assist x1 w/ SBA of 2nd sit>stand from chair w/ VCs for hand placement  Pt w/ MIN assist x1 w/ SBA of 2nd for safety and line management during functional mobility to [facility]  Pt w/ MIN assist stand>sit transfer to [facility]  Pt w  MIN assist steadying w/ RW w/ MAX assist cleanup after bowel movement  Pt w/ MIN assist x1 and SBA of 2nd for functional mobility w/ RW to EOB  Pt w/ MOD assist LB ADLs while seated EOB  Pt w/ compensatory crossed leg techniques to don/doff socks  Pt required assist to thread LEs into pants  Pt w/ MIN assist steadying assist w/ RW to pull up pants w/ one UE support on walker while pulling up each side  Pt w/ MIN assist sit>supine bed mobility w/ VCs for positioning and use of bed rails  Pt completed b/l UE exercises seen above to increase strength and endurance for ADLs, functional transfers and mobility  Recommend STR when medically stable  Pt continues to be limited due to decreased strength and endurance, impaired functional reach, impaired safety awareness all causing a decline in ADLs, functional transfers and mobility        OT Discharge Recommendation: Short Term Rehab  OT - OK to Discharge:  (when medically stable)      Comments: Kyleigh Mcdowell MS, OTR/L

## 2018-09-28 NOTE — OCCUPATIONAL THERAPY NOTE
OccupationalTherapy Progress Note     Patient Name: Jesus White  Today's Date: 9/28/2018  Problem List  Patient Active Problem List   Diagnosis    Essential hypertension           09/28/18 1225   Restrictions/Precautions   Weight Bearing Precautions Per Order No   Other Precautions Contact/isolation;Cognitive; Chair Alarm; Bed Alarm; Fall Risk;Multiple lines   ADL   Where Assessed Chair   Grooming Assistance 5  Supervision/Setup   Grooming Deficit Setup   LB Bathing Assistance 4  Minimal Assistance   LB Bathing Deficit Setup;Verbal cueing;Supervision/safety; Increased time to complete;Steadying;Perineal area; Buttocks   LB Bathing Comments w/ increased time due to impaired balance and steadying assist   LB Dressing Assistance 3  Moderate Assistance   LB Dressing Deficit Setup;Verbal cueing;Supervision/safety; Increased time to complete; Requires assistive device for steadying;Steadying;Pull up over hips   LB Dressing Comments able to thread L LE into pants and required assist to thread R LE into pants, able to don/doff socks w/ crossed leg technique; min assist steadying assist in stance to pull up over hips w/ one UE support on RW   Toileting Assistance  2  Maximal Assistance   Toileting Deficit Setup;Steadying;Verbal cueing;Supervison/safety; Increased time to complete;Clothing management up;Clothing management down   Toileting Comments max assist to wipe self after bowel movements   Functional Standing Tolerance   Time 4 minutes   Activity ADLs w/ MIN assist steadying support in RW   Comments w/ MIN assist steadying w/ RW support   Bed Mobility   Sit to Supine 4  Minimal assistance   Additional items Assist x 1; Increased time required;Verbal cues;LE management; Bedrails;HOB elevated   Additional Comments w/ assist for LE management   Transfers   Sit to Stand 4  Minimal assistance   Additional items Assist x 1; Increased time required;Verbal cues;Armrests  (SBA of 2nd for safety)   Stand to Sit 4  Minimal assistance Additional items Assist x 1; Increased time required;Verbal cues;Armrests  (SBA of 2nd for safety)   Additional Comments VCs for safety and hand positioning   Functional Mobility   Functional Mobility 4  Minimal assistance   Additional Comments assist x1 w/ SBA of 2nd for safety   Additional items Rolling walker   Toilet Transfers   Toilet Transfer From Rolling walker   Toilet Transfer Type To and from   Toilet Transfer to Standard toilet   Toilet Transfer Technique Stand pivot; Ambulating   Toilet Transfers Minimal assistance;Verbal cues  (SBA of 2nd for safety)   Toilet Transfers Comments w/ VCs for positioning and safety   Therapeutic Exercise - ROM   UE-ROM Yes   ROM- Right Upper Extremities   R Elbow AROM;Elbow extension;Elbow flexion  (forearm pronation/supination)   R Weight/Reps/Sets 2 sets x 10 reps   RUE ROM Comment tolerated well    ROM - Left Upper Extremities    L Elbow AROM;Elbow flexion;Elbow extension  (forearm pronation/supination)   L Weight/Reps/Sets 2 sets x 10 reps   LUE ROM Comment tolerated well   Cognition   Overall Cognitive Status Impaired   Arousal/Participation Alert; Cooperative   Attention Attends with cues to redirect   Orientation Level Oriented to person;Oriented to place;Oriented to time  (not specific date)   Memory Decreased short term memory;Decreased recall of precautions;Decreased recall of recent events   Following Commands Follows one step commands with increased time or repetition   Comments pt w/ increased processing time, cues for safety t/o tasks, pt cues for carryover w/ hand placement   Activity Tolerance   Activity Tolerance Patient limited by fatigue   Medical Staff Made Aware appropriate to see per Clemencia MENDEZ   Assessment   Assessment Pt seen for skilled OT session focused on ADLs, functional transfers and mobility, bed mobility and b/l UE exercises  Pt w/ MIN assist x1 w/ SBA of 2nd sit>stand from chair w/ VCs for hand placement   Pt w/ MIN assist x1 w/ SBA of 2nd for safety and line management during functional mobility to [facility]  Pt w/ MIN assist stand>sit transfer to [facility]  Pt w  MIN assist steadying w/ RW w/ MAX assist cleanup after bowel movement  Pt w/ MIN assist x1 and SBA of 2nd for functional mobility w/ RW to EOB  Pt w/ MOD assist LB ADLs while seated EOB  Pt w/ compensatory crossed leg techniques to don/doff socks  Pt required assist to thread LEs into pants  Pt w/ MIN assist steadying assist w/ RW to pull up pants w/ one UE support on walker while pulling up each side  Pt w/ MIN assist sit>supine bed mobility w/ VCs for positioning and use of bed rails  Pt completed b/l UE exercises seen above to increase strength and endurance for ADLs, functional transfers and mobility  Recommend STR when medically stable  Pt continues to be limited due to decreased strength and endurance, impaired functional reach, impaired safety awareness all causing a decline in ADLs, functional transfers and mobility  Plan   Treatment Interventions ADL retraining;UE strengthening/ROM; Functional transfer training;Cognitive reorientation; Endurance training;Patient/family training;Equipment evaluation/education; Compensatory technique education; Energy conservation; Activityengagement   Goal Expiration Date 10/04/18   Treatment Day 3   OT Frequency 3-5x/wk   Recommendation   OT Discharge Recommendation Short Term Rehab   OT - OK to Discharge (when medically stable)   Barthel Index   Feeding 10   Bathing 0   Grooming Score 5   Dressing Score 5   Bladder Score 5   Bowels Score 5   Toilet Use Score 5   Transfers (Bed/Chair) Score 10   Mobility (Level Surface) Score 0   Stairs Score 0   Barthel Index Score 45   Modified Columbia Scale   Modified Columbia Scale 4     Documentation completed by: Ilya Naqvi MS, OTR/L

## 2018-09-28 NOTE — DISCHARGE SUMMARY
Transition of Care Discharge Summary - [facility]    Patient Information: Estevan Gil 80 y o  male MRN: [id number]  Unit/Bed#: [place] [address] -01 Encounter: [id number]    Discharging Physician / Practitioner: Tea Johnson MD  PCP: Ifeanyi Sandoval PA-C  Admission Date: 9/17/2018  Discharge Date: 09/28/18    Disposition:      Acute Rehab Facility at [address] to [facility]:   · Not Applicable to this Patient - Not Applicable to this Patient    Reason for Admission:  Abdominal pain    Discharge Diagnoses:     Principal Problem (Resolved):    Metabolic acidosis  Active Problems:    Essential hypertension  Resolved Problems:    JOSE E (acute kidney injury) ([facility] [address] )    Transaminitis    Coagulopathy (HCC)    Thrombocytopenia (HCC)    Sepsis ([facility] [address] )    Acute cholangitis    Calculus of bile duct with acute cholangitis with obstruction    Hypokalemia      Consultations During Hospital Stay:  · Critical care  · Gastroenterology  · Nephrology      Medication Adjustments and Discharge Medications:  · Medication Dosing Tapers - Please refer to Discharge Medication List for details on any medication dosing tapers (if applicable to patient)  · Discharge Medication List: See after visit summary for reconciled discharge medications  Wound Care Recommendations:  When applicable, please see wound care section of After Visit Summary      Diet Recommendations at Discharge:  Diet -        Diet Orders            Start     Ordered    09/24/18 1202  Dietary nutrition supplements  Once     Comments:  Mix vanilla ensure enlive w/ vanilla ice TID   Question Answer Comment   Select Supplement: Ensure Enlive-Vanilla    Frequency Breakfast, Lunch, Dinner        09/24/18 1202    09/24/18 1202  Dietary nutrition supplements  Once     Comments:  Ensure pudding 2pm and banana w/ pb 10am   Question Answer Comment   Select Supplement: Ensure Pudding-Vanila    Frequency Once        09/24/18 1202 09/19/18 1025  Diet Dysphagia/Modified Consistency; Dysphagia 3-Dental Soft; Thin Liquid  Diet effective now     Question Answer Comment   Diet Type Dysphagia/Modified Consistency    Dysphagia/Modified Consistency Dysphagia 3-Dental Soft    Liquid Modifier Thin Liquid    RD to adjust diet per protocol? Yes        09/19/18 1024        Fluid Restriction - Continue Fluid Restriction as Listed Above at Discharge  Significant Findings / Test Results:     · Chest x-ray:  Mild right basilar airspace disease in keeping with atelectasis or pneumonia  · CT head:  Negative  CT abdomen pelvis: There is marked intrahepatic and extrahepatic biliary dilatation  The common duct measures up to approximately 3 cm in transverse diameter with no calcified stones identified in the common duct      The gallbladder is mildly distended with innumerable small calcified stones layering within the dependent portion of the gallbladder      Correlate for signs of biliary obstruction  Recommend GI consultation      · Examination limited by lack of oral and IV contrast and diffuse respiratory motion  · ERCP:  · Ultrasound kidney and bladder:  No hydronephrosis      Hospital Course:     Pepper Lcokett is a 80 y o  male patient who originally presented to the hospital on 9/17/2018 due to abdominal pain      This is an 80-year-old female with past medical history significant for hypertension, admitted on 09/17/2018 for evaluation of nausea and anorexia   Patient was admitted to ICU for sepsis likely secondary to choledocholithiasis with possible acute cholangitis   Patient also had acute kidney injury and metabolic acidosis   GI consulted, patient was taken for ERCP and prior to that was given 2 units of FFP and 2 units of platelet due to coagulopathy and thrombocytopenia   Patient found to have a large stone in the distal CBD, ERCP with plastic biliary stent placement   Patient developed decreased renal function and decreased urination defer started on IV Lasix drip and nephrology consulted, patient started on a bicarb drip      1  ) Sepsis secondary to Klebsiella and E coli bacteremia secondary to choledocholithiasis, cholangitis and C diff colitis  - improved with antibiotics, today is Day 12 of anitbiotics  - repeat blood cultures negative     2  ) Choledocholithiasis and cholangitis status post ERCP with stent placement  - GI follow-up appreciated  - Antibiotic Day 12, will transition to p o , ciprofloxacin for 2 more days to complete 14 days      3  ) C diff colitis  - on vancomycin day 11, will complete 14 days of therapy     4  ) Metabolic acidosis  - secondary to renal failure, improved     5  ) Thrombocytopenia  - likely secondary to sepsis, gradually improving, 126 today     6  ) Acute  Renal failure  - secondary to ATN with sepsis  - creatinine today is 2 96, gradually trending down  - nephrology follow-up appreciated, patient is stable for discharge from nephrology standpoint with outpatient follow-up    7 ) hematuria likely secondary to Ruvalcaba catheter  -no further episodes,     8 ) Disposition  -PT recommending rehab     9 ) Anemia  -hemoglobin 8 4, likely component of recent sepsis, acute renal failure  -no signs of any active bleeding,     Patient is medically stable, creatinine is gradually improving and is cleared by Nephrology for discharge to rehab today with outpatient follow-up      Condition at Discharge: good     Discharge Day Visit / Exam:     Subjective:  Patient seen examined at bedside, currently denies any abdominal pain, nausea, vomiting    Vitals: Blood Pressure: 149/57 (09/28/18 0723)  Pulse: 77 (09/28/18 0723)  Temperature: 99 1 °F (37 3 °C) (09/28/18 0723)  Temp Source: Temporal (09/28/18 0723)  Respirations: 18 (09/28/18 0723)  Height: 5' 4" (162 6 cm) (09/17/18 2000)  Weight - Scale: 74 3 kg (163 lb 12 8 oz) (09/19/18 0445)  SpO2: 96 % (09/28/18 0723)    Physical Exam:    Constitutional: Patient is oriented to person, place and time, no acute distress  HEENT:  Normocephalic, atraumatic, EOMI, PERRLA, no scleral icterus, no pallor, moist oral mucosa  Neck:  Supple, no masses, no thyromegaly, no bruits Normal range of motion  Lymph nodes:  No lymphadenopathy  Cardiovascular: Normal S1S2, RRR, No murmurs/rubs/gallops appreciated  Pulmonary: Clear to auscultation bilaterally, No rhonchi/rales/wheezing appreciated  Abdominal: Soft, Bowel sounds present, Non-tender, Non-distended, No rebound/guarding, no hepatomegaly   Musculoskeletal: No tenderness/abnormality   Extremities:  No cyanosis, clubbing or edema  Peripheral pulses palpable and equal bilaterally  Neurological: Cranial nerves II-XII grossly intact, sensation intact, otherwise no focal neurological symptoms  Skin: Skin is warm and dry, no rashes  Discharge instructions/Information to patient and family:   See after visit summary section titled Discharge Instructions for information provided to patient and family  Planned Readmission: no      Discharge Statement:  I spent 30 minutes discharging the patient  This time was spent on the day of discharge  I had direct contact with the patient on the day of discharge  Greater than 50% of the total time was spent examining patient, answering all patient questions, arranging and discussing plan of care with patient as well as directly providing post-discharge instructions  Additional time then spent on discharge activities      ** Please Note: This note has been constructed using a voice recognition system **

## 2018-09-29 LAB
ANION GAP SERPL CALCULATED.3IONS-SCNC: 5 MMOL/L (ref 5–14)
BUN SERPL-MCNC: 38 MG/DL (ref 5–25)
CALCIUM SERPL-MCNC: 7.7 MG/DL (ref 8.4–10.2)
CHLORIDE SERPL-SCNC: 106 MMOL/L (ref 97–108)
CO2 SERPL-SCNC: 28 MMOL/L (ref 22–30)
CREAT SERPL-MCNC: 2.25 MG/DL (ref 0.7–1.5)
ERYTHROCYTE [DISTWIDTH] IN BLOOD BY AUTOMATED COUNT: 15.3 %
GFR SERPL CREATININE-BSD FRML MDRD: 26 ML/MIN/1.73SQ M
GLUCOSE SERPL-MCNC: 114 MG/DL (ref 70–99)
HCT VFR BLD AUTO: 24 % (ref 41–53)
HGB BLD-MCNC: 7.9 G/DL (ref 13.5–17.5)
MCH RBC QN AUTO: 33 PG (ref 26.8–34.3)
MCHC RBC AUTO-ENTMCNC: 32.8 G/DL (ref 31.4–37.4)
MCV RBC AUTO: 101 FL (ref 80–100)
PLATELET # BLD AUTO: 123 THOUSANDS/UL (ref 150–450)
PMV BLD AUTO: 10 FL (ref 8.9–12.7)
POTASSIUM SERPL-SCNC: 3.9 MMOL/L (ref 3.6–5)
RBC # BLD AUTO: 2.38 MILLION/UL (ref 4.5–5.9)
SODIUM SERPL-SCNC: 139 MMOL/L (ref 137–147)
WBC # BLD AUTO: 5.7 THOUSAND/UL (ref 4.31–10.16)

## 2018-09-29 PROCEDURE — 97116 GAIT TRAINING THERAPY: CPT

## 2018-09-29 PROCEDURE — 97530 THERAPEUTIC ACTIVITIES: CPT

## 2018-09-29 PROCEDURE — 97163 PT EVAL HIGH COMPLEX 45 MIN: CPT

## 2018-09-29 PROCEDURE — 85027 COMPLETE CBC AUTOMATED: CPT | Performed by: HOSPITALIST

## 2018-09-29 PROCEDURE — 80048 BASIC METABOLIC PNL TOTAL CA: CPT | Performed by: HOSPITALIST

## 2018-09-29 RX ORDER — FERROUS SULFATE 325(65) MG
325 TABLET ORAL
Status: DISCONTINUED | OUTPATIENT
Start: 2018-09-30 | End: 2018-10-12 | Stop reason: HOSPADM

## 2018-09-29 RX ADMIN — AMLODIPINE BESYLATE 5 MG: 5 TABLET ORAL at 10:24

## 2018-09-29 RX ADMIN — NYSTATIN: 100000 CREAM TOPICAL at 17:31

## 2018-09-29 RX ADMIN — HEPARIN SODIUM 5000 UNITS: 5000 INJECTION, SOLUTION INTRAVENOUS; SUBCUTANEOUS at 21:20

## 2018-09-29 RX ADMIN — CIPROFLOXACIN HYDROCHLORIDE 500 MG: 250 TABLET, FILM COATED ORAL at 12:41

## 2018-09-29 RX ADMIN — NYSTATIN 1 APPLICATION: 100000 CREAM TOPICAL at 10:24

## 2018-09-29 RX ADMIN — METOPROLOL TARTRATE 50 MG: 50 TABLET ORAL at 10:24

## 2018-09-29 RX ADMIN — HEPARIN SODIUM 5000 UNITS: 5000 INJECTION, SOLUTION INTRAVENOUS; SUBCUTANEOUS at 14:52

## 2018-09-29 RX ADMIN — LISINOPRIL 40 MG: 20 TABLET ORAL at 10:23

## 2018-09-29 RX ADMIN — VANCOMYCIN HYDROCHLORIDE 500 MG: 500 INJECTION, POWDER, LYOPHILIZED, FOR SOLUTION INTRAVENOUS at 17:25

## 2018-09-29 RX ADMIN — HEPARIN SODIUM 5000 UNITS: 5000 INJECTION, SOLUTION INTRAVENOUS; SUBCUTANEOUS at 06:16

## 2018-09-29 RX ADMIN — VANCOMYCIN HYDROCHLORIDE 500 MG: 500 INJECTION, POWDER, LYOPHILIZED, FOR SOLUTION INTRAVENOUS at 12:48

## 2018-09-29 RX ADMIN — METOPROLOL TARTRATE 50 MG: 50 TABLET ORAL at 21:20

## 2018-09-29 RX ADMIN — AMLODIPINE BESYLATE 5 MG: 5 TABLET ORAL at 17:25

## 2018-09-29 RX ADMIN — VANCOMYCIN HYDROCHLORIDE 500 MG: 500 INJECTION, POWDER, LYOPHILIZED, FOR SOLUTION INTRAVENOUS at 00:16

## 2018-09-29 RX ADMIN — VANCOMYCIN HYDROCHLORIDE 500 MG: 500 INJECTION, POWDER, LYOPHILIZED, FOR SOLUTION INTRAVENOUS at 06:16

## 2018-09-29 NOTE — ASSESSMENT & PLAN NOTE
Secondary to choledocholithiasis and bile duct obstruction E coli Klebsiella bacteremia and C diff colitis  Resolved, management as above  Continue ciprofloxacin to complete a 14 days course and vancomycin p o  for 3 more days to complete a 14 days course

## 2018-09-29 NOTE — H&P
H&P- Jose Ramon Zelaya 12/9/1932, 80 y o  male MRN: [id number]    Unit/Bed#: -01 Encounter: [id number]    Primary Care Provider: Devonte Summers PA-C   Date and time admitted to hospital: 9/28/2018  5:32 PM        * Ambulatory dysfunction   Assessment & Plan    Patient had a prolonged hospitalization at [facility] ICU due to sepsis secondary to biliary obstruction, E coli + Klebsiella bacteremia and C diff colitis  To continue with PT OT and rehab specialist evaluation he was transferred to [facility] at [address] precaution  PT OT consult  Rehab specialist consult     Cholelithiasis with biliary obstruction   Assessment & Plan    Resolved, will monitor CBC vital signs  Choledocholithiasis and cholangitis status post ERCP with stent placement  Ciprofloxacin for 2 more days to complete 14 days course  Repeat blood culture negative       [facility] ([facility] )   Assessment & Plan    Secondary to choledocholithiasis and bile duct obstruction E coli Klebsiella bacteremia and C diff colitis  Resolved, management as above  Continue ciprofloxacin to complete a 14 days course and vancomycin p o  for 3 more days to complete a 14 days course       Clostridium difficile colitis   Assessment & Plan    Continue vancomycin     Thrombocytopenia ([facility] )   Assessment & Plan    Most likely secondary to severe sepsis, resolving  Most recent platelets of 110 K     Acute renal failure (ARF) (HCC)   Assessment & Plan    Unknown baseline of creatinine patient was evaluated by Nephrology  Renal failure deemed to be secondary to sepsis with  ATN  Creatinine trended down  Will repeat BMP in a m    Avoid nephrotoxin drugs  Nephrology recommended to follow up with them as outpatient     Essential hypertension   Assessment & Plan    On amlodipine and BB         VTE Prophylaxis: Heparin  / sequential compression device   Code Status:   Full code  POLST: There is no POLST form on file for this patient (pre-hospital)      Anticipated Length of Stay: Patient will be admitted on an Inpatient basis with an anticipated length of stay of  >2 midnights  Justification for Hospital Stay:   PT /OT evaluation  Total Time for Visit, including Counseling / Coordination of Care: 45 minutes  Greater than 50% of this total time spent on direct patient counseling and coordination of care  Chief Complaint:     Generalized weakness    History of Present Illness:    Armando Martínez is a 80 y o  male who originally admitted to [address] [place] [address] on 9/17/18 with sepsis/E coli Klebsiella bacteremia secondary to choledocholithiasis with cholangitis, acute renal failure with metabolic acidosis, coagulopathy  Patient was started on broad-spectrum antibiotic, IV fluids, bicarb drip, received fresh frozen plasma and platelets infusion  GI and Nephrology consulted  Patient underwent ERCP on 9/18   with noted large CBD stone and stent placement  Elective ERCP with sphincterotomy stone stent extraction in 6-8 weeks with SLGI recommended upon discharge  Patient also developed diarrhea and was diagnosed with C diff colitis for which he was started on vancomycin  His repeated blood culture have been negative  Nephrology managed his JOSE E secondary to ATN and creatinine gradually trended down  Rehab course was recommended after patient was evaluated by PT OT special   Upon my assessment patient denies any complaints but generalized weakness  Reports diarrhea is improving  Review of Systems:    Review of Systems   Constitutional: Positive for activity change         Past Medical and Surgical History:     Past Medical History:   Diagnosis Date    Hypertension     Medical history unknown     Renal disorder        Past Surgical History:   Procedure Laterality Date    ERCP N/A 9/18/2018    Procedure: ENDOSCOPIC RETROGRADE CHOLANGIOPANCREATOGRAPHY (ERCP) with stent placement;  Surgeon: Libby Silvestre MD;  Location: [facility] OR;  Service: Gastroenterology    NO PAST SURGERIES Meds/Allergies:    Prior to Admission medications    Medication Sig Start Date End Date Taking? Authorizing Provider   amLODIPine (NORVASC) 5 mg tablet Take 1 tablet (5 mg total) by mouth 2 (two) times a day 9/28/18  Yes Benedicto Love MD   ciprofloxacin (CIPRO) 500 mg tablet Take 1 tablet (500 mg total) by mouth every 12 (twelve) hours for 2 days 9/28/18 9/30/18 Yes Benedicto Love MD   lisinopril (ZESTRIL) 40 mg tablet TAKE 1 TABLET BY MOUTH EVERY DAY 9/4/18  Yes Hoda Brown PA-C   metoprolol tartrate (LOPRESSOR) 50 mg tablet Take 50 mg by mouth every 12 (twelve) hours   Yes Historical Provider, MD   nystatin (MYCOSTATIN) cream Apply topically 2 (two) times a day 9/28/18  Yes Benedicto Love MD   vancomycin (VANCOCIN) 50mg/mL SOLN Take 10 mL (500 mg total) by mouth every 6 (six) hours for 3 days 9/28/18 10/1/18 Yes Benedicto Love MD     I have reviewed home medications with a medical source (PCP, Pharmacy, other)  Allergies: No Known Allergies    Social History:     Marital Status: /Civil Union   Occupation:  none  Patient Pre-hospital Living Situation:  Home  Patient Pre-hospital Level of Mobility:   Regular  Patient Pre-hospital Diet Restrictions:   Regular  Substance Use History:   History   Alcohol Use No     Comment: denies ETOH use     History   Smoking Status    Former Smoker   Smokeless Tobacco    Former User     Comment: states quit about 50 years ago, cigars "a few per week"      History   Drug Use No     Comment: denies       Family History:    non-contributory    Physical Exam:     Vitals:   Blood Pressure: 165/71 (09/28/18 2046)  Pulse: 75 (09/28/18 2046)  Temperature: 98 °F (36 7 °C) (09/28/18 1658)  Temp Source: Temporal (09/28/18 1705)  Respirations: 20 (09/28/18 1658)  Height: 5' 4" (162 6 cm) (09/28/18 1658)  Weight - Scale: 58 6 kg (129 lb 3 oz) (09/28/18 1658)  SpO2: 97 % (09/28/18 1658)    Physical Exam   Constitutional: No distress  HENT:   Head: Normocephalic     Eyes: Pupils are equal, round, and reactive to light  Neck: Normal range of motion  Cardiovascular: Regular rhythm  Pulmonary/Chest: No respiratory distress  Abdominal: He exhibits no distension  Musculoskeletal: He exhibits no edema  Neurological: He is alert  No cranial nerve deficit  Skin:   Bruises   Psychiatric: He has a normal mood and affect  Additional Data:     Lab Results: I have personally reviewed pertinent reports  Results from last 7 days  Lab Units 09/28/18  0451   WBC Thousand/uL 8 29   HEMOGLOBIN g/dL 8 4*   HEMATOCRIT % 24 8*   PLATELETS Thousands/uL 126*   NEUTROS PCT % 74   LYMPHS PCT % 13*   MONOS PCT % 9   EOS PCT % 3       Results from last 7 days  Lab Units 09/28/18  0451  09/23/18  0438   SODIUM mmol/L 143  < > 140   POTASSIUM mmol/L 4 0  < > 3 6   CHLORIDE mmol/L 107  < > 101   CO2 mmol/L 28  < > 27   BUN mg/dL 48*  < > 98*   CREATININE mg/dL 2 96*  < > 6 21*   CALCIUM mg/dL 7 7*  < > 7 8*   ALK PHOS U/L  --   --  151*   ALT U/L  --   --  13   AST U/L  --   --  24   < > = values in this interval not displayed  Imaging: I have personally reviewed pertinent reports  No orders to display         [email or web address] Records Reviewed: Yes     ** Please Note: This note has been constructed using a voice recognition system   **

## 2018-09-29 NOTE — PHYSICAL THERAPY NOTE
Physical Therapy Evaluation:20 minutes( 8:15 to 8:35)      Patient's Name: Rosibel Reyna    Admitting Diagnosis  [facility] ) [A41 9]  Ambulatory dysfunction [R26 2]    Problem List  Patient Active Problem List   Diagnosis    Thrombocytopenia ([facility] )    [facility]    Essential hypertension    Ambulatory dysfunction    Cholelithiasis with biliary obstruction    Acute renal failure (ARF) ([facility] )    Clostridium difficile colitis       Past Medical History  Past Medical History:   Diagnosis Date    Hypertension     Medical history unknown     Renal disorder        Past Surgical History  Past Surgical History:   Procedure Laterality Date    ERCP N/A 9/18/2018    Procedure: ENDOSCOPIC RETROGRADE CHOLANGIOPANCREATOGRAPHY (ERCP) with stent placement;  Surgeon: Heaven Mullen MD;  Location: [facility];  Service: Gastroenterology    NO PAST SURGERIES          09/29/18 0815   Note Type   Note type Eval/Treat   Pain Assessment   Pain Assessment No/denies pain   Pain Score No Pain   Home Living   Type of Home (2 story home; stays on first floor)   [facility] to live on main level with bedroom/bathroom;Stairs to enter with rails  (1 MONA over doorstoop ( no rails))   Bathroom Shower/Tub Tub/shower unit  (Stands to shower)   Bathroom Toilet Standard   Bathroom Equipment Grab bars in shower  (BSC ( doesn't use it ))   [address] Other (Comment)  (Pt reports only owning a BSC)   Additional Comments Pt reported ambulating with no AD short household distances  Pt reported decreased ambulation distances; due to recently getting " dizzy ", stating " my equilibrium  is off "   Prior Function   Level of [place] Independent with ADLs and functional mobility   Lives With Spouse  (Wife ( in [address]); cat ( Smokey/Muffin))   Receives Help From Family  (Daughter ( lives across street))   ADL Assistance Independent   IADLs Needs assistance  (Wife cooks, cleans, does laundry    Pt does finances,)   Falls in the last 6 months 1 to 4   Vocational Retired  ()   Restrictions/Precautions   [facility] Bearing Precautions Per Order No   Other Precautions Contact/isolation;Cognitive; Fall Risk;Hard of hearing;Visual impairment  (+ C-Diff, wears glassess all the time, [place] ( left ear worse))   General   Family/Caregiver Present No   Cognition   Overall Cognitive Status Impaired   Arousal/Participation Alert   Attention Attends with cues to redirect   Orientation Level Oriented to person;Oriented to place;Oriented to time   Memory Decreased recall of recent events;Decreased recall of precautions;Decreased recall of biographical information;Decreased short term memory  (? DME)   Following Commands Follows one step commands with increased time or repetition   Comments (Slightly increased time to process questions and commands)   Light Touch   RLE Light Touch Grossly intact   LLE Light Touch Grossly intact   Bed Mobility   Rolling L 6  Modified independent   Additional items (HOB flat, no rail)   Supine to Sit 5  Supervision   Additional items Increased time required  (HOB flat, no rail, + log roll)   Transfers   Sit to Stand 5  Supervision   Additional items (Cues for hand placeement; when using RW)   Stand to Sit 5  Supervision   Additional items (Cues for hand placement; when using RW)   Stand pivot 5  Supervision   Additional items (SPT with and without RW with Supervision)   Ambulation/Elevation   Gait pattern Decreased foot clearance; Excessively slow; Short stride; Step to   Additional items (Amb with RW 25 feet with S; Amb without RW 25 feet with S)   Balance   Static Sitting (Good (-))   Dynamic Sitting (Fair )   Static Standing (Fair + with no AD)   Dynamic Standing (Fair with no AD)   Ambulatory (Fair with no AD)   Endurance Deficit   Endurance Deficit Yes   Endurance Deficit Description (Decreased endurance for activity)   Activity Tolerance   Activity Tolerance Patient limited by fatigue Medical Staff Made Aware (Spoke with Dr Tea Ruvalcaba; pt invoved in conversation)   Nurse Made Aware (RN aware of pt's medical status)   Assessment   Prognosis Good   Problem List Decreased strength;Decreased endurance; Impaired balance;Decreased mobility; Decreased cognition;Decreased safety awareness; Impaired vision; Impaired hearing   Assessment Vida Winslow is a 80year old male who originally admitted to [facility] ICU on 9/17/18 with sepsis/E coli Klebsiella bacteremia secondary to choledocholithiasis with cholangitis, acute renal failure with metabolic acidosis, and coagulopathy  Patient was started on broad-spectrum antibiotic, IV fluids, bicarb drip, received fresh frozen plasma and platelets infusion  GI and Nephrology consulted  Patient underwent ERCP on 9/18/18 with noted large CBD stone and stent placement  Elective ERCP with sphincterotomy stone stent extraction in 6-8 weeks with SLGI recommended upon discharge  Patient also developed diarrhea and was diagnosed with C-D diff colitis for which he was started on vancomycin  His repeated blood culture have been negative  Nephrology managed his JOSE E secondary to ATN and creatinine gradually trended down  Transferred to [facility] for rehab on 9/28/18  In summary, guiding factors including patient history, examination of body sytem(s), clinical presentation and clinical decision making were considered  Pt presents with comorbid conditions that impact function, comorbid conditions that may limit ability to progress, context of current functional limitations as compared to the prior level of function, impaired prior level of function, limited physical/social support, participation restrictions ( currently on  contact isolation precautions for C-Diff), and with living environment deficits   Pt also presents with impaired: cognition, safety, skin condition ( bilateral heels boggy, left worse than right and BUE's with bruises and scabs) , vision, hearing ( [place], left worse than right),  BLE MMT strength, functional strength, bilateral ankle ROM, endurance for activity, sit and stand balance, bed mobility, transfers, and gait abilities  Clinical presentation is with unstable and unpredictable characteristics  The assigned level of complexity is: High  Pt will benefit from skilled PT tx intervention to maximize safe mobility in prep for discharge  Pt lives with his wife in a 2 story home; but resides on the first floor ( bed and full bathroom on 1st floor)  There is 1 MONA home over doorstoop; with no rails  Barriers to Discharge Inaccessible home environment;Decreased caregiver support   Goals   Patient Goals (" Go home")   STG Expiration Date 10/12/18   LTG Expiration Date STG's = LTG's ( Expiration Date: 10/12/18 )    1  Patient will perform sit <->supine transfer ( HOB flat, no rail)  with MOD I ( in order to get in/out of bed)     2  Patient will perform all functional transfers with: MOD I ( in order to  transfer from one surface to another)     3  Patient will ambulate with no AD vs RW for > or = 75 feet with MOD I ( in order to safely access all necessary areas of home)     4  Patient will ascend/descend 1 curb step, without rails, using appropriate method/technique, AD prn, with MOD I ( to safely enter/exite home over doorstoop)     Treatment Day 1   Plan   Treatment/Interventions ADL retraining;Functional transfer training;LE strengthening/ROM; Elevations; Therapeutic exercise; Endurance training;Cognitive reorientation;Patient/family training;Equipment eval/education; Bed mobility;Gait training; Compensatory technique education;Spoke to nursing;Spoke to MD;Spoke to case management;Spoke to advanced practitioner;OT;Family   PT Frequency (6x/week)   Recommendation   Equipment Recommended (To be determined closer to discharge)   Barthel Index   Feeding 10   Bathing 0   Grooming Score 5   Dressing Score 5   Bladder Score 5   Bowels Score 5   Toilet Use Score 5   Transfers (Bed/Chair) Score 10   Mobility (Level Surface) Score 0   Stairs Score 0   Barthel Index Score 45     BLE MMT Strength   Right hip flexion: 4- to 4/5  Left hip flexion: 4-/5  Bilateral knee /: 4+/5  Bilateral ankle DF/PF: 4/5 ( within available range)        Ankle ROM:  Bilateral ankle DF AROM: grossly - 15 degrees ( pt with difficulty following testing instructions)      Vitals:   Supine: /74, HR 74, SPO2 (RA) 95%  After toileting, then ambulating 25 feet with no AD ( seated): /69, HR 80, SPO2 (RA) 95%        Danniel Opitz, PT

## 2018-09-29 NOTE — NURSING NOTE
No c/o chest pain or respiratory distress  Dr Vidya Perez notified  Of lab work  Ointment applied to excoriated  Groin and shawn area  Incontinent of urine  oob to chair  No stools, isolation for c diff continues  Po vancomycin continues

## 2018-09-29 NOTE — PHYSICAL THERAPY NOTE
Physical Therapy Daily Treatment Note       09/29/18 PT Treatment Session: 40 minutes ( 8:35 to 9:15)   Pain Assessment   Pain Assessment No/denies pain   Pain Score No Pain   Restrictions/Precautions   Weight Bearing Precautions Per Order No   General   Chart Reviewed Yes   Cognition   Overall Cognitive Status Impaired   Arousal/Participation Alert; Cooperative   Following Commands Follows one step commands with increased time or repetition   Subjective   Subjective (" I'm  good today; just really cold")   Transfers   Sit to Stand 5  Supervision   Additional items Increased time required  (Cues for hand placement with using RW)   Stand to Sit 5  Supervision   Additional items Increased time required  (Cues for hand placement with using RW)   Stand pivot 5  Supervision   Additional items (SPT with and without RW Supervision)   Additional items (Transfers: EOB, unsecured chair, toilet)   Ambulation/Elevation   Gait pattern Decreased foot clearance; Forward Flexion;Narrow CROW; Short stride; Excessively slow; Step to   Gait Assistance 5  Supervision   Additional items (Amb with and without RW 20 feet with S)   Balance   Static Sitting (Good -)   Static Standing (Fair+ with no AD)   Dynamic Standing (Fair with no AD)   Ambulatory (Fair with and without no AD)   Higher level balance (Toileting: assited pt with clothing, D for hygiene of BM)   Endurance Deficit   Endurance Deficit Yes   Endurance Deficit Description (Limited endurance for activity)   Activity Tolerance   Activity Tolerance Patient limited by fatigue;Treatment limited secondary to medical complications (Comment)  (+ C-Diff and increased need to toilet)   Nurse Made Aware (RN aware of pt's medical status)   Assessment   Prognosis Good   Assessment Pt presents with generalized deconditioning s/p recent hospital stay  Educated pt on benefits of mobility, risks of immobility, differences between PT/OT, functional mobility training with a RW ( using appropriate hand placement  with sit <->stand transfers and proper RW management with SPT's and gait), use of call bell for assistance, and POC  Pt receptive to education  Will need ongoing rehab to maximize safe mobility in prep for return to home with wife  At end of session pt remained in bedside chair with Dr Qi Chi  Barriers to Discharge Inaccessible home environment;Decreased caregiver support   Goals   Patient Goals (" Get to the bathroom ASAP")   STG Expiration Date 10/12/18   LTG Expiration Date 10/12/18   Plan   Treatment/Interventions ADL retraining;Functional transfer training;LE strengthening/ROM; Elevations; Therapeutic exercise; Endurance training;Cognitive reorientation;Patient/family training;Equipment eval/education; Bed mobility;Gait training; Compensatory technique education;Spoke to MD;Spoke to nursing;Spoke to case management;Spoke to advanced practitioner;OT;Family   Progress Progressing toward goals   PT Frequency (6x/week)   Recommendation   Equipment Recommended (To be determined closer to discharge)

## 2018-09-29 NOTE — ASSESSMENT & PLAN NOTE
Patient had a prolonged hospitalization at [facility] ICU due to sepsis secondary to biliary obstruction, E coli + Klebsiella bacteremia and C diff colitis  To continue with PT OT and rehab specialist evaluation he was transferred to [address] at [address] precaution  PT OT consult  Rehab specialist consult

## 2018-09-29 NOTE — CONSULTS
Consultation - Anuj Duarte 80 y o  male MRN: [id number]  Unit/Bed#: -01 Encounter: [id number]    Assessment/Plan     Assessment:  Physical deconditioning with abnormal gait   Sepsis due to Klebsiella following coli do a cool lithiasis  C diff enteritis  Hematologic abnormalities including anemia (macrocytic) and thrombocytopenia  Cognitive decline  Severe bilateral shoulder degenerative changes    Plan:  The patient is an appropriate candidate for comprehensive rehabilitation program to improve his functional status following his acute illness  He was hospitalized for for nearly 2 weeks with sepsis and multiple medical problems leading to severe deconditioning and resultant abnormal gait  Rehabilitation therapy should proceed with strength and endurance exercises and focus on functional activities such as bathing and dressing and toileting and functional mobility such as standing and transfers and walking and balance  He does not appear to have focal neuromuscular deficits which would require specialized bracing  Rather, a generalized program is appropriate for him now  Goals include independence in mobility and self-care  The patient reports that he was independent in these areas before admission  Regarding sepsis, he was on Cipro for the Klebsiella  He does not have leukocytosis or temperature over 100  Close follow-up will be appropriate  Regarding C diff, he continues on the oral vancomycin  Appropriate precautions and follow up for symptoms will be needed  He has the significant anemia with hemoglobin 7 9  Red blood cell indices are microcytic  Evaluation for correctable causes should proceed  In addition, he has a thrombocytopenia which is currently markedly improved from the very severe thrombocytopenia he had early in the illness  Follow-up CBC will be helpful      He a regarding cognition, his recall for recent events is limited, though this may be related to the acute illness  He also has some hearing impairment  He required repetition to follow some instructions  He will need to be followed closely the to evaluate his safety to be independent or of supervision will be needed at home  Regarding the shoulders, there is severe apparent bilaterally, left worse than right  Education with occupational therapy for self-care issues may be helpful  In addition, range of motion and modalities and topical analgesics may be helpful  History of Present Illness   HPI:  Jae Lora is a 80 y o  male who presents with several days of abdominal discomfort and loss of appetite  He was admitted to [facility] on September 17 and evaluation revealed sepsis related to cholangitis/choledocholithiasis  He was treated with antibiotics and ERCP with stent  The hospital course was also remarkable for severe thrombocytopenia (down to 9) and acute kidney injury with creatinine up to 6  He was stabilized medically and functional deficits were noted  On today's visit, he complains that he is generally weak but otherwise feels okay  He also notes that he only has limited appetite  When asked why he was hospitalized, he states that it is because he fell  He denies chest pain or shortness of breath  He denies lightheadedness or dizziness  He denies abdominal pain  He states he has some diarrhea  He denies any focal difficulties with limbs or joints  He notes that before the hospitalization, he was independent with mobility and self-care and that he needed no upper limb assistive device for walking  His current deficits involve the entire body and are of approximately 2 weeks duration    There are severe in severity and are more evident when he attempts to perform routine daily activities, but less evident at rest     Inpatient consult to Physical Medicine Rehab  Consult performed by: David Pritchard  Consult ordered by: Cristo De La Garza          Review of Systems Constitutional: Positive for activity change  Negative for chills and diaphoresis  HENT: Positive for hearing loss  Negative for trouble swallowing  Eyes: Positive for visual disturbance  Respiratory: Negative for choking and chest tightness  Cardiovascular: Negative for chest pain and leg swelling  Gastrointestinal: Positive for diarrhea  Negative for abdominal pain and constipation  Endocrine: Negative for polyphagia and polyuria  Genitourinary: Negative for difficulty urinating and dysuria  Musculoskeletal: Positive for arthralgias and back pain  Skin: Negative for color change  Neurological: Positive for weakness  Negative for dizziness  Hematological: Does not bruise/bleed easily  Psychiatric/Behavioral: Negative for confusion  Historical Information   Past Medical History:   Diagnosis Date    Hypertension     Medical history unknown     Renal disorder      Past Surgical History:   Procedure Laterality Date    ERCP N/A 9/18/2018    Procedure: ENDOSCOPIC RETROGRADE CHOLANGIOPANCREATOGRAPHY (ERCP) with stent placement;  Surgeon: Shelley Valladares MD;  Location: [facility];  Service: Gastroenterology    NO PAST SURGERIES       Social History   History   Alcohol Use No     Comment: denies ETOH use     History   Drug Use No     Comment: denies     History   Smoking Status    Former Smoker   Smokeless Tobacco    Former User     Comment: states quit about 50 years ago, cigars "a few per week"        Home Setup:   He lives with his wife in a two-story home  Functional Status Prior to Admission:   He was independent with mobility and self-care  Functional Status on Admission:   He needs assistance and supervision  History reviewed  No pertinent family history  Meds/Allergies   all current active meds have been reviewed  No Known Allergies    Objective   Vitals: Blood pressure (!) 171/75, pulse 70, temperature 99 °F (37 2 °C), temperature source Temporal, resp   rate 19, height 5' 4" (1 626 m), weight 58 6 kg (129 lb 3 oz), SpO2 97 %  Invasive Devices          No matching active lines, drains, or airways          Physical Exam   Constitutional: He appears well-developed and well-nourished  HENT:   Head: Normocephalic and atraumatic  Eyes: Conjunctivae and EOM are normal    Neck: Normal range of motion  Neck supple  Cardiovascular: Normal rate, regular rhythm and intact distal pulses  Pulmonary/Chest: Effort normal  No respiratory distress  Breath sounds are distant bilaterally  Abdominal: Soft  Bowel sounds are normal  There is no tenderness  There is no rebound  Musculoskeletal:   Range of motion is moderately impaired at the right shoulder and severely impaired at the left  Distally, range of motion is generally functional except for tendency towards intrinsic minus deformity in both hands  He both lower limbs have functional range  Pulses are present at both wrists and both feet  Both calves are nontender  Neurological:   On cognitive evaluation, he is oriented to place and time but not situation  He conversation is appropriate in context  There is some decline in executive function  Cranial nerve examination reveals extraocular movements are intact  There is no visual field cut or neglect  There is no severe facial weakness  Motor strength is generally 4/5 without focal deficit except both shoulders which have weakness consistent with considerable degenerative changes  Sensation is present throughout including light touch and joint position sense in the feet  On cerebellar testing, finger to chin testing is slow but accurate  Movement patterns reflect shoulder dysfunction bilaterally  Muscle stretch reflexes are unremarkable  Functionally, he can stand with supervision  He could walk with contact guard  Gait is abnormal with decreased speed and decreased step length and decreased jennyfer  There is some inconsistency and foot placement    There is no gross loss of balance or stumbling  Skin: Skin is warm and dry  Trophic changes are noted in the skin bilaterally  Psychiatric: He has a normal mood and affect  Nursing note and vitals reviewed  Lab Results: I have personally reviewed pertinent lab results  Imaging: I have personally reviewed pertinent films in PACS  EKG, Pathology, and Other Studies: I have personally reviewed pertinent reports  Code Status: Level 1 - Full Code  Advance Directive and Living Will:      Power of :    POLST:      Counseling / Coordination of Care  Total floor / unit time spent today 50 minutes  Greater than 50% of total time was spent with the patient and / or family counseling and / or coordination of care  A description of the counseling / coordination of care:   I explained the rehabilitation therapies will include strength and endurance exercises as well as practical activities such as walking and bathing and dressing to restore his strength and stamina and balance to return home  Gerhardt Sep

## 2018-09-29 NOTE — PROGRESS NOTES
Medication Regimen Review (MRR)    To promote positive health outcomes and reduce adverse consequences the patient's medication therapy has been reviewed by a pharmacist for the following potential problems:   1   documented indication and therapeutic benefits  2   appropriate dose, frequency, route, and duration of therapy  3   medication interactions, side effects, and allergies  4   medication or transcription errors  Medications are also reviewed for appropriate monitoring, duplicate therapy, and dose reduction  Based on the review please see the following recommendations  Patient information:    The patient is 80 y o  admitted for ambulatory disfunction  with a CrCl 19 9ml/min  Wt Readings from Last 1 Encounters:   09/28/18 58 6 kg (129 lb 3 oz)       Lab Results   Component Value Date    GLUCOSE 75 09/17/2018    CALCIUM 7 7 (L) 09/29/2018     09/29/2018    K 3 9 09/29/2018    CO2 28 09/29/2018     09/29/2018    BUN 38 (H) 09/29/2018    CREATININE 2 25 (H) 09/29/2018         Recommendations:    1  Recommend for nursing to check if PPD test needed  2  There are no medication recommendations from the pharmacy department at this time

## 2018-09-29 NOTE — ASSESSMENT & PLAN NOTE
Unknown baseline of creatinine patient was evaluated by Nephrology  Renal failure deemed to be secondary to sepsis with  ATN  Creatinine trended down  Will repeat BMP in a m    Avoid nephrotoxin drugs  Nephrology recommended to follow up with them as outpatient

## 2018-09-29 NOTE — PLAN OF CARE
Problem: PHYSICAL THERAPY ADULT  Goal: Performs mobility at highest level of function for planned discharge setting  See evaluation for individualized goals  Treatment/Interventions: ADL retraining, Functional transfer training, LE strengthening/ROM, Elevations, Therapeutic exercise, Endurance training, Cognitive reorientation, Patient/family training, Equipment eval/education, Bed mobility, Gait training, Compensatory technique education, Spoke to nursing, Spoke to MD, Spoke to case management, Spoke to advanced practitioner, OT, Family  Equipment Recommended:  (To be determined closer to discharge)       See flowsheet documentation for full assessment, interventions and recommendations  Outcome: Progressing  Prognosis: Good  Problem List: Decreased strength, Decreased endurance, Impaired balance, Decreased mobility, Decreased cognition, Decreased safety awareness, Impaired vision, Impaired hearing  Assessment: Pt presents with generalized deconditioning s/p recent hospital stay  Educated pt on benefits of mobility, risks of immobility, differences between PT/OT, functional mobility training with a RW ( using appropriate hand placement  with sit <->stand transfers and proper RW management with SPT's and gait), use of call bell for assistance, and POC  Pt receptive to education  Will need ongoing rehab to maximize safe mobility in prep for return to home with wife  At end of session pt remained in bedside chair with Dr Austin Hart  Barriers to Discharge: Inaccessible home environment, Decreased caregiver support                See flowsheet documentation for full assessment

## 2018-09-29 NOTE — PLAN OF CARE
Problem: PHYSICAL THERAPY ADULT  Goal: Performs mobility at highest level of function for planned discharge setting  See evaluation for individualized goals  Treatment/Interventions: ADL retraining, Functional transfer training, LE strengthening/ROM, Elevations, Therapeutic exercise, Endurance training, Cognitive reorientation, Patient/family training, Equipment eval/education, Bed mobility, Gait training, Compensatory technique education, Spoke to nursing, Spoke to MD, Spoke to case management, Spoke to advanced practitioner, OT, Family  Equipment Recommended:  (To be determined closer to discharge)       See flowsheet documentation for full assessment, interventions and recommendations  Prognosis: Good  Problem List: Decreased strength, Decreased endurance, Impaired balance, Decreased mobility, Decreased cognition, Decreased safety awareness, Impaired vision, Impaired hearing  Assessment: Luis Enrique Hemphill is a 80year old male who originally admitted to [address] [place] [address] ICU on 9/17/18 with sepsis/E coli Klebsiella bacteremia secondary to choledocholithiasis with cholangitis, acute renal failure with metabolic acidosis, and coagulopathy  Patient was started on broad-spectrum antibiotic, IV fluids, bicarb drip, received fresh frozen plasma and platelets infusion  GI and Nephrology consulted  Patient underwent ERCP on 9/18/18 with noted large CBD stone and stent placement  Elective ERCP with sphincterotomy stone stent extraction in 6-8 weeks with SLGI recommended upon discharge  Patient also developed diarrhea and was diagnosed with C-D diff colitis for which he was started on vancomycin  His repeated blood culture have been negative  Nephrology managed his JOSE E secondary to ATN and creatinine gradually trended down  Transferred [facility]hab on 9/28/18  In summary, guiding factors including patient history, examination of body sytem(s), clinical presentation and clinical decision making were considered   Pt presents with comorbid conditions that impact function, comorbid conditions that may limit ability to progress, context of current functional limitations as compared to the prior level of function, impaired prior level of function, limited physical/social support, participation restrictions ( currently on  contact isolation precautions for C-Diff), and with living environment deficits  Pt also presents with impaired: cognition, safety, skin condition ( bilateral heels boggy, left worse than right and BUE's with bruises and scabs) , vision, hearing ( [place], left worse than right),  BLE MMT strength, functional strength, bilateral ankle ROM, endurance for activity, sit and stand balance, bed mobility, transfers, and gait abilities  Clinical presentation is with unstable and unpredictable characteristics  The assigned level of complexity is: High  Pt will benefit from skilled PT tx intervention to maximize safe mobility in prep for discharge  Pt lives with his wife in a 2 story home; but resides on the first floor ( bed and full bathroom on 1st floor)  There is 1 MONA home over doorstoop; with no rails  Barriers to Discharge: Inaccessible home environment, Decreased caregiver support                See flowsheet documentation for full assessment

## 2018-09-29 NOTE — ASSESSMENT & PLAN NOTE
Resolved, will monitor CBC vital signs  Choledocholithiasis and cholangitis status post ERCP with stent placement  Ciprofloxacin for 2 more days to complete 14 days course  Repeat blood culture negative

## 2018-09-30 LAB
ABO GROUP BLD: NORMAL
BASOPHILS # BLD AUTO: 0.1 THOUSANDS/ΜL (ref 0–0.1)
BASOPHILS NFR BLD AUTO: 1 % (ref 0–1)
BLD GP AB SCN SERPL QL: NEGATIVE
EOSINOPHIL # BLD AUTO: 0.2 THOUSAND/ΜL (ref 0–0.4)
EOSINOPHIL NFR BLD AUTO: 4 % (ref 0–6)
ERYTHROCYTE [DISTWIDTH] IN BLOOD BY AUTOMATED COUNT: 15.2 %
FERRITIN SERPL-MCNC: 470 NG/ML (ref 8–388)
FERRITIN SERPL-MCNC: 480 NG/ML (ref 8–388)
HCT VFR BLD AUTO: 21.6 % (ref 41–53)
HGB BLD-MCNC: 7.1 G/DL (ref 13.5–17.5)
HYPERCHROMIA BLD QL SMEAR: PRESENT
IRON SATN MFR SERPL: 51 %
IRON SERPL-MCNC: 78 UG/DL (ref 65–175)
LYMPHOCYTES # BLD AUTO: 1 THOUSANDS/ΜL (ref 0.5–4)
LYMPHOCYTES NFR BLD AUTO: 18 % (ref 20–50)
MACROCYTES BLD QL AUTO: PRESENT
MCH RBC QN AUTO: 33 PG (ref 26.8–34.3)
MCHC RBC AUTO-ENTMCNC: 32.9 G/DL (ref 31.4–37.4)
MCV RBC AUTO: 100 FL (ref 80–100)
MONOCYTES # BLD AUTO: 0.6 THOUSAND/ΜL (ref 0.2–0.9)
MONOCYTES NFR BLD AUTO: 12 % (ref 1–10)
NEUTROPHILS # BLD AUTO: 3.6 THOUSANDS/ΜL (ref 1.8–7.8)
NEUTS SEG NFR BLD AUTO: 66 % (ref 45–65)
PLATELET # BLD AUTO: 130 THOUSANDS/UL (ref 150–450)
PLATELET BLD QL SMEAR: ABNORMAL
PMV BLD AUTO: 10.1 FL (ref 8.9–12.7)
RBC # BLD AUTO: 2.16 MILLION/UL (ref 4.5–5.9)
RBC MORPH BLD: ABNORMAL
RH BLD: POSITIVE
SPECIMEN EXPIRATION DATE: NORMAL
TIBC SERPL-MCNC: 152 UG/DL (ref 250–450)
WBC # BLD AUTO: 5.4 THOUSAND/UL (ref 4.31–10.16)

## 2018-09-30 PROCEDURE — 86920 COMPATIBILITY TEST SPIN: CPT

## 2018-09-30 PROCEDURE — 86900 BLOOD TYPING SEROLOGIC ABO: CPT | Performed by: FAMILY MEDICINE

## 2018-09-30 PROCEDURE — 86901 BLOOD TYPING SEROLOGIC RH(D): CPT | Performed by: FAMILY MEDICINE

## 2018-09-30 PROCEDURE — 97167 OT EVAL HIGH COMPLEX 60 MIN: CPT

## 2018-09-30 PROCEDURE — G8988 SELF CARE GOAL STATUS: HCPCS

## 2018-09-30 PROCEDURE — 82728 ASSAY OF FERRITIN: CPT | Performed by: FAMILY MEDICINE

## 2018-09-30 PROCEDURE — 83550 IRON BINDING TEST: CPT | Performed by: FAMILY MEDICINE

## 2018-09-30 PROCEDURE — G8987 SELF CARE CURRENT STATUS: HCPCS

## 2018-09-30 PROCEDURE — P9021 RED BLOOD CELLS UNIT: HCPCS

## 2018-09-30 PROCEDURE — 92610 EVALUATE SWALLOWING FUNCTION: CPT

## 2018-09-30 PROCEDURE — 85025 COMPLETE CBC W/AUTO DIFF WBC: CPT | Performed by: FAMILY MEDICINE

## 2018-09-30 PROCEDURE — 86850 RBC ANTIBODY SCREEN: CPT | Performed by: FAMILY MEDICINE

## 2018-09-30 PROCEDURE — 83540 ASSAY OF IRON: CPT | Performed by: FAMILY MEDICINE

## 2018-09-30 PROCEDURE — 30233N1 TRANSFUSION OF NONAUTOLOGOUS RED BLOOD CELLS INTO PERIPHERAL VEIN, PERCUTANEOUS APPROACH: ICD-10-PCS | Performed by: FAMILY MEDICINE

## 2018-09-30 PROCEDURE — 97535 SELF CARE MNGMENT TRAINING: CPT

## 2018-09-30 RX ADMIN — HEPARIN SODIUM 5000 UNITS: 5000 INJECTION, SOLUTION INTRAVENOUS; SUBCUTANEOUS at 05:39

## 2018-09-30 RX ADMIN — LISINOPRIL 40 MG: 20 TABLET ORAL at 10:11

## 2018-09-30 RX ADMIN — AMLODIPINE BESYLATE 5 MG: 5 TABLET ORAL at 17:44

## 2018-09-30 RX ADMIN — VANCOMYCIN HYDROCHLORIDE 500 MG: 500 INJECTION, POWDER, LYOPHILIZED, FOR SOLUTION INTRAVENOUS at 05:40

## 2018-09-30 RX ADMIN — VANCOMYCIN HYDROCHLORIDE 500 MG: 500 INJECTION, POWDER, LYOPHILIZED, FOR SOLUTION INTRAVENOUS at 01:25

## 2018-09-30 RX ADMIN — METOPROLOL TARTRATE 50 MG: 50 TABLET ORAL at 10:10

## 2018-09-30 RX ADMIN — AMLODIPINE BESYLATE 5 MG: 5 TABLET ORAL at 10:11

## 2018-09-30 RX ADMIN — TUBERCULIN PURIFIED PROTEIN DERIVATIVE 5 UNITS: 5 INJECTION INTRADERMAL at 16:49

## 2018-09-30 RX ADMIN — WATER 10 ML: 1 INJECTION INTRAMUSCULAR; INTRAVENOUS; SUBCUTANEOUS at 01:24

## 2018-09-30 RX ADMIN — METOPROLOL TARTRATE 50 MG: 50 TABLET ORAL at 21:52

## 2018-09-30 RX ADMIN — VANCOMYCIN HYDROCHLORIDE 500 MG: 500 INJECTION, POWDER, LYOPHILIZED, FOR SOLUTION INTRAVENOUS at 12:10

## 2018-09-30 RX ADMIN — FERROUS SULFATE TAB 325 MG (65 MG ELEMENTAL FE) 325 MG: 325 (65 FE) TAB at 08:02

## 2018-09-30 RX ADMIN — NYSTATIN: 100000 CREAM TOPICAL at 17:45

## 2018-09-30 RX ADMIN — NYSTATIN: 100000 CREAM TOPICAL at 10:12

## 2018-09-30 RX ADMIN — VANCOMYCIN HYDROCHLORIDE 500 MG: 500 INJECTION, POWDER, LYOPHILIZED, FOR SOLUTION INTRAVENOUS at 17:45

## 2018-09-30 NOTE — SPEECH THERAPY NOTE
Speech-Language Pathology Bedside Swallow Evaluation      Patient Name: Armando Martínez    WSVYH'E Date: 9/30/2018     Problem List  Patient Active Problem List   Diagnosis    [facility] [facility]    Sepsis [facility]    Essential hypertension    Ambulatory dysfunction    Cholelithiasis with biliary obstruction    Acute renal failure (ARF) [facility]    Clostridium difficile colitis       Past Medical History  Past Medical History:   Diagnosis Date    Hypertension     Medical history unknown     Renal disorder        Past Surgical History  Past Surgical History:   Procedure Laterality Date    ERCP N/A 9/18/2018    Procedure: ENDOSCOPIC RETROGRADE CHOLANGIOPANCREATOGRAPHY (ERCP) with stent placement;  Surgeon: Libby Silvestre MD;  Location: [facility];  Service: Gastroenterology    NO PAST SURGERIES         Summary   Pt presented with mild oral dysphagia characterized by min prolonged mastication time with regular solids  Pharyngeal stage of swallowing appears adequate  Risk for Aspiration: Mild      Recommendations: soft/level 3 diet and thin liquids     Recommended Form of Meds: crushed with puree     Aspiration precautions and compensatory swallowing strategies: upright posture and only feed when fully alert      Current Medical Status  Pt is a 80 y o  male who presented to [address] [id number] on 9/17 with gi symptoms including nausea with dry heaving  CT revealed dilated gallbladder with gallstones and dilated hepatic and common bile duct  Patient seen by  at [facility] and was recommended to have a level 3 diet with thin liquids  Patient transferred to [facility] on 9/28 and was placed on a level 3 diet with nectar thick liquids  Per nursing screen, patient had wet vocal quality  RN also reports that patient had significant difficulty swallowing pills whole in applesauce  Medications have since been crushed  Past medical history:  HTN and renal disorder    Please see H&P for details      Special Studies:  Chest x-ray 9/17: Mild right basilar airspace disease in keeping with atelectasis or pneumonia  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  Social/Education/Vocational Hx:  Pt lives with family      Swallow Information   Current Risks for Dysphagia & Aspiration: patient appears to have some confusion     Current Symptoms/Concerns: prolonged mastication     Current Diet: soft/level 3 diet and nectar thick liquids      Baseline Diet: soft/level 3 diet and thin liquids      Baseline Assessment   Behavior/Cognition: alert    Speech/Language Status: able to participate in conversation and able to follow commands    Patient Positioning: upright in bed    Pain Status/Interventions/Response to Interventions:  No report of or nonverbal indications of pain  Swallow Mechanism Exam   Oral-motor structures and function are WNL for symmetry, strength, ROM & coordination  Facial: symmetrical  Labial: WFL  Lingual: WFL  Velum: symmetrical  Mandible: adequate ROM  Dentition: adequate and patient has lower dentures but refuses to wear them   Vocal quality:clear/adequate   Volitional Cough: strong/productive   Tracheostomy: n/a      Consistencies Assessed and Performance   Consistencies Administered: thin liquids, puree and hard solids  Specific materials administered included applesauce and a tobias cracker with thin liquids     Oral Stage: mild  Retrieval of items via tsp and straw are adequate  Patient takes small bites from [employer] cracker, but bite strength is [facility]  Mastication time is minimally prolonged, with some oral residue  Residue is cleared with liquid wash  Tolerates puree and thin liquids well with good bolus control and transfer time  Pharyngeal Stage: WFL  Swallow Mechanics:  Swallowing initiation appeared prompt  Laryngeal rise was palpated and judged to be within functional limits    No coughing, throat clearing, change in vocal quality or respiratory status noted today  Esophageal Concerns: none reported    Summary and Recommendations (see above)      Results Reviewed with: patient, RN and MD     Treatment Recommended: Dysphagia therapy      Frequency of treatment: 3x week    Patient Stated Goal: "I didn't like that toilet water (nectar thick water)"     Dysphagia Goals per SLP: pt will tolerate level 3 solids (soft)  with thin liquids  without s/s of aspiration x3    Pt/Family Education: initiated  Pt and caregivers would benefit from/require continued education      Speech Therapy Prognosis   Prognosis: good    Prognosis Considerations: age, medical status and cognitive status

## 2018-09-30 NOTE — OCCUPATIONAL THERAPY NOTE
Occupational Therapy Evaluation      Pepper Lockett    9/30/2018    Patient Active Problem List   Diagnosis    [facility]    Sepsis [facility]    Essential hypertension    Ambulatory dysfunction    Cholelithiasis with biliary obstruction    [facility] failure [facility]F) [facility]    Clostridium difficile colitis       Past Medical History:   Diagnosis Date    Hypertension     Medical history unknown     Renal disorder        Past Surgical History:   Procedure Laterality Date    ERCP N/A 9/18/2018    Procedure: ENDOSCOPIC RETROGRADE CHOLANGIOPANCREATOGRAPHY (ERCP) with stent placement;  Surgeon: Juan Falcon MD;  Location: [facility];  Service: Gastroenterology    NO PAST SURGERIES        09/30/18 0830   Note Type   Note type Eval/Treat   Restrictions/Precautions   Weight Bearing Precautions Per Order No   Other Precautions Contact/isolation;Cognitive  (C-Diff)   Pain Assessment   Pain Assessment No/denies pain   Home Living   Type of [address] [place] [address] Two level; Able to live on main level with bedroom/bathroom  (1STE  LIVES ON FIRST FLOOR)   Bathroom Shower/Tub Tub/shower unit   Bathroom Toilet Standard   Bathroom Equipment Grab bars in shower;Commode  (does not use BSC)   [address] (standard walker (wife's))   Prior Function   Level of [place] Independent with ADLs and functional mobility   Lives With Spouse   Receives Help From Family;Friend(s)   ADL Assistance Independent   IADLs Needs assistance   Falls in the last 6 months 1 to 4  (1)   Vocational Retired  ()   Lifestyle   Autonomy PTA, pt reports (I) with all ADLs, mobility, transfers  (+)   Pt reports handles finances   Reciprocal Relationships Pt reports wife handles cooking, cleaning, laundry, takes trash out  Pt reports daughter lives across the street   Daughter works full time, "too many hours" and "too involved" in pt's life (pt jokes when stating daughter too involved) Intrinsic Gratification watching tv   Psychosocial   Psychosocial (WDL) WDL   Subjective   Subjective "I feel like my eye is the problem  I just can't see out of it "    ADL   Where Assessed Chair   Eating Assistance 5  Supervision/Setup   Eating Deficit Setup   Grooming Assistance 5  Supervision/Setup   Grooming Deficit Setup   UB Bathing Assistance 5  Supervision/Setup   UB Bathing Deficit Setup;Verbal cueing   LB Bathing Assistance 4  Minimal Assistance   LB Bathing Deficit Increased time to complete;Verbal cueing   UB Dressing Assistance 5  Supervision/Setup   UB Dressing Deficit Setup   LB Dressing Assistance 4  Minimal Assistance   LB Dressing Deficit Increased time to complete   [address]  3  Moderate Assistance   Toileting Deficit Perineal hygiene; Increased time to complete;Verbal cueing  (cues for thoroughness)   Bed Mobility   Supine to Sit 5  Supervision   Additional items Increased time required   Sit to Supine (not assessed)   Additional Comments Pt remained OOB in chair at the end of session  Transfers   Sit to Stand 5  Supervision   Stand to Sit 5  Supervision   Stand pivot 5  Supervision   Toilet transfer 5  Supervision   Additional Comments cues for hand placement and safety  Functional Mobility   Functional Mobility 5  Supervision   Additional Comments pt notes feeling more steady with RW on this date  Additional items Rolling walker   Balance   Static Sitting Good   Dynamic Sitting Good  (observed during ADL)   Static Standing Fair +   Dynamic Standing Fair  (observed during standing ADL)   Activity Tolerance   Activity Tolerance Patient tolerated treatment well   Nurse Made Aware RN cleared pt for OT evaluation/tx session  Pt presents supine in bed, agreeable to session  At the end of session, pt left seated in chair with needs/call bell in reach      RUE Assessment   RUE Assessment (PROM shldr [facility] ~125deg flex, AROM to approx 50 deg flex  )   RUE Strength   RUE Overall Strength (shl 3-/5, distal strength 4/5)   LUE Assessment   LUE Assessment (PROM shldr ~45deg, distal ROM WFL)   LUE Strength   LUE Overall Strength (2-/5 shldr, distal strength WFL)   Hand Function   Gross Motor Coordination Functional   Fine Motor Coordination Functional   Sensation   Light Touch No apparent deficits   Vision-Basic Assessment   Current Vision Wears glasses all the time   Vision - Complex Assessment   Acuity Able to read clock/calendar on wall without difficulty  (**ABLE TO READ WITH RIGHT EYE ONLY)   Additional Comments Pt notes "I think my left eye is the problem  I can't see out of it very well  When OT asked pt to occlude vision of right eye, pt unable to adequately do so and still observed compensating by using right eye for vision testing  OT assisted with covered right eye  Difficult to assess visual fields 2' pt unable to keep left eye stationary during testing  At times, pt reports ability to see in lower quadrant with left eye- however this was inconsistant  Otherwise, pt unable to see anything out of left eye with testing  When asked how long this has been going on, pt inconsistant and states, "I noticed it the last couple of weeks"  Later in session pt states, "I guess its been awhile now" re: impaired vision  Cognition   Overall Cognitive Status Impaired   Arousal/Participation Alert; Cooperative   Attention Attends with cues to redirect   Orientation Level Oriented X4   Memory Decreased recall of precautions;Decreased recall of recent events   Following Commands Follows one step commands with increased time or repetition   Assessment   Limitation Decreased ADL status; Decreased UE ROM; Decreased UE strength;Decreased Safe judgement during ADL;Decreased cognition;Decreased endurance;Decreased high-level ADLs   Prognosis Fair   Assessment Pt is an 84yo/M adm to [facility] [address] unit for rehab after hospital admission to [facility] ICU on 9/17/18 with dx of sepsis/E coli Klebsiella bacteremia secondary to choledocholithiasis with cholangitis, acute renal failure with metabolic acidosis, and coagulopathy  Patient underwent ERCP on 9/18/18 with noted large CBD stone and stent placement  Elective ERCP with sphincterotomy stone stent extraction in 6-8 weeks with SLGI recommended upon discharge  Patient also developed diarrhea and was diagnosed with C-D diff colitis for which he was started on vancomycin  His repeated blood culture have been negative  Nephrology managed his JOSE E secondary to ATN and creatinine gradually trended down  Transferred to [facility] for rehab on 9/28/18  Prior to initiating evaluation, chart reviewed as well as social history and this was taken into account to guide session  PTA, pt living with spouse in a home with a first floor setup  Pt with daughter whom lives across the street and is actively involved in life, however works full time  Pt reports self to be self maintainer, (I) with mobility, and states only responsibility is handling finances as spouse and daughter manage all other IADLs  Pt lives fairly sedentary lifestyle and watches TV t/o the day  1 fall reported in the last 6 months  At this time, pt presents to OT at (S) level for functional mobility as well as ADL transfers, min (A) for LB ADL, mod (A) for toileting due to dec'd thoroughness with hygiene, and (S) for UB ADL with setup  At this time, pt presents below baseline level of independence and would benefit from continued OT services to address deficits which include: weakness, decreased ROM, decreased strength, decreased balance, decreased tolerance, impaired attention, impaired memory, impaired problem solving, decreased safety awareness and visual impairment (appears baseline)  Occupational Performance areas to address include: grooming, bathing/shower, toilet hygiene, dressing, functional mobility, clothing management, money management and social participation  High Complexity Evaluation      Goals   Patient Goals To return home   STG Time Frame (7-10 days)   Short Term Goal #1 see below   LTG Time Frame 10-14   Long Term Goal #1 see below   Plan   Treatment Interventions ADL retraining;Functional transfer training;UE strengthening/ROM; Endurance training;Cognitive reorientation;Patient/family training;Equipment evaluation/education; Compensatory technique education;Continued evaluation   Goal Expiration Date 10/14/18   OT Frequency (6x/wk, x2 weeks)   Additional Treatment Session   Start Time 0900   End Time 0930   Treatment Assessment Pt seen at bedside for OT evaluation and tx session  Tx session included education re: safety wiht ADl transfers  Pt with urgent need to use restroom  OT instructed on SAFETY first and instructed pt NOT to rush to restroom since wearing a brief  OT explained that a mess is able to be cleaned up and falling comes with greater risks  Pt verbalized undrestanding  Bed mobility with (S)  sit<>stand with (S)  Functional mobility bed<>restroom with (S) using RW  toilet transfer with (S)  Max (A) for doffing/donning brief  Pt was able to stand and hold hospital gown out of the way while OT managing brief- no LOB with this  Incontinent of large amount of BM  Pt attempted toileting hygiene while seated however with difficulty being thorough due to watery nature of stool  Pt stood unsupported while OT assisted by using wet wipes to ensure throrough cleaning  Pt washed hands with (S) and vc's for thoroughness  Pt stood unsupported x3mins to wash hands  Pt returned to chair  Pt demonstrated donning socks with significant inc'd time  Pt feels as though chair at home lower and therefore easier at home to manage  Pt requesting to return to  bed and therefore OT educated on importance of OOB, risks of immobility  OT also instructed on light exercises to do while in chair   Pt verbalized understanding to the latter however reinforcement and furhter instruction likely needed due to mild cognitive deficits noted in evaluation  Continued OT services recommended during hospital course to address deficits listed in above evaluation and to maximize independence for safe d/c to home      Barthel Index   Feeding 10   Bathing 0   Grooming Score 5   Dressing Score 5   Bladder Score 5   Bowels Score 5   Toilet Use Score 5   Transfers (Bed/Chair) Score 10   Mobility (Level Surface) Score 0   Stairs Score 0   Barthel Index Score 45     GOALS:    STG  Pt will achieve the following goals within 1 weeks    *G&H standing sinkside with (I)} and with Good balance for inc'd independence with self cares    *ADL transfers with distant (S)/mod (I) for inc'd independence with ADLs/purposeful tasks      *UB ADL at  mod (I)/(I) level, including item retrieval, for inc'd independence with self cares    *LB ADL with (S), including item retrieval, using AE prn for inc'd independence with self cares    *Toileting with (S) for clothing management and hygiene for return to PLOF with personal care    *Increase static stand balance to Fair+/Good- and dyn stand balance to Fair+ for inc'd safety with standing purposeful tasks    *Increase stand tolerance x4-5m m for inc'd tolerance with standing purposeful tasks    *Activity tolerance- Fair/Fair+ for inc'd tolerance with purposeful tasks    *Participate in further cognitive testing to assist with safe d/c planning    *Tub/shower transfer using most appropriate technique (step in vs  Tub seat/transfer bench vs  Step in) with (S) for inc'd safety and independence with bathing    LTG  Pt will achieve the following goals by d/c:    *ADL transfers with distant (S)/mod (I) for inc'd independence with ADLs/purposeful tasks    *LB ADL with mod (I)/(I), including item retrieval, using AE prn for inc'd independence with self cares    *Toileting with (I) for clothing management and hygiene for return to PLOF with personal care    *Increase static stand balance to Good- and dyn stand balance to Fair+/Good- for inc'd safety with standing purposeful tasks    *Increase stand tolerance x7-10m for inc'd tolerance with standing purposeful tasks (ie: showering)    *Activity tolerance- Fair+ for inc'd tolerance with purposeful tasks    *Pt will demonstrate at least 75%-100% accuracy with simulated finance management activities in order to facilitate return to [facility] with management of personal finances    *Tub/shower transfer using most appropriate technique (step in vs  Tub seat/transfer bench vs  Step in) with mod (I) for inc'd safety and independence with bathing    *Pt will demonstrate independence with ADL item retrieval from various heights and distances, with use of AD prn (reacher), for inc'd safety in home    Evgenysusan Silva, OT

## 2018-09-30 NOTE — PLAN OF CARE
Problem: OCCUPATIONAL THERAPY ADULT  Goal: Performs self-care activities at highest level of function for planned discharge setting  See evaluation for individualized goals  Treatment Interventions: ADL retraining, Functional transfer training, UE strengthening/ROM, Endurance training, Cognitive reorientation, Patient/family training, Equipment evaluation/education, Compensatory technique education, Continued evaluation          See flowsheet documentation for full assessment, interventions and recommendations  Outcome: Progressing  Limitation: Decreased ADL status, Decreased UE ROM, Decreased UE strength, Decreased Safe judgement during ADL, Decreased cognition, Decreased endurance, Decreased high-level ADLs  Prognosis: Fair  Assessment: Pt is an 84yo/M adm to [facility] CARE [address] T[facility] unit for rehab after hospital admission to [address] [place] [address] ICU on 9/17/18 with dx of sepsis/E coli Klebsiella bacteremia secondary to choledocholithiasis with cholangitis, acute renal failure with metabolic acidosis, and coagulopathy  Patient underwent ERCP on 9/18/18 with noted large CBD stone and stent placement  Elective ERCP with sphincterotomy stone stent extraction in 6-8 weeks with SLGI recommended upon discharge  Patient also developed diarrhea and was diagnosed with C-D diff colitis for which he was started on vancomycin  His repeated blood culture have been negative  Nephrology managed his JOSE E secondary to ATN and creatinine gradually trended down  Transferred to [facility] for rehab on 9/28/18  Prior to initiating evaluation, chart reviewed as well as social history and this was taken into account to guide session  PTA, pt living with spouse in a home with a first floor setup  Pt with daughter whom lives across the street and is actively involved in life, however works full time  Pt reports self to be self maintainer, (I) with mobility, and states only responsibility is handling finances as spouse and daughter manage all other IADLs   Pt lives fairly sedentary lifestyle and watches TV t/o the day  1 fall reported in the last 6 months  At this time, pt presents to OT at (S) level for functional mobility as well as ADL transfers, min (A) for LB ADL, mod (A) for toileting due to dec'd thoroughness with hygiene, and (S) for UB ADL with setup  At this time, pt presents below baseline level of independence and would benefit from continued OT services to address deficits which include: weakness, decreased ROM, decreased strength, decreased balance, decreased tolerance, impaired attention, impaired memory, impaired problem solving, decreased safety awareness and visual impairment (appears baseline)  Occupational Performance areas to address include: grooming, bathing/shower, toilet hygiene, dressing, functional mobility, clothing management, money management and social participation  High Complexity Evaluation

## 2018-10-01 DIAGNOSIS — I15.9 SECONDARY HYPERTENSION: ICD-10-CM

## 2018-10-01 PROBLEM — Z87.898 HISTORY OF BACTEREMIA: Status: ACTIVE | Noted: 2018-10-01

## 2018-10-01 PROBLEM — D64.9 ANEMIA: Status: ACTIVE | Noted: 2018-10-01

## 2018-10-01 LAB
ERYTHROCYTE [DISTWIDTH] IN BLOOD BY AUTOMATED COUNT: 18.1 %
HCT VFR BLD AUTO: 24.9 % (ref 41–53)
HEMOCCULT STL QL: NEGATIVE
HGB BLD-MCNC: 8.2 G/DL (ref 13.5–17.5)
MCH RBC QN AUTO: 32.1 PG (ref 26.8–34.3)
MCHC RBC AUTO-ENTMCNC: 32.8 G/DL (ref 31.4–37.4)
MCV RBC AUTO: 98 FL (ref 80–100)
PLATELET # BLD AUTO: 147 THOUSANDS/UL (ref 150–450)
PMV BLD AUTO: 10 FL (ref 8.9–12.7)
RBC # BLD AUTO: 2.54 MILLION/UL (ref 4.5–5.9)
WBC # BLD AUTO: 6.1 THOUSAND/UL (ref 4.31–10.16)

## 2018-10-01 PROCEDURE — 99309 SBSQ NF CARE MODERATE MDM 30: CPT | Performed by: INTERNAL MEDICINE

## 2018-10-01 PROCEDURE — 97535 SELF CARE MNGMENT TRAINING: CPT

## 2018-10-01 PROCEDURE — 97110 THERAPEUTIC EXERCISES: CPT

## 2018-10-01 PROCEDURE — 97116 GAIT TRAINING THERAPY: CPT

## 2018-10-01 PROCEDURE — 82272 OCCULT BLD FECES 1-3 TESTS: CPT | Performed by: FAMILY MEDICINE

## 2018-10-01 PROCEDURE — 92526 ORAL FUNCTION THERAPY: CPT

## 2018-10-01 PROCEDURE — 85027 COMPLETE CBC AUTOMATED: CPT | Performed by: FAMILY MEDICINE

## 2018-10-01 RX ORDER — LISINOPRIL 40 MG/1
TABLET ORAL
Qty: 30 TABLET | Refills: 0 | OUTPATIENT
Start: 2018-10-01

## 2018-10-01 RX ORDER — HYDRALAZINE HYDROCHLORIDE 25 MG/1
25 TABLET, FILM COATED ORAL 2 TIMES DAILY
Status: DISCONTINUED | OUTPATIENT
Start: 2018-10-01 | End: 2018-10-10

## 2018-10-01 RX ORDER — NYSTATIN 100000 [USP'U]/G
1 POWDER TOPICAL 3 TIMES DAILY
Status: DISCONTINUED | OUTPATIENT
Start: 2018-10-01 | End: 2018-10-12 | Stop reason: HOSPADM

## 2018-10-01 RX ADMIN — AMLODIPINE BESYLATE 5 MG: 5 TABLET ORAL at 17:38

## 2018-10-01 RX ADMIN — NYSTATIN 1 APPLICATION: 100000 POWDER TOPICAL at 20:45

## 2018-10-01 RX ADMIN — VANCOMYCIN HYDROCHLORIDE 500 MG: 500 INJECTION, POWDER, LYOPHILIZED, FOR SOLUTION INTRAVENOUS at 13:14

## 2018-10-01 RX ADMIN — NYSTATIN: 100000 CREAM TOPICAL at 17:39

## 2018-10-01 RX ADMIN — NYSTATIN: 100000 CREAM TOPICAL at 08:47

## 2018-10-01 RX ADMIN — LISINOPRIL 40 MG: 20 TABLET ORAL at 08:44

## 2018-10-01 RX ADMIN — FERROUS SULFATE TAB 325 MG (65 MG ELEMENTAL FE) 325 MG: 325 (65 FE) TAB at 08:43

## 2018-10-01 RX ADMIN — METOPROLOL TARTRATE 50 MG: 50 TABLET ORAL at 20:41

## 2018-10-01 RX ADMIN — HYDRALAZINE HYDROCHLORIDE 25 MG: 25 TABLET ORAL at 19:24

## 2018-10-01 RX ADMIN — AMLODIPINE BESYLATE 5 MG: 5 TABLET ORAL at 08:44

## 2018-10-01 RX ADMIN — METOPROLOL TARTRATE 50 MG: 50 TABLET ORAL at 08:44

## 2018-10-01 RX ADMIN — VANCOMYCIN HYDROCHLORIDE 500 MG: 500 INJECTION, POWDER, LYOPHILIZED, FOR SOLUTION INTRAVENOUS at 00:00

## 2018-10-01 RX ADMIN — VANCOMYCIN HYDROCHLORIDE 500 MG: 500 INJECTION, POWDER, LYOPHILIZED, FOR SOLUTION INTRAVENOUS at 05:55

## 2018-10-01 NOTE — ASSESSMENT & PLAN NOTE
Acute renal failure secondary to septicemia  Continues to improve  DISCONTINUE lisinopril until renal dysfunction closer to baseline  Continue amlodipine and metoprolol    Will substitute lisinopril with hydralazine

## 2018-10-01 NOTE — OCCUPATIONAL THERAPY NOTE
Occupational Therapy Treatment Note    Name:  Rosibel Reyna   MRN:   [id number]  Age:     80 y o  Patient Active Problem List   Diagnosis    [facility]    Sepsis [facility]    Essential hypertension    Ambulatory dysfunction    Cholelithiasis with biliary obstruction    [facility]F) ([facility]    Clostridium difficile colitis     Sepsis ([facility] ) [A41 9]  Ambulatory dysfunction [R26 2]      Subjective/Goals: "i was hoping I could lay down a bit, was a long evening"    Vitals: 164/74 BP, 67 HR    OT total treatment time: (950-1023) 33 min    Additional goals & Comments:         10/01/18 1023   Restrictions/Precautions   Weight Bearing Precautions Per Order No   Other Precautions Contact/isolation; Fall Risk   Pain Assessment   Pain Assessment No/denies pain   Pain Score No Pain   Diversional Activities Television   ADL   Where Assessed Chair   Grooming Comments S/MI standing sinkside grooming, hand hygiene   UB Bathing Comments MI bathing post set up due to fatigue this AM   LB Bathing Comments MI post set up (patient did ask for ABARCA to ensure he was clean post bathing buttocks)   UB Dressing Comments min assist   LB Dressing Comments min assist    Toileting Comments Supervision   Functional Standing Tolerance   Time 4 min with ADL   Bed Mobility   Supine to Sit 5  Supervision   Sit to Supine 6  Modified independent   Additional Comments flat bed, no rail   Transfers   Sit to Stand 6  Modified independent   Additional items Increased time required;Armrests   Stand to Sit 6  Modified independent   Additional items Increased time required;Armrests   Stand pivot 5  Supervision   Additional items Increased time required;Armrests   Functional Mobility   Functional Mobility 5  Supervision   Additional items Rolling walker   Toilet Transfers   Toilet Transfer From Rolling walker   Toilet Transfer Type To and from   Toilet Transfer to Raised toilet seat with rails   Toilet Transfer Technique Ambulating Toilet Transfers Supervision   Therapeutic Exercise - ROM   UE-ROM (incooporated throughtout ADL)   Cognition   Overall Cognitive Status Impaired   Arousal/Participation Alert; Cooperative   Attention Within functional limits   Orientation Level Oriented X4   Memory Decreased short term memory   Following Commands Follows one step commands with increased time or repetition   Additional Activities   Additional Activities Comments Balance:  Fair+ static, and Fair Dynamic   Activity Tolerance   Activity Tolerance Patient tolerated treatment well  (Fair (fatigued from blood transfusion over jerry))   Assessment   Assessment Patient seen this AM for an ADL-- patient completed all aspects of bathing with supervision and dressing with min assist   Overall patient appeared safe in all transfers in terms of balance needing some min cues for safety  Patient fatigued from evening events and therefore went back to bed post session to rest   Continue OT at this time with goals as set by OTR  At end of session patient remains in room with all needs within reach  Plan   Treatment Interventions ADL retraining;Functional transfer training;UE strengthening/ROM; Energy conservation; Activityengagement   Goal Expiration Date 10/14/18   Treatment Day 2   OT Frequency (6x/week)   Recommendation   OT Discharge Recommendation Home OT   OT - OK to Discharge Yane Resendez  10/1/2018

## 2018-10-01 NOTE — PLAN OF CARE
Problem: PHYSICAL THERAPY ADULT  Goal: Performs mobility at highest level of function for planned discharge setting  See evaluation for individualized goals  Treatment/Interventions: ADL retraining, Functional transfer training, LE strengthening/ROM, Elevations, Therapeutic exercise, Endurance training, Cognitive reorientation, Patient/family training, Equipment eval/education, Bed mobility, Gait training, Compensatory technique education, Spoke to nursing, Spoke to MD, Spoke to case management, Spoke to advanced practitioner, OT, Family  Equipment Recommended:  (To be determined closer to discharge)       See flowsheet documentation for full assessment, interventions and recommendations  Outcome: Progressing  Prognosis: Good  Problem List: Decreased strength, Decreased endurance, Impaired balance, Decreased mobility, Decreased cognition, Decreased safety awareness, Impaired vision, Impaired hearing  Assessment: Pt cooperative with session but reports being fatigue and wanting to lay down  /63 HR 63 [id number]  Gerhardt Sep No LOB with amb with no AD around room with frequent turns   Did well with one step and with bed mobility  Barriers to Discharge: Inaccessible home environment, Decreased caregiver support                See flowsheet documentation for full assessment

## 2018-10-01 NOTE — ASSESSMENT & PLAN NOTE
Recently admitted at [facility] for Klebsiella E coli bacteremia secondary to choledocholithiasis  Had ERCP and completed course of antibiotics x14 days

## 2018-10-01 NOTE — PHYSICAL THERAPY NOTE
27' session     10/01/18 1527   Pain Assessment   Pain Assessment No/denies pain   Restrictions/Precautions   Other Precautions Contact/isolation   General   Chart Reviewed Yes   Family/Caregiver Present No   Cognition   Overall Cognitive Status WFL   Arousal/Participation Alert; Cooperative   Attention Within functional limits   Following Commands Follows one step commands without difficulty   Comments tired from poor sleep last night - requests to go back to bed   Subjective   Subjective i got blood in the middle of the night   Bed Mobility   Rolling R 7  Independent   Rolling L 7  Independent   Supine to Sit 6  Modified independent   Sit to Supine 6  Modified independent   Transfers   Sit to Stand 6  Modified independent   Stand to Sit 6  Modified independent   Stand pivot 6  Modified independent   Ambulation/Elevation   Gait pattern Improper Weight shift; Antalgic  (decrease heel strike/toe off)   Gait Assistance 5  Supervision   Assistive Device (none)   Distance 124' and 30' ( in room)   Curbs (1 step x 2 with L door jam and S )   Balance   Ambulatory Fair   Endurance Deficit   Endurance Deficit No   Activity Tolerance   Activity Tolerance Patient tolerated treatment well   Exercises   Bridging 20 reps   Balance training  repeated sit <-> stand with no UE use , 10 x 2 with S    Assessment   Prognosis Good   Assessment Pt cooperative with session but reports being fatigue and wanting to lay down  /63 HR 63 [id number]  Abbie Blend No LOB with amb with no AD around room with frequent turns    Did well with one step and with bed mobility   Goals   Patient Goals get a little movement and go back to bed   STG Expiration Date 10/12/18   LTG Expiration Date 10/12/18   Treatment Day 2   Plan   Treatment/Interventions (cont as per POC)   Progress Progressing toward goals   PT Frequency (6x/week)

## 2018-10-01 NOTE — PROGRESS NOTES
REHAB Progress Note - Jonh Fiore 12/9/1932, 80 y o  male MRN: [id number]    Unit/Bed#: -01 Encounter: [id number]    Primary Care Provider: Chung Tavera PA-C   Date and time admitted to hospital: 9/28/2018  5:32 PM        Assessment and Plan  * Ambulatory dysfunction   Assessment & Plan    PT/OT  Continue rehabilitation  Anemia   Assessment & Plan    Anemia chronic disease  Secondary to recent septicemia  Improved with 1 unit PRBC  History of bacteremia   Assessment & Plan    Recently admitted at [facility] for Klebsiella E coli bacteremia secondary to choledocholithiasis  Had ERCP and completed course of antibiotics x14 days  Clostridium difficile colitis   Assessment & Plan    Finished 14 day course of vancomycin  No further loose stooling     Acute renal failure (ARF) (HCC)   Assessment & Plan    Acute renal failure secondary to septicemia  Continues to improve  DISCONTINUE lisinopril until renal dysfunction closer to baseline  Continue amlodipine and metoprolol  Will substitute lisinopril with hydralazine     Cholelithiasis with biliary obstruction   Assessment & Plan    Status post ERCP  Associated with Klebsiella and E coli bacteremia  Essential hypertension   Assessment & Plan    Discontinue lisinopril given ongoing acute renal failure  Continue metoprolol and amlodipine  Add hydralazine  Thrombocytopenia (HCC)   Assessment & Plan    Thrombocytopenia secondary to recent admission for sepsis/bacteremia  Continues to improve  VTE Pharmacologic Prophylaxis: Pharmacologic VTE Prophylaxis contraindicated due to anemia and thrombocytopenia    Patient Centered Rounds: I have performed bedside rounds with nursing staff today  Time Spent for Care: 30 mins  More than 50% of total time spent on counseling and coordination of care as described above      Current Length of Stay: 3 day(s)    Current Patient Status: SNF Short Term Inpatient     Code Status: Level 1 - Full Code  ______________________________________________________________________________    Subjective:   Patient seen and examined  No new complaints  Still feeling weak  Objective:   Vitals: Blood pressure 137/63, pulse 63, temperature 98 4 °F (36 9 °C), temperature source Temporal, resp  rate 20, height 5' 4" (1 626 m), weight 57 2 kg (126 lb 1 6 oz), SpO2 99 %  Physical Exam:   General appearance: alert, appears stated age and cooperative  Head: Normocephalic, without obvious abnormality, atraumatic  Lungs: clear to auscultation bilaterally  Heart: regular rate and rhythm and ++ murmur  Abdomen: soft, non-tender; bowel sounds normal; no masses,  no organomegaly  Back: negative  Extremities: edema +2 lower extremities bilaterally  Neurologic: Grossly normal    Additional Data:   Labs:    Results from last 7 days  Lab Units 10/01/18  0537 09/30/18  0534 09/29/18  0842 09/28/18  0451 09/27/18  0537 09/25/18  0446   WBC Thousand/uL 6 10 5 40 5 70 8 29 9 23 11 59*   HEMOGLOBIN g/dL 8 2* 7 1* 7 9* 8 4* 7 7* 8 6*   HEMATOCRIT % 24 9* 21 6* 24 0* 24 8* 23 1* 24 2*   MCV fL 98 100 101* 96 95 92   PLATELETS Thousands/uL 147* 130* 123* 126* 113* 70*       Results from last 7 days  Lab Units 09/29/18  0841 09/28/18  0451 09/27/18  0537 09/26/18  0456 09/25/18  0446   SODIUM mmol/L 139 143 141 143 141   POTASSIUM mmol/L 3 9 4 0 3 4* 3 5 3 9   CHLORIDE mmol/L 106 107 105 103 101   CO2 mmol/L 28 28 30 30 30   ANION GAP mmol/L 5 8 6 10 10   BUN mg/dL 38* 48* 62* 77* 89*   CREATININE mg/dL 2 25* 2 96* 3 69* 4 71* 5 66*   CALCIUM mg/dL 7 7* 7 7* 7 5* 7 7* 8 1*   EGFR ml/min/1 73sq m 26* 18 14 10 8   GLUCOSE RANDOM mg/dL 114* 98 101 92 108                                  * I Have Reviewed All Lab Data Listed Above      Cultures:         Results from last 7 days  Lab Units 10/01/18  0325   FECAL OCCULT BLOOD DIAGNOSTIC  Negative     Imaging:  Imaging Reports Reviewed Today Include:   No results found       Scheduled Meds:  Current Facility-Administered Medications:  acetaminophen 650 mg Oral Q6H PRN Missy Valle MD   amLODIPine 5 mg Oral BID Missy Valle MD   ferrous sulfate 325 mg Oral Daily With Breakfast Robin Arnold MD   lisinopril 40 mg Oral Daily Missy Valle MD   metoprolol tartrate 50 mg Oral Q12H [address] Missy Valle MD   nystatin  Topical BID Missy Valle MD   ondansetron 4 mg Intravenous Q6H PRN Missy Valle MD   tuberculin 5 Units Intradermal Once MD Arianna Trent DO [address] Internal Medicine  Hospitalist    ** Please Note: This note has been constructed using a voice recognition system   **

## 2018-10-01 NOTE — ASSESSMENT & PLAN NOTE
Discontinue lisinopril given ongoing acute renal failure  Continue metoprolol and amlodipine  Add hydralazine

## 2018-10-01 NOTE — PLAN OF CARE
Problem: OCCUPATIONAL THERAPY ADULT  Goal: Performs self-care activities at highest level of function for planned discharge setting  See evaluation for individualized goals  Treatment Interventions: ADL retraining, Functional transfer training, UE strengthening/ROM, Endurance training, Cognitive reorientation, Patient/family training, Equipment evaluation/education, Compensatory technique education, Continued evaluation          See flowsheet documentation for full assessment, interventions and recommendations  Outcome: Progressing  Limitation: Decreased ADL status, Decreased UE ROM, Decreased UE strength, Decreased Safe judgement during ADL, Decreased cognition, Decreased endurance, Decreased high-level ADLs  Prognosis: Fair  Assessment: Patient seen this AM for an ADL-- patient completed all aspects of bathing with supervision and dressing with min assist   Overall patient appeared safe in all transfers in terms of balance needing some min cues for safety  Patient fatigued from evening events and therefore went back to bed post session to rest   Continue OT at this time with goals as set by OTR  At end of session patient remains in room with all needs within reach       OT Discharge Recommendation: Home OT  OT - OK to Discharge: No

## 2018-10-01 NOTE — NURSING NOTE
9/30/18- awake and alert, contact isolation maintained, ordered for blood transfusion, called lab blood not ready  2268- followed -up blood, still not available  About 2230, blood ready, started infusing at 2340- vital signs stable 97 3 temp 137/62 bp 65 hr 20 rr sat 98%  No transfusion rxn noted, slept well, checked at times, vital signs remain stable, 0300- blood done, needs attended

## 2018-10-01 NOTE — SPEECH THERAPY NOTE
Speech/Language Pathology Progress Note    Patient Name: Armando Martínez  Today's Date: 10/1/2018     0470-4734 14 minutes     Subjective:  "I'll have some ice cream"  Patient is pleasant and cooperative  Agrees to pm snack  Objective:  Patient requests ice cream for pm snack  He feeds himself  The patient has adequate bite size and rate, with good a-p transfer  Swallow initiation is timely  Patient has ocassional wet vocal quality, but this clears  He has no coughing or no changes in respiratory status  The patient takes small sips of thin liquids without overt s/s aspiration  The patient does report that mastication can be difficult at times, since he is not wearing his bottom dentures  Despite this, patient wishes to continue on soft diet  Assessment:  Appears to be tolerating ice cream and sips of thin liquids well  Plan/Recommendations:  Continue therapy and assess tolerance of diet during mealtime

## 2018-10-02 PROCEDURE — 97116 GAIT TRAINING THERAPY: CPT

## 2018-10-02 PROCEDURE — 97110 THERAPEUTIC EXERCISES: CPT

## 2018-10-02 PROCEDURE — 97535 SELF CARE MNGMENT TRAINING: CPT

## 2018-10-02 PROCEDURE — 97530 THERAPEUTIC ACTIVITIES: CPT

## 2018-10-02 RX ADMIN — AMLODIPINE BESYLATE 5 MG: 5 TABLET ORAL at 08:38

## 2018-10-02 RX ADMIN — HYDRALAZINE HYDROCHLORIDE 25 MG: 25 TABLET ORAL at 17:05

## 2018-10-02 RX ADMIN — METOPROLOL TARTRATE 50 MG: 50 TABLET ORAL at 20:43

## 2018-10-02 RX ADMIN — HYDRALAZINE HYDROCHLORIDE 25 MG: 25 TABLET ORAL at 08:39

## 2018-10-02 RX ADMIN — NYSTATIN 1 APPLICATION: 100000 POWDER TOPICAL at 17:05

## 2018-10-02 RX ADMIN — NYSTATIN 1 APPLICATION: 100000 POWDER TOPICAL at 20:46

## 2018-10-02 RX ADMIN — METOPROLOL TARTRATE 50 MG: 50 TABLET ORAL at 08:38

## 2018-10-02 RX ADMIN — FERROUS SULFATE TAB 325 MG (65 MG ELEMENTAL FE) 325 MG: 325 (65 FE) TAB at 07:14

## 2018-10-02 RX ADMIN — NYSTATIN 1 APPLICATION: 100000 POWDER TOPICAL at 08:42

## 2018-10-02 RX ADMIN — AMLODIPINE BESYLATE 5 MG: 5 TABLET ORAL at 17:04

## 2018-10-02 NOTE — PROGRESS NOTES
RECREATIONAL THERAPY PARTICIPATION LOG      ACTIVITY:    GAMES: Balloon Volleyball-declined, due to being fatigued from previous therapy session  BINGO:        MUSIC STIM:        ARTS & CRAFTS:        EXERCISE:        CLUBS & MEETING:        SOCIALS:        SPIRITUAL:        INDEPENDENT: Resident engaged in phone calls with family member(s) during the day; he did listen to introduction of Rec  Therapy program and later when calendar was hung and then continued important conversations to help him feel connected to his loved one(s)  1:1:  Resident was seen for an initial Recreational Therapy greeting and introduction to our Recreational Therapy/Activity Program   Resident was presented a calendar to follow along and to decide what activities he would like to participate in; he was made aware that the [facility]Dining Room in available at anytime and that he is welcome to spend time in there with his family, as well  TINO Marsh  C

## 2018-10-02 NOTE — OCCUPATIONAL THERAPY NOTE
Occupational Therapy Treatment Note    Name:  Mai Fofana   MRN:   [id number]  Age:     80 y o  Patient Active Problem List   Diagnosis    [facility]    Sepsis ([facility] [facility] )    Essential hypertension    Ambulatory dysfunction    Cholelithiasis with biliary obstruction    [facility]    Clostridium difficile colitis    History of bacteremia    Anemia     Sepsis ([facility] ) [A41 9]  Ambulatory dysfunction [R26 2]      Subjective/Goals: " I'm tired from all of the therapy"    Vitals: Please see summary    OT total treatment time:58 min    Additional goals & Comments: Pt pleasant and cooperative during tx  Increase time and effort to don/ doff pj pants 2x and to tie  Sister present for part of tx session  Pt demonstrated decreased ST memory at times, yet G overall safety  LE's elevated end of tx all needs in reach  10/02/18 1404   Restrictions/Precautions   Weight Bearing Precautions Per Order No   Other Precautions Contact/isolation   Pain Assessment   Pain Assessment No/denies pain   ADL   Grooming Assistance (MI for hand washing)   LB Dressing Comments (S to doff/ don pull up/down, pj pants)   Toileting Comments (no need)   Light Housekeeping   Light Housekeeping Level (item retrieval varied hts w/ RW basket)   Functional Standing Tolerance   Time (8 min)   Comments (Static stand at window then hand washing)   Transfers   Sit to Stand 6  Modified independent   Additional items Armrests   Stand to Sit 6  Modified independent   Additional items Armrests   Stand pivot (S/ MI w/ RW)   Additional Comments (Static F+/G-, D F/F+)   Functional Mobility   Functional Mobility 5  Supervision   Additional items Rolling walker   Therapeutic Exercise - ROM   UE-ROM (Restorator to increase Act xi 4 min alt forward/ back)   Cognition   Overall Cognitive Status WFL   Arousal/Participation Alert; Cooperative   Attention Within functional limits   Orientation Level Oriented X4   Memory Decreased short term memory   Following Commands Follows one step commands without difficulty   Activity Tolerance   Activity Tolerance Patient tolerated treatment well   Assessment   Assessment Patient participated in Skilled OT session this date with interventions consisting of ADL re training with the use of correct body mechnaics,  therapeutic activities to: increase activity tolerance, increase standing tolerance time with unilateral UE support to complete sink level ADLs and increase dynamic sit/ stand balance during functional activity    Patient agreeable to OT treatment session, upon arrival patient was found seated OOB to Recliner  In comparison to previous session, patient with improvements in ADL transfers, pt tolerated tx well however c/o fatigue 2nd "so much therapy today"  Patient continues to be functioning below baseline level, occupational performance remains limited secondary to factors listed above and increased risk for falls and injury  From OT standpoint, recommendation at time of d/c would be Home OT and increased S  Patient to benefit from continued Occupational Therapy treatment while in the hospital to address deficits as defined above and maximize level of functional independence with ADLs and functional mobility      Plan   Goal Expiration Date 10/14/18   Treatment Day 3   OT Frequency (6x/wk)   Recommendation   OT Discharge Recommendation Home OT   OT - OK to Discharge Yane Cohen

## 2018-10-02 NOTE — SOCIAL WORK
SW met with patient to review admission packet and signed consents  All consents signed, patient selects [facility]  Patient reports he does not have a POA or living will  SW reviewed psychosocial assessment  Patient admitted for ambulatory dysfunction s/p sepsis  Patient and his wife are retired and live in a single story home with 3+1 MONA and BHR  Patient reports he does not own any of his own medical equipment but has RW and several canes from his wife at home  Patient's PCP is Dr Lashell Clements and denies mental health treatment hx, quit smoking several years ago, denies abuse hx,  hx and SNF admission in the past 60 days  Patient reports his pharmacy is [facility] on [address]     Patient reports he would like his wife involved in [facility]  MATTHEW contacted patient's wife Joy Teresa and scheduled a meeting on Thursday 10/4 at 3:00 PM  Joy Teresa reports she has short-term memory issues and will contact her daughter to determine if she can be involved in meeting  SW agreeable

## 2018-10-02 NOTE — PLAN OF CARE
Problem: OCCUPATIONAL THERAPY ADULT  Goal: Performs self-care activities at highest level of function for planned discharge setting  See evaluation for individualized goals  Treatment Interventions: ADL retraining, Functional transfer training, UE strengthening/ROM, Endurance training, Cognitive reorientation, Patient/family training, Equipment evaluation/education, Compensatory technique education, Continued evaluation          See flowsheet documentation for full assessment, interventions and recommendations  Outcome: Progressing  Limitation: Decreased ADL status, Decreased UE ROM, Decreased UE strength, Decreased Safe judgement during ADL, Decreased cognition, Decreased endurance, Decreased high-level ADLs  Prognosis: Fair  Assessment: Patient participated in Skilled OT session this date with interventions consisting of ADL re training with the use of correct body mechnaics,  therapeutic activities to: increase activity tolerance, increase standing tolerance time with unilateral UE support to complete sink level ADLs and increase dynamic sit/ stand balance during functional activity    Patient agreeable to OT treatment session, upon arrival patient was found seated OOB to Recliner  In comparison to previous session, patient with improvements in ADL transfers, pt tolerated tx well however c/o fatigue 2nd "so much therapy today"  Patient continues to be functioning below baseline level, occupational performance remains limited secondary to factors listed above and increased risk for falls and injury  From OT standpoint, recommendation at time of d/c would be Home OT and increased S  Patient to benefit from continued Occupational Therapy treatment while in the hospital to address deficits as defined above and maximize level of functional independence with ADLs and functional mobility        OT Discharge Recommendation: Home OT  OT - OK to Discharge: No      Comments: Haja Chiu

## 2018-10-02 NOTE — PHYSICAL THERAPY NOTE
47' session     10/02/18 1057   Pain Assessment   Pain Assessment No/denies pain   Restrictions/Precautions   Other Precautions Contact/isolation   General   Chart Reviewed Yes   Family/Caregiver Present No   Cognition   Overall Cognitive Status WFL   Arousal/Participation Alert; Cooperative   Attention Within functional limits   Following Commands Follows one step commands without difficulty   Subjective   Subjective maybe a little easier than yesterday   Bed Mobility   Supine to Sit 7  Independent   Sit to Supine 7  Independent   Additional Comments bed flat no rail   Transfers   Sit to Stand 6  Modified independent   Stand to Sit 6  Modified independent   Stand pivot 6  Modified independent   Additional Comments to BR mangeing the door- to sink to wash glasses- no unsteadainess noted   Ambulation/Elevation   Gait pattern Improper Weight shift; Short stride   Gait Assistance 6  Modified independent   Assistive Device Rolling walker  ( and no AD)   Distance (144' with no AD then 47' with RW( in room with freq turns))   Curbs (1 + 1 in doorway with S)   Balance   Dynamic Standing (F- to P+ with no UE support)   Ambulatory Fair   Endurance Deficit   Endurance Deficit No   Activity Tolerance   Activity Tolerance Patient tolerated treatment well  (with breaks)   Exercises   Heelslides Supine;Bilateral;AROM;20 reps   Hip Abduction Bilateral;Supine;20 reps;AROM   Knee AROM Long Arc Quad Bilateral;AROM;20 reps; Sitting   Bridging 20 reps   Balance training  repeated sit <-> stand with no UE use  x 10 with S , sidesteppping wiht S , retro amb with small steps and close S , heel raises with  occ min A for LOB, perturbations  with occ retro LOB   Assessment   Prognosis Good   Problem List Impaired balance   Assessment Pt limited to session in room 2* isolation precaution, frequent turns with amb with no AD and no LOB  Pt slightly unsteady with up and down 1 step but no overt LOB   Pt did have occ LOB with higher level challenges requiring min A of 1 Pt feels his visionin L eye is not right, causing imbalance( nurse made aware)  sats 99 HR 68 after activity with report of slight fatigue pt also reports that he does have a RW at home that was his wifes   Goals   Patient Goals i guess to walk with and without the RW   STG Expiration Date 10/12/18   LTG Expiration Date 10/12/18   Treatment Day 3   Plan   Treatment/Interventions (cont as per POC)   Progress Progressing toward goals   PT Frequency (6x/week)

## 2018-10-02 NOTE — PLAN OF CARE
Problem: PHYSICAL THERAPY ADULT  Goal: Performs mobility at highest level of function for planned discharge setting  See evaluation for individualized goals  Treatment/Interventions: ADL retraining, Functional transfer training, LE strengthening/ROM, Elevations, Therapeutic exercise, Endurance training, Cognitive reorientation, Patient/family training, Equipment eval/education, Bed mobility, Gait training, Compensatory technique education, Spoke to nursing, Spoke to MD, Spoke to case management, Spoke to advanced practitioner, OT, Family  Equipment Recommended:  (To be determined closer to discharge)       See flowsheet documentation for full assessment, interventions and recommendations  Outcome: Progressing  Prognosis: Good  Problem List: Impaired balance  Assessment: Pt limited to session in room 2* isolation precaution, frequent turns with amb with no AD and no LOB  Pt slightly unsteady with up and down 1 step but no overt LOB  Pt did have occ LOB with higher level challenges requiring min A of 1 Pt feels his visionin L eye is not right, causing imbalance( nurse made aware)  sats 99 HR 68 after activity with report of slight fatigue pt also reports that he does have a RW at home that was his wifes  Barriers to Discharge: Inaccessible home environment, Decreased caregiver support                See flowsheet documentation for full assessment

## 2018-10-03 LAB
ANION GAP SERPL CALCULATED.3IONS-SCNC: 3 MMOL/L (ref 5–14)
BUN SERPL-MCNC: 23 MG/DL (ref 5–25)
CALCIUM SERPL-MCNC: 7.3 MG/DL (ref 8.4–10.2)
CHLORIDE SERPL-SCNC: 105 MMOL/L (ref 97–108)
CO2 SERPL-SCNC: 26 MMOL/L (ref 22–30)
CREAT SERPL-MCNC: 1.56 MG/DL (ref 0.7–1.5)
ERYTHROCYTE [DISTWIDTH] IN BLOOD BY AUTOMATED COUNT: 17.9 %
GFR SERPL CREATININE-BSD FRML MDRD: 40 ML/MIN/1.73SQ M
GLUCOSE P FAST SERPL-MCNC: 79 MG/DL (ref 70–99)
GLUCOSE SERPL-MCNC: 79 MG/DL (ref 70–99)
HCT VFR BLD AUTO: 24.1 % (ref 41–53)
HGB BLD-MCNC: 7.8 G/DL (ref 13.5–17.5)
MCH RBC QN AUTO: 32 PG (ref 26.8–34.3)
MCHC RBC AUTO-ENTMCNC: 32.6 G/DL (ref 31.4–37.4)
MCV RBC AUTO: 98 FL (ref 80–100)
PLATELET # BLD AUTO: 148 THOUSANDS/UL (ref 150–450)
PMV BLD AUTO: 9.9 FL (ref 8.9–12.7)
POTASSIUM SERPL-SCNC: 4.7 MMOL/L (ref 3.6–5)
RBC # BLD AUTO: 2.45 MILLION/UL (ref 4.5–5.9)
SODIUM SERPL-SCNC: 134 MMOL/L (ref 137–147)
WBC # BLD AUTO: 4.8 THOUSAND/UL (ref 4.31–10.16)

## 2018-10-03 PROCEDURE — 97116 GAIT TRAINING THERAPY: CPT

## 2018-10-03 PROCEDURE — 97535 SELF CARE MNGMENT TRAINING: CPT

## 2018-10-03 PROCEDURE — 99308 SBSQ NF CARE LOW MDM 20: CPT | Performed by: INTERNAL MEDICINE

## 2018-10-03 PROCEDURE — 97530 THERAPEUTIC ACTIVITIES: CPT

## 2018-10-03 PROCEDURE — G0515 COGNITIVE SKILLS DEVELOPMENT: HCPCS

## 2018-10-03 PROCEDURE — 80048 BASIC METABOLIC PNL TOTAL CA: CPT | Performed by: INTERNAL MEDICINE

## 2018-10-03 PROCEDURE — 85027 COMPLETE CBC AUTOMATED: CPT | Performed by: INTERNAL MEDICINE

## 2018-10-03 PROCEDURE — 92526 ORAL FUNCTION THERAPY: CPT

## 2018-10-03 RX ADMIN — NYSTATIN 1 APPLICATION: 100000 POWDER TOPICAL at 08:02

## 2018-10-03 RX ADMIN — FERROUS SULFATE TAB 325 MG (65 MG ELEMENTAL FE) 325 MG: 325 (65 FE) TAB at 07:58

## 2018-10-03 RX ADMIN — HYDRALAZINE HYDROCHLORIDE 25 MG: 25 TABLET ORAL at 17:43

## 2018-10-03 RX ADMIN — METOPROLOL TARTRATE 50 MG: 50 TABLET ORAL at 21:23

## 2018-10-03 RX ADMIN — AMLODIPINE BESYLATE 5 MG: 5 TABLET ORAL at 17:43

## 2018-10-03 RX ADMIN — NYSTATIN 1 APPLICATION: 100000 POWDER TOPICAL at 17:43

## 2018-10-03 RX ADMIN — AMLODIPINE BESYLATE 5 MG: 5 TABLET ORAL at 08:00

## 2018-10-03 RX ADMIN — HYDRALAZINE HYDROCHLORIDE 25 MG: 25 TABLET ORAL at 08:00

## 2018-10-03 RX ADMIN — METOPROLOL TARTRATE 50 MG: 50 TABLET ORAL at 08:00

## 2018-10-03 RX ADMIN — ACETAMINOPHEN 650 MG: 325 TABLET ORAL at 14:17

## 2018-10-03 NOTE — PROGRESS NOTES
REHAB Progress Note - Virgil Garcia 12/9/1932, 80 y o  male MRN: [id number]    Unit/Bed#: -01 Encounter: [id number]    Primary Care Provider: Samantha Feliciano PA-C   Date and time admitted to hospital: 9/28/2018  5:32 PM        Assessment and Plan  * Ambulatory dysfunction   Assessment & Plan    PT/OT  Continue rehabilitation  Anemia   Assessment & Plan    Anemia chronic disease secondary to recent septicemia  Improved with 1 unit PRBC  Recheck labs prior to next visit will order for 10/7/2018     History of bacteremia   Assessment & Plan    Recently admitted at [facility] for Klebsiella E coli bacteremia secondary to choledocholithiasis  Had ERCP and completed course of antibiotics x14 days  Clostridium difficile colitis   Assessment & Plan    Finished 14 day course of vancomycin  No further loose stooling     Acute renal failure (ARF) (HCC)   Assessment & Plan    Acute renal failure secondary to septicemia  Continues to improve  DISCONTINUED lisinopril on 10/1/2018 and continue to hold until renal dysfunction closer to baseline  Continue amlodipine and metoprolol  Will substitute lisinopril with hydralazine in the interim     Cholelithiasis with biliary obstruction   Assessment & Plan    Status post ERCP  Associated with Klebsiella and E coli bacteremia and finished course of antibiotics     Essential hypertension   Assessment & Plan    Discontinued lisinopril given ongoing acute renal failure  Continue metoprolol and amlodipine  Added hydralazine  Thrombocytopenia (HCC)   Assessment & Plan    Thrombocytopenia secondary to recent admission for sepsis/bacteremia  Continues to improve  Time Spent for Care: 25 mins  More than 50% of total time spent on counseling and coordination of care as described above      Current Length of Stay: 5 day(s)    Current Patient Status: SNF Short Term Inpatient   Certification Statement: The patient will continue to require additional inpatient hospital stay due to Ambulatory dysfunction    Discharge Plan / Estimated Discharge Date:     Code Status: Level 1 - Full Code  ______________________________________________________________________________    Subjective:   Patient seen and examined  No new complaints  Feeling cold but afebrile    Objective:   Vitals: Blood pressure 128/61, pulse 62, temperature 98 6 °F (37 °C), temperature source Temporal, resp  rate 20, height 5' 4" (1 626 m), weight 57 2 kg (126 lb 1 6 oz), SpO2 94 %  Physical Exam:   General appearance: alert, appears stated age and cooperative  Head: Normocephalic, without obvious abnormality, atraumatic  Lungs: clear to auscultation bilaterally  Heart: regular rate and rhythm and ++ murmur  Abdomen: soft, non-tender, positive bowel sounds   Back: negative, range of motion normal  Extremities: edema +2 lower extremities bilaterally  Neurologic: Grossly normal    Additional Data:   Labs:    Results from last 7 days  Lab Units 10/03/18  0446 10/01/18  0537 09/30/18  0534 09/29/18  0842 09/28/18  0451 09/27/18  0537   WBC Thousand/uL 4 80 6 10 5 40 5 70 8 29 9 23   HEMOGLOBIN g/dL 7 8* 8 2* 7 1* 7 9* 8 4* 7 7*   HEMATOCRIT % 24 1* 24 9* 21 6* 24 0* 24 8* 23 1*   MCV fL 98 98 100 101* 96 95   PLATELETS Thousands/uL 148* 147* 130* 123* 126* 113*       Results from last 7 days  Lab Units 10/03/18  0446 09/29/18  0841 09/28/18  0451 09/27/18  0537   SODIUM mmol/L 134* 139 143 141   POTASSIUM mmol/L 4 7 3 9 4 0 3 4*   CHLORIDE mmol/L 105 106 107 105   CO2 mmol/L 26 28 28 30   ANION GAP mmol/L 3* 5 8 6   BUN mg/dL 23 38* 48* 62*   CREATININE mg/dL 1 56* 2 25* 2 96* 3 69*   CALCIUM mg/dL 7 3* 7 7* 7 7* 7 5*   EGFR ml/min/1 73sq m 40* 26* 18 14   GLUCOSE RANDOM mg/dL 79 114* 98 101                                  * I Have Reviewed All Lab Data Listed Above      Cultures:         Results from last 7 days  Lab Units 10/01/18  0325   FECAL OCCULT BLOOD DIAGNOSTIC  Negative Imaging:  Imaging Reports Reviewed Today Include:   No results found  Scheduled Meds:  Current Facility-Administered Medications:  acetaminophen 650 mg Oral Q6H PRN Haleigh Jon MD   amLODIPine 5 mg Oral BID Haleigh Jon MD   ferrous sulfate 325 mg Oral Daily With Breakfast Angela Diaz MD   hydrALAZINE 25 mg Oral BID Virginie Wagner DO   metoprolol tartrate 50 mg Oral Q12H [facility] Haleigh Jon MD   nystatin 1 application Topical TID Virginie Wagner DO   ondansetron 4 mg Intravenous Q6H PRN Haleigh Jon MD   tuberculin 5 Units Intradermal Once MD Virginie Corea DO  [address] Internal Medicine  Hospitalist    ** Please Note: This note has been constructed using a voice recognition system   **

## 2018-10-03 NOTE — SPEECH THERAPY NOTE
Speech/Language Pathology Progress Note    Patient Name: Shon Adames  Today's Date: 10/3/2018       Subjective:  "It's not easy to eat in bed"  Patient is awake and alert  Seen at am meal      Objective: The patient is assessed at breakfast meal which consists of mixed consistency cold cereal  The patient independently preps food and feeds himself  Bite size and rate are adequate  The patient has prolonged mastication  Despite this, oral clearance is adequate  He reports always fully chewing food at home  The patient takes small sips of thin liquids via cup  Good laryngeal rise is observed, with no overt s/s aspiration  Assessment:  Appears to tolerate cold cereal well, but needs f/u to assess with hot meal     Plan/Recommendations:  Continue current diet  ST will f/u and assess patient with lunch tray to determine tolerance of soft vs mechanical soft diet  Patient agrees with POC

## 2018-10-03 NOTE — PROGRESS NOTES
RECREATIONAL THERAPY PARTICIPATION LOG      ACTIVITY:    GAMES:  1:1 Word game in resident room  BINGO:        MUSIC STIM: Sing-a-long  ARTS & CRAFTS:        EXERCISE:        CLUBS & MEETING:        SOCIALS:        SPIRITUAL:        INDEPENDENT: Anticipated phone calls  1:1:  Resident received a 1:1 Recreational Therapy greeting and visit  Resident chose to play a word game, similar to [employer]"; however, resident expressed difficulty in seeing out of his left eye  Resident was then asked if he would like to have a Sing-a-long  Resident was sung to with such songs as, "Take me out to the [employer]", and "Lynjacqui Ballardch Me"  Resident is aware of the other leisure activities that can be brought in to his room for 1:1 activities and for Independent activities, although he declined any more at the time of the visit  Resident does keep his phone near him for phone calls  TINO Chinchilla  C

## 2018-10-03 NOTE — OCCUPATIONAL THERAPY NOTE
Occupational Therapy Treatment Note    Name:  Mai Fofana   MRN:   [id number]  Age:     80 y o  Patient Active Problem List   Diagnosis    [facility]    Sepsis ([facility] )    Essential hypertension    Ambulatory dysfunction    Cholelithiasis with biliary obstruction    Acute [facility] failure ([facility]F) ([facility] )    Clostridium difficile colitis    History of bacteremia    Anemia     Sepsis ([facility] ) [A41 9]  Ambulatory dysfunction [R26 2]      Subjective/Goals: " I just have a little headache"-    OT total treatment time:50 min    Additional goals & Comments: Pt w/ decreased Hem  this day, per NRS Physician notified, no order to hold therapy  Pt seen for LB dressing and to administer MOCA  Pt required increased time to complete MOCA score of 19/30  Despite score, pt is alert and O x 4       10/03/18 1459   Restrictions/Precautions   Weight Bearing Precautions Per Order No   Other Precautions Contact/isolation;Cognitive   Lifestyle   Reciprocal Relationships Spouse and Daughter   Pain Assessment   Pain Assessment ("a little headache" declined to quantify or request meds)   ADL   LB Dressing Assistance (MI to don pj pants w/ increased time to problem solve)   LB Dressing Deficit (MI to pull up, Mod A to tie)   Toileting Comments (no need denies any BM since last evening)   Light Housekeeping   Light Housekeeping Level (S/ MI to retrieve and organize clothing in closet)   Functional Standing Tolerance   Time 5 min   Activity (organizing clothing)   Transfers   Sit to Stand 6  Modified independent   Additional items Armrests   Stand to Sit 6  Modified independent   Additional items Armrests   Stand pivot 6  Modified independent   Additional items (w/ RW)   Additional Comments Static bal F+/G -, Dyn F+   Functional Mobility   Functional Mobility 5  Supervision   Additional items Rolling walker   Cognition   Overall Cognitive Status WFL   Arousal/Participation Alert; Cooperative   Attention Within functional limits Orientation Level Oriented X4   Memory (decreased frustration tolerance at times)   Following Commands Follows one step commands without difficulty   Cognition Assessment Tools MOCA   Score (19/30)   Activity Tolerance   Activity Tolerance Patient tolerated treatment well   Assessment   Assessment Patient participated in Skilled OT session this date with interventions consisting of ADL re training with the use of correct body mechnaics, Energy Conservation techniques and safety awareness and fall prevention techniques;MOCA administered score of 19/30  Patient agreeable to OT treatment session, upon arrival patient was found seated OOB to Recliner  In comparison to previous session, patient with improvements in ADL transfers  Patient continues to be functioning below baseline level, occupational performance remains limited secondary to factors listed above and increased risk for falls and injury  From OT standpoint, recommendation at time of d/c would be Home OT and 24/ 7 S to increase safety  Patient to benefit from continued Occupational Therapy treatment while on TCF to address deficits as defined above and maximize level of functional independence with ADLs and functional mobility  Plan   Treatment Interventions ADL retraining;Functional transfer training; Activityengagement   Goal Expiration Date 10/14/18   Treatment Day 4   Recommendation   OT Discharge Recommendation Home OT  (and 24/ 7 S)   OT - OK to Discharge No Alphonsus Merlin, [address]

## 2018-10-03 NOTE — ASSESSMENT & PLAN NOTE
Discontinued lisinopril given ongoing acute renal failure  Continue metoprolol and amlodipine  Added hydralazine

## 2018-10-03 NOTE — PLAN OF CARE
Problem: OCCUPATIONAL THERAPY ADULT  Goal: Performs self-care activities at highest level of function for planned discharge setting  See evaluation for individualized goals  Treatment Interventions: ADL retraining, Functional transfer training, UE strengthening/ROM, Endurance training, Cognitive reorientation, Patient/family training, Equipment evaluation/education, Compensatory technique education, Continued evaluation          See flowsheet documentation for full assessment, interventions and recommendations  Outcome: Progressing  Limitation: Decreased ADL status, Decreased UE ROM, Decreased UE strength, Decreased Safe judgement during ADL, Decreased cognition, Decreased endurance, Decreased high-level ADLs  Prognosis: Fair  Assessment: Patient participated in Skilled OT session this date with interventions consisting of ADL re training with the use of correct body mechnaics, Energy Conservation techniques and safety awareness and fall prevention techniques;MOCA administered score of 19/30  Patient agreeable to OT treatment session, upon arrival patient was found seated OOB to Recliner  In comparison to previous session, patient with improvements in ADL transfers  Patient continues to be functioning below baseline level, occupational performance remains limited secondary to factors listed above and increased risk for falls and injury  From OT standpoint, recommendation at time of d/c would be Home OT and 24/ 7 S to increase safety  Patient to benefit from continued Occupational Therapy treatment while on TCF to address deficits as defined above and maximize level of functional independence with ADLs and functional mobility        OT Discharge Recommendation: Home OT (and 24/ 7 S)  OT - OK to Discharge: No      Comments: Kristopher Murillo

## 2018-10-03 NOTE — PHYSICAL THERAPY NOTE
Physical Therapy Daily Treatment Note       10/03/18: 53 minutes   Pain Assessment   Pain Assessment No/denies pain   Pain Score No Pain   Restrictions/Precautions   Weight Bearing Precautions Per Order No   Other Precautions Contact/isolation;Cognitive;Hard of hearing;Visual impairment  (+ C-Diff)   General   Chart Reviewed Yes   Response to Previous Treatment Patient with no complaints from previous session  Family/Caregiver Present No   Cognition   Overall Cognitive Status WFL   Arousal/Participation Alert; Cooperative   Attention Within functional limits   Following Commands Follows one step commands without difficulty   Subjective   Subjective (" I slept not too bad last night")   Bed Mobility   Rolling R 7  Independent   Rolling L 7  Independent   Supine to Sit 7  Independent   Sit to Supine 7  Independent   Additional Comments (Bed Mobility: HOB flat  no rail)   Transfers   Sit to Stand 6  Modified independent   Stand to Sit 6  Modified independent   Stand pivot 6  Modified independent   Additional items (SPT with and without RW)   Ambulation/Elevation   Gait pattern Improper Weight shift; Short stride  (Decreased bilateral arm swing)   Additional items (Amb 150  feet with RW S; then 100 feet with no AD S)   Assistive Device (Ambulated 50 feet with RW MOD I; then 50 ft with no AD MOD I)   Balance   Static Sitting (Good/Good+ ( improved, was Good - on PT Eval))   Dynamic Sitting (Good - ( improved, was Fair on PT Eval))   Static Standing (Fair + no change (was Fair+ on PT Eval))   Dynamic Standing (Fair ( no change, was Fair on PT Eval))   Ambulatory (Fair ( no change, was Fair on PT Eval))   Higher level balance (TUG TEST: 12 seconds average time ( 13,12,11 seconds))   Endurance Deficit   Endurance Deficit Yes   Endurance Deficit Description (Decreased endurance for activity)   Activity Tolerance   Activity Tolerance Patient limited by fatigue   Nurse Made Aware (Hgb is 7 8; ABARCA reported RN cleared pt for rehab today)   Assessment   Prognosis Good   Problem List Decreased strength;Decreased range of motion; Impaired balance;Decreased endurance;Decreased mobility; Impaired hearing; Impaired vision;Decreased skin integrity;Orthopedic restrictions   Assessment Pt is progressing well in PT, in all aspects of functional mobility  Continue to trial mobility with no AD vs RW  Pt appears safe using both methods  A TUG Test was administered today and pt scored a 12  A score of < or = to 13 5 is indicative of a low fall risk  A Care Conference will be completed this week to discuss discharge planning and needs  Barriers to Discharge Inaccessible home environment;Decreased caregiver support   Goals   Patient Goals (" I'd like to go home as soon as a can")   STG Expiration Date 10/12/18   LTG Expiration Date 10/12/18   Treatment Day 4   Plan   Treatment/Interventions ADL retraining;Functional transfer training;LE strengthening/ROM; Elevations; Therapeutic exercise; Endurance training;Cognitive reorientation;Patient/family training;Equipment eval/education; Bed mobility;Gait training; Compensatory technique education;Spoke to MD;Spoke to nursing;Spoke to case management;Spoke to advanced practitioner;OT;Family   Progress Progressing toward goals   PT Frequency (6x/week)   Recommendation   Equipment Recommended (To be determined closer to discharge)

## 2018-10-03 NOTE — ASSESSMENT & PLAN NOTE
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

## 2018-10-03 NOTE — PLAN OF CARE
Problem: PHYSICAL THERAPY ADULT  Goal: Performs mobility at highest level of function for planned discharge setting  See evaluation for individualized goals  Treatment/Interventions: ADL retraining, Functional transfer training, LE strengthening/ROM, Elevations, Therapeutic exercise, Endurance training, Cognitive reorientation, Patient/family training, Equipment eval/education, Bed mobility, Gait training, Compensatory technique education, Spoke to nursing, Spoke to MD, Spoke to case management, Spoke to advanced practitioner, OT, Family  Equipment Recommended:  (To be determined closer to discharge)       See flowsheet documentation for full assessment, interventions and recommendations  Prognosis: Good  Problem List: Decreased strength, Decreased range of motion, Impaired balance, Decreased endurance, Decreased mobility, Impaired hearing, Impaired vision, Decreased skin integrity, Orthopedic restrictions  Assessment: Pt is progressing well in PT, in all aspects of functional mobility  Continue to trial mobility with no AD vs RW  Pt appears safe using both methods  A TUG Test was administered today and pt scored a 12  A score of < or = to 13 5 is indicative of a low fall risk  A Care Conference will be completed this week to discuss discharge planning and needs  Barriers to Discharge: Inaccessible home environment, Decreased caregiver support                See flowsheet documentation for full assessment

## 2018-10-03 NOTE — ASSESSMENT & PLAN NOTE
Acute renal failure secondary to septicemia  Continues to improve  DISCONTINUED lisinopril on 10/1/2018 and continue to hold until renal dysfunction closer to baseline  Continue amlodipine and metoprolol    Will substitute lisinopril with hydralazine in the interim

## 2018-10-03 NOTE — PLAN OF CARE
Problem: SLP ADULT - SWALLOWING, IMPAIRED  Goal: Advance to least restrictive diet without signs or symptoms of aspiration for planned discharge setting  See evaluation for individualized goals  Outcome: Progressing  Tolerating current diet well   Will assess soft vs mechanical soft due to lack of dentition

## 2018-10-03 NOTE — ASSESSMENT & PLAN NOTE
Recently admitted at [facility] for Klebsiella E coli bacteremia secondary to choledocholithiasis  Had ERCP and completed course of antibiotics x14 days

## 2018-10-03 NOTE — ASSESSMENT & PLAN NOTE
Status post ERCP    Associated with Klebsiella and E coli bacteremia and finished course of antibiotics

## 2018-10-04 ENCOUNTER — TELEPHONE (OUTPATIENT)
Dept: GASTROENTEROLOGY | Facility: CLINIC | Age: 83
End: 2018-10-04

## 2018-10-04 PROBLEM — K83.09 ACUTE CHOLANGITIS: Status: ACTIVE | Noted: 2018-10-04

## 2018-10-04 PROCEDURE — 97116 GAIT TRAINING THERAPY: CPT

## 2018-10-04 PROCEDURE — 97110 THERAPEUTIC EXERCISES: CPT

## 2018-10-04 PROCEDURE — 97530 THERAPEUTIC ACTIVITIES: CPT

## 2018-10-04 PROCEDURE — 92526 ORAL FUNCTION THERAPY: CPT

## 2018-10-04 PROCEDURE — 97535 SELF CARE MNGMENT TRAINING: CPT

## 2018-10-04 RX ADMIN — HYDRALAZINE HYDROCHLORIDE 25 MG: 25 TABLET ORAL at 17:13

## 2018-10-04 RX ADMIN — AMLODIPINE BESYLATE 5 MG: 5 TABLET ORAL at 08:24

## 2018-10-04 RX ADMIN — AMLODIPINE BESYLATE 5 MG: 5 TABLET ORAL at 17:13

## 2018-10-04 RX ADMIN — METOPROLOL TARTRATE 50 MG: 50 TABLET ORAL at 21:14

## 2018-10-04 RX ADMIN — NYSTATIN 1 APPLICATION: 100000 POWDER TOPICAL at 08:25

## 2018-10-04 RX ADMIN — NYSTATIN 1 APPLICATION: 100000 POWDER TOPICAL at 21:14

## 2018-10-04 RX ADMIN — HYDRALAZINE HYDROCHLORIDE 25 MG: 25 TABLET ORAL at 08:24

## 2018-10-04 RX ADMIN — NYSTATIN 1 APPLICATION: 100000 POWDER TOPICAL at 17:13

## 2018-10-04 RX ADMIN — FERROUS SULFATE TAB 325 MG (65 MG ELEMENTAL FE) 325 MG: 325 (65 FE) TAB at 06:35

## 2018-10-04 RX ADMIN — METOPROLOL TARTRATE 50 MG: 50 TABLET ORAL at 08:24

## 2018-10-04 NOTE — OCCUPATIONAL THERAPY NOTE
Occupational Therapy Treatment Note    Name:  Juan Crook   MRN:   [id number]  Age:     80 y o  Patient Active Problem List   Diagnosis    [facility]    Sepsis ([facility] )    Essential hypertension    Ambulatory dysfunction    Cholelithiasis with biliary obstruction    Acute renal failure (ARF) (HCC)    Clostridium difficile colitis    History of bacteremia    Anemia     Sepsis ([facility] ) [A41 9]  Ambulatory dysfunction [R26 2]      Subjective/Goals: " You are a good [facility]"    Vitals:139/64 HR 64 RA O2 97%    OT total treatment time:48 min    Additional goals & Comments: Pt seen for ADL this am  Pt remains w/ low hem  No order to hold therapy  Item clothing retrieval performed yesterday, pt out of clothing for now, will ask Family to bring in fresh clothing  Pt demonstrated decreased frustration tolerance w/ overhead top, however pleasant for remainder of tx  LE's elevated, all needs in reach  Dr Laura Leon arrived to see pt      10/04/18 0810   Restrictions/Precautions   Weight Bearing Precautions Per Order No   Other Precautions Contact/isolation;Hard of hearing;Visual impairment   Lifestyle   Reciprocal Relationships Wife,Daughter   Pain Assessment   Pain Assessment No/denies pain   ADL   Where Assessed Chair   Grooming Assistance 6  Modified Independent   Grooming Deficit (Oral care w/ setup)   UB Bathing Assistance 6  Modified Independent   LB Bathing Assistance 4  Minimal Assistance   LB Bathing Deficit (w/ buttocks only, MI for shawn, LE's and feet)   UB Dressing Assistance (Min A to doff shirt, S to don;  Mod A to don robe)   LB Dressing Assistance (MI don pants, S pullup/tie,  MI for socks)   Toileting Assistance  2  Maximal Assistance   Toileting Deficit (to doff /don depends incontinent of urine, decllined need BR)   Functional Standing Tolerance   Time ~ 5min   Activity (grooming func mob)   Bed Mobility   Rolling R 7  Independent   Rolling L 7  Independent   Supine to Sit 7  Independent Transfers   Sit to Stand 6  Modified independent   Additional items Armrests   Stand to Sit 6  Modified independent   Additional items Armrests   Stand pivot 6  Modified independent   Additional items (w/ RW)   Functional Mobility   Functional Mobility 5  Supervision   Additional Comments (pt cautious of others when moving w/ walker)   Additional items Rolling walker   Cognition   Overall Cognitive Status [facility]   Arousal/Participation Alert; Cooperative   Attention Within functional limits   Orientation Level Oriented X4   Following Commands Follows one step commands without difficulty   Activity Tolerance   Activity Tolerance Patient tolerated treatment well   Assessment   Assessment Patient participated in Skilled OT session this date with interventions consisting of ADL re training with the use of correct body mechnaics, safety awareness and fall prevention techniques,  therapeutic activities to: increase activity tolerance, increase standing tolerance time with unilateral UE support to complete sink level ADLs and increase dynamic sit/ stand balance during functional activity    Patient agreeable to OT treatment session, upon arrival patient was found seated OOB to Recliner  In comparison to previous session, patient with consistent in ADL transfers, LB drssing  Pt w/ Difficulty w/ overhead top 2nd decreased ROM R shoulder and decreased frustration tolerance w/ dressing  Patient continues to be functioning below baseline level, occupational performance remains limited secondary to factors listed above and increased risk for falls and injury  From OT standpoint, recommendation at time of d/c would be Home OT and family support  Patient to benefit from continued Occupational Therapy treatment while in the hospital to address deficits as defined above and maximize level of functional independence with ADLs and functional mobility      Plan   Goal Expiration Date 10/14/18   Treatment Day 5   OT Frequency (6x/wk) Recommendation   OT Discharge Recommendation (Home OT and 24/7 2nd some cognitive deficits on MOCA)   OT - OK to Discharge Yane Garcia Dani, [address]

## 2018-10-04 NOTE — PROGRESS NOTES
RECREATIONAL THERAPY PARTICIPATION LOG      ACTIVITY:    GAMES:        BINGO:        MUSIC STIM:        ARTS & CRAFTS:        EXERCISE:        CLUBS & MEETING:        SOCIALS:        SPIRITUAL:        INDEPENDENT:        1:1:  Resident was greeted by Recreational Therapy staff member  Resident declined a visit, due to being too fatigued from previous therapy treatment  Resident was offered and was assisted with a pillow to help support his neck and head to make him more comfortable in his reclining chair  Also, resident communicated that he was cold  Resident was offered blankets to increase his body temperature  Resident expressed sincere gratitude  Resident will continue to receive Recreational Therapy invitations, in order to help foster a well-rounded leisure lifestyle and optimize his quality of life  TINO Blood

## 2018-10-04 NOTE — SPEECH THERAPY NOTE
Speech/Language Pathology Progress Note    Patient Name: Yen Evans  Today's Date: 10/4/2018       Subjective:  "The food can be hard to chew sometimes"  Patient is cooperative for dysphagia therapy and agrees to pm snack  Objective: The patient is assessed with regular chocolate chip cookie  Bite strength is adequate  Mastication time is prolonged, but efficient with no oral residue  The patient takes small sips of thin liquids via straw without overt s/s aspiration  Assessment:   The patient reports improvement in appetite, but difficulty with some menu items  He states that grilled cheese was too hard to chew and would like to try diet downgrade to mechanical soft  Plan/Recommendations:  Recommend diet change to NDD2/mechanical soft solids with thin liquids  ST will continue to further assess tolerance

## 2018-10-04 NOTE — PHYSICAL THERAPY NOTE
57 minute treatment       10/04/18 0952   Pain Assessment   Pain Assessment 0-10   Pain Score No Pain   Restrictions/Precautions   Weight Bearing Precautions Per Order No   Other Precautions Contact/isolation;Hard of hearing;Visual impairment  (C Diff)   General   Chart Reviewed Yes   Response to Previous Treatment Patient with no complaints from previous session  Family/Caregiver Present No   Cognition   Overall Cognitive Status WFL   Following Commands Follows one step commands without difficulty   Subjective   Subjective This is good  (PT)   Transfers   Sit to Stand 6  Modified independent   Additional items Armrests   Stand to Sit 6  Modified independent   Additional items Armrests   Stand pivot 6  Modified independent   Additional items (w/ and w/o RW)   Ambulation/Elevation   Gait pattern Improper Weight shift  (decreaser arm swing)   Gait Assistance 5  Supervision   Assistive Device Rolling walker;None   Distance 75' x 1 w/ RW, 75' x 2 w/o AD   Endurance Deficit   Endurance Deficit Yes   Activity Tolerance   Activity Tolerance Patient limited by fatigue  (frequent rest breaks encouraged)   Exercises   Hip Flexion Sitting;Bilateral;AROM  (30 reps)   Hip Abduction Sitting;Bilateral  (MRE's x 30; Standing hip abd x 20 reps w/ CS)   Hip Adduction Sitting  (Add ball squeeze x 30)   Knee AROM Long Arc Quad Sitting;Bilateral;AROM  (x 30 reps)   Ankle Pumps Sitting;Bilateral  ( x 30 reps  Standing heel raises x 20 reps)   Marching Standing;20 reps  (w/ CS)   Assessment   Prognosis Good   Assessment Pt seen for PT treatment at bedside 2* contact isolation  Pt is doing well w/ amb  Pts gait is steady w/ and w/o AD  Pt did require frequent rest breaks during treatment      Goals   Patient Goals "to go home"   STG Expiration Date 10/12/18   LTG Expiration Date 10/12/18   Treatment Day 5   Plan   Treatment/Interventions (Continue per paln of care)   Progress Progressing toward goals   PT Frequency (6x/week)   Jase Nye Ximena, PTA

## 2018-10-04 NOTE — SOCIAL WORK
[facility] held with patient, patient's daughter and patient's wife  SW, OT, and DON also present  OT recommended supervision for patient of ADLs  Patient's wife is in the house 24/7 and daughter lives across the street but works during the day  SW discussed options, patient and wife said they would not consider snf  Patient, wife and daughter feel that patient can manage on their own  Patient has been ambulating with a RW, patient's wife reports she has a RW for patient at home never used and multiple canes  SW to follow-up if patient wants to order his own  Patient to potentially practice car transfer on Monday  Patient's wife discussed GI procedure, however nurse contacted GI and confirmed that procedure is scheduled 11/23 and would not be sooner  SW provided patient's daughter number if she has further questions for GI  No further questions/concerns at this time

## 2018-10-04 NOTE — NURSING NOTE
The pts wife contacted the unit and stated, "I am very upset, Bijal's stomach doctor wants to see him in the office in 4 weeks  However I know he will not go if the appointment is after he gets home"  The pts wife requested the pts nurse call to schedule a f/u appt  An appt was made for Nov 23, 2018  A copy of the paperwork sent by Dr Luis Hitchcock office was given to the pts family

## 2018-10-04 NOTE — TELEPHONE ENCOUNTER
----- Message from Eamon Diop PA-C sent at 10/2/2018 12:55 PM EDT -----  Tiesha Lynch,    Can you please schedule repeat ERCP with Dr Ryder Urrutia in 6 weeks?     Thanks,  [employer]

## 2018-10-04 NOTE — PLAN OF CARE
Problem: PHYSICAL THERAPY ADULT  Goal: Performs mobility at highest level of function for planned discharge setting  See evaluation for individualized goals  Treatment/Interventions: ADL retraining, Functional transfer training, LE strengthening/ROM, Elevations, Therapeutic exercise, Endurance training, Cognitive reorientation, Patient/family training, Equipment eval/education, Bed mobility, Gait training, Compensatory technique education, Spoke to nursing, Spoke to MD, Spoke to case management, Spoke to advanced practitioner, OT, Family  Equipment Recommended:  (To be determined closer to discharge)       See flowsheet documentation for full assessment, interventions and recommendations  Outcome: Progressing  Prognosis: Good  Problem List: Decreased strength, Decreased range of motion, Impaired balance, Decreased endurance, Decreased mobility, Impaired hearing, Impaired vision, Decreased skin integrity, Orthopedic restrictions  Assessment: Pt seen for PT treatment at bedside 2* contact isolation  Pt is doing well w/ amb  Pts gait is steady w/ and w/o AD  Pt did require frequent rest breaks during treatment  Barriers to Discharge: Inaccessible home environment, Decreased caregiver support                See flowsheet documentation for full assessment

## 2018-10-04 NOTE — PLAN OF CARE
Problem: OCCUPATIONAL THERAPY ADULT  Goal: Performs self-care activities at highest level of function for planned discharge setting  See evaluation for individualized goals  Treatment Interventions: ADL retraining, Functional transfer training, UE strengthening/ROM, Endurance training, Cognitive reorientation, Patient/family training, Equipment evaluation/education, Compensatory technique education, Continued evaluation          See flowsheet documentation for full assessment, interventions and recommendations  Outcome: Progressing  Limitation: Decreased ADL status, Decreased UE ROM, Decreased UE strength, Decreased Safe judgement during ADL, Decreased cognition, Decreased endurance, Decreased high-level ADLs  Prognosis: Fair  Assessment: Patient participated in Skilled OT session this date with interventions consisting of ADL re training with the use of correct body mechnaics, safety awareness and fall prevention techniques,  therapeutic activities to: increase activity tolerance, increase standing tolerance time with unilateral UE support to complete sink level ADLs and increase dynamic sit/ stand balance during functional activity    Patient agreeable to OT treatment session, upon arrival patient was found seated OOB to Recliner  In comparison to previous session, patient with consistent in ADL transfers, LB drssing  Pt w/ Difficulty w/ overhead top 2nd decreased ROM R shoulder and decreased frustration tolerance w/ dressing  Patient continues to be functioning below baseline level, occupational performance remains limited secondary to factors listed above and increased risk for falls and injury  From OT standpoint, recommendation at time of d/c would be Home OT and family support  Patient to benefit from continued Occupational Therapy treatment while in the hospital to address deficits as defined above and maximize level of functional independence with ADLs and functional mobility        OT Discharge Recommendation:  (Home OT and 24/7 2nd some cognitive deficits on MOCA)  OT - OK to Discharge: No      Comments: Kathe Krishnamurthy, [address]

## 2018-10-04 NOTE — PLAN OF CARE
Problem: SLP ADULT - SWALLOWING, IMPAIRED  Goal: Advance to least restrictive diet without signs or symptoms of aspiration for planned discharge setting  See evaluation for individualized goals         Outcome: Not Progressing  Downgrade to mechanical soft solids as patient reports difficulty with mastication of some menu items

## 2018-10-04 NOTE — CONSULTS
Consult Routine Foot Care- Podiatry   Jessy Low 80 y o  male MRN: [id number]  Unit/Bed#: -01 Encounter: [id number]    Assessment/Plan     Assessment:  1  Onychomycosis x 10  2  Calluses x calluses  3  PVD     Plan:  - Hallucal mycotic nails x2 debrided decreasing thickness by 1 mm  Mycotic toe nails 2-5 b/l were trimmed, decreasing length, without incidence utilizing a sharp nail nipper  - Calluses were sharply trimmed with a 15 blade down to normal epithelium    - All questions and concerns addressed  - Podiatry signing off, thank you for the consult  History of Present Illness     HPI:  Jessy Low is a 80 y o  male who presents with painful, elongated toenails and painful calluses  They have difficulty applying their socks and shoes due to the elongation of the nails  The pressure within their shoe gear is painful and they have been unable to cut their nails adequately  Pt states they have become "deformed" and painful in shoe gear  Also complains of discomfort to his calluses as well  No other complaints  Inpatient consult to Podiatry  Consult performed by: Kinza Vigil  Consult ordered by: Diana Tenorio        Review of Systems   Constitutional: Negative  HENT: Negative  Eyes: Negative  Respiratory: Negative  Cardiovascular: Negative  Gastrointestinal: Negative  Musculoskeletal: Negative   Skin: elongated thickened toenails, painful calluses   Neurological: Negative          Historical Information   Past Medical History:   Diagnosis Date    Hypertension     Medical history unknown     Renal disorder      Past Surgical History:   Procedure Laterality Date    ERCP N/A 9/18/2018    Procedure: ENDOSCOPIC RETROGRADE CHOLANGIOPANCREATOGRAPHY (ERCP) with stent placement;  Surgeon: Bud Calderon MD;  Location: [facility];  Service: Gastroenterology    NO PAST SURGERIES       Social History   History   Alcohol Use No     Comment: denies ETOH use     History   Drug Use No     Comment: denies     History   Smoking Status    Former Smoker    Types: Cigars   Smokeless Tobacco    Former User     Comment: states quit about 50 years ago, cigars "a few per week"      Family History: History reviewed  No pertinent family history      Meds/Allergies   Prescriptions Prior to Admission   Medication    amLODIPine (NORVASC) 5 mg tablet    [] ciprofloxacin (CIPRO) 500 mg tablet    lisinopril (ZESTRIL) 40 mg tablet    metoprolol tartrate (LOPRESSOR) 50 mg tablet    nystatin (MYCOSTATIN) cream    [] vancomycin (VANCOCIN) 50mg/mL SOLN     No Known Allergies    Objective   First Vitals:   Blood Pressure: 141/68 (18)  Pulse: 89 (18)  Temperature: 98 °F (36 7 °C) (18)  Temp Source: Temporal (18)  Respirations: 20 (18)  Height: 5' 4" (162 6 cm) (18)  Weight - Scale: 58 6 kg (129 lb 3 oz) (18)  SpO2: 97 % (18)    Current Vitals:   Blood Pressure: 139/64 (10/04/18 0725)  Pulse: 64 (10/04/18 0725)  Temperature: 97 8 °F (36 6 °C) (10/04/18 0725)  Temp Source: Temporal (10/04/18 0725)  Respirations: 19 (10/04/18 0725)  Height: 5' 4" (162 6 cm) (18)  Weight - Scale: 57 2 kg (126 lb 1 6 oz) (18 1010)  SpO2: 97 % (10/04/18 0725)    /64 (BP Location: Right arm)   Pulse 64   Temp 97 8 °F (36 6 °C) (Temporal)   Resp 19   Ht 5' 4" (1 626 m)   Wt 57 2 kg (126 lb 1 6 oz)   SpO2 97%   BMI 21 65 kg/m²     General Appearance:    Alert, cooperative, no distress   Head:    Normocephalic, without obvious abnormality, atraumatic   Eyes:    PERRL, conjunctiva/corneas clear, EOM's intact            Nose:   Moist mucous membranes, no drainage or sinus tenderness   Throat:   No tenderness, no exudates   Neck:   Supple, symmetrical, trachea midline, no JVD   Back:     Symmetric, no CVA tenderness   Lungs:     Clear to auscultation bilaterally, respirations unlabored   Chest wall:    No tenderness or deformity   Heart:    Regular rate and rhythm, S1 and S2 normal, no murmur, rub   or gallop   Abdomen:     Soft, non-tender, bowel sounds active all four quadrants,     no masses, no organomegaly     Extremities:   MMT is 4/5 to all compartments of the LE, +1/4 edema B/L, Digital ROM is intact,    Pulses:   R DP is +1/4, R PT is +1/4, L DP is +1/4, L PT is +1/4, CFT< 3sec to all digits  Thin/shiny skin noted to the B/L LE, pigmentary changes to B/L LE  Nail thickening b/l   Skin:   Nails are yellow discolored, thickened, elongated, with notable subungual debris and >2 mm thickness, toe nails 1-5 b/l  Calluses located medial hallux b/l       Neurologic:   CNII-XII intact  Normal strength, sensation and reflexes       Throughout  Gross sensation is intact  Protective sensation is diminished          Lab Results:   Admission on 09/28/2018   Component Date Value    Sodium 09/29/2018 139     Potassium 09/29/2018 3 9     Chloride 09/29/2018 106     CO2 09/29/2018 28     ANION GAP 09/29/2018 5     BUN 09/29/2018 38*    Creatinine 09/29/2018 2 25*    Glucose 09/29/2018 114*    Calcium 09/29/2018 7 7*    eGFR 09/29/2018 26*    WBC 09/29/2018 5 70     RBC 09/29/2018 2 38*    Hemoglobin 09/29/2018 7 9*    Hematocrit 09/29/2018 24 0*    MCV 09/29/2018 101*    MCH 09/29/2018 33 0     MCHC 09/29/2018 32 8     RDW 09/29/2018 15 3*    Platelets 12/13/0792 123*    MPV 09/29/2018 10 0     WBC 09/30/2018 5 40     RBC 09/30/2018 2 16*    Hemoglobin 09/30/2018 7 1*    Hematocrit 09/30/2018 21 6*    MCV 09/30/2018 100     MCH 09/30/2018 33 0     MCHC 09/30/2018 32 9     RDW 09/30/2018 15 2     MPV 09/30/2018 10 1     Platelets 89/19/6747 130*    Neutrophils Relative 09/30/2018 66*    Lymphocytes Relative 09/30/2018 18*    Monocytes Relative 09/30/2018 12*    Eosinophils Relative 09/30/2018 4     Basophils Relative 09/30/2018 1     Neutrophils Absolute 09/30/2018 3 60     Lymphocytes Absolute 09/30/2018 1 00     Monocytes Absolute 09/30/2018 0 60     Eosinophils Absolute 09/30/2018 0 20     Basophils Absolute 09/30/2018 0 10     Ferritin 09/30/2018 470*    Iron Saturation 09/30/2018 51     TIBC 09/30/2018 152*    Iron 09/30/2018 78     Ferritin 09/30/2018 480*    RBC Morphology 09/30/2018 abnormal     Hypochromia 09/30/2018 Present     Macrocytes 09/30/2018 Present     Platelet Estimate 60/34/7738 Borderline*    Fecal Occult Blood Diagn* 10/01/2018 Negative     ABO Grouping 09/30/2018 A     Rh Factor 09/30/2018 POSITIVE     Antibody Screen 09/30/2018 NEGATIVE     Specimen Expiration Date 09/30/2018 10/02/2018     WBC 10/01/2018 6 10     RBC 10/01/2018 2 54*    Hemoglobin 10/01/2018 8 2*    Hematocrit 10/01/2018 24 9*    MCV 10/01/2018 98     MCH 10/01/2018 32 1     MCHC 10/01/2018 32 8     RDW 10/01/2018 18 1*    Platelets 05/08/5360 147*    MPV 10/01/2018 10 0     Sodium 10/03/2018 134*    Potassium 10/03/2018 4 7     Chloride 10/03/2018 105     CO2 10/03/2018 26     ANION GAP 10/03/2018 3*    BUN 10/03/2018 23     Creatinine 10/03/2018 1 56*    Glucose 10/03/2018 79     Glucose, Fasting 10/03/2018 79     Calcium 10/03/2018 7 3*    eGFR 10/03/2018 40*    WBC 10/03/2018 4 80     RBC 10/03/2018 2 45*    Hemoglobin 10/03/2018 7 8*    Hematocrit 10/03/2018 24 1*    MCV 10/03/2018 98     MCH 10/03/2018 32 0     MCHC 10/03/2018 32 6     RDW 10/03/2018 17 9*    Platelets 37/38/6988 148*    MPV 10/03/2018 9 9      Imaging: I have personally reviewed pertinent films in PACS  EKG, Pathology, and Other Studies: I have personally reviewed pertinent reports        Code Status: Level 1 - Full Code  Advance Directive and Living Will:      Power of :    POLST:

## 2018-10-04 NOTE — NURSING NOTE
Pt awake and alert , no c/o chest pain or respiratory distress  Isolation continues  Continue with plan of care

## 2018-10-04 NOTE — TELEPHONE ENCOUNTER
ERCP scheduled with Dr Olivia in [place] on 11/23/2018  I gave Leo Jha from Rehab verbal instructions/faxed to [phone]

## 2018-10-05 ENCOUNTER — APPOINTMENT (INPATIENT)
Dept: NON INVASIVE DIAGNOSTICS | Facility: HOSPITAL | Age: 83
DRG: 092 | End: 2018-10-05
Payer: MEDICARE

## 2018-10-05 LAB — NT-PROBNP SERPL-MCNC: 5250 PG/ML (ref 0–299)

## 2018-10-05 PROCEDURE — 93321 DOPPLER ECHO F-UP/LMTD STD: CPT | Performed by: INTERNAL MEDICINE

## 2018-10-05 PROCEDURE — 97530 THERAPEUTIC ACTIVITIES: CPT

## 2018-10-05 PROCEDURE — 83880 ASSAY OF NATRIURETIC PEPTIDE: CPT | Performed by: NURSE PRACTITIONER

## 2018-10-05 PROCEDURE — 99308 SBSQ NF CARE LOW MDM 20: CPT | Performed by: NURSE PRACTITIONER

## 2018-10-05 PROCEDURE — 97535 SELF CARE MNGMENT TRAINING: CPT

## 2018-10-05 PROCEDURE — 93306 TTE W/DOPPLER COMPLETE: CPT

## 2018-10-05 PROCEDURE — 93325 DOPPLER ECHO COLOR FLOW MAPG: CPT | Performed by: INTERNAL MEDICINE

## 2018-10-05 PROCEDURE — 97116 GAIT TRAINING THERAPY: CPT

## 2018-10-05 PROCEDURE — 92526 ORAL FUNCTION THERAPY: CPT

## 2018-10-05 PROCEDURE — 93308 TTE F-UP OR LMTD: CPT | Performed by: INTERNAL MEDICINE

## 2018-10-05 RX ORDER — FUROSEMIDE 40 MG/1
40 TABLET ORAL ONCE
Status: COMPLETED | OUTPATIENT
Start: 2018-10-05 | End: 2018-10-05

## 2018-10-05 RX ADMIN — NYSTATIN 1 APPLICATION: 100000 POWDER TOPICAL at 18:13

## 2018-10-05 RX ADMIN — HYDRALAZINE HYDROCHLORIDE 25 MG: 25 TABLET ORAL at 08:48

## 2018-10-05 RX ADMIN — METOPROLOL TARTRATE 50 MG: 50 TABLET ORAL at 08:47

## 2018-10-05 RX ADMIN — HYDRALAZINE HYDROCHLORIDE 25 MG: 25 TABLET ORAL at 18:12

## 2018-10-05 RX ADMIN — NYSTATIN 1 APPLICATION: 100000 POWDER TOPICAL at 22:25

## 2018-10-05 RX ADMIN — AMLODIPINE BESYLATE 5 MG: 5 TABLET ORAL at 18:13

## 2018-10-05 RX ADMIN — FUROSEMIDE 40 MG: 40 TABLET ORAL at 13:57

## 2018-10-05 RX ADMIN — AMLODIPINE BESYLATE 5 MG: 5 TABLET ORAL at 08:47

## 2018-10-05 RX ADMIN — FERROUS SULFATE TAB 325 MG (65 MG ELEMENTAL FE) 325 MG: 325 (65 FE) TAB at 08:47

## 2018-10-05 RX ADMIN — METOPROLOL TARTRATE 50 MG: 50 TABLET ORAL at 22:23

## 2018-10-05 RX ADMIN — NYSTATIN 1 APPLICATION: 100000 POWDER TOPICAL at 09:17

## 2018-10-05 NOTE — SPEECH THERAPY NOTE
Speech/Language Pathology Progress Note    Patient Name: Luis Enrique ALEXANDER Date: 10/5/2018     [id number]  13 minutes     Subjective:  "The softer food is better, until I get my dentures"  The patient is awake and alert  OOB in chair  Objective: The patient is seen at am meal for dysphagia therapy  He continues to order his usual, cold cereal with milk  The patient independently sets up tray and feeds himself  Bite size and rate are adequate  The patient's mastication time is minimally prolonged, but efficient  No oral residue is noted  No overt s/s aspiration observed  Dietary staff report that patient typically orders cereal for breakfast and lunch  Assessment: At this time, the patient appears to be tolerating mechanical soft solids well  He wishes to continue receiving soft solids  Plan/Recommendations:  Recommend diet downgrade to mechanical soft solids  Will request change from MD  D/C ST at this time  Please re-consult with concerns, or if patient begins to wear dentures and wishes to have diet upgrade

## 2018-10-05 NOTE — PHYSICAL THERAPY NOTE
Attempted to see pt for skilled PT tx session, but pt off unit for doppler and various other testing; per conversation with nursing

## 2018-10-05 NOTE — OCCUPATIONAL THERAPY NOTE
OT TX (10 mins): Upon entry pt finishing up with PT  Educated pt on being due for functional update; agreed  SPO2 98% RA HR 59  BP seated 119/56  Pt asked OT if these were his socks- pointing to tubigrips  Pt doffed B/L socks with MI, donned B/L tubigrips with (S), and redonned socks with MI  Educated pt on purpose of tubigrips and correct method of donning  Increased SOB noted s/p completion of donning tubigrips  SPO2 96% RA HR 88  Cues to scoot back further on chair to prevent falling  OT reviewed all goals as per POC and progression made thus far  Pt states "physically I'm doing good " His goal remains "to get home " Educated pt on continuing OT another week to achieve remaining goals  No questions or concerns  Remains seated in recliner with all needs at end of session  Fair activity tolerance  See below for details on progress note       PROGRESS NOTE    Pt will achieve the following goals within 1 weeks     *G&H standing sinkside with (I)} and with Good balance for inc'd independence with self cares CURRENT- S/MI      *ADL transfers with distant (S)/mod (I) for inc'd independence with ADLs/purposeful tasks MET        *UB ADL at  mod (I)/(I) level, including item retrieval, for inc'd independence with self cares MET     *LB ADL with (S), including item retrieval, using AE prn for inc'd independence with self cares  MET     *Toileting with (S) for clothing management and hygiene for return to PLOF with personal care  MET     *Increase static stand balance to Fair+/Good- and dyn stand balance to Fair+ for inc'd safety with standing purposeful tasks  MET     *Increase stand tolerance x4-5m m for inc'd tolerance with standing purposeful tasks  MET     *Activity tolerance- Fair/Fair+ for inc'd tolerance with purposeful tasks  MET     *Participate in further cognitive testing to assist with safe d/c planning MET- SCORED 19/30 ON MOCA     *Tub/shower transfer using most appropriate technique (step in vs  Tub seat/transfer bench vs  Step in) with (S) for inc'd safety and independence with bathing  ** D/C GOAL- SPONGE BATHES**     LTG  Pt will achieve the following goals by d/c:     *ADL transfers with distant (S)/mod (I) for inc'd independence with ADLs/purposeful tasks  MET     *LB ADL with mod (I)/(I), including item retrieval, using AE prn for inc'd independence with self cares     *Toileting with (I) for clothing management and hygiene for return to PLOF with personal care     *Increase static stand balance to Good- and dyn stand balance to Fair+/Good- for inc'd safety with standing purposeful tasks     *Increase stand tolerance x7-10m for inc'd tolerance with standing purposeful tasks (ie: showering)     *Activity tolerance- Fair+ for inc'd tolerance with purposeful tasks     *Pt will demonstrate at least 75%-100% accuracy with simulated finance management activities in order to facilitate return to PLOF with management of personal finances     *Tub/shower transfer using most appropriate technique (step in vs  Tub seat/transfer bench vs  Step in) with mod (I) for inc'd safety and independence with bathing ** D/C GOAL- SPONGE BATHES **     *Pt will demonstrate independence with ADL item retrieval from various heights and distances, with use of AD prn (reacher), for inc'd safety in home  CURRENT- (S)    Pt making gains toward OT goals  Participated in 6/6 OT sessions  Pt achieved 8/9 short term goals and 1/8 long term goals  Hindered by fatigue, deconditioning, and cognition  Pt scored 19/30 on MOCA, which indicates safety concerns for returning home  However, wife is home 24/7 with pt and daughter lives across the street  Tub goals discharged by this OTR due to sponge bathing at home only, per notes  GI procedure is scheduled for 11/23/18 and is not able to be completed any sooner  New onset of LE edema today, CRNP ordered VDE to r/o DVT and ECHO  Continue OT x 1 more week to achieve goals and maximize function  POC remains appropriate  Please focus on short term deficit, then initiate long term goals  Care conference held this week and family aware of pt's current functional status and feel they can manage at home  Recommend VNA services to maximize function s/p discharge       Dk Estevez, OT

## 2018-10-05 NOTE — PLAN OF CARE
Problem: SLP ADULT - SWALLOWING, IMPAIRED  Goal: Advance to least restrictive diet without signs or symptoms of aspiration for planned discharge setting  See evaluation for individualized goals  Outcome: Completed Date Met: 10/05/18  Mechanical soft diet   D/C ST

## 2018-10-05 NOTE — PLAN OF CARE
Problem: PHYSICAL THERAPY ADULT  Goal: Performs mobility at highest level of function for planned discharge setting  See evaluation for individualized goals  Treatment/Interventions: ADL retraining, Functional transfer training, LE strengthening/ROM, Elevations, Therapeutic exercise, Endurance training, Cognitive reorientation, Patient/family training, Equipment eval/education, Bed mobility, Gait training, Compensatory technique education, Spoke to nursing, Spoke to MD, Spoke to case management, Spoke to advanced practitioner, OT, Family  Equipment Recommended:  (To be determined closer to discharge)       See flowsheet documentation for full assessment, interventions and recommendations  Outcome: Progressing  Prognosis: Good  Problem List: Decreased strength, Decreased range of motion, Impaired balance, Decreased endurance, Decreased mobility, Impaired hearing, Impaired vision, Decreased skin integrity, Orthopedic restrictions  Assessment: Pt is progressing well in PT, in all aspects of functional mobility  Continue to trial mobility with no AD vs RW  Pt appears safe using both methods  A TUG Test was administered today and pt scored a 12  A score of < or = to 13 5 is indicative of a low fall risk  A Care Conference will be completed this week to discuss discharge planning and needs  Barriers to Discharge: Inaccessible home environment, Decreased caregiver support                See flowsheet documentation for full assessment

## 2018-10-05 NOTE — PHYSICAL THERAPY NOTE
Physical Therapy Daily Treatment Note       10/05/18:PT Tx Session:38 minutes (1415 to 1453)   Pain Assessment   Pain Assessment No/denies pain   Pain Score No Pain   Restrictions/Precautions   Weight Bearing Precautions Per Order No   Other Precautions Contact/isolation;Hard of hearing;Visual impairment   Cognition   Overall Cognitive Status WFL   Arousal/Participation Alert; Cooperative   Attention Within functional limits   Following Commands Follows all commands and directions without difficulty   Subjective   Subjective (' Im not too bad, just cold and damp")   Bed Mobility   Rolling R 7  Independent   Rolling L 7  Independent   Supine to Sit 7  Independent   Sit to Supine 7  Independent   Additional Comments (Bed Mob: HOB flat, no rail, decreased time and effort)   Transfers   Sit to Stand 7  Independent   Additional items (Good hand placement)   Stand to Sit 7  Independent   Additional items (Good hand placement, + controlled descent)   Stand pivot 6  Modified independent   Additional items (SPT with RW -> good RW management, SPT with no AD MOD I)   Additional items (Trnasfers: EOB, unsecured chair, recliner)   Ambulation/Elevation   Gait pattern Short stride;Decreased foot clearance;Narrow CROW  (Decreased bilateral arm swing without AD)   Gait Assistance 6  Modified independent   Assistive Device (Amb with a RW for 244 feet with: MOD I)   Distance (Amb with no AD for 75 feet, 15 feet x 3 with MOD I)   Curbs (Up/down 1 curb step 4 x, both hands on doorframe MOD I)   Balance   Static Sitting (Good /Good+)   Dynamic Sitting (Good -)   Static Standing (Good - with RW)   Dynamic Standing (Fair + with RW)   Ambulatory (Fair + with RW)   Endurance Deficit   Endurance Deficit Yes   Endurance Deficit Description (Decreased endurancxec for activity')   Activity Tolerance   Nurse Made Aware (RN ( Piero Neville) cleared pt for PT today)   Assessment   Prognosis Good   Assessment Pt was off unit for a few hours for testing   RN Osorio Ortiz) cleared pt for therapy  Pt is currently on contact/isolation precautions for C-Diff  Then spoke with another RN Mehreen Henry) who reported that if pt's is NOT incontinent of stool; he may ambulate in hallway for therapy  This PT spoke with FRANKLIN regarding this and he  plans to have pt's stool tested again  Pt progressing very well in PT  Improvement assessed with: functional strength, balance, activity tolerance, transfers and gait with/without RW, and curb step  Continue PT to work towards consistent achievement of all STG's/LTG's  Issued pt and Independent sign ( written on white board and notified nursing ) for ambulation in bedroom with or without RW  Vitals at rest: SPO2 (RA) 98%, HR 59, /56; After ambulating with a RW for 244 feet: SPO2 (RA) 99%, HR 75   Anticipate d/c from PT next Monday or Tuesday     Barriers to Discharge Inaccessible home environment;Decreased caregiver support   Goals   Patient Goals (" Get back home")   STG Expiration Date 10/12/18   LTG Expiration Date 10/12/18   Treatment Day 6   Plan   Progress Improving as expected   PT Frequency (6x/week)   Recommendation   Equipment Recommended (To be determined closer to discharge )

## 2018-10-05 NOTE — PLAN OF CARE
Problem: OCCUPATIONAL THERAPY ADULT  Goal: Performs self-care activities at highest level of function for planned discharge setting  See evaluation for individualized goals  Treatment Interventions: ADL retraining, Functional transfer training, UE strengthening/ROM, Endurance training, Cognitive reorientation, Patient/family training, Equipment evaluation/education, Compensatory technique education, Continued evaluation          See flowsheet documentation for full assessment, interventions and recommendations  Outcome: Progressing  Limitation: Decreased ADL status, Decreased UE ROM, Decreased UE strength, Decreased Safe judgement during ADL, Decreased cognition, Decreased endurance, Decreased high-level ADLs  Prognosis: Fair  Assessment: Patient participated in Skilled OT session this date with interventions consisting of ADL re training with the use of correct body mechnaics, safety awareness and fall prevention techniques,  therapeutic activities to: increase activity tolerance and increase dynamic sit/ stand balance during functional activity    Patient agreeable to OT treatment session, upon arrival patient was found seated OOB to Recliner  In comparison to previous session, patient with improvement in Act xi   Patient continues to be functioning below baseline level, occupational performance remains limited secondary to factors listed above and increased risk for falls and injury  From OT standpoint, recommendation at time of d/c would be Home OT and increased services to increase safety  Patient to benefit from continued Occupational Therapy treatment while on TCF to address deficits as defined above and maximize level of functional independence with ADLs and functional mobility        OT Discharge Recommendation:  (Home OT and increased services)  OT - OK to Discharge: No      Comments: Serge Mane, [address]

## 2018-10-05 NOTE — PLAN OF CARE
Problem: OCCUPATIONAL THERAPY ADULT  Goal: Performs self-care activities at highest level of function for planned discharge setting  See evaluation for individualized goals  Treatment Interventions: ADL retraining, Functional transfer training, UE strengthening/ROM, Endurance training, Cognitive reorientation, Patient/family training, Equipment evaluation/education, Compensatory technique education, Continued evaluation          See flowsheet documentation for full assessment, interventions and recommendations  Outcome: Progressing  Limitation: Decreased ADL status, Decreased UE ROM, Decreased UE strength, Decreased Safe judgement during ADL, Decreased cognition, Decreased endurance, Decreased high-level ADLs  Prognosis: [address]     OT Discharge Recommendation:  (Home OT and increased services)  OT - OK to Discharge: No       OT TX (10 mins): Upon entry pt finishing up with PT  Educated pt on being due for functional update; agreed  SPO2 98% RA HR 59  BP seated 119/56  Pt asked OT if these were his socks- pointing to tubigrips  Pt doffed B/L socks with MI, donned B/L tubigrips with (S), and redonned socks with MI  Educated pt on purpose of tubigrips and correct method of donning  Increased SOB noted s/p completion of donning tubigrips  SPO2 96% RA HR 88  Cues to scoot back further on chair to prevent falling  OT reviewed all goals as per POC and progression made thus far  Pt states "physically I'm doing good " His goal remains "to get home " Educated pt on continuing OT another week to achieve remaining goals  No questions or concerns  Remains seated in recliner with all needs at end of session  Fair activity tolerance  See below for details on progress note       PROGRESS NOTE    Pt will achieve the following goals within 1 weeks     *G&H standing sinkside with (I)} and with Good balance for inc'd independence with self cares CURRENT- S/MI      *ADL transfers with distant (S)/mod (I) for inc'd independence with ADLs/purposeful tasks MET        *UB ADL at  mod (I)/(I) level, including item retrieval, for inc'd independence with self cares MET     *LB ADL with (S), including item retrieval, using AE prn for inc'd independence with self cares  MET     *Toileting with (S) for clothing management and hygiene for return to PLOF with personal care  MET     *Increase static stand balance to Fair+/Good- and dyn stand balance to Fair+ for inc'd safety with standing purposeful tasks  MET     *Increase stand tolerance x4-5m m for inc'd tolerance with standing purposeful tasks  MET     *Activity tolerance- Fair/Fair+ for inc'd tolerance with purposeful tasks  MET     *Participate in further cognitive testing to assist with safe d/c planning MET- SCORED 19/30 ON MOCA     *Tub/shower transfer using most appropriate technique (step in vs  Tub seat/transfer bench vs  Step in) with (S) for inc'd safety and independence with bathing  ** D/C GOAL- SPONGE BATHES**     LTG  Pt will achieve the following goals by d/c:     *ADL transfers with distant (S)/mod (I) for inc'd independence with ADLs/purposeful tasks  MET     *LB ADL with mod (I)/(I), including item retrieval, using AE prn for inc'd independence with self cares     *Toileting with (I) for clothing management and hygiene for return to PLOF with personal care     *Increase static stand balance to Good- and dyn stand balance to Fair+/Good- for inc'd safety with standing purposeful tasks     *Increase stand tolerance x7-10m for inc'd tolerance with standing purposeful tasks (ie: showering)     *Activity tolerance- Fair+ for inc'd tolerance with purposeful tasks     *Pt will demonstrate at least 75%-100% accuracy with simulated finance management activities in order to facilitate return to PLOF with management of personal finances     *Tub/shower transfer using most appropriate technique (step in vs  Tub seat/transfer bench vs  Step in) with mod (I) for inc'd safety and independence with bathing ** D/C GOAL- SPONGE BATHES **     *Pt will demonstrate independence with ADL item retrieval from various heights and distances, with use of AD prn (reacher), for inc'd safety in home  CURRENT- (S)    Pt making gains toward OT goals  Participated in 6/6 OT sessions  Pt achieved 8/9 short term goals and 1/8 long term goals  Hindered by fatigue, deconditioning, and cognition  Pt scored 19/30 on MOCA, which indicates safety concerns for returning home  However, wife is home 24/7 with pt and daughter lives across the street  Tub goals discharged by this OTR due to sponge bathing at home only, per notes  GI procedure is scheduled for 11/23/18 and is not able to be completed any sooner  New onset of LE edema today, CRNP ordered VDE to r/o DVT and ECHO  Continue OT x 1 more week to achieve goals and maximize function  POC remains appropriate  Please focus on short term deficit, then initiate long term goals  Care conference held this week and family aware of pt's current functional status and feel they can manage at home  Recommend VNA services to maximize function s/p discharge       Qiana Barrera, OT

## 2018-10-05 NOTE — PROGRESS NOTES
Pt seen at request of RN for concerns regarding BLE  Pt states he has no hx of edema or heart failure  Not sure when swelling in legs started  Denies CP, SOB  No calf pain  No dizziness or light headedness  /65 (BP Location: Left arm)   Pulse 70   Temp 97 9 °F (36 6 °C) (Temporal)   Resp 18   Ht 5' 4" (1 626 m)   Wt 57 2 kg (126 lb 1 6 oz)   SpO2 97%   BMI 21 65 kg/m²     Alert and oriented x3  Lungs diminished in bases  HRR, ++murmur, 2+ pitting pedal/ankle edema    Will order BNP and echo given new onset edema and cardiac murmur with no known hx of CHF  Will also check BLE venous duplex to r/o DVT  Not on lovenox due to anemia, but at risk for DVT 2/2 ambulatory dysfunction  40mg lasix po x1  RENETTA stockings to BLE's  Monitor anemia - labs already ordered for Sunday

## 2018-10-05 NOTE — OCCUPATIONAL THERAPY NOTE
Occupational Therapy Treatment Note    Name:  Mariah Peralta   MRN:   [id number]  Age:     80 y o  Patient Active Problem List   Diagnosis    [facility]ocytopenia [facility]    Sepsis ([facility] )    Essential hypertension    Ambulatory dysfunction    Cholelithiasis with biliary obstruction    Acute renal failure (ARF) (HCC)    Clostridium difficile colitis    History of bacteremia    Anemia    Acute cholangitis     Sepsis ([facility] ) [A41 9]  Ambulatory dysfunction [R26 2]      Subjective/Goals: " I'm doing better today"    Vitals: unable transport 2nd transport arrived    OT total treatment time:53 min    Additional goals & Comments: Pt seen for OT tx  Pt toileted had small formed BM  Pt performed UB dressing, restorator, organized clothing in closet  Prior to taking vitals, transport arrived to take pt for ECHO   Pt transferred to [facility] Sit > Sup Min A       10/05/18 1208   Restrictions/Precautions   Weight Bearing Precautions Per Order No   Other Precautions Contact/isolation;Hard of hearing;Visual impairment   Lifestyle   Reciprocal Relationships Wife, Daughter   Pain Assessment   Pain Assessment No/denies pain   ADL   Grooming Assistance 6  Modified Independent   Grooming Deficit Teeth care;Wash/dry face   UB Dressing Assistance 6  Modified independent   UB Dressing Deficit Increased time to complete   UB Dressing Comments (Min to don robe in standing w/v/c's)   LB Dressing Assistance 6  Modified independent   LB Dressing Deficit (to don slippers)   Toileting Assistance  (S for BM hygiene and CM, increased time to tie pj pants)   Functional Standing Tolerance   Time ~4min   Comments (Func mob in room, organizing clothes in closet)   Bed Mobility   Sit to Supine (MIn A onto litter)   Transfers   Sit to Stand 6  Modified independent   Additional items Armrests   Stand to Sit 6  Modified independent   Additional items Armrests   Stand pivot 6  Modified independent   Additional items (w/ RW)   Additional Comments Sttic F+/G-, Dyn F+   Functional Mobility   Functional Mobility (S/MI w/ RW)   Toilet Transfers   Toilet Transfer From Rolling walker   Toilet Transfer Type (to/from)   Toilet Transfers (MI Sit >< Stand )   Therapeutic Exercise - ROM   UE-ROM (Restorator to increase Act xi 8min w/ Min resistance)   Cognition   Overall Cognitive Status WFL   Arousal/Participation Alert; Cooperative   Attention Within functional limits   Orientation Level Oriented X4   Activity Tolerance   Activity Tolerance Patient tolerated treatment well   Assessment   Assessment Patient participated in Skilled OT session this date with interventions consisting of ADL re training with the use of correct body mechnaics, safety awareness and fall prevention techniques,  therapeutic activities to: increase activity tolerance and increase dynamic sit/ stand balance during functional activity    Patient agreeable to OT treatment session, upon arrival patient was found seated OOB to Recliner  In comparison to previous session, patient with improvement in Act xi   Patient continues to be functioning below baseline level, occupational performance remains limited secondary to factors listed above and increased risk for falls and injury  From OT standpoint, recommendation at time of d/c would be Home OT and increased services to increase safety  Patient to benefit from continued Occupational Therapy treatment while on TCF to address deficits as defined above and maximize level of functional independence with ADLs and functional mobility  Plan   Treatment Interventions ADL retraining;Functional transfer training; Activityengagement   Goal Expiration Date 10/14/18   Treatment Day 6   OT Frequency (6 x/wk)   Recommendation   OT Discharge Recommendation (Home OT and increased services)   OT - OK to Discharge Yane Junior, [address]

## 2018-10-06 LAB — GLUCOSE SERPL-MCNC: 87 MG/DL (ref 70–99)

## 2018-10-06 PROCEDURE — 97116 GAIT TRAINING THERAPY: CPT

## 2018-10-06 PROCEDURE — 82948 REAGENT STRIP/BLOOD GLUCOSE: CPT

## 2018-10-06 RX ADMIN — HYDRALAZINE HYDROCHLORIDE 25 MG: 25 TABLET ORAL at 10:01

## 2018-10-06 RX ADMIN — NYSTATIN 1 APPLICATION: 100000 POWDER TOPICAL at 10:02

## 2018-10-06 RX ADMIN — FERROUS SULFATE TAB 325 MG (65 MG ELEMENTAL FE) 325 MG: 325 (65 FE) TAB at 07:56

## 2018-10-06 RX ADMIN — NYSTATIN 1 APPLICATION: 100000 POWDER TOPICAL at 21:39

## 2018-10-06 RX ADMIN — NYSTATIN 1 APPLICATION: 100000 POWDER TOPICAL at 17:03

## 2018-10-06 RX ADMIN — METOPROLOL TARTRATE 50 MG: 50 TABLET ORAL at 10:02

## 2018-10-06 RX ADMIN — AMLODIPINE BESYLATE 5 MG: 5 TABLET ORAL at 17:03

## 2018-10-06 RX ADMIN — HYDRALAZINE HYDROCHLORIDE 25 MG: 25 TABLET ORAL at 17:03

## 2018-10-06 RX ADMIN — METOPROLOL TARTRATE 50 MG: 50 TABLET ORAL at 21:38

## 2018-10-06 RX ADMIN — AMLODIPINE BESYLATE 5 MG: 5 TABLET ORAL at 10:01

## 2018-10-06 NOTE — PHYSICAL THERAPY NOTE
19' session     10/06/18 1029   Pain Assessment   Pain Assessment No/denies pain   Restrictions/Precautions   Other Precautions Contact/isolation;Cognitive  (nurse reports OK for pt to leave room for amb)   General   Chart Reviewed Yes   Family/Caregiver Present No   Cognition   Overall Cognitive Status WFL   Arousal/Participation Alert; Cooperative   Following Commands Follows one step commands without difficulty   Subjective   Subjective i had a restless night   Bed Mobility   Supine to Sit 7  Independent   Sit to Supine 7  Independent   Transfers   Sit to Stand 7  Independent   Stand to Sit 7  Independent   Stand pivot 6  Modified independent   Ambulation/Elevation   Gait pattern Improper Weight shift; Short stride   Gait Assistance 6  Modified independent   Assistive Device Rolling walker  (and no AD)   Distance (15' x 4 with no AD then 0' with RW)   Curbs (1 step x 4 with door jam for support with mod I)   Balance   Ambulatory Fair +   Endurance Deficit   Endurance Deficit No   Activity Tolerance   Activity Tolerance Patient tolerated treatment well   Exercises   Balance training  standing marching, ABD, adnheel raises x 10 each with S    Assessment   Prognosis Good   Assessment Pt is doing well with all transfers, amb with and without AD    Slight unsteadiness with balance challenges but no LOB or assist needed   Goals   Patient Goals i don't know - I guess it doesn't really matter   STG Expiration Date 10/12/18   LTG Expiration Date 10/12/18   Treatment Day 7   Plan   Treatment/Interventions (cont as per POC)   Progress Progressing toward goals   PT Frequency (6x/week)

## 2018-10-06 NOTE — NURSING NOTE
No c /o chest pain or respiratory distress  oob with therapy in [place]  No furthur stools, check b m records  Needs encouragement to get out of bed  Mycostatin powder applied to  To decreasing redness at groins   Continue plan of care and isolation

## 2018-10-06 NOTE — PLAN OF CARE
Problem: PHYSICAL THERAPY ADULT  Goal: Performs mobility at highest level of function for planned discharge setting  See evaluation for individualized goals  Treatment/Interventions: ADL retraining, Functional transfer training, LE strengthening/ROM, Elevations, Therapeutic exercise, Endurance training, Cognitive reorientation, Patient/family training, Equipment eval/education, Bed mobility, Gait training, Compensatory technique education, Spoke to nursing, Spoke to MD, Spoke to case management, Spoke to advanced practitioner, OT, Family  Equipment Recommended:  (To be determined closer to discharge)       See flowsheet documentation for full assessment, interventions and recommendations  Outcome: Progressing  Prognosis: Good  Problem List: Decreased strength, Decreased range of motion, Impaired balance, Decreased endurance, Decreased mobility, Impaired hearing, Impaired vision, Decreased skin integrity, Orthopedic restrictions  Assessment: Pt is doing well with all transfers, amb with and without AD   Slight unsteadiness with balance challenges but no LOB or assist needed  Barriers to Discharge: Inaccessible home environment, Decreased caregiver support                See flowsheet documentation for full assessment

## 2018-10-07 LAB
ALBUMIN SERPL BCP-MCNC: 2.4 G/DL (ref 3–5.2)
ALP SERPL-CCNC: 118 U/L (ref 43–122)
ALT SERPL W P-5'-P-CCNC: 30 U/L (ref 9–52)
ANION GAP SERPL CALCULATED.3IONS-SCNC: 5 MMOL/L (ref 5–14)
AST SERPL W P-5'-P-CCNC: 54 U/L (ref 17–59)
BILIRUB SERPL-MCNC: 1.2 MG/DL
BUN SERPL-MCNC: 24 MG/DL (ref 5–25)
CALCIUM SERPL-MCNC: 7.2 MG/DL (ref 8.4–10.2)
CHLORIDE SERPL-SCNC: 105 MMOL/L (ref 97–108)
CO2 SERPL-SCNC: 25 MMOL/L (ref 22–30)
CREAT SERPL-MCNC: 1.62 MG/DL (ref 0.7–1.5)
ERYTHROCYTE [DISTWIDTH] IN BLOOD BY AUTOMATED COUNT: 18 %
GFR SERPL CREATININE-BSD FRML MDRD: 38 ML/MIN/1.73SQ M
GLUCOSE P FAST SERPL-MCNC: 83 MG/DL (ref 70–99)
GLUCOSE SERPL-MCNC: 83 MG/DL (ref 70–99)
HCT VFR BLD AUTO: 23.2 % (ref 41–53)
HGB BLD-MCNC: 7.6 G/DL (ref 13.5–17.5)
MCH RBC QN AUTO: 32.2 PG (ref 26.8–34.3)
MCHC RBC AUTO-ENTMCNC: 32.9 G/DL (ref 31.4–37.4)
MCV RBC AUTO: 98 FL (ref 80–100)
PLATELET # BLD AUTO: 125 THOUSANDS/UL (ref 150–450)
PMV BLD AUTO: 9.6 FL (ref 8.9–12.7)
POTASSIUM SERPL-SCNC: 4.8 MMOL/L (ref 3.6–5)
PROT SERPL-MCNC: 5.8 G/DL (ref 5.9–8.4)
RBC # BLD AUTO: 2.37 MILLION/UL (ref 4.5–5.9)
SODIUM SERPL-SCNC: 135 MMOL/L (ref 137–147)
VIT B12 SERPL-MCNC: 1285 PG/ML (ref 100–900)
WBC # BLD AUTO: 4 THOUSAND/UL (ref 4.31–10.16)

## 2018-10-07 PROCEDURE — 80053 COMPREHEN METABOLIC PANEL: CPT | Performed by: INTERNAL MEDICINE

## 2018-10-07 PROCEDURE — 82607 VITAMIN B-12: CPT | Performed by: FAMILY MEDICINE

## 2018-10-07 PROCEDURE — 85027 COMPLETE CBC AUTOMATED: CPT | Performed by: INTERNAL MEDICINE

## 2018-10-07 RX ADMIN — HYDRALAZINE HYDROCHLORIDE 25 MG: 25 TABLET ORAL at 17:42

## 2018-10-07 RX ADMIN — FERROUS SULFATE TAB 325 MG (65 MG ELEMENTAL FE) 325 MG: 325 (65 FE) TAB at 09:31

## 2018-10-07 RX ADMIN — METOPROLOL TARTRATE 50 MG: 50 TABLET ORAL at 22:05

## 2018-10-07 RX ADMIN — NYSTATIN 1 APPLICATION: 100000 POWDER TOPICAL at 22:06

## 2018-10-07 RX ADMIN — METOPROLOL TARTRATE 50 MG: 50 TABLET ORAL at 09:32

## 2018-10-07 RX ADMIN — AMLODIPINE BESYLATE 5 MG: 5 TABLET ORAL at 17:42

## 2018-10-07 RX ADMIN — AMLODIPINE BESYLATE 5 MG: 5 TABLET ORAL at 09:32

## 2018-10-07 RX ADMIN — HYDRALAZINE HYDROCHLORIDE 25 MG: 25 TABLET ORAL at 09:32

## 2018-10-07 RX ADMIN — NYSTATIN 1 APPLICATION: 100000 POWDER TOPICAL at 17:43

## 2018-10-07 RX ADMIN — NYSTATIN 1 APPLICATION: 100000 POWDER TOPICAL at 10:38

## 2018-10-08 ENCOUNTER — APPOINTMENT (INPATIENT)
Dept: NON INVASIVE DIAGNOSTICS | Facility: HOSPITAL | Age: 83
DRG: 092 | End: 2018-10-08
Payer: MEDICARE

## 2018-10-08 PROCEDURE — 93970 EXTREMITY STUDY: CPT

## 2018-10-08 PROCEDURE — G0515 COGNITIVE SKILLS DEVELOPMENT: HCPCS

## 2018-10-08 PROCEDURE — 97535 SELF CARE MNGMENT TRAINING: CPT

## 2018-10-08 PROCEDURE — 97530 THERAPEUTIC ACTIVITIES: CPT

## 2018-10-08 PROCEDURE — 97116 GAIT TRAINING THERAPY: CPT

## 2018-10-08 PROCEDURE — 93970 EXTREMITY STUDY: CPT | Performed by: SURGERY

## 2018-10-08 RX ADMIN — HYDRALAZINE HYDROCHLORIDE 25 MG: 25 TABLET ORAL at 08:06

## 2018-10-08 RX ADMIN — METOPROLOL TARTRATE 50 MG: 50 TABLET ORAL at 08:06

## 2018-10-08 RX ADMIN — AMLODIPINE BESYLATE 5 MG: 5 TABLET ORAL at 08:06

## 2018-10-08 RX ADMIN — FERROUS SULFATE TAB 325 MG (65 MG ELEMENTAL FE) 325 MG: 325 (65 FE) TAB at 07:58

## 2018-10-08 RX ADMIN — HYDRALAZINE HYDROCHLORIDE 25 MG: 25 TABLET ORAL at 17:25

## 2018-10-08 RX ADMIN — NYSTATIN 1 APPLICATION: 100000 POWDER TOPICAL at 08:05

## 2018-10-08 RX ADMIN — NYSTATIN 1 APPLICATION: 100000 POWDER TOPICAL at 17:25

## 2018-10-08 RX ADMIN — METOPROLOL TARTRATE 50 MG: 50 TABLET ORAL at 20:21

## 2018-10-08 RX ADMIN — AMLODIPINE BESYLATE 5 MG: 5 TABLET ORAL at 17:25

## 2018-10-08 RX ADMIN — NYSTATIN 1 APPLICATION: 100000 POWDER TOPICAL at 20:22

## 2018-10-08 NOTE — PLAN OF CARE
Problem: OCCUPATIONAL THERAPY ADULT  Goal: Performs self-care activities at highest level of function for planned discharge setting  See evaluation for individualized goals  Treatment Interventions: ADL retraining, Functional transfer training, UE strengthening/ROM, Endurance training, Cognitive reorientation, Patient/family training, Equipment evaluation/education, Compensatory technique education, Continued evaluation          See flowsheet documentation for full assessment, interventions and recommendations  Outcome: Progressing  Limitation: Decreased ADL status, Decreased UE ROM, Decreased UE strength, Decreased Safe judgement during ADL, Decreased cognition, Decreased endurance, Decreased high-level ADLs  Prognosis: Fair  Assessment: Patient participated in Skilled OT session this date with interventions consisting of ADL re training with the use of correct body mechnaics and increase OOB/ sitting tolerance   Patient agreeable to OT treatment session, upon arrival patient was found supine in bed  Patient continues to be functioning below baseline level, occupational performance remains limited secondary to factors listed above and increased risk for falls and injury  From OT standpoint, recommendation at time of d/c would be Home OT and increased services to increase safety  Patient to benefit from continued Occupational Therapy treatment while on TCF address deficits as defined above and maximize level of functional independence with ADLs and functional mobility        OT Discharge Recommendation: Home OT  OT - OK to Discharge:  (when medically cleared w/ increased services available)      Comments: TEVIN Sky

## 2018-10-08 NOTE — PROGRESS NOTES
RECREATIONAL THERAPY PARTICIPATION LOG      ACTIVITY:    GAMES:        BINGO:        MUSIC STIM:        ARTS & CRAFTS:        EXERCISE:        CLUBS & MEETING:        SOCIALS:        SPIRITUAL:        INDEPENDENT:        1:1:    Resident was seen for a Recreational Therapy greeting in his room  Resident declined a visit, due to fatigue  TINO Loja

## 2018-10-08 NOTE — PHYSICAL THERAPY NOTE
Physical Therapy Daily Treatment Note       10/08/18: 55 minutes ( 14:50 to 14:45)   Pain Assessment   Pain Assessment No/denies pain   Pain Score No Pain   Restrictions/Precautions   Weight Bearing Precautions Per Order No   Other Precautions Hard of hearing;Visual impairment   General   Chart Reviewed Yes   Response to Previous Treatment Patient with no complaints from previous session  Family/Caregiver Present No   Cognition   Arousal/Participation Alert; Cooperative   Attention Within functional limits   Orientation Level Oriented X4   Following Commands Follows all commands and directions without difficulty   Subjective   Subjective (" I feel pretty good today, just cold")   Bed Mobility   Rolling R 7  Independent   Rolling L 7  Independent   Supine to Sit 7  Independent   Sit to Supine 7  Independent   Additional Comments (Bed Mobility: decreased time and effort; HOB flat, no rail)   Transfers   Sit to Stand 7  Independent   Additional items (Good hand placement; decreased time and effort)   Stand to Sit 7  Independent   Additional items (Good hand placement; decreased time and effort)   Stand pivot (SPT with RW -> good RW management; SPT without RW and MOD I)   Ambulation/Elevation   Gait pattern Decreased foot clearance;Narrow CROW; Short stride   Additional items (Amb with a RW for 260 feet with MOD I; ambulated with no AD for 85 feet x 4 with MOD I   Stair Management Assistance 6  Modified independent  (See Assessment for details)   Curbs MOD I on curb steps  (Negotiated 2 curb steps, holding on doorframe with hands)   Balance   Static Sitting (Good+ ( improved, was Good- on PT Eval))   Dynamic Sitting (Good ( improved, was Fair on PT Eval))   Static Standing (Good (-) with RW)   Dynamic Standing (Fair + with RW)   Ambulatory (Fair + with RW)   Activity Tolerance   Activity Tolerance Patient limited by fatigue;Patient tolerated treatment well   Nurse Made Aware (RN wilma of pt'smedical status) Assessment   Prognosis Good   Problem List Decreased strength;Decreased range of motion;Decreased endurance; Impaired balance;Decreased mobility; Decreased coordination; Impaired vision; Impaired hearing;Orthopedic restrictions   Assessment Pt is discharged from skilled PT effective today, 10/8/18  Pt remains on TCF for medical management  Pt plans to return to home with wife when medically cleared  Since 9/29/18 PT Emily, pt has been seen for 7/7skilled PT tx sessions for therex, theract, and gait/elevation training  t with excellent progress in skilled PT this past week  Improvement assessed with: BLE MMT strength, functional strength, sit and stand balance, activity tolerance, safety, sit <-> stand transfers, SPT's with and without RW, gait with and without RW, and elevations  Today, pt's wife Magda Hirsch) reported during PT tx session that in addition to the door stoop, the pt has 4 steps with BHR's close together, to enter home  Pt then practiced 8 steps today with MOD I: pt performed 4 steps with BHR's, then 2 steps with right HR up, then 2 more steps with left HR up to address  different methods  Pt stated that his wife was a little confused and he only has 1 door stoop to enter/exit home  Wife then stated she couldn't remember which steps she was talking about  Pt's wife declined having pt practice transfer training in and out of [id number] [employer] today  Wife now plans to take pt home in car instead  All STG's/LTG's are now consistently achieved  Please refer goals on PT Discharge Summary for details on pt's functional mobility status at discharge     Barriers to Discharge Inaccessible home environment;Decreased caregiver support   Goals   Patient Goals (" I just want to warm up")   STG Expiration Date (D/C PT Today ( Previous Expiration Date: 10/12/18))   LTG Expiration Date (D/C PT Today ( Previous Expiration Date: 10/12/18))   Treatment Day 7   Plan   Treatment/Interventions ADL retraining;Functional transfer training;LE strengthening/ROM; Elevations; Therapeutic exercise; Endurance training;Cognitive reorientation;Patient/family training;Equipment eval/education; Bed mobility;Gait training; Compensatory technique education;Spoke to MD;Spoke to nursing;Spoke to case management;Spoke to advanced practitioner;OT;Family   Progress Discontinue PT   PT Frequency (6x/week)   Recommendation   Recommendation Home with family support;D/C PT;Home PT   Equipment Recommended (Pediatric RW )       BLE MMT Strength ( as compared to on 10/8/18 PT Eval)  Right hip flexion: 5/5 ( improved, was 4- to 4/5)  Left hip flexion: 4+/5 ( improved, was 4-/5)   Bilateral knee /: 5/5 ( improved, was 4+/5 )  Bilateral ankle DF: 4+/5 within available range ( improved, was 4/5)  Bilateral ankle DF/PF: 4+/5 ( improved, was 4/5)         Vitals:   Seated at rest: /59,  HR 56, SPO2 (RA) 99%    After ambulating with RW for 260 feet ( seated): /65, , HR 60, SPO2 (RA) 98%  After ambulating with RW for 85 feet ( seated, 2nd amb trial without AD): HR 71, SPO2 (RA) 99%        Ksenia Urbina, PT

## 2018-10-08 NOTE — NURSING NOTE
Pt awake and alert, vde performed in room according to staff vde was negative, oral report  No c/o chest pain or respiratory distress    No furthur stool  No c/o abdominal pain  Therapy continues  Labs noted  Less redness noted at groin area  conitnue plan of care

## 2018-10-08 NOTE — PLAN OF CARE
Problem: PHYSICAL THERAPY ADULT  Goal: Performs mobility at highest level of function for planned discharge setting  See evaluation for individualized goals  Treatment/Interventions: ADL retraining, Functional transfer training, LE strengthening/ROM, Elevations, Therapeutic exercise, Endurance training, Cognitive reorientation, Patient/family training, Equipment eval/education, Bed mobility, Gait training, Compensatory technique education, Spoke to nursing, Spoke to MD, Spoke to case management, Spoke to advanced practitioner, OT, Family  Equipment Recommended:  (To be determined closer to discharge)       See flowsheet documentation for full assessment, interventions and recommendations  Outcome: Adequate for Discharge  Prognosis: Good  Problem List: Decreased strength, Decreased range of motion, Decreased endurance, Impaired balance, Decreased mobility, Decreased coordination, Impaired vision, Impaired hearing, Orthopedic restrictions  Assessment: Pt is discharged from skilled PT effective today, 10/8/18  Pt remains on TCF for medical management  Pt plans to return to home with wife when medically cleared  Since 9/29/18 PT Eval, pt has been seen for 7/7skilled PT tx sessions for therex, theract, and gait/elevation training  t with excellent progress in skilled PT this past week  Improvement assessed with: BLE MMT strength, functional strength, sit and stand balance, activity tolerance, safety, sit <-> stand transfers, SPT's with and without RW, gait with and without RW, and elevations  Today, pt's wife Titi Hernandez) reported during PT tx session that in addition to the door stoop, the pt has 4 steps with BHR's close together, to enter home  Pt then practiced 8 steps today with MOD I: pt performed 4 steps with BHR's, then 2 steps with right HR up, then 2 more steps with left HR up to address  different methods  Pt stated that his wife was a little confused and he only has 1 door stoop to enter/exit home   Wife then stated she couldn't remember which steps she was talking about  Pt's wife declined having pt practice transfer training in and out of [id number] [facility] [employer] pick-up truck today  Wife now plans to take pt home in car instead  All STG's/LTG's are now consistently achieved  Please refer goals on PT Discharge Summary for details on pt's functional mobility status at discharge  Barriers to Discharge: Inaccessible home environment, Decreased caregiver support     Recommendation: Home with family support, D/C PT, Home PT          See flowsheet documentation for full assessment

## 2018-10-08 NOTE — OCCUPATIONAL THERAPY NOTE
Occupational Therapy Treatment Note    Name:  rAleen Rios   MRN:   [id number]  Age:     80 y o  Patient Active Problem List   Diagnosis    Th[facility]ocytopenia ([facility] [address] [facility]    Sepsis ([facility] [address] [facility]    Essential hypertension    Ambulatory dysfunction    Cholelithiasis with biliary obstruction    Acute renal failure (ARF) (HCC)    Clostridium difficile colitis    History of bacteremia    Anemia    Acute cholangitis     Sepsis ([facility] [address] ) [A41 9]  Ambulatory dysfunction [R26 2]      Subjective/Goals: " I am tired today"    OT total treatment time:33 min    Additional goals & Comments: Pt cleared by NRS for tx  Pt seen for bill management activity  Pt in bed, pj pants wet and requesting "bottle"  Pt encouraged to use BR  Pt w/ BM and urination  Pt bathed shawn/ and buttocks 2nd incontinent and wet clothing  Once completed bathing/dressing pt performed activity w/ Mod A to pay bill and write check  Pt then stated that he opens bills and wife makes out checks; " We do it together"  Pt also reports checks received are auto deposit into account  All needs in reach end of tx  Pt shared that he has not had new glasses for ~8 years and thinks this is part of his" problem"       10/08/18 1148   Restrictions/Precautions   Weight Bearing Precautions Per Order No   Other Precautions Visual impairment   Lifestyle   Reciprocal Relationships Wife/Daughter   Pain Assessment   Pain Assessment No/denies pain   ADL   Grooming Assistance (MI for hand washing)   LB Dressing Assistance 6  Modified independent   LB Dressing Deficit (doff pj pants, MI w/ increased time/ effort to don D w/ dep)   Toileting Assistance  (Pt incontinent of urine also requested "bottle")   Toileting Deficit (encourged to go to BR, urinated/ BM MI for hygiene and wash)   Toileting Comments (washed shawn and buttocks 2nd incontinent, clothing wet)   Money Management   Money Management Level of Assistance (per c/c/ eval pt does bill management)   Money Management ( Mod for bill paying pt then stated wife makes out checks)   Functional Standing Tolerance   Time 4min   Activity (LB bathe)   Transfers   Sit to Stand 7  Independent   Stand to Sit 6  Modified independent   Additional items Armrests   Stand pivot 6  Modified independent   Additional items (RW)   Additional Comments Static F+/ G-, Dyn F+    Functional Mobility   Functional Mobility 6  Modified independent   Additional items (MI RW in room)   Cognition   Overall Cognitive Status Impaired   Arousal/Participation Alert; Cooperative   Attention Within functional limits   Orientation Level Oriented X4   Following Commands Follows one step commands without difficulty   Activity Tolerance   Activity Tolerance Patient tolerated treatment well   Assessment   Assessment Patient participated in Skilled OT session this date with interventions consisting of ADL re training with the use of correct body mechnaics and increase OOB/ sitting tolerance   Patient agreeable to OT treatment session, upon arrival patient was found supine in bed  Patient continues to be functioning below baseline level, occupational performance remains limited secondary to factors listed above and increased risk for falls and injury  From OT standpoint, recommendation at time of d/c would be Home OT and increased services to increase safety  Patient to benefit from continued Occupational Therapy treatment while on TCF address deficits as defined above and maximize level of functional independence with ADLs and functional mobility      Plan   Treatment Interventions ADL retraining   Goal Expiration Date 10/14/18   Treatment Day 7   OT Frequency (6x/wk)   Recommendation   OT Discharge Recommendation Home OT   OT - OK to Discharge (when medically cleared w/ increased services available)     Jaciel [place]

## 2018-10-08 NOTE — PHYSICAL THERAPY NOTE
Physical Therapy Discharge Summary      Pt is discharged from skilled PT effective today, 10/8/18  Pt remains on TCF for medical management  Pt plans to return to home with wife when medically cleared  Since 9/29/18 PT Emily, pt has been seen for 7/7skilled PT tx sessions for therex, theract, and gait/elevation training  Pt with excellent progress in skilled PT this past week  Improvement assessed with: BLE MMT strength, functional strength, sit and stand balance, activity tolerance, safety, sit <-> stand transfers, SPT's with and without RW, gait with and without RW, and elevations  Today, pt's wife Stacey Allen) reported during PT tx session that in addition to the door stoop, the pt has 4 steps with BHR's close together, to enter home  Pt then practiced 8 steps today with MOD I: pt performed 4 steps with BHR's, then 2 steps with right HR up, then 2 more steps with left HR up to address  different methods  Pt stated that his wife was a little confused and he only has 1 door stoop to enter/exit home  Wife then stated she couldn't remember which steps she was talking about  Pt's wife declined having pt practice transfer training in and out of [id number] [employer]-up truck today  Wife now plans to take pt home in car instead  All STG's/LTG's are now achieved  Please see goals below for details on pt's functional mobility status at discharge      STG's = LTG's ( Expiration Date: 10/12/18 )     1  Patient will perform sit <->supine transfer ( HOB flat, no rail)  with MOD I ( in order to get in/out of bed)  ACHIEVED  CURRENTLY: sit <->supine (HOB flat,no rail) : Independent   CURRENTLY: roll left <->right (HOB flat, no rail) : Independent      2  Patient will perform all functional transfers with: MOD I ( in order to  transfer from one surface to another) ACHIEVED  CURRENTLY: sit <->stand with: MOD I -> Independent   CURRENTLY: SPT with RW and MOD I  CURRENTLY: SPT with no AD and MOD I     3   Patient will ambulate with no AD vs RW for > or = 75 feet with MOD I ( in order to safely access all necessary areas of home)  ACHIEVED  CURRENTLY: Ambulated with a RW for 230 to 260 feet with MOD I  CURRENTLY: Ambulated with no AD for 85 feet with MOD I     4  Patient will ascend/descend 1 curb step, without rails, using appropriate method/technique, AD prn, with MOD I ( to safely enter/exite home over doorstoop)  ACHIEVED  CURRENTLY: Pt negotiates 1 curb step, holding onto doorframe with bilateral hands, with no AD,  and MOD I        Recommendations:  1  Home PT to maximize safe Independent mobility and to re-acclimate pt back into his own personal environment     2  Supervision and assist as needed on steps; until Home PT formally assesses pt's safety on his on [place] home    3   Recommend ambulation with or without RW in the home and RW for longer distances outside the home       Meli Vázquez, PT

## 2018-10-09 PROCEDURE — 97535 SELF CARE MNGMENT TRAINING: CPT

## 2018-10-09 PROCEDURE — 97530 THERAPEUTIC ACTIVITIES: CPT

## 2018-10-09 RX ADMIN — NYSTATIN 1 APPLICATION: 100000 POWDER TOPICAL at 21:05

## 2018-10-09 RX ADMIN — FERROUS SULFATE TAB 325 MG (65 MG ELEMENTAL FE) 325 MG: 325 (65 FE) TAB at 08:15

## 2018-10-09 RX ADMIN — AMLODIPINE BESYLATE 5 MG: 5 TABLET ORAL at 17:00

## 2018-10-09 RX ADMIN — NYSTATIN 1 APPLICATION: 100000 POWDER TOPICAL at 17:00

## 2018-10-09 RX ADMIN — AMLODIPINE BESYLATE 5 MG: 5 TABLET ORAL at 08:14

## 2018-10-09 RX ADMIN — HYDRALAZINE HYDROCHLORIDE 25 MG: 25 TABLET ORAL at 17:00

## 2018-10-09 RX ADMIN — METOPROLOL TARTRATE 50 MG: 50 TABLET ORAL at 08:15

## 2018-10-09 RX ADMIN — HYDRALAZINE HYDROCHLORIDE 25 MG: 25 TABLET ORAL at 08:15

## 2018-10-09 RX ADMIN — NYSTATIN 1 APPLICATION: 100000 POWDER TOPICAL at 08:15

## 2018-10-09 RX ADMIN — METOPROLOL TARTRATE 50 MG: 50 TABLET ORAL at 21:04

## 2018-10-09 NOTE — PLAN OF CARE
Problem: OCCUPATIONAL THERAPY ADULT  Goal: Performs self-care activities at highest level of function for planned discharge setting  See evaluation for individualized goals  Treatment Interventions: ADL retraining, Functional transfer training, UE strengthening/ROM, Endurance training, Cognitive reorientation, Patient/family training, Equipment evaluation/education, Compensatory technique education, Continued evaluation          See flowsheet documentation for full assessment, interventions and recommendations  Outcome: Adequate for Discharge  Limitation: Decreased ADL status, Decreased UE ROM, Decreased UE strength, Decreased Safe judgement during ADL, Decreased cognition, Decreased endurance, Decreased high-level ADLs  Prognosis: Fair  Assessment: Patient participated in Skilled OT session this date with interventions consisting of ADL re training with the use of correct body mechnaics, safety awareness and fall prevention techniques and  therapeutic activities to: increase activity tolerance   Patient agreeable to OT treatment session, upon arrival patient was found seated OOB to Recliner  Patient appears to be at baseline level  Will recommend d/c OT  From OT standpoint, recommendation at time of d/c would be Home OT and increased services          OT Discharge Recommendation: Home OT  OT - OK to Discharge:  (when medically cleared w/ increased services available)      Comments: Chick Goodell, COTA

## 2018-10-09 NOTE — SOCIAL WORK
MATTHEW notified by PT/OT plans to discharge patient  MATTHEW m/w patient to discuss discharge date  Patient would like to discharge home on Friday  MATTHEW s/w patient's wife on the phone with patient present and planned for Friday 2:00 PM pick-up in [facility] (wife does not want to come up to unit)  MATTHEW provided patient's wife with unit contact information incase she is unable to make 2:00 PM      Patient does not have insurance coverage for DME  MATTHEW sent referral for a youth RW (requested by PT) to [facility]  MATTHEW informed patient of $99 copay for RW  Patient agreeable to pay  Dilcia Harder to deliver  Patient is accepted with [facility] for discharge  No further needs noted at this time

## 2018-10-09 NOTE — NURSING NOTE
Pt alert and oriented x 3  No c/o chest pian or respiratory distress  Participating with therapy and encouraged to get out of bed  Pedal edema at ankles with pulses positive  No c/o abdominal pain , or nausea   Continue plan of care

## 2018-10-09 NOTE — OCCUPATIONAL THERAPY NOTE
Occupational Therapy Treatment Note    Name:  Mt Tan   MRN:   [id number]  Age:     80 y o  Patient Active Problem List   Diagnosis    [facility] [address] [facility]    Sepsis ([facility] [address] [facility]    Essential hypertension    Ambulatory dysfunction    Cholelithiasis with biliary obstruction    Acute renal failure (ARF) (HCC)    Clostridium difficile colitis    History of bacteremia    Anemia    Acute cholangitis     Sepsis ([facility] [address] ) [A41 9]  Ambulatory dysfunction [R26 2]      Subjective/Goals: " I have some blurriness in my L eye, I mentioned to some NRS's"    Vitals: 126/58  HR 63 RAO2 100%, pt appeared to be SOB at times however denied, possibly wheezing, NRS notified    OT total treatment time: 39 miin    Additional goals & Comments: Pt seen in room /outside room  Pt denied SOB, pain  C/o L eye blurry at times, ABARCA recommended Opthalmology appt ASAP SW may be able to assist in locating one in home area  Pt interested in walker basket for walker to increase safety  Pt Ox 4 able to report 911 in emergency  Pt appears to be at baseline will recommend D/c OT to OTR       10/09/18 1100   Restrictions/Precautions   Weight Bearing Precautions Per Order No   Other Precautions Hard of hearing;Visual impairment   Lifestyle   Reciprocal Relationships Wife/ Daughter   Pain Assessment   Pain Assessment 0-10   Pain Score No Pain   Hospital Pain Intervention(s) Medication (See MAR); Repositioned; Emotional support   ADL   UB Dressing Assistance 4  Minimal Assistance   UB Dressing Deficit (to locate sleeves, belt and to pull robe around back)   LB Dressing Assistance (MI to don pj pants and pull up)   LB Dressing Deficit Increased time to complete   LB Dressing Comments (Dependent to don depends)   Light Housekeeping   Light Housekeeping Level (MI for item retrieval from night stand and closet w/ basket)   Functional Standing Tolerance   Time 6 min   Activity (func mob)   Transfers   Sit to Stand 6  Modified independent   Additional items Armrests   Stand to Sit 6  Modified independent   Additional items Armrests   Stand pivot 6  Modified independent   Additional items (RW)   Additional Comments (Static F+Dyn Unsupp F/F+, Dyn G-)   Functional Mobility   Functional Mobility 6  Modified independent   Additional items Rolling walker   Cognition   Overall Cognitive Status Impaired   Arousal/Participation Alert; Cooperative   Attention Within functional limits   Orientation Level Oriented X4   Following Commands Follows all commands and directions without difficulty   Additional Activities   Additional Activities (ball toss in standing 4min)   Activity Tolerance   Activity Tolerance Patient tolerated treatment well   Assessment   Assessment Patient participated in Skilled OT session this date with interventions consisting of ADL re training with the use of correct body mechnaics, safety awareness and fall prevention techniques and  therapeutic activities to: increase activity tolerance   Patient agreeable to OT treatment session, upon arrival patient was found seated OOB to Recliner  Patient appears to be at baseline level  Will recommend d/c OT  From OT standpoint, recommendation at time of d/c would be Home OT and increased services  Plan   Treatment Interventions ADL retraining;Equipment evaluation/education; Activityengagement   Goal Expiration Date 10/14/18   Treatment Day 8   OT Frequency (6x/wk)     TEVIN Robbins

## 2018-10-10 PROBLEM — I35.0 AORTIC STENOSIS, MODERATE: Status: ACTIVE | Noted: 2018-10-10

## 2018-10-10 PROBLEM — R60.0 LOWER EXTREMITY EDEMA: Status: ACTIVE | Noted: 2018-10-10

## 2018-10-10 PROBLEM — H53.8 BLURRY VISION, LEFT EYE: Status: ACTIVE | Noted: 2018-10-10

## 2018-10-10 LAB
ANION GAP SERPL CALCULATED.3IONS-SCNC: 3 MMOL/L (ref 5–14)
ANION GAP SERPL CALCULATED.3IONS-SCNC: 5 MMOL/L (ref 5–14)
ANISOCYTOSIS BLD QL SMEAR: PRESENT
BUN SERPL-MCNC: 23 MG/DL (ref 5–25)
BUN SERPL-MCNC: 23 MG/DL (ref 5–25)
CALCIUM SERPL-MCNC: 7.5 MG/DL (ref 8.4–10.2)
CALCIUM SERPL-MCNC: 7.7 MG/DL (ref 8.4–10.2)
CHLORIDE SERPL-SCNC: 105 MMOL/L (ref 97–108)
CHLORIDE SERPL-SCNC: 106 MMOL/L (ref 97–108)
CO2 SERPL-SCNC: 26 MMOL/L (ref 22–30)
CO2 SERPL-SCNC: 28 MMOL/L (ref 22–30)
CREAT SERPL-MCNC: 1.41 MG/DL (ref 0.7–1.5)
CREAT SERPL-MCNC: 1.51 MG/DL (ref 0.7–1.5)
EOSINOPHIL # BLD AUTO: 0.34 THOUSAND/UL (ref 0–0.4)
EOSINOPHIL NFR BLD MANUAL: 11 % (ref 0–6)
ERYTHROCYTE [DISTWIDTH] IN BLOOD BY AUTOMATED COUNT: 18.5 %
GFR SERPL CREATININE-BSD FRML MDRD: 42 ML/MIN/1.73SQ M
GFR SERPL CREATININE-BSD FRML MDRD: 45 ML/MIN/1.73SQ M
GLUCOSE SERPL-MCNC: 143 MG/DL (ref 70–99)
GLUCOSE SERPL-MCNC: 83 MG/DL (ref 70–99)
HCT VFR BLD AUTO: 24.2 % (ref 41–53)
HGB BLD-MCNC: 8 G/DL (ref 13.5–17.5)
HYPERCHROMIA BLD QL SMEAR: PRESENT
LYMPHOCYTES # BLD AUTO: 1.02 THOUSAND/UL (ref 0.5–4)
LYMPHOCYTES # BLD AUTO: 33 % (ref 20–50)
MCH RBC QN AUTO: 33 PG (ref 26.8–34.3)
MCHC RBC AUTO-ENTMCNC: 33.1 G/DL (ref 31.4–37.4)
MCV RBC AUTO: 100 FL (ref 80–100)
MONOCYTES # BLD AUTO: 0.16 THOUSAND/UL (ref 0.2–0.9)
MONOCYTES NFR BLD AUTO: 5 % (ref 1–10)
NEUTS SEG # BLD: 1.58 THOUSAND/UL (ref 1.8–7.8)
NEUTS SEG NFR BLD AUTO: 51 %
PLATELET # BLD AUTO: 110 THOUSANDS/UL (ref 150–450)
PLATELET BLD QL SMEAR: ABNORMAL
PMV BLD AUTO: 9.7 FL (ref 8.9–12.7)
POTASSIUM SERPL-SCNC: 4.1 MMOL/L (ref 3.6–5)
POTASSIUM SERPL-SCNC: 5.8 MMOL/L (ref 3.6–5)
RBC # BLD AUTO: 2.43 MILLION/UL (ref 4.5–5.9)
RBC MORPH BLD: ABNORMAL
SODIUM SERPL-SCNC: 136 MMOL/L (ref 137–147)
SODIUM SERPL-SCNC: 137 MMOL/L (ref 137–147)
TOTAL CELLS COUNTED SPEC: 100
WBC # BLD AUTO: 3.1 THOUSAND/UL (ref 4.31–10.16)

## 2018-10-10 PROCEDURE — 97110 THERAPEUTIC EXERCISES: CPT

## 2018-10-10 PROCEDURE — 97530 THERAPEUTIC ACTIVITIES: CPT

## 2018-10-10 PROCEDURE — 85007 BL SMEAR W/DIFF WBC COUNT: CPT | Performed by: FAMILY MEDICINE

## 2018-10-10 PROCEDURE — 99308 SBSQ NF CARE LOW MDM 20: CPT | Performed by: FAMILY MEDICINE

## 2018-10-10 PROCEDURE — 80048 BASIC METABOLIC PNL TOTAL CA: CPT | Performed by: FAMILY MEDICINE

## 2018-10-10 PROCEDURE — 85027 COMPLETE CBC AUTOMATED: CPT | Performed by: FAMILY MEDICINE

## 2018-10-10 RX ADMIN — NYSTATIN 1 APPLICATION: 100000 POWDER TOPICAL at 08:10

## 2018-10-10 RX ADMIN — METOPROLOL TARTRATE 50 MG: 50 TABLET ORAL at 08:08

## 2018-10-10 RX ADMIN — METOPROLOL TARTRATE 50 MG: 50 TABLET ORAL at 20:49

## 2018-10-10 RX ADMIN — AMLODIPINE BESYLATE 5 MG: 5 TABLET ORAL at 18:21

## 2018-10-10 RX ADMIN — FERROUS SULFATE TAB 325 MG (65 MG ELEMENTAL FE) 325 MG: 325 (65 FE) TAB at 08:09

## 2018-10-10 RX ADMIN — AMLODIPINE BESYLATE 5 MG: 5 TABLET ORAL at 08:08

## 2018-10-10 RX ADMIN — NYSTATIN 1 APPLICATION: 100000 POWDER TOPICAL at 16:35

## 2018-10-10 RX ADMIN — NYSTATIN 1 APPLICATION: 100000 POWDER TOPICAL at 20:50

## 2018-10-10 RX ADMIN — HYDRALAZINE HYDROCHLORIDE 25 MG: 25 TABLET ORAL at 08:08

## 2018-10-10 NOTE — ASSESSMENT & PLAN NOTE
· Found on the echo asymptomatic  Avoid hypotension  Keep blood pressures greater than 120 sbp     Follow up outpatient with Cardiology

## 2018-10-10 NOTE — OCCUPATIONAL THERAPY NOTE
OT TX (40 mins): Upon entry pt supine in bed  Educated pt on being his final OT session; agreed  Denies pain  SPO2 97% RA HR 65  /57 HR 55  Supine to sit (I)  Pt donned B/L slippers (I) while seated on EOB  Sit to stand (I); poor hand placement but no LOB  Mobility into bathroom with MI using RW  Good- dynamic stand balance  Pt washed hands at sink (I) with Good stand balance  Declined the need to use the bathroom  SPT to recliner with MI using RW  Stand to sit (I) with controlled descent  Performed functional mobility on unit x 7 mins  SPO2 98% RA HR 90  OT reviewed all goals as per POC and achievement, except bill management  Pt confirms that wife and him complete together  Also confirms that his wife is home with him and his daughter lives across the street if he needs her  Discussed RW ordered for home; not delivered yet  Reviewed VNA services upon return home  Pt alert and oriented x 4  Correctly answered fire safety questions and able to state to call 911 in the event of an emergency  Pt states "I feel like I'm back to myself " Reports no further questions or concerns  Sit to stand (I) from recliner  SPT to EOB with MI using RW  Sit to supine (I)  Remains in bed with all needs at end of session  Fair+ activity tolerance  D/C'd from OT  OCCUPATIONAL THERAPY DISCHARGE SUMMARY    DISPOSITION: Home with wife on Friday  AE/DME: RW      DISCHARGE SUMMARY: Pt discharged from OT effective today due to being at functional baseline  Participated in a total of 9/9 OT sessions  Pt achieved 9/9 short term goals and 7/8 long term goals  Deficit remain in bill management, which him and his wife complete together  Pt also has difficulties donning long sleeved items, due to vision  Pt scored 19/30 on MOCA assessment, which indicates safety concerns for being home alone  Wife is home with pt and will assist PRN   In addition, daughter lives across the street and will also assist  Pt will have VNA services to ensure safety in own environment and maximize function  Pt is safe to return home with wife and services  No further concerns noted  RECOMMENDATIONS: VNA services & supervision/assist from wife and daughter KJ Barrera, OT

## 2018-10-10 NOTE — ASSESSMENT & PLAN NOTE
Anemia chronic disease secondary to recent septicemia  Improved with 1 unit PRBC- on 10/07 his hemoglobin was 7 6, he is currently asymptomatic he is on ferrous sulfate his Hemoccult was negative    Will recheck hemoglobin now will keep hemoglobin greater than 7 5

## 2018-10-10 NOTE — ASSESSMENT & PLAN NOTE
Thrombocytopenia secondary to recent admission for sepsis/bacteremia    Dropped on 10/7- will repeat labs now

## 2018-10-10 NOTE — ASSESSMENT & PLAN NOTE
Discontinued lisinopril given ongoing acute renal failure  Continue metoprolol and amlodipine    Discontinue hydralazine with moderate aortic stenosis avoid hypotension

## 2018-10-10 NOTE — SOCIAL WORK
SW notified by Genet Molina from [facility] that she went to deliver youth RW and patient was with his wife on the phone  Per wife, there are many bills that need to be paid and cannot afford a $99 RW  SW to determine if patient can qualify for financial assistance program with Rosana Hernandez

## 2018-10-10 NOTE — ASSESSMENT & PLAN NOTE
Acute renal failure secondary to septicemia  Continues to improve  DISCONTINUED lisinopril on 10/1/2018 and continue to hold until renal dysfunction closer to baseline  Continue amlodipine and metoprolol  · Last creatinine on 10/07 was 1 6  He also had a low hemoglobin    Will recheck labs now

## 2018-10-10 NOTE — ASSESSMENT & PLAN NOTE
· Improved currently to +1 suspect secondary to diastolic heart failure though echo was not able to adequately assess  He was given Lasix 40 mg p o   X1   · With the BNP elevated 5000  · Will recheck kidney function currently  · Weight has been stable and post Lasix lost weight  · Venous duplex negative  · He does have moderate aortic stenosis found on the echo  · Currently no additional Lasix warranted, if transfusion of blood is given  Lasix needs to be given as well

## 2018-10-10 NOTE — PROGRESS NOTES
Progress Note - Mariah Peralta 12/9/1932, 80 y o  male MRN: [id number]    Unit/Bed#: -01 Encounter: [id number]    Primary Care Provider: Sindy Mendez PA-C   Date and time admitted to hospital: 9/28/2018  5:32 PM        Blurry vision, left eye   Assessment & Plan    · Patient has chronic blurry vision in left eye which is intermittent currently he does not have it  His vision is clear both LEs in without glasses  He has states he has this for couple of months  Recommend to see his ophthalmologist as an outpatient  Lower extremity edema   Assessment & Plan    · Improved currently to +1 suspect secondary to diastolic heart failure though echo was not able to adequately assess  He was given Lasix 40 mg p o  X1   · With the BNP elevated 5000  · Will recheck kidney function currently  · Weight has been stable and post Lasix lost weight  · Venous duplex negative  · He does have moderate aortic stenosis found on the echo  · Currently no additional Lasix warranted, if transfusion of blood is given  Lasix needs to be given as well     Aortic stenosis, moderate   Assessment & Plan    · Found on the echo asymptomatic  Avoid hypotension  Keep blood pressures greater than 120 sbp  Follow up outpatient with Cardiology     Anemia   Assessment & Plan    Anemia chronic disease secondary to recent septicemia  Improved with 1 unit PRBC- on 10/07 his hemoglobin was 7 6, he is currently asymptomatic he is on ferrous sulfate his Hemoccult was negative  Will recheck hemoglobin now will keep hemoglobin greater than 7 5     History of bacteremia   Assessment & Plan    Recently admitted at [facility] for Klebsiella E coli bacteremia secondary to choledocholithiasis  Had ERCP and completed course of antibiotics x14 days  Clostridium difficile colitis   Assessment & Plan    Finished 14 day course of vancomycin    No further loose stooling     Acute renal failure (ARF) ([address] )   Assessment & Plan    Acute renal failure secondary to septicemia  Continues to improve  DISCONTINUED lisinopril on 10/1/2018 and continue to hold until renal dysfunction closer to baseline  Continue amlodipine and metoprolol  · Last creatinine on 10/07 was 1 6  He also had a low hemoglobin  Will recheck labs now     Cholelithiasis with biliary obstruction   Assessment & Plan    Status post ERCP  Associated with Klebsiella and E coli bacteremia and finished course of antibiotics     Essential hypertension   Assessment & Plan    Discontinued lisinopril given ongoing acute renal failure  Continue metoprolol and amlodipine  Discontinue hydralazine with moderate aortic stenosis avoid hypotension     Thrombocytopenia (HCC)   Assessment & Plan    Thrombocytopenia secondary to recent admission for sepsis/bacteremia  Dropped on 10/7- will repeat labs now     * Ambulatory dysfunction   Assessment & Plan    PT/OT  Continue rehabilitation  VTE Pharmacologic Prophylaxis:   Pharmacologic: Pharmacologic VTE Prophylaxis contraindicated due to Anemia below 8 thrombocytopenia  Mechanical VTE Prophylaxis in Place: Yes    Patient Centered Rounds: I have performed bedside rounds with nursing staff today  Discussions with Specialists or Other Care Team Provider:     Education and Discussions with Family / Patient:   Patient    Time Spent for Care: 30 minutes  More than 50% of total time spent on counseling and coordination of care as described above  Current Length of Stay: 12 day(s)    Current Patient Status: SNF Short Term Inpatient   Certification Statement: The patient will continue to require additional inpatient hospital stay due to Rehab    Discharge Plan:   When cleared by PT OT    Code Status: Level 1 - Full Code      Subjective:   Patient seen examined he denies any dizziness shortness of breath he finally had a solid stool denies any abdominal pain his leg edema has improved significantly    He states in terms of his left eye blurry vision he has for couple of months intermittent currently he does not have it as we speak  He has  Not seen ophthalmologist in a while  Objective:     Vitals:   Temp (24hrs), Av 6 °F (36 4 °C), Min:97 2 °F (36 2 °C), Max:97 9 °F (36 6 °C)    Temp:  [97 2 °F (36 2 °C)-97 9 °F (36 6 °C)] 97 9 °F (36 6 °C)  HR:  [55-65] 55  Resp:  [18] 18  BP: (118-142)/(58-63) 137/63  SpO2:  [97 %-98 %] 97 %  Body mass index is 20 7 kg/m²  Input and Output Summary (last 24 hours):     No intake or output data in the 24 hours ending 10/10/18 0917    Physical Exam:     Physical Exam   Constitutional: He is oriented to person, place, and time  He appears well-developed and well-nourished  HENT:   Head: Normocephalic and atraumatic  Eyes: Pupils are equal, round, and reactive to light  EOM are normal    The patient has no blurry vision with glasses or without glasses on eye exam   Neck: Normal range of motion  Cardiovascular: Normal rate and regular rhythm  Murmur heard  Systolic murmur is present with a grade of 3/6   Pulmonary/Chest: Effort normal and breath sounds normal    Abdominal: Soft  Bowel sounds are normal    Musculoskeletal: Normal range of motion  He exhibits edema (Plus one pitting edema bilateral lower extremities)  Neurological: He is alert and oriented to person, place, and time  He has normal reflexes  Skin: Skin is warm  Psychiatric: He has a normal mood and affect           Additional Data:     Labs:      Results from last 7 days  Lab Units 10/07/18  0447   WBC Thousand/uL 4 00*   HEMOGLOBIN g/dL 7 6*   HEMATOCRIT % 23 2*   PLATELETS Thousands/uL 125*       Results from last 7 days  Lab Units 10/07/18  0452   SODIUM mmol/L 135*   POTASSIUM mmol/L 4 8   CHLORIDE mmol/L 105   CO2 mmol/L 25   BUN mg/dL 24   CREATININE mg/dL 1 62*   ANION GAP mmol/L 5   CALCIUM mg/dL 7 2*   ALBUMIN g/dL 2 4*   TOTAL BILIRUBIN mg/dL 1 20   ALK PHOS U/L 118   ALT U/L 30   AST U/L 54           Results from last 7 days  Lab Units 10/06/18  0711   POC GLUCOSE mg/dl 87                   * I Have Reviewed All Lab Data Listed Above  * Additional Pertinent Lab Tests Reviewed: [address] Admission Reviewed    Imaging:    Imaging Reports Reviewed Today Include:   Imaging Personally Reviewed by Myself Includes:      Recent Cultures (last 7 days):           Last 24 Hours Medication List:     Current Facility-Administered Medications:  acetaminophen 650 mg Oral Q6H PRN Armando Munguia MD   amLODIPine 5 mg Oral BID Armando Munguia MD   ferrous sulfate 325 mg Oral Daily With Breakfast Alexis Baldwin MD   metoprolol tartrate 50 mg Oral Q12H [facility] Armando Munguia MD   nystatin 1 application Topical TID Roman Norman DO   ondansetron 4 mg Intravenous Q6H PRN Armando Munguia MD        Today, Patient Was Seen By: Alexis Baldwin MD    ** Please Note: Dictation voice to text software may have been used in the creation of this document   **

## 2018-10-10 NOTE — PLAN OF CARE
Problem: OCCUPATIONAL THERAPY ADULT  Goal: Performs self-care activities at highest level of function for planned discharge setting  See evaluation for individualized goals  Treatment Interventions: ADL retraining, Functional transfer training, UE strengthening/ROM, Endurance training, Cognitive reorientation, Patient/family training, Equipment evaluation/education, Compensatory technique education, Continued evaluation          See flowsheet documentation for full assessment, interventions and recommendations  Outcome: Completed Date Met: 10/10/18  Limitation: Decreased ADL status, Decreased UE ROM, Decreased UE strength, Decreased Safe judgement during ADL, Decreased cognition, Decreased endurance, Decreased high-level ADLs  Prognosis: [address]     OT Discharge Recommendation: Home OT  OT - OK to Discharge:  YES    OCCUPATIONAL THERAPY DISCHARGE SUMMARY    DISPOSITION: Home with wife on Friday  AE/DME: RW      DISCHARGE SUMMARY: Pt discharged from OT effective today due to being at functional baseline  Participated in a total of 9/9 OT sessions  Pt achieved 9/9 short term goals and 7/8 long term goals  Deficit remain in bill management, which him and his wife complete together  Pt also has difficulties donning long sleeved items, due to vision  Pt scored 19/30 on MOCA assessment, which indicates safety concerns for being home alone  Wife is home with pt and will assist PRN  In addition, daughter lives across the street and will also assist  Pt will have VNA services to ensure safety in own environment and maximize function  Pt is safe to return home with wife and services  No further concerns noted  RECOMMENDATIONS: VNA services & supervision/assist from wife and daughter PRN       Anthony Vargas, OT

## 2018-10-10 NOTE — PROGRESS NOTES
RECREATIONAL THERAPY PARTICIPATION LOG      ACTIVITY:    GAMES:        BINGO:        MUSIC STIM:        ARTS & CRAFTS:        EXERCISE:        CLUBS & MEETING:        SOCIALS: Declined "[employer] [place]"  SPIRITUAL: [address] Visit  INDEPENDENT: Communicating on telephone with loved one(s)  1:1:    Resident was seen for a Recreational Therapy greeting  Resident declined a 1:1 Recreational Therapy session  He was on hold from talking on the telephone  Resident communicated that he would like to be able to rest in his room  TINO Salinas

## 2018-10-10 NOTE — ASSESSMENT & PLAN NOTE
· Patient has chronic blurry vision in left eye which is intermittent currently he does not have it  His vision is clear both LEs in without glasses  He has states he has this for couple of months  Recommend to see his ophthalmologist as an outpatient

## 2018-10-11 RX ADMIN — METOPROLOL TARTRATE 50 MG: 50 TABLET ORAL at 21:34

## 2018-10-11 RX ADMIN — FERROUS SULFATE TAB 325 MG (65 MG ELEMENTAL FE) 325 MG: 325 (65 FE) TAB at 08:22

## 2018-10-11 RX ADMIN — NYSTATIN 1 APPLICATION: 100000 POWDER TOPICAL at 21:34

## 2018-10-11 RX ADMIN — NYSTATIN 1 APPLICATION: 100000 POWDER TOPICAL at 17:47

## 2018-10-11 RX ADMIN — NYSTATIN 1 APPLICATION: 100000 POWDER TOPICAL at 08:22

## 2018-10-11 RX ADMIN — AMLODIPINE BESYLATE 5 MG: 5 TABLET ORAL at 17:47

## 2018-10-11 RX ADMIN — METOPROLOL TARTRATE 50 MG: 50 TABLET ORAL at 08:22

## 2018-10-11 RX ADMIN — AMLODIPINE BESYLATE 5 MG: 5 TABLET ORAL at 08:22

## 2018-10-11 NOTE — NURSING NOTE
Pt awake and oriented x 3  No c/o chest pain or respiratory distress  Gait more steady with walker and minimal assist  Continue to moniter

## 2018-10-12 ENCOUNTER — TELEPHONE (OUTPATIENT)
Dept: FAMILY MEDICINE CLINIC | Facility: CLINIC | Age: 83
End: 2018-10-12

## 2018-10-12 VITALS
SYSTOLIC BLOOD PRESSURE: 125 MMHG | RESPIRATION RATE: 20 BRPM | TEMPERATURE: 98 F | BODY MASS INDEX: 20.59 KG/M2 | WEIGHT: 120.59 LBS | HEIGHT: 64 IN | HEART RATE: 61 BPM | DIASTOLIC BLOOD PRESSURE: 61 MMHG | OXYGEN SATURATION: 97 %

## 2018-10-12 PROCEDURE — 99316 NF DSCHRG MGMT 30 MIN+: CPT | Performed by: FAMILY MEDICINE

## 2018-10-12 RX ORDER — FERROUS SULFATE 325(65) MG
325 TABLET ORAL
Qty: 30 TABLET | Refills: 0 | Status: SHIPPED | OUTPATIENT
Start: 2018-10-13 | End: 2018-11-13 | Stop reason: SDUPTHER

## 2018-10-12 RX ORDER — METOPROLOL TARTRATE 50 MG/1
50 TABLET, FILM COATED ORAL EVERY 12 HOURS SCHEDULED
Qty: 60 TABLET | Refills: 0 | Status: SHIPPED | OUTPATIENT
Start: 2018-10-12 | End: 2018-11-13 | Stop reason: SDUPTHER

## 2018-10-12 RX ORDER — AMLODIPINE BESYLATE 5 MG/1
5 TABLET ORAL 2 TIMES DAILY
Qty: 60 TABLET | Refills: 0 | Status: SHIPPED | OUTPATIENT
Start: 2018-10-12 | End: 2018-11-13 | Stop reason: SDUPTHER

## 2018-10-12 RX ADMIN — METOPROLOL TARTRATE 50 MG: 50 TABLET ORAL at 08:26

## 2018-10-12 RX ADMIN — FERROUS SULFATE TAB 325 MG (65 MG ELEMENTAL FE) 325 MG: 325 (65 FE) TAB at 08:26

## 2018-10-12 RX ADMIN — AMLODIPINE BESYLATE 5 MG: 5 TABLET ORAL at 08:26

## 2018-10-12 RX ADMIN — NYSTATIN 1 APPLICATION: 100000 POWDER TOPICAL at 08:30

## 2018-10-12 NOTE — ASSESSMENT & PLAN NOTE
Thrombocytopenia secondary to recent admission for sepsis/bacteremia  Dropped on 10/7-125 cholesterol after that 110 no bleeding  Will recheck a CBC outpatient on 10/15

## 2018-10-12 NOTE — SOCIAL WORK
Patient qualifies for 100% coverage from [address] [place] [address] for DME  SW delivered youth RW, patient signed delivery form  SW notified PT to make sure youth RW is the right size for patient

## 2018-10-12 NOTE — DISCHARGE SUMMARY
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 which is intermittent currently he does not have it  His vision is clear both LEs in without glasses  He has states he has this for couple of months  Recommend to see his ophthalmologist as an outpatient  Lower extremity edema   Assessment & Plan    · Improved currently to +1 suspect secondary to diastolic heart failure though echo was not able to adequately assess  He was given Lasix 40 mg p o  X1   · With the BNP elevated 5000  · Will recheck kidney function currently  · Weight has been stable and post Lasix lost weight  · Venous duplex negative  · He does have moderate aortic stenosis found on the echo  · Currently no additional Lasix warranted, if transfusion of blood is given  Lasix needs to be given as well  · Leg edema significantly improved to trace bilaterally     Aortic stenosis, moderate   Assessment & Plan    · Found on the echo asymptomatic  Avoid hypotension  Keep blood pressures greater than 120 sbp  Follow up outpatient with Cardiology     Anemia   Assessment & Plan    Anemia chronic disease secondary to recent septicemia  Improved with 1 unit PRBC- on 10/07 his hemoglobin was 7 6, he is currently asymptomatic he is on ferrous sulfate his Hemoccult was negative  Will recheck hemoglobin now will keep hemoglobin greater than 7 5-hemoglobin the recheck was 8 he has no symptoms will continue outpatient ferrous sulfate repeat outpatient CBC and 10 /15  History of bacteremia   Assessment & Plan    Recently admitted at [facility] for Klebsiella E coli bacteremia secondary to choledocholithiasis  Had ERCP and completed course of antibiotics x14 days  Clostridium difficile colitis   Assessment & Plan    Finished 14 day course of vancomycin  No further loose stooling     Acute renal failure (ARF) (HCC)   Assessment & Plan    Acute renal failure secondary to septicemia  Continues to improve  DISCONTINUED lisinopril on 10/1/2018 and continue to hold until renal dysfunction closer to baseline  Continue amlodipine and metoprolol  · Last creatinine on 10/07 was 1 6  -> 1 5 continue to improve- would like to obtain outpatient BMP on 10/15 follow up with his PCP regarding results  Cholelithiasis with biliary obstruction   Assessment & Plan    Status post ERCP  Associated with Klebsiella and E coli bacteremia and finished course of antibiotics     Essential hypertension   Assessment & Plan    Discontinued lisinopril given ongoing acute renal failure  Continue metoprolol and amlodipine  Controlled     Thrombocytopenia (HCC)   Assessment & Plan    Thrombocytopenia secondary to recent admission for sepsis/bacteremia  Dropped on 10/7-125 cholesterol after that 110 no bleeding  Will recheck a CBC outpatient on 10/15  * Ambulatory dysfunction   Assessment & Plan    · Patient has finished with physical and occupational therapy is being discharged home today  Discharging Physician / Practitioner: Jared Leon MD  PCP: Timi Mendez PA-C  Admission Date:   Admission Orders     Ordered        09/28/18 1929  Inpatient Admission  Once             Discharge Date: 10/12/18    Resolved Problems  Date Reviewed: 10/12/2018    None          Consultations During Hospital Stay:  · None    Procedures Performed:     · None    Significant Findings / Test Results:     · Venous duplex negative  · Echo-EF 55% intermediate diastolic function moderate calcific aortic valve stenosis      Incidental Findings:   · none     Test Results Pending at Discharge (will require follow up):   · none     Outpatient Tests Requested:  · CBC BMP on October 84MR    Complications:  None    Reason for Admission:   [address] Course:     Mt Tan is a 80 y o  male patient who originally presented to the hospital on 9/28/2018 due to deconditioning ambulatory dysfunction requiring physical therapy  Initially admitted to [facility] with sepsis E coli Klebsiella bacteremia secondary to choledocholithiasis with cholangitis acute renal failure metabolic acidosis coagulopathy  While here he did develop lower extremity edema echo was down he was found to have moderate aortic stenosis intermediate diastolic dysfunction he was given days of Lasix with resolution of lower extremity edema without requiring any more  He did have anemia he was started on ferrous sulfate hemoglobin is 8 without any symptoms he will continue follow-up as an outpatient will repeat labs on October 15 including the [facility] with renal function improving since admission to [facility]  Thrombocytopenia will be followed up as an outpatient on the lab work  Clear to be discharged home  Please see above list of diagnoses and related plan for additional information  Condition at Discharge: stable     Discharge Day Visit / Exam:     Subjective:  Patient seen examined excited to go home denies any chest pain shortness of breath no dizziness nausea vomiting diarrhea abdominal pain  Vitals: Blood Pressure: 125/61 (10/12/18 0730)  Pulse: 61 (10/12/18 0730)  Temperature: 98 °F (36 7 °C) (10/12/18 0730)  Temp Source: Temporal (10/12/18 0730)  Respirations: 20 (10/12/18 0730)  Height: 5' 4" (162 6 cm) (09/28/18 1658)  Weight - Scale: 54 7 kg (120 lb 9 5 oz) (10/07/18 0615)  SpO2: 97 % (10/12/18 1022)  Exam:   Physical Exam   Constitutional: He is oriented to person, place, and time  He appears well-developed and well-nourished  HENT:   Head: Normocephalic and atraumatic  Eyes: Pupils are equal, round, and reactive to light  EOM are normal    Neck: Normal range of motion  Cardiovascular: Normal rate and regular rhythm  Murmur heard  Systolic murmur is present with a grade of 2/6   Pulmonary/Chest: Effort normal and breath sounds normal    Abdominal: Soft  Bowel sounds are normal    Musculoskeletal: Normal range of motion  He exhibits edema (Bilateral lower extremity trace bilateral lower extremity edema)  Neurological: He is alert and oriented to person, place, and time  He has normal reflexes  Skin: Skin is warm  Psychiatric: He has a normal mood and affect  Discussion with Family: patient    Discharge instructions/Information to patient and family:   See after visit summary for information provided to patient and family  Provisions for Follow-Up Care:  See after visit summary for information related to follow-up care and any pertinent home health orders  Disposition:     Home    For Discharges to [facility] SNF:   · Not Applicable to this Patient - Not Applicable to this Patient    Planned Readmission: no     Discharge Statement:  I spent >35 minutes discharging the patient  This time was spent on the day of discharge  I had direct contact with the patient on the day of discharge  Greater than 50% of the total time was spent examining patient, answering all patient questions, arranging and discussing plan of care with patient as well as directly providing post-discharge instructions  Additional time then spent on discharge activities  Discharge Medications:  See after visit summary for reconciled discharge medications provided to patient and family        ** Please Note: This note has been constructed using a voice recognition system **

## 2018-10-12 NOTE — DISCHARGE INSTRUCTIONS
Chronic Hypertension   WHAT YOU NEED TO KNOW:   Hypertension is high blood pressure (BP)  Your BP is the force of your blood moving against the walls of your arteries  Normal BP is less than 120/80  Prehypertension is between 120/80 and 139/89  Hypertension is 140/90 or higher  Hypertension causes your BP to get so high that your heart has to work much harder than normal  This can damage your heart  Chronic hypertension is a long-term condition that you can control with a healthy lifestyle or medicines  A controlled blood pressure helps protect your organs, such as your heart, lungs, brain, and kidneys  DISCHARGE INSTRUCTIONS:   Call 911 for any of the following:   · You have discomfort in your chest that feels like squeezing, pressure, fullness, or pain  · You become confused or have difficulty speaking  · You suddenly feel lightheaded or have trouble breathing  · You have pain or discomfort in your back, neck, jaw, stomach, or arm  Return to the emergency department if:   · You have a severe headache or vision loss  · You have weakness in an arm or leg  Contact your healthcare provider if:   · You feel faint, dizzy, confused, or drowsy  · You have been taking your BP medicine and your BP is still higher than your healthcare provider says it should be  · You have questions or concerns about your condition or care  Medicines: You may need any of the following:  · Medicine  may be used to help lower your BP  You may need more than one type of medicine  Take the medicine exactly as directed  · Diuretics  help decrease extra fluid that collects in your body  This will help lower your BP  You may urinate more often while you take this medicine  · Cholesterol medicine  helps lower your cholesterol level  A low cholesterol level helps prevent heart disease and makes it easier to control your blood pressure  · Take your medicine as directed    Contact your healthcare provider if you think your medicine is not helping or if you have side effects  Tell him or her if you are allergic to any medicine  Keep a list of the medicines, vitamins, and herbs you take  Include the amounts, and when and why you take them  Bring the list or the pill bottles to follow-up visits  Carry your medicine list with you in case of an emergency  Follow up with your healthcare provider as directed: You will need to return to have your blood pressure checked and to have other lab tests done  Write down your questions so you remember to ask them during your visits  Manage chronic hypertension:  Talk with your healthcare provider about these and other ways to manage hypertension:  · Take your BP at home  Sit and rest for 5 minutes before you take your BP  Extend your arm and support it on a flat surface  Your arm should be at the same level as your heart  Follow the directions that came with your BP monitor  If possible, take at least 2 BP readings each time  Take your BP at least twice a day at the same times each day, such as morning and evening  Keep a record of your BP readings and bring it to your follow-up visits  Ask your healthcare provider what your blood pressure should be  · Limit sodium (salt) as directed  Too much sodium can affect your fluid balance  Check labels to find low-sodium or no-salt-added foods  Some low-sodium foods use potassium salts for flavor  Too much potassium can also cause health problems  Your healthcare provider will tell you how much sodium and potassium are safe for you to have in a day  He or she may recommend that you limit sodium to 2,300 mg a day  · Follow the meal plan recommended by your healthcare provider  A dietitian or your provider can give you more information on low-sodium plans or the DASH (Dietary Approaches to Stop Hypertension) eating plan  The DASH plan is low in sodium, unhealthy fats, and total fat  It is high in potassium, calcium, and fiber  · Exercise to maintain a healthy weight  Exercise at least 30 minutes per day, on most days of the week  This will help decrease your blood pressure  Ask about the best exercise plan for you  · Decrease stress  This may help lower your BP  Learn ways to relax, such as deep breathing or listening to music  · Limit alcohol  Women should limit alcohol to 1 drink a day  Men should limit alcohol to 2 drinks a day  A drink of alcohol is 12 ounces of beer, 5 ounces of wine, or 1½ ounces of liquor  · Do not smoke  Nicotine and other chemicals in cigarettes and cigars can increase your BP and also cause lung damage  Ask your healthcare provider for information if you currently smoke and need help to quit  E-cigarettes or smokeless tobacco still contain nicotine  Talk to your healthcare provider before you use these products  © 2017 [employer] Information is for End User's use only and may not be sold, redistributed or otherwise used for commercial purposes  All illustrations and images included in CareNotes® are the copyrighted property of [employer]  or Keith Sheffield  The above information is an  only  It is not intended as medical advice for individual conditions or treatments  Talk to your doctor, nurse or pharmacist before following any medical regimen to see if it is safe and effective for you  Complications of Infection   AMBULATORY CARE:   Complications of infection  can happen if an infection is not diagnosed and treated early  Some infections may have complications even when they are treated early  The infection can spread from one place in your body to the entire body through your bloodstream  Early diagnosis and treatment may prevent complications such as bacteremia, sepsis, and septic shock  These are serious, life-threatening conditions that need immediate treatment  · Bacteremia  is when there is bacteria in the blood   Bacteremia can happen when infections in other parts of the body, such as the lungs, kidneys, or skin, travel to the blood  It can also happen when indwelling catheters, such as a central venous access devices, pacemaker wires, or urinary catheters become infected  A central venous access device is a special IV that is placed in a large vein and left there for an extended period of time  · Sepsis  happens when an infection spreads and causes the body to react strongly to the germs  The body's defense system normally releases chemicals to fight off infection at the infected area  In sepsis, chemicals are released throughout the body  The chemicals cause inflammation and can cause clotting in small blood vessels that is difficult to control  Inflammation and clotting decreases blood flow and oxygen to organs  This may cause them to stop working correctly  Sepsis is also called systemic inflammatory response syndrome (SIRS) due to infection  · Septic shock  is a severe type of sepsis that happens as sepsis gets worse and causes multiple organs to shut down  The blood pressure drops very low and organs do not get enough blood  This may cause permanent damage to organs    Signs and symptoms that an infection has become worse:   · Fever or very low body temperature with chills and violent shaking    · Swelling in the ankles or legs    · A change in mental status such as confusion, loss of consciousness, or seizures    · A fast or irregular heartbeat    · Urinating very little or not at all     · Difficulty breathing, dizziness, or weakness    · A rash or warm, red skin  Call 911 or have someone else call for any of the following:   · You have any of the following signs of a heart attack:      ¨ Squeezing, pressure, or pain in your chest that lasts longer than 5 minutes or returns    ¨ Discomfort or pain in your back, neck, jaw, stomach, or arm     ¨ Trouble breathing    ¨ Nausea or vomiting    ¨ Lightheadedness or a sudden cold sweat, especially with chest pain or trouble breathing    · You have a seizure or lose consciousness  · You have trouble breathing  · Your lips or fingernails are blue  · You feel extremely weak and have a hard time moving  Seek care immediately if:   · Your symptoms, such as fever, get worse, even if you are taking medicine to treat the infection  · You have increased swelling in your legs, feet, or abdomen  · You feel weak, dizzy, or faint  · You stop urinating or urinate very little  Contact your healthcare provider if:   · You have questions or concerns about your condition or care  Treatment  may depend on how severe the complications are  You may need monitoring and treatment in the hospital  You may  need any of the following:  · Removal or change of a catheter  may be needed to get rid of the infection  · Medicines  may be given to increase your blood pressure and blood flow to your organs  Antibiotics may be given to treat an infection  Medicines may also be given to decrease inflammation, control your blood sugar, prevent stomach ulcers, and prevent blood clots  · Surgery or other procedures  may be needed to treat problems causing sepsis or related to the complications of your infection  This may include draining an abscess or removing infected tissue  Follow up with your healthcare provider as directed:  Write down your questions so you remember to ask them during your visits  © 2017 [employer]  Information is for End User's use only and may not be sold, redistributed or otherwise used for commercial purposes  All illustrations and images included in CareNotes® are the copyrighted property of [employer]  or Keith Sheffield  The above information is an  only  It is not intended as medical advice for individual conditions or treatments   Talk to your doctor, nurse or pharmacist before following any medical regimen to see if it is safe and effective for you  Aspiration Precautions   WHAT YOU NEED TO KNOW:   Aspiration means that foods or fluids get into your airway  This can lead to trouble breathing or lung infections such as pneumonia  Aspiration precautions are practices that help prevent these problems  DISCHARGE INSTRUCTIONS:   Return to the emergency department if:   · You have chest pain  · You have shortness of breath  · You have signs or symptoms of dehydration, such as increased thirst, dark urine, or little or no urine  Contact your healthcare provider if:   · You have a cough, chills, or a fever  · You cough or choke before, during, or after you eat or drink  · You feel like you have to clear your throat after you eat or drink  · You lose weight without trying  · You have questions or concerns about your condition or care  Prevent aspiration:   · Eat in a chair or sit upright while you eat  This will help prevent choking  Stay upright for 45 minutes to 1 hour after you eat or drink  · Eat small amounts slowly  Do not eat or drink with a straw  Take small bites and chew well before you swallow  · Avoid distractions while you eat  Keep the radio and TV turned off during meals  Do not try to talk to others while you eat  · Make sure your dentures fit correctly  This will help you chew food into pieces that are easier to swallow  · Limit spicy foods and caffeine  These may cause reflux  Reflux is the movement of foods and fluids from your stomach into your esophagus  This could increase the risk that foods or fluids will also move into your airway  · Drink water with your meals  Water will help rinse food out of your mouth  This will decrease the risk that food will move into your airway  · Do not smoke  Nicotine and other chemicals in cigarettes and cigars can damage your esophagus and cause trouble swallowing   Ask your healthcare provider for information if you currently smoke and need help to quit  E-cigarettes or smokeless tobacco still contain nicotine  Talk to your healthcare provider before you use these products  What you need to know about nutrition and aspiration:  Your healthcare provider may show you how to thicken liquids or soften foods  Thickened liquids and soft foods are easier to swallow  A registered dietitian can help you plan your meals:  · Puree your foods as directed  This will help remove chunks or lumps  You can add gravy, sauce, vegetable juice, milk, or half and half to foods before you blend them  Your food should be the same consistency as pudding after you puree it  If your food is too thin after you puree it, thicken it as directed  The following are examples of foods that puree well into a pudding consistency:     ¨ Cream of wheat with small amounts of milk    ¨ Moistened breads, pancakes, [place] pastries, or muffins    ¨ Well-cooked pasta, noodles, or rice    ¨ Cooked vegetables, tomato sauce, or cooked potatoes without skin    ¨ Casseroles, eggs, or cooked pureed meats    ¨ Margarine, sour cream, smooth cheese sauces, or strained gravy    · Thicken your foods and drinks as directed  Your food and drinks should be thickened to the consistency of pudding  You can add flour, cornstarch, or potato flakes, or thickening products to thicken your foods or drinks  Follow directions on the package when you add thickening products to your food or drinks  Your healthcare provider will give you a complete list of foods and drinks that need to be thickened  The following are examples of foods and drinks that should be thickened:     ¨ Milk, milkshakes, nutritional shakes, or sherbet    ¨ Juices without pulp or gelatin    ¨ Coffee, tea, or soda    ¨ Alcoholic beverages    · Keep a food diary  Write down everything you eat  Take the diary to your follow-up visits  This information will help your healthcare provider decide if you are getting enough nutrition    Follow up with your healthcare provider as directed:  Write down your questions so you remember to ask them during your visits  © 2017 [employer] Information is for End User's use only and may not be sold, redistributed or otherwise used for commercial purposes  All illustrations and images included in CareNotes® are the copyrighted property of [employer]  or Keith Sheffield  The above information is an  only  It is not intended as medical advice for individual conditions or treatments  Talk to your doctor, nurse or pharmacist before following any medical regimen to see if it is safe and effective for you  Cholecystitis   WHAT YOU NEED TO KNOW:   Cholecystitis is inflammation of your gallbladder  Your gallbladder stores bile, which helps break down the fat that you eat  Your gallbladder also helps remove certain chemicals from your body  You may have a sudden, severe attack (acute cholecystitis) or several mild attacks (chronic cholecystitis)  DISCHARGE INSTRUCTIONS:   Call 911 for any of the following:   · You have chest pain or trouble breathing  Return to the emergency department if:   · You have severe pain in your abdomen  · You urinate less than usual   Contact your healthcare provider if:   · You have a fever or chills  · You have pain when you urinate  · Your skin or eyes turn yellow  · You have questions or concerns about your condition or care  Medicines: You may need any of the following:  · Antibiotics  treat a bacterial infection  · Prescription pain medicine  may be given  Ask your healthcare provider how to take this medicine safely  Some prescription pain medicines contain acetaminophen  Do not take other medicines that contain acetaminophen without talking to your healthcare provider  Too much acetaminophen may cause liver damage  Prescription pain medicine may cause constipation   Ask your healthcare provider how to prevent or treat constipation  · NSAIDs , such as ibuprofen, help decrease swelling, pain, and fever  This medicine is available with or without a doctor's order  NSAIDs can cause stomach bleeding or kidney problems in certain people  If you take blood thinner medicine, always ask your healthcare provider if NSAIDs are safe for you  Always read the medicine label and follow directions  · Take your medicine as directed  Contact your healthcare provider if you think your medicine is not helping or if you have side effects  Tell him of her if you are allergic to any medicine  Keep a list of the medicines, vitamins, and herbs you take  Include the amounts, and when and why you take them  Bring the list or the pill bottles to follow-up visits  Carry your medicine list with you in case of an emergency  Eat a variety of healthy foods: This may decrease your symptoms  Healthy foods include fruit, vegetables, whole-grain breads, low-fat dairy products, beans, lean meat, and fish  Ask if you need to be on a special diet  Follow up with your healthcare provider as directed:  Write down your questions so you remember to ask them during your visits  © 2017 [facility] Information is for End User's use only and may not be sold, redistributed or otherwise used for commercial purposes  All illustrations and images included in CareNotes® are the copyrighted property of [employer]  or Keith Sheffield  The above information is an  only  It is not intended as medical advice for individual conditions or treatments  Talk to your doctor, nurse or pharmacist before following any medical regimen to see if it is safe and effective for you

## 2018-10-12 NOTE — ASSESSMENT & PLAN NOTE
· Improved currently to +1 suspect secondary to diastolic heart failure though echo was not able to adequately assess  He was given Lasix 40 mg p o   X1   · With the BNP elevated 5000  · Will recheck kidney function currently  · Weight has been stable and post Lasix lost weight  · Venous duplex negative  · He does have moderate aortic stenosis found on the echo  · Currently no additional Lasix warranted, if transfusion of blood is given  Lasix needs to be given as well  · Leg edema significantly improved to trace bilaterally

## 2018-10-12 NOTE — ASSESSMENT & PLAN NOTE
Acute renal failure secondary to septicemia  Continues to improve  DISCONTINUED lisinopril on 10/1/2018 and continue to hold until renal dysfunction closer to baseline  Continue amlodipine and metoprolol  · Last creatinine on 10/07 was 1 6  -> 1 5 continue to improve- would like to obtain outpatient BMP on 10/15 follow up with his PCP regarding results

## 2018-10-12 NOTE — ASSESSMENT & PLAN NOTE
Discontinued lisinopril given ongoing acute renal failure  Continue metoprolol and amlodipine      Controlled

## 2018-10-12 NOTE — ASSESSMENT & PLAN NOTE
Recently admitted at [facility] for Klebsiella E coli bacteremia secondary to choledocholithiasis  Had ERCP and completed course of antibiotics x14 days

## 2018-10-12 NOTE — ASSESSMENT & PLAN NOTE
Anemia chronic disease secondary to recent septicemia  Improved with 1 unit PRBC- on 10/07 his hemoglobin was 7 6, he is currently asymptomatic he is on ferrous sulfate his Hemoccult was negative  Will recheck hemoglobin now will keep hemoglobin greater than 7 5-hemoglobin the recheck was 8 he has no symptoms will continue outpatient ferrous sulfate repeat outpatient CBC and 10 /15

## 2018-10-15 ENCOUNTER — TRANSITIONAL CARE MANAGEMENT (OUTPATIENT)
Dept: FAMILY MEDICINE CLINIC | Facility: CLINIC | Age: 83
End: 2018-10-15

## 2018-11-12 ENCOUNTER — TELEPHONE (OUTPATIENT)
Dept: FAMILY MEDICINE CLINIC | Facility: CLINIC | Age: 83
End: 2018-11-12

## 2018-11-12 NOTE — TELEPHONE ENCOUNTER
Pt came to office for a [address] apt today also to adjust and look at his meds  I explained we don't have WI's anymore and he needs an apt  He handed a paper his wife wrote up that said pt was on Metoprolol Tartrate and Lisinopril every morning before he was admitted to hospital on 9/17/2018  She wants to know if he should still be taking these meds or not  He has a endoscopy with GI on 11/23/2018 also and wants clarification and the meds

## 2018-11-12 NOTE — TELEPHONE ENCOUNTER
Pt's wife is calling about 2 meds that the Rehab facility had him on  Pt is out of the Ferrous Sulfate 325mg and Amlodipine 5mg  She thinks he shouldn't be on them after the bottle is empty, but is unsure  She knows they missed the [place], but they were unaware he even had it until she found all the discharge papers later in that week  She seems very confused also  Please call to help her figure it out

## 2018-11-13 DIAGNOSIS — I10 ESSENTIAL HYPERTENSION: Chronic | ICD-10-CM

## 2018-11-13 DIAGNOSIS — R53.1 GENERALIZED WEAKNESS: ICD-10-CM

## 2018-11-13 DIAGNOSIS — D64.9 ANEMIA, UNSPECIFIED TYPE: ICD-10-CM

## 2018-11-13 RX ORDER — AMLODIPINE BESYLATE 5 MG/1
TABLET ORAL
Qty: 180 TABLET | Refills: 0 | OUTPATIENT
Start: 2018-11-13

## 2018-11-13 RX ORDER — AMLODIPINE BESYLATE 5 MG/1
5 TABLET ORAL 2 TIMES DAILY
Qty: 60 TABLET | Refills: 0 | Status: SHIPPED | OUTPATIENT
Start: 2018-11-13 | End: 2018-12-06 | Stop reason: SDUPTHER

## 2018-11-13 RX ORDER — METOPROLOL TARTRATE 50 MG/1
TABLET, FILM COATED ORAL
Qty: 180 TABLET | Refills: 0 | OUTPATIENT
Start: 2018-11-13

## 2018-11-13 RX ORDER — METOPROLOL TARTRATE 50 MG/1
50 TABLET, FILM COATED ORAL EVERY 12 HOURS SCHEDULED
Qty: 60 TABLET | Refills: 0 | Status: SHIPPED | OUTPATIENT
Start: 2018-11-13 | End: 2018-12-06 | Stop reason: SDUPTHER

## 2018-11-13 RX ORDER — FERROUS SULFATE 325(65) MG
325 TABLET ORAL
Qty: 30 TABLET | Refills: 0 | Status: SHIPPED | OUTPATIENT
Start: 2018-11-13 | End: 2018-12-06 | Stop reason: SDUPTHER

## 2018-11-13 NOTE — TELEPHONE ENCOUNTER
Patient should be on metoprolol and amlodipine for his blood pressure  At this time would recommend continue with iron supplement until hemoglobin is recheck  They believe that he was having anemia due to his sepsis, so he may not need to be on this medication for an extended period of time, unless something else is found

## 2018-11-13 NOTE — TELEPHONE ENCOUNTER
Pt's wife Selin Zaidi informed and they have made a HTN fup for 12/6/2018 and to go over meds  I gave you 40 min apt

## 2018-11-22 ENCOUNTER — ANESTHESIA EVENT (OUTPATIENT)
Dept: GASTROENTEROLOGY | Facility: HOSPITAL | Age: 83
End: 2018-11-22
Payer: COMMERCIAL

## 2018-11-23 ENCOUNTER — HOSPITAL ENCOUNTER (OUTPATIENT)
Facility: HOSPITAL | Age: 83
Setting detail: OUTPATIENT SURGERY
Discharge: HOME | End: 2018-11-23
Attending: INTERNAL MEDICINE | Admitting: INTERNAL MEDICINE
Payer: COMMERCIAL

## 2018-11-23 ENCOUNTER — ANESTHESIA (OUTPATIENT)
Dept: GASTROENTEROLOGY | Facility: HOSPITAL | Age: 83
End: 2018-11-23
Payer: COMMERCIAL

## 2018-11-23 ENCOUNTER — APPOINTMENT (OUTPATIENT)
Dept: RADIOLOGY | Facility: HOSPITAL | Age: 83
End: 2018-11-23
Payer: COMMERCIAL

## 2018-11-23 VITALS
SYSTOLIC BLOOD PRESSURE: 165 MMHG | BODY MASS INDEX: 19.99 KG/M2 | WEIGHT: 120 LBS | OXYGEN SATURATION: 98 % | HEIGHT: 65 IN | HEART RATE: 75 BPM | TEMPERATURE: 98 F | RESPIRATION RATE: 15 BRPM | DIASTOLIC BLOOD PRESSURE: 70 MMHG

## 2018-11-23 PROCEDURE — C1726 CATH, BAL DIL, NON-VASCULAR: HCPCS | Performed by: INTERNAL MEDICINE

## 2018-11-23 PROCEDURE — 43264 ERCP REMOVE DUCT CALCULI: CPT | Performed by: INTERNAL MEDICINE

## 2018-11-23 PROCEDURE — 74328 X-RAY BILE DUCT ENDOSCOPY: CPT

## 2018-11-23 PROCEDURE — 43245 EGD DILATE STRICTURE: CPT | Performed by: INTERNAL MEDICINE

## 2018-11-23 PROCEDURE — 43273 ENDOSCOPIC PANCREATOSCOPY: CPT | Performed by: INTERNAL MEDICINE

## 2018-11-23 PROCEDURE — 43247 EGD REMOVE FOREIGN BODY: CPT | Performed by: INTERNAL MEDICINE

## 2018-11-23 PROCEDURE — C1769 GUIDE WIRE: HCPCS | Performed by: INTERNAL MEDICINE

## 2018-11-23 PROCEDURE — 43277 ERCP EA DUCT/AMPULLA DILATE: CPT | Performed by: INTERNAL MEDICINE

## 2018-11-23 RX ORDER — EPHEDRINE SULFATE 50 MG/ML
INJECTION, SOLUTION INTRAVENOUS AS NEEDED
Status: DISCONTINUED | OUTPATIENT
Start: 2018-11-23 | End: 2018-11-23 | Stop reason: SURG

## 2018-11-23 RX ORDER — ONDANSETRON 2 MG/ML
INJECTION INTRAMUSCULAR; INTRAVENOUS AS NEEDED
Status: DISCONTINUED | OUTPATIENT
Start: 2018-11-23 | End: 2018-11-23 | Stop reason: SURG

## 2018-11-23 RX ORDER — FENTANYL CITRATE 50 UG/ML
INJECTION, SOLUTION INTRAMUSCULAR; INTRAVENOUS AS NEEDED
Status: DISCONTINUED | OUTPATIENT
Start: 2018-11-23 | End: 2018-11-23 | Stop reason: SURG

## 2018-11-23 RX ORDER — SODIUM CHLORIDE, SODIUM LACTATE, POTASSIUM CHLORIDE, CALCIUM CHLORIDE 600; 310; 30; 20 MG/100ML; MG/100ML; MG/100ML; MG/100ML
125 INJECTION, SOLUTION INTRAVENOUS CONTINUOUS
Status: DISCONTINUED | OUTPATIENT
Start: 2018-11-23 | End: 2018-11-23 | Stop reason: HOSPADM

## 2018-11-23 RX ORDER — FENTANYL CITRATE/PF 50 MCG/ML
25 SYRINGE (ML) INJECTION
Status: DISCONTINUED | OUTPATIENT
Start: 2018-11-23 | End: 2018-11-23 | Stop reason: HOSPADM

## 2018-11-23 RX ORDER — SUCCINYLCHOLINE CHLORIDE 20 MG/ML
INJECTION INTRAMUSCULAR; INTRAVENOUS AS NEEDED
Status: DISCONTINUED | OUTPATIENT
Start: 2018-11-23 | End: 2018-11-23 | Stop reason: SURG

## 2018-11-23 RX ORDER — METOCLOPRAMIDE HYDROCHLORIDE 5 MG/ML
10 INJECTION INTRAMUSCULAR; INTRAVENOUS ONCE AS NEEDED
Status: DISCONTINUED | OUTPATIENT
Start: 2018-11-23 | End: 2018-11-23 | Stop reason: HOSPADM

## 2018-11-23 RX ORDER — SODIUM CHLORIDE 9 MG/ML
INJECTION, SOLUTION INTRAVENOUS CONTINUOUS PRN
Status: DISCONTINUED | OUTPATIENT
Start: 2018-11-23 | End: 2018-11-23 | Stop reason: SURG

## 2018-11-23 RX ORDER — PROPOFOL 10 MG/ML
INJECTION, EMULSION INTRAVENOUS AS NEEDED
Status: DISCONTINUED | OUTPATIENT
Start: 2018-11-23 | End: 2018-11-23 | Stop reason: SURG

## 2018-11-23 RX ADMIN — SODIUM CHLORIDE: 9 INJECTION, SOLUTION INTRAVENOUS at 07:45

## 2018-11-23 RX ADMIN — IOHEXOL 21 ML: 240 INJECTION, SOLUTION INTRATHECAL; INTRAVASCULAR; INTRAVENOUS; ORAL at 08:20

## 2018-11-23 RX ADMIN — EPHEDRINE SULFATE 15 MG: 50 INJECTION, SOLUTION INTRAMUSCULAR; INTRAVENOUS; SUBCUTANEOUS at 08:47

## 2018-11-23 RX ADMIN — EPHEDRINE SULFATE 10 MG: 50 INJECTION, SOLUTION INTRAMUSCULAR; INTRAVENOUS; SUBCUTANEOUS at 08:37

## 2018-11-23 RX ADMIN — FENTANYL CITRATE 50 MCG: 50 INJECTION, SOLUTION INTRAMUSCULAR; INTRAVENOUS at 08:00

## 2018-11-23 RX ADMIN — SUCCINYLCHOLINE CHLORIDE 80 MG: 20 INJECTION, SOLUTION INTRAMUSCULAR; INTRAVENOUS at 08:00

## 2018-11-23 RX ADMIN — FENTANYL CITRATE 50 MCG: 50 INJECTION, SOLUTION INTRAMUSCULAR; INTRAVENOUS at 08:13

## 2018-11-23 RX ADMIN — PROPOFOL 100 MG: 10 INJECTION, EMULSION INTRAVENOUS at 08:00

## 2018-11-23 RX ADMIN — EPHEDRINE SULFATE 10 MG: 50 INJECTION, SOLUTION INTRAMUSCULAR; INTRAVENOUS; SUBCUTANEOUS at 08:24

## 2018-11-23 RX ADMIN — ONDANSETRON 4 MG: 2 INJECTION INTRAMUSCULAR; INTRAVENOUS at 08:19

## 2018-11-23 RX ADMIN — DEXAMETHASONE SODIUM PHOSPHATE 4 MG: 10 INJECTION INTRAMUSCULAR; INTRAVENOUS at 08:19

## 2018-11-23 NOTE — ANESTHESIA POSTPROCEDURE EVALUATION
Post-Op Assessment Note      CV Status:  Stable    Mental Status:  Alert and awake    Hydration Status:  Euvolemic    PONV Controlled:  Controlled    Airway Patency:  Patent    Post Op Vitals Reviewed: Yes          Staff: CRNA       Comments: awake verbal          /67 (11/23/18 0912)    Temp 98 °F (36 7 °C) (11/23/18 0912)    Pulse 75 (11/23/18 0912)   Resp 16 (11/23/18 0912)    SpO2 100 % (11/23/18 0912)

## 2018-11-23 NOTE — DISCHARGE INSTRUCTIONS
OPERATIVE REPORT  PATIENT NAME: Jason Yi    :  1932  MRN: [id number]  Pt Location: [facility] RO[facility] 04    SURGERY DATE: 2018    Surgeon(s) and Role:  * Valeria Lynne MD - Primary  *Makayla Ang MD    ENDOSCOPIC RETROGRADE CHOLAGIOPANCREATOGRAPHY    PROCEDURE: ERCP/ Sphincterotomy, sphincteroplasty, Stone Extraction with Balloon    INDICATIONS: Choledocholithiasis -he was admitted in September with acute cholangitis  Because of thrombocytopenia and coagulopathy sphincterotomy was not performed at that time  Today here for sphincterotomy and stone extraction  POST-OP DIAGNOSIS: See the impression below    SEDATION: Monitored anesthesia care, check anesthesia records    PHYSICAL EXAM:    Blood pressure 164/72, pulse 60, temperature 97 9 °F (36 6 °C), resp  rate 16, height 5' 5" (1 651 m), weight 54 4 kg (120 lb), SpO2 100 %  Body mass index is 19 97 kg/m²  General: NAD  Heart: S1 & S2 normal, RRR  Lungs: CTA, No rales or rhonchi  Abdomen: Soft, nontender, nondistended, good bowel sounds    CONSENT:  Informed consent was obtained for the procedure, including sedation after explaining the risks and benefits of the procedure  Risks including but not limited to severe pancreatitis, bleeding, perforation, infection, aspiration were discussed in detail  Also explained about less than 100% sensitivity with the exam and other alternatives  PREPARATION:   EKG tracing, pulse oximetry, blood pressure were monitored throughout the procedure  Patient was identified by myself both verbally and by visual inspection of ID band  DESCRIPTION:   Patient was placed in the prone position and was sedated with the above medication  The standard lateral viewing gastroduodenoscope was introduced in to the oropharynx and the esophagus was intubated under direct visualization using sliding technique  Scope was passed down the esophagus up to the second part of the duodenum   The scope was withdrawn and noted the ampulla on the medial wall  Ampulla was cannulated with sphinctertome catheter and cholangiogram was performed  FINDINGS:    Limited evaluation of upper GI tract  Initial cholangiogram showed previously placed double pigtail stent in place  Mild luminal stricture noted at the duodenal sweep  This area was dilated with CRE balloon starting at 10 then to 12 mm  Then the scope was passed into the 2nd portion of the duodenum  Using a polypectomy snare the stent was extracted through the scope  Then selective biliary cannulation was achieved using sphincterotome and guidewire under fluoroscopy  Cholangiogram showed large round mobile stone and mid CBD measuring about 15 mm  Bile duct was dilated to 2 cm  A 10 mm sphincterotomy was performed with good hemostasis  Then a sphincteroplasty was performed using a hurricane balloon at 10 mm  Dilation was performed for 60 seconds  Then using an inflated 12 -15 mm stone extraction balloon, a 15 mm round yellow stone was removed  On further sweep to smaller stones were extracted  Final cholangiogram showed no apparent filling defect  Pancreatic duct was not injected or cannulated           IMPRESSIONS:      ERCP with sphincterotomy, sphincteroplasty and stone extraction  Previously placed double pigtail stent was removed  RECOMMENDATIONS:   Check LFTs in 1 week  Follow-up as an outpatient  Discharge home  If you have abdominal pain, bleeding or fever call my office at [phone]  Surgical evaluation for possible cholecystectomy  COMPLICATIONS: None; patient tolerated the procedure well          DISPOSITION: PACU           CONDITION: Stable  SIGNATURE: Elisa Erickson MD  DATE: November 23, 2018  TIME: 9:08 AM

## 2018-11-23 NOTE — H&P
History and Physical -  [facility]  Armando Martínez 80 y o  male MRN: [id number]    HPI: Armando Martínez is a 80y o  year old male who presents for ERCP  He had choledocholithiasis and prior ERCP with stent placement  Review of Systems    Historical Information   Past Medical History:   Diagnosis Date    Hypertension     Medical history unknown     Renal disorder      Past Surgical History:   Procedure Laterality Date    ERCP N/A 9/18/2018    Procedure: ENDOSCOPIC RETROGRADE CHOLANGIOPANCREATOGRAPHY (ERCP) with stent placement;  Surgeon: Libby Silvestre MD;  Location: [facility];  Service: Gastroenterology    NO PAST SURGERIES       Social History   History   Alcohol Use No     Comment: denies ETOH use     History   Drug Use No     Comment: denies     History   Smoking Status    Former Smoker    Types: Cigars   Smokeless Tobacco    Former User     Comment: states quit about 50 years ago, cigars "a few per week"      History reviewed  No pertinent family history  Meds/Allergies     Prescriptions Prior to Admission   Medication    amLODIPine (NORVASC) 5 mg tablet    ferrous sulfate 325 (65 Fe) mg tablet    metoprolol tartrate (LOPRESSOR) 50 mg tablet       No Known Allergies    Objective     Blood pressure 164/72, pulse 60, temperature 97 9 °F (36 6 °C), resp  rate 16, height 5' 5" (1 651 m), weight 54 4 kg (120 lb), SpO2 100 %  PHYSICAL EXAM    Gen: NAD  CV: RRR  CHEST: Clear  ABD: soft, NT/ND  EXT: no edema  Neuro: AAO      ASSESSMENT/PLAN:  This is a 80y o  year old male here for ERCP with stent removal and stone extraction      PLAN:   Procedure:  ERCP with stone extraction and stent removal

## 2018-11-23 NOTE — OP NOTE
OPERATIVE REPORT  PATIENT NAME: Nikolai Corley    :  1932  MRN: [id number]  Pt Location: [facility] 04    SURGERY DATE: 2018    Surgeon(s) and Role:  * Suzanne Mosquera MD - Primary  *Nancy Lee MD    ENDOSCOPIC RETROGRADE CHOLAGIOPANCREATOGRAPHY    PROCEDURE: ERCP/ Sphincterotomy, sphincteroplasty, Stone Extraction with Balloon    INDICATIONS: Choledocholithiasis -he was admitted in September with acute cholangitis  Because of thrombocytopenia and coagulopathy sphincterotomy was not performed at that time  Today here for sphincterotomy and stone extraction  POST-OP DIAGNOSIS: See the impression below    SEDATION: Monitored anesthesia care, check anesthesia records    PHYSICAL EXAM:    Blood pressure 164/72, pulse 60, temperature 97 9 °F (36 6 °C), resp  rate 16, height 5' 5" (1 651 m), weight 54 4 kg (120 lb), SpO2 100 %  Body mass index is 19 97 kg/m²  General: NAD  Heart: S1 & S2 normal, RRR  Lungs: CTA, No rales or rhonchi  Abdomen: Soft, nontender, nondistended, good bowel sounds    CONSENT:  Informed consent was obtained for the procedure, including sedation after explaining the risks and benefits of the procedure  Risks including but not limited to severe pancreatitis, bleeding, perforation, infection, aspiration were discussed in detail  Also explained about less than 100% sensitivity with the exam and other alternatives  PREPARATION:   EKG tracing, pulse oximetry, blood pressure were monitored throughout the procedure  Patient was identified by myself both verbally and by visual inspection of ID band  DESCRIPTION:   Patient was placed in the prone position and was sedated with the above medication  The standard lateral viewing gastroduodenoscope was introduced in to the oropharynx and the esophagus was intubated under direct visualization using sliding technique  Scope was passed down the esophagus up to the second part of the duodenum   The scope was withdrawn and noted the ampulla on the medial wall  Ampulla was cannulated with sphinctertome catheter and cholangiogram was performed  FINDINGS:    Limited evaluation of upper GI tract  Initial cholangiogram showed previously placed double pigtail stent in place  Mild luminal stricture noted at the duodenal sweep  This area was dilated with CRE balloon starting at 10 then to 12 mm  Then the scope was passed into the 2nd portion of the duodenum  Using a polypectomy snare the stent was extracted through the scope  Then selective biliary cannulation was achieved using sphincterotome and guidewire under fluoroscopy  Cholangiogram showed large round mobile stone and mid CBD measuring about 15 mm  Bile duct was dilated to 2 cm  A 10 mm sphincterotomy was performed with good hemostasis  Then a sphincteroplasty was performed using a hurricane balloon at 10 mm  Dilation was performed for 60 seconds  Then using an inflated 12 -15 mm stone extraction balloon, a 15 mm round yellow stone was removed  On further sweep to smaller stones were extracted  Final cholangiogram showed no apparent filling defect  Pancreatic duct was not injected or cannulated           IMPRESSIONS:      ERCP with sphincterotomy, sphincteroplasty and stone extraction  Previously placed double pigtail stent was removed  RECOMMENDATIONS:   Check LFTs in 1 week  Follow-up as an outpatient  Discharge home  If you have abdominal pain, bleeding or fever call my office at [phone]  Surgical evaluation for possible cholecystectomy  COMPLICATIONS: None; patient tolerated the procedure well          DISPOSITION: PACU           CONDITION: Stable  SIGNATURE: Emiliana Barnes MD  DATE: November 23, 2018  TIME: 9:08 AM

## 2018-11-23 NOTE — ANESTHESIA PREPROCEDURE EVALUATION
Review of Systems/Medical History  Patient summary reviewed  Chart reviewed  No history of anesthetic complications     Cardiovascular  EKG reviewed, Hypertension ,   Comment: Echo nl LV and mild TR,  Pulmonary  Negative pulmonary ROS        GI/Hepatic      Comment: Recent acute cholangitis     Negative  ROS        Endo/Other  Negative endo/other ROS      GYN       Hematology  Anemia ,  Thrombocytopenia,    Musculoskeletal  Negative musculoskeletal ROS        Neurology  Negative neurology ROS      Psychology   Negative psychology ROS              Physical Exam    Airway    Mallampati score: I  TM Distance: >3 FB  Neck ROM: full     Dental   lower dentures and upper dentures,     Cardiovascular      Pulmonary      Other Findings        Anesthesia Plan  ASA Score- 2     Anesthesia Type- general with ASA Monitors  Additional Monitors:   Airway Plan: ETT  Plan Factors-  Patient did not smoke on day of surgery  Induction- intravenous  Postoperative Plan-     Informed Consent- Anesthetic plan and risks discussed with patient  I personally reviewed this patient with the CRNA  Discussed and agreed on the Anesthesia Plan with the CRNA  Brooklynn Guerrero

## 2018-11-27 ENCOUNTER — TELEPHONE (OUTPATIENT)
Dept: GASTROENTEROLOGY | Facility: AMBULARY SURGERY CENTER | Age: 83
End: 2018-11-27

## 2018-12-06 ENCOUNTER — OFFICE VISIT (OUTPATIENT)
Dept: FAMILY MEDICINE CLINIC | Facility: CLINIC | Age: 83
End: 2018-12-06
Payer: COMMERCIAL

## 2018-12-06 VITALS
SYSTOLIC BLOOD PRESSURE: 152 MMHG | RESPIRATION RATE: 18 BRPM | HEIGHT: 65 IN | BODY MASS INDEX: 20.99 KG/M2 | DIASTOLIC BLOOD PRESSURE: 70 MMHG | OXYGEN SATURATION: 98 % | TEMPERATURE: 97.7 F | WEIGHT: 126 LBS | HEART RATE: 67 BPM

## 2018-12-06 DIAGNOSIS — J30.9 ALLERGIC RHINITIS, UNSPECIFIED SEASONALITY, UNSPECIFIED TRIGGER: ICD-10-CM

## 2018-12-06 DIAGNOSIS — I10 ESSENTIAL HYPERTENSION: Primary | Chronic | ICD-10-CM

## 2018-12-06 DIAGNOSIS — D64.9 ANEMIA, UNSPECIFIED TYPE: ICD-10-CM

## 2018-12-06 DIAGNOSIS — R53.1 GENERALIZED WEAKNESS: ICD-10-CM

## 2018-12-06 PROCEDURE — 3725F SCREEN DEPRESSION PERFORMED: CPT | Performed by: PHYSICIAN ASSISTANT

## 2018-12-06 PROCEDURE — 99214 OFFICE O/P EST MOD 30 MIN: CPT | Performed by: PHYSICIAN ASSISTANT

## 2018-12-06 RX ORDER — CETIRIZINE HYDROCHLORIDE 5 MG/1
5 TABLET ORAL DAILY
Qty: 90 TABLET | Refills: 1 | Status: SHIPPED | OUTPATIENT
Start: 2018-12-06 | End: 2019-07-15

## 2018-12-06 RX ORDER — FLUTICASONE PROPIONATE 50 MCG
SPRAY, SUSPENSION (ML) NASAL
Qty: 3 BOTTLE | Refills: 1 | Status: SHIPPED | OUTPATIENT
Start: 2018-12-06 | End: 2020-05-19 | Stop reason: SDUPTHER

## 2018-12-06 RX ORDER — METOPROLOL TARTRATE 50 MG/1
50 TABLET, FILM COATED ORAL EVERY 12 HOURS SCHEDULED
Qty: 180 TABLET | Refills: 1 | Status: SHIPPED | OUTPATIENT
Start: 2018-12-06 | End: 2020-01-08

## 2018-12-06 RX ORDER — AMLODIPINE BESYLATE 5 MG/1
5 TABLET ORAL 2 TIMES DAILY
Qty: 180 TABLET | Refills: 1 | Status: SHIPPED | OUTPATIENT
Start: 2018-12-06 | End: 2019-06-02 | Stop reason: SDUPTHER

## 2018-12-06 RX ORDER — FLUTICASONE PROPIONATE 50 MCG
2 SPRAY, SUSPENSION (ML) NASAL DAILY
Qty: 16 G | Refills: 5 | Status: SHIPPED | OUTPATIENT
Start: 2018-12-06 | End: 2018-12-06 | Stop reason: SDUPTHER

## 2018-12-06 RX ORDER — FERROUS SULFATE 325(65) MG
325 TABLET ORAL
Qty: 90 TABLET | Refills: 1 | Status: SHIPPED | OUTPATIENT
Start: 2018-12-06 | End: 2019-06-11 | Stop reason: SDUPTHER

## 2018-12-06 NOTE — ASSESSMENT & PLAN NOTE
Patient skin was warm pain today, this time no concerns for any excessive anemia  Patient has not gotten repeat lab work completed  A patient new lab slip  At this time continue with iron supplement, will re-evaluate after lab work was completed

## 2018-12-06 NOTE — PROGRESS NOTES
Assessment/Plan:    Essential hypertension  Informed patient will continue with current medication as prescribed at the hospital   Discussed the importance of taking his medication every day as this could lead to an increased risk of heart attack or stroke  Also informed patient that he needs to get lab work completed that was recommended at discharge from the hospital   Handed patient lab slip today  Anemia  Patient skin was warm pain today, this time no concerns for any excessive anemia  Patient has not gotten repeat lab work completed  A patient new lab slip  At this time continue with iron supplement, will re-evaluate after lab work was completed  Allergic rhinitis  Informed patient that his symptoms are likely not due to cold, but allergic rhinitis  When seen in the office 1 year ago he was started on Zyrtec  Patient does not recall being on that medication  Will restart medication today, also send over prescription for Flonase  Did inform patient that if he does have cold-like symptoms he can take Coricidin HBP for cold symptoms  Diagnoses and all orders for this visit:    Essential hypertension  -     Comprehensive metabolic panel; Future  -     Lipid panel; Future  -     metoprolol tartrate (LOPRESSOR) 50 mg tablet; Take 1 tablet (50 mg total) by mouth every 12 (twelve) hours    Anemia, unspecified type  -     CBC and differential; Future  -     ferrous sulfate 325 (65 Fe) mg tablet; Take 1 tablet (325 mg total) by mouth daily with breakfast    Generalized weakness  -     amLODIPine (NORVASC) 5 mg tablet; Take 1 tablet (5 mg total) by mouth 2 (two) times a day    Allergic rhinitis, unspecified seasonality, unspecified trigger  -     cetirizine (ZyrTEC) 5 MG tablet; Take 1 tablet (5 mg total) by mouth daily  -     fluticasone (FLONASE) 50 mcg/act nasal spray; 2 sprays into each nostril daily          Subjective:      Patient ID: Jose Martin Harris is a 80 y o  male      66-year-old male presenting for follow-up of hospitalization September 2018  Patient was admitted for sepsis  Patient states that he is feeling much better today  Patient does have some confusion about medication he is taking  Prior to his admission he was on blood pressure medication, but had not been taking at time  Upon discharge she was started on metoprolol 50 mg twice a day, amlodipine 5 mg twice a day  Patient also noted to have anemia due to chronic disease, was placed on iron supplement  Was also get lab work completed after his discharge from the hospital, but had never gotten completed  He under went an MRCP recently, but does not over the results  Patient states that he is doing fine today  Denies any headaches, dizziness, changes in vision chest pain, palpitations abdominal pain, nausea, vomiting, pain or swelling in lower extremities  Patient has concerns with possible cold-like symptoms for awhile  Patient states that he is having runny nose, nasal congestion, watery eyes  Denies any pruritic eyes  Has not experiencing any sore throat, ear pain, cough, shortness of breath, wheezing  Has not been taking anything over-the-counter as he was told by the visiting nurse that he should not take anything because of his current conditions  The following portions of the patient's history were reviewed and updated as appropriate:   He  has a past medical history of Hypertension; Medical history unknown; and Renal disorder    He   Patient Active Problem List    Diagnosis Date Noted    Allergic rhinitis 12/06/2018    Aortic stenosis, moderate 10/10/2018    Lower extremity edema 10/10/2018    Blurry vision, left eye 10/10/2018    Acute cholangitis 10/04/2018    History of bacteremia 10/01/2018    Anemia 10/01/2018    Ambulatory dysfunction 09/28/2018    Cholelithiasis with biliary obstruction 09/28/2018    [facility] failure ([facility]F) ([facility] [address] ) 09/28/2018    Clostridium difficile colitis 09/28/2018    Essential hypertension     [facility] 09/17/2018    Sepsis ([facility] ) 09/17/2018     He  has a past surgical history that includes No past surgeries; ERCP (N/A, 9/18/2018); and pr ercp dx collection specimen brushing/washing (N/A, 11/23/2018)  His family history is not on file  He  reports that he has quit smoking  His smoking use included Cigars  He has quit using smokeless tobacco  He reports that he does not drink alcohol or use drugs  Current Outpatient Prescriptions   Medication Sig Dispense Refill    amLODIPine (NORVASC) 5 mg tablet Take 1 tablet (5 mg total) by mouth 2 (two) times a day 180 tablet 1    ferrous sulfate 325 (65 Fe) mg tablet Take 1 tablet (325 mg total) by mouth daily with breakfast 90 tablet 1    metoprolol tartrate (LOPRESSOR) 50 mg tablet Take 1 tablet (50 mg total) by mouth every 12 (twelve) hours 180 tablet 1    cetirizine (ZyrTEC) 5 MG tablet Take 1 tablet (5 mg total) by mouth daily 90 tablet 1    fluticasone (FLONASE) 50 mcg/act nasal spray 2 sprays into each nostril daily 16 g 5     No current facility-administered medications for this visit  He has No Known Allergies       Review of Systems   Constitutional: Negative for activity change, appetite change, chills, diaphoresis, fatigue, fever and unexpected weight change  HENT: Positive for congestion and rhinorrhea  Negative for ear pain, sinus pressure and sore throat  Eyes: Positive for discharge  Negative for redness, itching and visual disturbance  Respiratory: Negative for cough, chest tightness, shortness of breath and wheezing  Cardiovascular: Negative for chest pain, palpitations and leg swelling  Gastrointestinal: Negative for abdominal pain, constipation, diarrhea, nausea and vomiting  Endocrine: Negative for cold intolerance, heat intolerance, polydipsia, polyphagia and polyuria  Genitourinary: Negative for dysuria and hematuria  Skin: Negative for wound     Neurological: Negative for dizziness, tremors, syncope, weakness, numbness and headaches  Hematological: Negative for adenopathy  Objective:      /70 (BP Location: Left arm, Patient Position: Sitting, Cuff Size: Standard)   Pulse 67   Temp 97 7 °F (36 5 °C) (Tympanic)   Resp 18   Ht 5' 5" (1 651 m)   Wt 57 2 kg (126 lb)   SpO2 98%   BMI 20 97 kg/m²          Physical Exam   Constitutional: He is oriented to person, place, and time  He appears well-developed and well-nourished  No distress  HENT:   Right Ear: External ear normal    Left Ear: External ear normal    Nose: Mucosal edema present  Mouth/Throat: Oropharynx is clear and moist    Neck: Normal range of motion  Neck supple  Cardiovascular: Normal rate, regular rhythm and normal heart sounds  Exam reveals no gallop and no friction rub  No murmur heard  Pulmonary/Chest: Effort normal and breath sounds normal  No respiratory distress  He has no wheezes  He has no rales  Abdominal: Soft  Bowel sounds are normal  He exhibits no distension  There is no tenderness  There is no rebound and no guarding  Lymphadenopathy:     He has no cervical adenopathy  Neurological: He is alert and oriented to person, place, and time  No cranial nerve deficit  Skin: He is not diaphoretic  Psychiatric: He has a normal mood and affect  His behavior is normal    Nursing note and vitals reviewed

## 2018-12-06 NOTE — ASSESSMENT & PLAN NOTE
Informed patient will continue with current medication as prescribed at the hospital   Discussed the importance of taking his medication every day as this could lead to an increased risk of heart attack or stroke  Also informed patient that he needs to get lab work completed that was recommended at discharge from the hospital   Handed patient lab slip today

## 2018-12-06 NOTE — ASSESSMENT & PLAN NOTE
Informed patient that his symptoms are likely not due to cold, but allergic rhinitis  When seen in the office 1 year ago he was started on Zyrtec  Patient does not recall being on that medication  Will restart medication today, also send over prescription for Flonase  Did inform patient that if he does have cold-like symptoms he can take Coricidin HBP for cold symptoms

## 2018-12-10 DIAGNOSIS — R53.1 GENERALIZED WEAKNESS: ICD-10-CM

## 2018-12-11 RX ORDER — AMLODIPINE BESYLATE 5 MG/1
TABLET ORAL
Qty: 60 TABLET | Refills: 0 | OUTPATIENT
Start: 2018-12-11

## 2019-01-04 ENCOUNTER — OFFICE VISIT (OUTPATIENT)
Dept: FAMILY MEDICINE CLINIC | Facility: CLINIC | Age: 84
End: 2019-01-04

## 2019-01-04 VITALS
RESPIRATION RATE: 18 BRPM | DIASTOLIC BLOOD PRESSURE: 78 MMHG | WEIGHT: 125 LBS | HEIGHT: 65 IN | BODY MASS INDEX: 20.83 KG/M2 | SYSTOLIC BLOOD PRESSURE: 164 MMHG | OXYGEN SATURATION: 98 % | TEMPERATURE: 97.9 F | HEART RATE: 67 BPM

## 2019-01-04 DIAGNOSIS — J01.10 ACUTE NON-RECURRENT FRONTAL SINUSITIS: Primary | ICD-10-CM

## 2019-01-04 PROCEDURE — 99213 OFFICE O/P EST LOW 20 MIN: CPT | Performed by: PHYSICIAN ASSISTANT

## 2019-01-04 RX ORDER — DEXTROMETHORPHAN HYDROBROMIDE AND PROMETHAZINE HYDROCHLORIDE 15; 6.25 MG/5ML; MG/5ML
5 SYRUP ORAL 4 TIMES DAILY PRN
Qty: 118 ML | Refills: 0 | Status: SHIPPED | OUTPATIENT
Start: 2019-01-04 | End: 2019-07-15

## 2019-01-04 RX ORDER — AMOXICILLIN AND CLAVULANATE POTASSIUM 875; 125 MG/1; MG/1
1 TABLET, FILM COATED ORAL EVERY 12 HOURS SCHEDULED
Qty: 20 TABLET | Refills: 0 | Status: SHIPPED | OUTPATIENT
Start: 2019-01-04 | End: 2019-01-14

## 2019-01-04 NOTE — PROGRESS NOTES
Assessment/Plan:    Informed patient that he may have a sinus infection that is causing his current symptoms  Will send over prescription for Augmentin to see if this will help with symptoms  Will also send over cough medication to be used on an as-needed basis  Make sure to drink plenty of fluids, and get rest   Follow up if there is no improvement in symptoms  Diagnoses and all orders for this visit:    Acute non-recurrent frontal sinusitis  -     amoxicillin-clavulanate (AUGMENTIN) 875-125 mg per tablet; Take 1 tablet by mouth every 12 (twelve) hours for 10 days  -     promethazine-dextromethorphan (PHENERGAN-DM) 6 25-15 mg/5 mL oral syrup; Take 5 mL by mouth 4 (four) times a day as needed for cough          Subjective:      Patient ID: Rosibel Reyna is a 80 y o  male  63-year-old male presenting with cold-like symptoms times 3 weeks  Patient is a former smoker  Has never been diagnosed with asthma or COPD  States that he has been having a hacky cough and has been bringing up yellowish mucus  Cough is worse at night  Has also been experiencing nasal congestion and sore throat  Has had some sinus pressure  Denies any earaches, rhinorrhea, chest pain, shortness of breath, wheezing  Has been using over-the-counter cough medication without any relief of his symptoms  The following portions of the patient's history were reviewed and updated as appropriate:   He  has a past medical history of Hypertension; Medical history unknown; and Renal disorder    He   Patient Active Problem List    Diagnosis Date Noted    Allergic rhinitis 12/06/2018    Aortic stenosis, moderate 10/10/2018    Lower extremity edema 10/10/2018    Blurry vision, left eye 10/10/2018    Acute cholangitis 10/04/2018    History of bacteremia 10/01/2018    Anemia 10/01/2018    Ambulatory dysfunction 09/28/2018    Cholelithiasis with biliary obstruction 09/28/2018    Acute renal failure (ARF) ([facility] [address] ) 09/28/2018    Clostridium difficile colitis 09/28/2018    Essential hypertension     [facility] [address] ) 09/17/2018    [facility] 09/17/2018     He  has a past surgical history that includes No past surgeries; ERCP (N/A, 9/18/2018); and pr ercp dx collection specimen brushing/washing (N/A, 11/23/2018)  His family history is not on file  He  reports that he has quit smoking  His smoking use included Cigars  He has quit using smokeless tobacco  He reports that he does not drink alcohol or use drugs  Current Outpatient Prescriptions   Medication Sig Dispense Refill    amLODIPine (NORVASC) 5 mg tablet Take 1 tablet (5 mg total) by mouth 2 (two) times a day 180 tablet 1    amoxicillin-clavulanate (AUGMENTIN) 875-125 mg per tablet Take 1 tablet by mouth every 12 (twelve) hours for 10 days 20 tablet 0    cetirizine (ZyrTEC) 5 MG tablet Take 1 tablet (5 mg total) by mouth daily 90 tablet 1    ferrous sulfate 325 (65 Fe) mg tablet Take 1 tablet (325 mg total) by mouth daily with breakfast 90 tablet 1    fluticasone (FLONASE) 50 mcg/act nasal spray SHAKE LIQUID AND USE 2 SPRAYS IN EACH NOSTRIL DAILY 3 Bottle 1    metoprolol tartrate (LOPRESSOR) 50 mg tablet Take 1 tablet (50 mg total) by mouth every 12 (twelve) hours 180 tablet 1    promethazine-dextromethorphan (PHENERGAN-DM) 6 25-15 mg/5 mL oral syrup Take 5 mL by mouth 4 (four) times a day as needed for cough 118 mL 0     No current facility-administered medications for this visit  He has No Known Allergies       Review of Systems   Constitutional: Negative for chills, diaphoresis, fatigue and fever  HENT: Positive for congestion, postnasal drip, sinus pressure and sore throat  Negative for ear pain, nosebleeds and rhinorrhea  Eyes: Negative for photophobia, pain, redness and visual disturbance  Respiratory: Negative for cough, chest tightness, shortness of breath and wheezing  Cardiovascular: Negative for chest pain and palpitations     Gastrointestinal: Negative for abdominal pain  Neurological: Negative for dizziness, weakness, light-headedness and headaches  Objective:      /78 (BP Location: Left arm, Patient Position: Sitting, Cuff Size: Standard)   Pulse 67   Temp 97 9 °F (36 6 °C) (Tympanic)   Resp 18   Ht 5' 5" (1 651 m)   Wt 56 7 kg (125 lb)   SpO2 98%   BMI 20 80 kg/m²          Physical Exam   Constitutional: He appears well-developed and well-nourished  No distress  HENT:   Right Ear: Hearing, tympanic membrane, external ear and ear canal normal    Left Ear: Hearing, tympanic membrane, external ear and ear canal normal    Nose: Mucosal edema present  No rhinorrhea  Mouth/Throat: Mucous membranes are normal  Posterior oropharyngeal erythema present  Eyes: Pupils are equal, round, and reactive to light  Conjunctivae and EOM are normal    Neck: Normal range of motion  Neck supple  Cardiovascular: Normal rate, regular rhythm and normal heart sounds  Exam reveals no gallop and no friction rub  No murmur heard  Pulmonary/Chest: Effort normal and breath sounds normal  No respiratory distress  He has no wheezes  He has no rales  Lymphadenopathy:     He has cervical adenopathy  Skin: He is not diaphoretic

## 2019-04-26 ENCOUNTER — OFFICE VISIT (OUTPATIENT)
Dept: FAMILY MEDICINE CLINIC | Facility: CLINIC | Age: 84
End: 2019-04-26

## 2019-04-26 VITALS
SYSTOLIC BLOOD PRESSURE: 134 MMHG | TEMPERATURE: 98.4 F | DIASTOLIC BLOOD PRESSURE: 68 MMHG | BODY MASS INDEX: 22.36 KG/M2 | OXYGEN SATURATION: 96 % | HEART RATE: 68 BPM | RESPIRATION RATE: 20 BRPM | HEIGHT: 64 IN | WEIGHT: 131 LBS

## 2019-04-26 DIAGNOSIS — J30.1 SEASONAL ALLERGIC RHINITIS DUE TO POLLEN: Primary | ICD-10-CM

## 2019-04-26 DIAGNOSIS — J45.20 MILD INTERMITTENT ASTHMA WITHOUT COMPLICATION: ICD-10-CM

## 2019-04-26 PROCEDURE — 99213 OFFICE O/P EST LOW 20 MIN: CPT | Performed by: PHYSICIAN ASSISTANT

## 2019-04-26 RX ORDER — PREDNISONE 10 MG/1
TABLET ORAL
Qty: 30 TABLET | Refills: 0 | Status: SHIPPED | OUTPATIENT
Start: 2019-04-26 | End: 2019-07-15 | Stop reason: SDUPTHER

## 2019-04-26 RX ORDER — ALBUTEROL SULFATE 90 UG/1
2 AEROSOL, METERED RESPIRATORY (INHALATION) EVERY 6 HOURS PRN
Qty: 18 G | Refills: 0 | Status: SHIPPED | OUTPATIENT
Start: 2019-04-26 | End: 2019-04-26 | Stop reason: SDUPTHER

## 2019-04-26 RX ORDER — LORATADINE 10 MG/1
10 TABLET ORAL DAILY
Qty: 90 TABLET | Refills: 1 | Status: SHIPPED | OUTPATIENT
Start: 2019-04-26 | End: 2019-10-15 | Stop reason: SDUPTHER

## 2019-04-29 RX ORDER — ALBUTEROL SULFATE 90 UG/1
AEROSOL, METERED RESPIRATORY (INHALATION)
Qty: 54 G | Refills: 0 | Status: SHIPPED | OUTPATIENT
Start: 2019-04-29 | End: 2019-07-12 | Stop reason: SDUPTHER

## 2019-05-18 DIAGNOSIS — J45.20 MILD INTERMITTENT ASTHMA WITHOUT COMPLICATION: ICD-10-CM

## 2019-05-20 RX ORDER — ALBUTEROL SULFATE 90 UG/1
AEROSOL, METERED RESPIRATORY (INHALATION)
Qty: 18 G | Refills: 0 | Status: SHIPPED | OUTPATIENT
Start: 2019-05-20 | End: 2019-07-12 | Stop reason: SDUPTHER

## 2019-06-02 DIAGNOSIS — R53.1 GENERALIZED WEAKNESS: ICD-10-CM

## 2019-06-03 RX ORDER — AMLODIPINE BESYLATE 5 MG/1
TABLET ORAL
Qty: 180 TABLET | Refills: 1 | Status: SHIPPED | OUTPATIENT
Start: 2019-06-03 | End: 2019-12-03 | Stop reason: SDUPTHER

## 2019-06-11 DIAGNOSIS — D64.9 ANEMIA, UNSPECIFIED TYPE: ICD-10-CM

## 2019-06-11 RX ORDER — FERROUS SULFATE 325(65) MG
TABLET ORAL
Qty: 90 TABLET | Refills: 1 | Status: SHIPPED | OUTPATIENT
Start: 2019-06-11 | End: 2019-12-02 | Stop reason: SDUPTHER

## 2019-07-12 DIAGNOSIS — J45.20 MILD INTERMITTENT ASTHMA WITHOUT COMPLICATION: ICD-10-CM

## 2019-07-12 RX ORDER — ALBUTEROL SULFATE 90 UG/1
2 AEROSOL, METERED RESPIRATORY (INHALATION) EVERY 6 HOURS PRN
Qty: 18 G | Refills: 1 | Status: SHIPPED | OUTPATIENT
Start: 2019-07-12 | End: 2019-08-27 | Stop reason: SDUPTHER

## 2019-07-15 ENCOUNTER — OFFICE VISIT (OUTPATIENT)
Dept: FAMILY MEDICINE CLINIC | Facility: CLINIC | Age: 84
End: 2019-07-15

## 2019-07-15 VITALS
BODY MASS INDEX: 22.49 KG/M2 | OXYGEN SATURATION: 97 % | DIASTOLIC BLOOD PRESSURE: 72 MMHG | WEIGHT: 131 LBS | RESPIRATION RATE: 16 BRPM | TEMPERATURE: 97.4 F | HEART RATE: 69 BPM | SYSTOLIC BLOOD PRESSURE: 138 MMHG

## 2019-07-15 DIAGNOSIS — Z13.1 SCREENING FOR DIABETES MELLITUS: ICD-10-CM

## 2019-07-15 DIAGNOSIS — D69.6 THROMBOCYTOPENIA (HCC): ICD-10-CM

## 2019-07-15 DIAGNOSIS — J45.20 MILD INTERMITTENT ASTHMA WITHOUT COMPLICATION: ICD-10-CM

## 2019-07-15 DIAGNOSIS — J30.9 ALLERGIC RHINITIS, UNSPECIFIED SEASONALITY, UNSPECIFIED TRIGGER: ICD-10-CM

## 2019-07-15 DIAGNOSIS — Z13.220 SCREENING CHOLESTEROL LEVEL: Primary | ICD-10-CM

## 2019-07-15 DIAGNOSIS — I35.0 AORTIC STENOSIS, MODERATE: ICD-10-CM

## 2019-07-15 DIAGNOSIS — D64.9 ANEMIA, UNSPECIFIED TYPE: ICD-10-CM

## 2019-07-15 PROCEDURE — 99213 OFFICE O/P EST LOW 20 MIN: CPT | Performed by: PHYSICIAN ASSISTANT

## 2019-07-15 RX ORDER — PREDNISONE 10 MG/1
TABLET ORAL
Qty: 30 TABLET | Refills: 0 | Status: SHIPPED | OUTPATIENT
Start: 2019-07-15 | End: 2020-01-10 | Stop reason: SDUPTHER

## 2019-07-15 RX ORDER — BENZONATATE 200 MG/1
200 CAPSULE ORAL 3 TIMES DAILY PRN
Qty: 20 CAPSULE | Refills: 0 | Status: SHIPPED | OUTPATIENT
Start: 2019-07-15 | End: 2020-08-20

## 2019-07-15 NOTE — PROGRESS NOTES
Assessment/Plan: Aortic stenosis, moderate  Asymptomatic  Blood pressure was normal today  Will refer to Cardiology in the future  Allergic rhinitis  Recommend continue with loratadine and Flonase at this time  Thrombocytopenia ([address] )  Will recheck lab work to make sure anemia has resolved  Believe that patient's symptoms are due to an allergic rhinitis, and exacerbation of asthma  Slight wheezing noted on physical exam   At this time no concerns for bacterial infection  Continue with loratadine and Flonase  Will send over prescription for prednisone to see if this helps with wheezing  Can use Tessalon Perles as needed for cough  Reviewing patient's lab work, and has been about a year since it was last done  At that time he was also noted to have significant anemia  Instructed patient to get lab work completed to make sure he is on correct medications  Diagnoses and all orders for this visit:    Screening cholesterol level  -     Lipid panel; Future    Screening for diabetes mellitus  -     Comprehensive metabolic panel; Future    Anemia, unspecified type  -     CBC and differential; Future  -     Iron; Future  -     Ferritin; Future  -     TIBC; Future    Thrombocytopenia (HCC)    Mild intermittent asthma without complication  -     predniSONE 10 mg tablet; Take 4 tabs for 3 days, then 3 tabs for 3 days, then 2 tabs for 3 days, then 1 tab for 3 days  -     benzonatate (TESSALON) 200 MG capsule; Take 1 capsule (200 mg total) by mouth 3 (three) times a day as needed for cough    Allergic rhinitis, unspecified seasonality, unspecified trigger    Aortic stenosis, moderate          Subjective:      Patient ID: David Darling is a 80 y o  male  80-year-old male presenting with cold-like symptoms x2 weeks  Patient states that he is having nasal congestion, rhinorrhea, sinus pressure  Also has a mild nonproductive cough    At times will have chest tightness, and uses rescue inhaler as needed  Has not had any fevers or chills, ear pain, sore throat  Has been taking allergy medication as directed  Has been taken Tussin DM, and states this has not been helping with his symptoms  Recently went to a concert, and states after the event is when he started experiencing the symptoms  The following portions of the patient's history were reviewed and updated as appropriate:   He  has a past medical history of Hypertension, Medical history unknown, and Renal disorder  He   Patient Active Problem List    Diagnosis Date Noted    Allergic rhinitis 12/06/2018    Aortic stenosis, moderate 10/10/2018    Lower extremity edema 10/10/2018    Blurry vision, left eye 10/10/2018    Acute cholangitis 10/04/2018    History of bacteremia 10/01/2018    Anemia 10/01/2018    Ambulatory dysfunction 09/28/2018    Cholelithiasis with biliary obstruction 09/28/2018    [facility] 09/28/2018    Clostridium difficile colitis 09/28/2018    Essential hypertension     Thrombocytopenia ([facility] ) 09/17/2018    Sepsis ([facility] ) 09/17/2018     He  has a past surgical history that includes No past surgeries; ERCP (N/A, 9/18/2018); and pr ercp dx collection specimen brushing/washing (N/A, 11/23/2018)  His family history is not on file  He  reports that he has quit smoking  His smoking use included cigars  He has quit using smokeless tobacco  He reports that he does not drink alcohol or use drugs    Current Outpatient Medications   Medication Sig Dispense Refill    albuterol (PROVENTIL HFA,VENTOLIN HFA) 90 mcg/act inhaler Inhale 2 puffs every 6 (six) hours as needed for wheezing or shortness of breath 18 g 1    amLODIPine (NORVASC) 5 mg tablet TAKE 1 TABLET(5 MG) BY MOUTH TWICE DAILY 180 tablet 1    fluticasone (FLONASE) 50 mcg/act nasal spray SHAKE LIQUID AND USE 2 SPRAYS IN EACH NOSTRIL DAILY 3 Bottle 1    loratadine (CLARITIN) 10 mg tablet Take 1 tablet (10 mg total) by mouth daily 90 tablet 1    metoprolol tartrate (LOPRESSOR) 50 mg tablet Take 1 tablet (50 mg total) by mouth every 12 (twelve) hours 180 tablet 1    benzonatate (TESSALON) 200 MG capsule Take 1 capsule (200 mg total) by mouth 3 (three) times a day as needed for cough 20 capsule 0    FEROSUL 325 (65 Fe) MG tablet TAKE 1 TABLET(325 MG) BY MOUTH DAILY WITH BREAKFAST 90 tablet 1    predniSONE 10 mg tablet Take 4 tabs for 3 days, then 3 tabs for 3 days, then 2 tabs for 3 days, then 1 tab for 3 days  30 tablet 0     No current facility-administered medications for this visit  He has No Known Allergies       Review of Systems   Constitutional: Negative for chills, diaphoresis, fatigue and fever  HENT: Positive for congestion, postnasal drip and rhinorrhea  Negative for ear discharge, ear pain, sneezing and sore throat  Eyes: Negative for discharge and itching  Respiratory: Positive for cough, chest tightness and shortness of breath  Cardiovascular: Negative for chest pain and palpitations  Neurological: Negative for dizziness, weakness, light-headedness and headaches  Objective:      /72 (BP Location: Right arm, Patient Position: Sitting, Cuff Size: Standard)   Pulse 69   Temp (!) 97 4 °F (36 3 °C) (Temporal)   Resp 16   Wt 59 4 kg (131 lb)   SpO2 97%   BMI 22 49 kg/m²          Physical Exam   Constitutional: He is oriented to person, place, and time  He appears well-developed and well-nourished  No distress  HENT:   Right Ear: Tympanic membrane, external ear and ear canal normal    Left Ear: Tympanic membrane, external ear and ear canal normal    Nose: Mucosal edema and rhinorrhea present  Mouth/Throat: Oropharynx is clear and moist and mucous membranes are normal    Cardiovascular: Normal rate and regular rhythm  Exam reveals no gallop and no friction rub  Murmur heard  Pulmonary/Chest: Effort normal  No respiratory distress  He has wheezes  He has no rales     Lymphadenopathy:     He has no cervical adenopathy  Neurological: He is alert and oriented to person, place, and time  No cranial nerve deficit  Skin: He is not diaphoretic  Nursing note and vitals reviewed

## 2019-08-27 DIAGNOSIS — J45.20 MILD INTERMITTENT ASTHMA WITHOUT COMPLICATION: ICD-10-CM

## 2019-09-27 DIAGNOSIS — J45.20 MILD INTERMITTENT ASTHMA WITHOUT COMPLICATION: ICD-10-CM

## 2019-10-15 DIAGNOSIS — J30.1 SEASONAL ALLERGIC RHINITIS DUE TO POLLEN: ICD-10-CM

## 2019-10-17 RX ORDER — LORATADINE 10 MG/1
TABLET ORAL
Qty: 90 TABLET | Refills: 1 | Status: SHIPPED | OUTPATIENT
Start: 2019-10-17 | End: 2020-04-06

## 2019-10-29 DIAGNOSIS — J45.20 MILD INTERMITTENT ASTHMA WITHOUT COMPLICATION: ICD-10-CM

## 2019-10-29 RX ORDER — ALBUTEROL SULFATE 90 UG/1
AEROSOL, METERED RESPIRATORY (INHALATION)
Qty: 18 G | Refills: 2 | Status: SHIPPED | OUTPATIENT
Start: 2019-10-29 | End: 2020-02-03

## 2019-12-02 DIAGNOSIS — D64.9 ANEMIA, UNSPECIFIED TYPE: ICD-10-CM

## 2019-12-02 RX ORDER — FERROUS SULFATE 325(65) MG
TABLET ORAL
Qty: 90 TABLET | Refills: 1 | Status: SHIPPED | OUTPATIENT
Start: 2019-12-02 | End: 2020-06-02

## 2019-12-03 DIAGNOSIS — R53.1 GENERALIZED WEAKNESS: ICD-10-CM

## 2019-12-03 RX ORDER — AMLODIPINE BESYLATE 5 MG/1
TABLET ORAL
Qty: 180 TABLET | Refills: 1 | Status: SHIPPED | OUTPATIENT
Start: 2019-12-03 | End: 2020-05-29

## 2020-01-08 ENCOUNTER — TELEPHONE (OUTPATIENT)
Dept: FAMILY MEDICINE CLINIC | Facility: CLINIC | Age: 85
End: 2020-01-08

## 2020-01-08 DIAGNOSIS — I10 ESSENTIAL HYPERTENSION: Chronic | ICD-10-CM

## 2020-01-08 RX ORDER — METOPROLOL TARTRATE 50 MG/1
TABLET, FILM COATED ORAL
Qty: 180 TABLET | Refills: 1 | Status: SHIPPED | OUTPATIENT
Start: 2020-01-08 | End: 2020-07-08

## 2020-01-08 NOTE — TELEPHONE ENCOUNTER
Form was dropped by patient, will be forwarded to provider at assigned delivery time      [facility] Pre-op Form

## 2020-01-10 ENCOUNTER — TELEPHONE (OUTPATIENT)
Dept: FAMILY MEDICINE CLINIC | Facility: CLINIC | Age: 85
End: 2020-01-10

## 2020-01-10 ENCOUNTER — OFFICE VISIT (OUTPATIENT)
Dept: FAMILY MEDICINE CLINIC | Facility: CLINIC | Age: 85
End: 2020-01-10

## 2020-01-10 VITALS
DIASTOLIC BLOOD PRESSURE: 80 MMHG | HEART RATE: 73 BPM | HEIGHT: 62 IN | OXYGEN SATURATION: 96 % | BODY MASS INDEX: 24.48 KG/M2 | TEMPERATURE: 99 F | RESPIRATION RATE: 18 BRPM | WEIGHT: 133 LBS | SYSTOLIC BLOOD PRESSURE: 142 MMHG

## 2020-01-10 DIAGNOSIS — J40 BRONCHITIS: Primary | ICD-10-CM

## 2020-01-10 DIAGNOSIS — L98.9 SKIN LESION OF FACE: ICD-10-CM

## 2020-01-10 DIAGNOSIS — J45.20 MILD INTERMITTENT ASTHMA WITHOUT COMPLICATION: ICD-10-CM

## 2020-01-10 PROCEDURE — 99213 OFFICE O/P EST LOW 20 MIN: CPT | Performed by: PHYSICIAN ASSISTANT

## 2020-01-10 RX ORDER — PREDNISONE 10 MG/1
TABLET ORAL
Qty: 30 TABLET | Refills: 0 | Status: SHIPPED | OUTPATIENT
Start: 2020-01-10 | End: 2020-03-19 | Stop reason: SDUPTHER

## 2020-01-10 RX ORDER — AZITHROMYCIN 250 MG/1
TABLET, FILM COATED ORAL
Qty: 6 TABLET | Refills: 0 | Status: SHIPPED | OUTPATIENT
Start: 2020-01-10 | End: 2020-01-15

## 2020-01-10 NOTE — PROGRESS NOTES
Assessment/Plan:    Concern for an acute bronchitis  Since patient is a former smoker, will send over Z-Georges and steroid taper to help with his symptoms  If there is no improvement in 1 week, recommend making a follow-up appointment  Encouraged him to continue using Robitussin DM  Make sure to drink plenty of fluids  On physical exam skin lesion was noted on the left side of the nose  Concern for a basal cell carcinoma  Will see if we get patient into Dermatology  If unable to, will have patient follow up with 1 of the residents at [place] [id number] to have biopsy performed  Diagnoses and all orders for this visit:    Bronchitis  -     predniSONE 10 mg tablet; Take 4 tabs for 3 days, then 3 tabs for 3 days, then 2 tabs for 3 days, then 1 tab for 3 days  -     azithromycin (ZITHROMAX) 250 mg tablet; Take 2 tablets (500 mg total) by mouth daily for 1 day, THEN 1 tablet (250 mg total) daily for 4 days  Skin lesion of face  -     Cancel: Ambulatory referral to Dermatology; Future  -     Ambulatory referral to Dermatology; Future    Mild intermittent asthma without complication          Subjective:      Patient ID: Yonis Wray is a 80 y o  male  80-year-old male presenting with cold-like symptoms x1 5 weeks  Patient is a former smoker  Patient has been experiencing an increasing cough over the last week  States he feels like his chest is congested, but is unable to bring up any mucus  Has also been experiencing nasal congestion, sore throat, shortness of breath  Has had some relief with a rescue inhaler  Has been taking Robitussin DM which he does not believe has been helping his symptoms  The following portions of the patient's history were reviewed and updated as appropriate:   He  has a past medical history of Hypertension, Medical history unknown, and Renal disorder    He   Patient Active Problem List    Diagnosis Date Noted    Allergic rhinitis 12/06/2018    Aortic stenosis, moderate 10/10/2018    Lower extremity edema 10/10/2018    Blurry vision, left eye 10/10/2018    Acute cholangitis 10/04/2018    History of bacteremia 10/01/2018    Anemia 10/01/2018    Ambulatory dysfunction 09/28/2018    Cholelithiasis with biliary obstruction 09/28/2018    [facility]/28/2018    Clostridium difficile colitis 09/28/2018    Essential hypertension     Thrombocytopenia ([facility] ) 09/17/2018    Sepsis ([facility] ) 09/17/2018     He  has a past surgical history that includes No past surgeries; ERCP (N/A, 9/18/2018); and pr ercp dx collection specimen brushing/washing (N/A, 11/23/2018)  His family history is not on file  He  reports that he has quit smoking  His smoking use included cigars  He has quit using smokeless tobacco  He reports that he does not drink alcohol or use drugs  Current Outpatient Medications   Medication Sig Dispense Refill    albuterol (PROVENTIL HFA,VENTOLIN HFA) 90 mcg/act inhaler INHALE 2 PUFFS BY MOUTH EVERY 6 HOURS AS NEEDED FOR WHEEZING OR SHORTNESS OF BREATH 18 g 2    amLODIPine (NORVASC) 5 mg tablet TAKE 1 TABLET(5 MG) BY MOUTH TWICE DAILY 180 tablet 1    azithromycin (ZITHROMAX) 250 mg tablet Take 2 tablets (500 mg total) by mouth daily for 1 day, THEN 1 tablet (250 mg total) daily for 4 days   6 tablet 0    benzonatate (TESSALON) 200 MG capsule Take 1 capsule (200 mg total) by mouth 3 (three) times a day as needed for cough 20 capsule 0    FEROSUL 325 (65 Fe) MG tablet TAKE 1 TABLET(325 MG) BY MOUTH DAILY WITH BREAKFAST 90 tablet 1    fluticasone (FLONASE) 50 mcg/act nasal spray SHAKE LIQUID AND USE 2 SPRAYS IN EACH NOSTRIL DAILY 3 Bottle 1    loratadine (CLARITIN) 10 mg tablet TAKE ONE TABLET BY MOUTH EVERY DAY 90 tablet 1    metoprolol tartrate (LOPRESSOR) 50 mg tablet TAKE 1 TABLET(50 MG) BY MOUTH EVERY 12 HOURS 180 tablet 1    predniSONE 10 mg tablet Take 4 tabs for 3 days, then 3 tabs for 3 days, then 2 tabs for 3 days, then 1 tab for 3 days  30 tablet 0     No current facility-administered medications for this visit  He has No Known Allergies       Review of Systems   Constitutional: Negative for appetite change, chills, diaphoresis, fatigue and fever  HENT: Positive for congestion and sore throat  Negative for ear pain, rhinorrhea and trouble swallowing  Eyes: Negative for visual disturbance  Respiratory: Positive for cough, chest tightness and wheezing  Negative for shortness of breath  Cardiovascular: Negative for chest pain and palpitations  Neurological: Negative for dizziness and headaches  Objective:      /80 (BP Location: Left arm, Patient Position: Sitting, Cuff Size: Standard)   Pulse 73   Temp 99 °F (37 2 °C) (Temporal)   Resp 18   Ht 5' 2" (1 575 m)   Wt 60 3 kg (133 lb)   SpO2 96%   BMI 24 33 kg/m²          Physical Exam   Constitutional: He appears well-developed and well-nourished  No distress  HENT:   Right Ear: Tympanic membrane, external ear and ear canal normal    Left Ear: Tympanic membrane, external ear and ear canal normal    Nose: Mucosal edema and rhinorrhea present  Cardiovascular: Normal rate, regular rhythm and normal heart sounds  Exam reveals no gallop and no friction rub  No murmur heard  Pulmonary/Chest: Effort normal  No respiratory distress  He has decreased breath sounds  He has wheezes  Lymphadenopathy:     He has cervical adenopathy  Skin: He is not diaphoretic  Nursing note and vitals reviewed

## 2020-01-10 NOTE — TELEPHONE ENCOUNTER
albuterol (PROVENTIL HFA,VENTOLIN HFA) 90 mcg/act inhaler    Rickey Gilliam has some questions and concerns about patient taking the following medication  Rickey Gilliam states when Farrah Warren is taking the inhaler seems like he is just swellings it rather then inhale  Rickey Gilliam also wanted to know should he still be taking the inhaler while also taking antibiotics  Please contact Rosa Maria anytime after 20 mins or so

## 2020-01-31 DIAGNOSIS — J45.20 MILD INTERMITTENT ASTHMA WITHOUT COMPLICATION: ICD-10-CM

## 2020-02-03 RX ORDER — ALBUTEROL SULFATE 90 UG/1
AEROSOL, METERED RESPIRATORY (INHALATION)
Qty: 18 G | Refills: 1 | Status: SHIPPED | OUTPATIENT
Start: 2020-02-03 | End: 2020-02-25

## 2020-02-25 DIAGNOSIS — J45.20 MILD INTERMITTENT ASTHMA WITHOUT COMPLICATION: ICD-10-CM

## 2020-02-25 RX ORDER — ALBUTEROL SULFATE 90 UG/1
AEROSOL, METERED RESPIRATORY (INHALATION)
Qty: 18 G | Refills: 1 | Status: SHIPPED | OUTPATIENT
Start: 2020-02-25 | End: 2020-02-25

## 2020-02-25 RX ORDER — ALBUTEROL SULFATE 90 UG/1
AEROSOL, METERED RESPIRATORY (INHALATION)
Qty: 54 G | Refills: 0 | Status: SHIPPED | OUTPATIENT
Start: 2020-02-25 | End: 2020-05-13

## 2020-03-19 DIAGNOSIS — J40 BRONCHITIS: ICD-10-CM

## 2020-03-19 RX ORDER — PREDNISONE 10 MG/1
TABLET ORAL
Qty: 30 TABLET | Refills: 0 | Status: SHIPPED | OUTPATIENT
Start: 2020-03-19 | End: 2020-05-19 | Stop reason: SDUPTHER

## 2020-03-19 RX ORDER — PREDNISONE 10 MG/1
TABLET ORAL
Qty: 30 TABLET | Refills: 0 | OUTPATIENT
Start: 2020-03-19

## 2020-03-19 NOTE — TELEPHONE ENCOUNTER
Due to current outbreak of COVID-19, will send refill over to the pharmacy  If the cough gets worse, fevers develop, or he has any difficulty breathing, recommend that he go to the emergency room  Since he has had problems with the cough in the past, if his cough does not improve, may need to have him follow up with a specialist in the future

## 2020-03-19 NOTE — TELEPHONE ENCOUNTER
Pt wife is calling requesting refill states he's very having a bad cough but no shortness of breath or fever     predniSONE 10 mg tablet     Correct pharmacy on file

## 2020-04-05 DIAGNOSIS — J30.1 SEASONAL ALLERGIC RHINITIS DUE TO POLLEN: ICD-10-CM

## 2020-04-06 RX ORDER — LORATADINE 10 MG/1
TABLET ORAL
Qty: 90 TABLET | Refills: 3 | Status: SHIPPED | OUTPATIENT
Start: 2020-04-06 | End: 2020-08-20 | Stop reason: SDUPTHER

## 2020-04-09 DIAGNOSIS — J40 BRONCHITIS: ICD-10-CM

## 2020-04-10 RX ORDER — PREDNISONE 10 MG/1
TABLET ORAL
Qty: 30 TABLET | Refills: 0 | OUTPATIENT
Start: 2020-04-10

## 2020-05-13 DIAGNOSIS — J45.20 MILD INTERMITTENT ASTHMA WITHOUT COMPLICATION: ICD-10-CM

## 2020-05-13 RX ORDER — ALBUTEROL SULFATE 90 UG/1
AEROSOL, METERED RESPIRATORY (INHALATION)
Qty: 54 G | Refills: 0 | Status: SHIPPED | OUTPATIENT
Start: 2020-05-13

## 2020-05-19 ENCOUNTER — TELEMEDICINE (OUTPATIENT)
Dept: FAMILY MEDICINE CLINIC | Facility: CLINIC | Age: 85
End: 2020-05-19

## 2020-05-19 ENCOUNTER — TELEPHONE (OUTPATIENT)
Dept: FAMILY MEDICINE CLINIC | Facility: CLINIC | Age: 85
End: 2020-05-19

## 2020-05-19 DIAGNOSIS — J30.9 ALLERGIC RHINITIS, UNSPECIFIED SEASONALITY, UNSPECIFIED TRIGGER: Primary | ICD-10-CM

## 2020-05-19 DIAGNOSIS — J45.30 MILD PERSISTENT ASTHMA WITHOUT COMPLICATION: ICD-10-CM

## 2020-05-19 PROCEDURE — G2012 BRIEF CHECK IN BY MD/QHP: HCPCS | Performed by: PHYSICIAN ASSISTANT

## 2020-05-19 RX ORDER — FLUTICASONE PROPIONATE 50 MCG
2 SPRAY, SUSPENSION (ML) NASAL DAILY
Qty: 3 BOTTLE | Refills: 0 | Status: SHIPPED | OUTPATIENT
Start: 2020-05-19 | End: 2020-08-20

## 2020-05-19 RX ORDER — PREDNISONE 10 MG/1
TABLET ORAL
Qty: 30 TABLET | Refills: 0 | Status: SHIPPED | OUTPATIENT
Start: 2020-05-19 | End: 2020-08-20

## 2020-05-29 DIAGNOSIS — R53.1 GENERALIZED WEAKNESS: ICD-10-CM

## 2020-05-29 RX ORDER — AMLODIPINE BESYLATE 5 MG/1
TABLET ORAL
Qty: 180 TABLET | Refills: 1 | Status: SHIPPED | OUTPATIENT
Start: 2020-05-29 | End: 2020-08-20 | Stop reason: SDUPTHER

## 2020-06-01 DIAGNOSIS — D64.9 ANEMIA, UNSPECIFIED TYPE: ICD-10-CM

## 2020-06-02 RX ORDER — FERROUS SULFATE 325(65) MG
TABLET ORAL
Qty: 90 TABLET | Refills: 1 | Status: SHIPPED | OUTPATIENT
Start: 2020-06-02 | End: 2020-08-20 | Stop reason: SDUPTHER

## 2020-07-08 DIAGNOSIS — I10 ESSENTIAL HYPERTENSION: Chronic | ICD-10-CM

## 2020-07-08 RX ORDER — METOPROLOL TARTRATE 50 MG/1
TABLET, FILM COATED ORAL
Qty: 180 TABLET | OUTPATIENT
Start: 2020-07-08

## 2020-07-08 RX ORDER — METOPROLOL TARTRATE 50 MG/1
TABLET, FILM COATED ORAL
Qty: 60 TABLET | Refills: 0 | Status: SHIPPED | OUTPATIENT
Start: 2020-07-08 | End: 2020-08-20 | Stop reason: SDUPTHER

## 2020-07-31 DIAGNOSIS — J45.20 MILD INTERMITTENT ASTHMA WITHOUT COMPLICATION: ICD-10-CM

## 2020-07-31 RX ORDER — ALBUTEROL SULFATE 90 UG/1
AEROSOL, METERED RESPIRATORY (INHALATION)
Qty: 54 G | Refills: 0 | OUTPATIENT
Start: 2020-07-31

## 2020-08-11 DIAGNOSIS — I10 ESSENTIAL HYPERTENSION: Chronic | ICD-10-CM

## 2020-08-11 RX ORDER — METOPROLOL TARTRATE 50 MG/1
TABLET, FILM COATED ORAL
Qty: 60 TABLET | Refills: 0 | OUTPATIENT
Start: 2020-08-11

## 2020-08-20 ENCOUNTER — CONSULT (OUTPATIENT)
Dept: FAMILY MEDICINE CLINIC | Facility: CLINIC | Age: 85
End: 2020-08-20

## 2020-08-20 VITALS
WEIGHT: 126 LBS | BODY MASS INDEX: 23.05 KG/M2 | TEMPERATURE: 98.7 F | SYSTOLIC BLOOD PRESSURE: 140 MMHG | OXYGEN SATURATION: 98 % | DIASTOLIC BLOOD PRESSURE: 80 MMHG | RESPIRATION RATE: 18 BRPM | HEART RATE: 90 BPM

## 2020-08-20 DIAGNOSIS — H25.89 OTHER AGE-RELATED CATARACT OF BOTH EYES: ICD-10-CM

## 2020-08-20 DIAGNOSIS — I10 ESSENTIAL HYPERTENSION: Chronic | ICD-10-CM

## 2020-08-20 DIAGNOSIS — J30.1 SEASONAL ALLERGIC RHINITIS DUE TO POLLEN: ICD-10-CM

## 2020-08-20 DIAGNOSIS — D64.9 ANEMIA, UNSPECIFIED TYPE: ICD-10-CM

## 2020-08-20 DIAGNOSIS — Z01.818 PREOP EXAMINATION: Primary | ICD-10-CM

## 2020-08-20 DIAGNOSIS — N17.9 ACUTE RENAL FAILURE, UNSPECIFIED ACUTE RENAL FAILURE TYPE (HCC): ICD-10-CM

## 2020-08-20 PROCEDURE — 99242 OFF/OP CONSLTJ NEW/EST SF 20: CPT | Performed by: FAMILY MEDICINE

## 2020-08-20 RX ORDER — NEOMYCIN SULFATE, POLYMYXIN B SULFATE AND DEXAMETHASONE 3.5; 10000; 1 MG/ML; [USP'U]/ML; MG/ML
SUSPENSION/ DROPS OPHTHALMIC
COMMUNITY
Start: 2020-08-18 | End: 2021-05-05

## 2020-08-20 RX ORDER — METOPROLOL TARTRATE 50 MG/1
50 TABLET, FILM COATED ORAL EVERY 12 HOURS SCHEDULED
Qty: 60 TABLET | Refills: 3 | Status: SHIPPED | OUTPATIENT
Start: 2020-08-20 | End: 2020-08-26

## 2020-08-20 RX ORDER — AMLODIPINE BESYLATE 5 MG/1
5 TABLET ORAL 2 TIMES DAILY
Qty: 180 TABLET | Refills: 0 | Status: SHIPPED | OUTPATIENT
Start: 2020-08-20 | End: 2020-11-16

## 2020-08-20 RX ORDER — FERROUS SULFATE 325(65) MG
325 TABLET ORAL
Qty: 90 TABLET | Refills: 1 | Status: SHIPPED | OUTPATIENT
Start: 2020-08-20 | End: 2022-02-23 | Stop reason: SDUPTHER

## 2020-08-20 RX ORDER — LORATADINE 10 MG/1
10 TABLET ORAL DAILY
Qty: 90 TABLET | Refills: 0 | Status: SHIPPED | OUTPATIENT
Start: 2020-08-20 | End: 2021-05-05 | Stop reason: SDUPTHER

## 2020-08-20 NOTE — PROGRESS NOTES
Presurgical Evaluation    Subjective:      Patient ID: Roderick Kearns is a 80 y o  male  Chief Complaint   Patient presents with    Pre-op Exam       HPI    The following portions of the patient's history were reviewed and updated as appropriate: allergies, current medications, past family history, past medical history, past social history, past surgical history and problem list     Procedure date: right eye 8/31/20 and left eye 9/14/20    Surgeon:  [facility]  Needs pre-op clearance  No blood or EKG needed  Does need to go for COVID testing on 8/24/20  Offers no complaints  Prior anesthesia: No    CAD History: None   NOTE: Patient should see Cardiology if time available before surgery, and if appropriate  Pulmonary History: Asthma    Renal history: CKD stage 3 - GFR 30-59 from labs done October 2018    Diabetes History:  None     Neurological History: None     On Immunosuppressant meds/biologics: No      Review of Systems   Constitutional: Negative for appetite change, fatigue, fever and unexpected weight change  HENT: Negative for congestion, ear pain, postnasal drip, rhinorrhea and sore throat  Eyes: Positive for visual disturbance  Negative for pain  Respiratory: Negative for cough, chest tightness and shortness of breath  Cardiovascular: Negative for chest pain, palpitations and leg swelling  Gastrointestinal: Negative for abdominal pain, constipation, diarrhea, nausea and vomiting  Genitourinary: Negative for difficulty urinating and dysuria  Musculoskeletal: Negative for arthralgias and back pain  Skin: Negative for rash  Neurological: Negative for dizziness, numbness and headaches  Psychiatric/Behavioral: Negative for behavioral problems and suicidal ideas  The patient is not nervous/anxious            Current Outpatient Medications   Medication Sig Dispense Refill    albuterol (PROVENTIL HFA,VENTOLIN HFA) 90 mcg/act inhaler INHALE 2 PUFFS BY MOUTH EVERY 6 HOURS AS NEEDED FOR WHEEZING 54 g 0    amLODIPine (NORVASC) 5 mg tablet TAKE 1 TABLET(5 MG) BY MOUTH TWICE DAILY 180 tablet 1    FEROSUL 325 (65 Fe) MG tablet TAKE 1 TABLET(325 MG) BY MOUTH DAILY WITH BREAKFAST 90 tablet 1    loratadine (CLARITIN) 10 mg tablet TAKE ONE TABLET BY MOUTH EVERY DAY 90 tablet 3    metoprolol tartrate (LOPRESSOR) 50 mg tablet TAKE 1 TABLET(50 MG) BY MOUTH EVERY 12 HOURS 60 tablet 0    benzonatate (TESSALON) 200 MG capsule Take 1 capsule (200 mg total) by mouth 3 (three) times a day as needed for cough (Patient not taking: Reported on 8/20/2020) 20 capsule 0    fluticasone (FLONASE) 50 mcg/act nasal spray 2 sprays into each nostril daily Shake liquid and spray (Patient not taking: Reported on 8/20/2020) 3 Bottle 0    neomycin-polymyxin-dexamethasone (MAXITROL) ophthalmic suspension       predniSONE 10 mg tablet Take 4 tabs for 3 days, then 3 tabs for 3 days, then 2 tabs for 3 days, then 1 tab for 3 days  (Patient not taking: Reported on 8/20/2020) 30 tablet 0     No current facility-administered medications for this visit  Allergies on file:   Patient has no known allergies      Patient Active Problem List   Diagnosis    [facility]    Sepsis ([facility] )    Essential hypertension    Ambulatory dysfunction    Cholelithiasis with biliary obstruction    [facility]F) ([facility])    Clostridium difficile colitis    History of bacteremia    Anemia    Acute cholangitis    Aortic stenosis, moderate    Lower extremity edema    Blurry vision, left eye    Allergic rhinitis    Mild persistent asthma without complication        Past Medical History:   Diagnosis Date    Hypertension     Medical history unknown     Mild persistent asthma without complication 7/52/0824    Renal disorder        Past Surgical History:   Procedure Laterality Date    ERCP N/A 9/18/2018    Procedure: ENDOSCOPIC RETROGRADE CHOLANGIOPANCREATOGRAPHY (ERCP) with stent placement;  Surgeon: Raya Francisco MD;  Location: [facility];  Service: Gastroenterology    NO PAST SURGERIES      OR ERCP DX COLLECTION SPECIMEN BRUSHING/WASHING N/A 2018    Procedure: ENDOSCOPIC RETROGRADE CHOLANGIOPANCREATOGRAPHY (ERCP); Surgeon: Raya Francisco MD;  Location: [facility]; Service: Gastroenterology       No family history on file  Social History     Tobacco Use    Smoking status: Former Smoker     Types: Cigars    Smokeless tobacco: Former User    Tobacco comment: states quit about 50 years ago, cigars "a few per week"    Substance Use Topics    Alcohol use: No     Comment: denies ETOH use    Drug use: No     Comment: denies       Objective:    Vitals:    20 1602   BP: 140/80   BP Location: Left arm   Patient Position: Sitting   Cuff Size: Standard   Pulse: 90   Resp: 18   Temp: 98 7 °F (37 1 °C)   SpO2: 98%   Weight: 57 2 kg (126 lb)        Physical Exam  Constitutional:       Appearance: He is well-developed  HENT:      Head: Normocephalic and atraumatic  Right Ear: External ear normal       Left Ear: External ear normal       Nose: Nose normal    Eyes:      Conjunctiva/sclera: Conjunctivae normal       Pupils: Pupils are equal, round, and reactive to light  Neck:      Musculoskeletal: Normal range of motion and neck supple  Cardiovascular:      Rate and Rhythm: Normal rate and regular rhythm  Heart sounds: Murmur present  Pulmonary:      Effort: Pulmonary effort is normal       Breath sounds: Normal breath sounds  Abdominal:      General: Bowel sounds are normal       Palpations: Abdomen is soft  Musculoskeletal: Normal range of motion  Right lower le+ Pitting Edema present  Left lower le+ Pitting Edema present  Skin:     General: Skin is warm  Neurological:      Mental Status: He is alert and oriented to person, place, and time     Psychiatric:         Behavior: Behavior normal            Preop labs/testing available and reviewed: no               EKG no    Echo no    Stress test/cath no  42  PFT/[place] no    Functional capacity: Climb stairs                        4 Mets   Pick the highest level patient can comfortably perform   4 mets or greater for surgery    RCRI  High Risk surgery? 1 Point  CAD History:         1 Point   MI; Positive Stress Test; CP due to Mi;  Nitrate Usage to control Angina; Pathologic Q wave on EKG  CHF Active:         1 Point   Pulm Edema; Paroxysmal Nocturnal Dyspnea;  Bibasilar Rales (crackles);S3; CHF on CXR  Cerebrovascular Disease (TIA or CVA):     1 Point  DM on Insulin:        1 Point  Serum Creat >2 0 mg/dl:       1 Point          Total Points: 0     Scorin: Class I, Very Low Risk (0 4%)     1: Class II, Low risk (0 9%)     2: Class III Moderate (6 6%)     3: Class IV High (>11%)      JOSE E Risk:  GFR:    42 in 2018, will need repeat BMP for updated value    For PCP:  If GFR>60, Hold ACE/ARB/Diuretic on the day of surgery, and NSAIDS 10 days before  If GFR<45, Consider PRE and POST op Nephrology Consult  If 46 <GFR> 59 : Has Patient had JOSE E in last 6 Months? no   If YES: Preop Nephrology consult   If No:  [address] Ne[facility]rology consult  Assessment/Plan:    Patient is medically optimized (cleared) for the planned procedure, pending BMP and CBC results are stable  Further testing/evaluation is required  BMP should be done due to low GFR  CBC should be done due to anemia history   None available since 2018    Postop concerns: yes, bleeding risk due to low hemoglobin     Problem List Items Addressed This Visit        Respiratory    Allergic rhinitis    Relevant Medications    neomycin-polymyxin-dexamethasone (MAXITROL) ophthalmic suspension    loratadine (CLARITIN) 10 mg tablet       Cardiovascular and Mediastinum    Essential hypertension (Chronic)     Stable on amlodipine and metoprolol         Relevant Medications    amLODIPine (NORVASC) 5 mg tablet    metoprolol tartrate (LOPRESSOR) 50 mg tablet       Genitourinary    Acute renal failure (ARF) ([facility] )     Last BMP in October 2018  Needs repeat value         Relevant Orders    Comprehensive metabolic panel       Other    Anemia     Last CBC in October 2018  Needs repeat CBC and iron panel  Refilled ferrous sulfate         Relevant Medications    ferrous sulfate (FeroSul) 325 (65 Fe) mg tablet    Other Relevant Orders    CBC and differential    Iron Panel (Includes Ferritin, Iron Sat%, Iron, and TIBC)      Other Visit Diagnoses     Preop examination    -  Primary    Other age-related cataract of both eyes        Relevant Medications    neomycin-polymyxin-dexamethasone (MAXITROL) ophthalmic suspension           Diagnoses and all orders for this visit:    Preop examination    Anemia, unspecified type  -     ferrous sulfate (FeroSul) 325 (65 Fe) mg tablet; Take 1 tablet (325 mg total) by mouth daily with breakfast  -     CBC and differential; Future  -     Iron Panel (Includes Ferritin, Iron Sat%, Iron, and TIBC); Future    Seasonal allergic rhinitis due to pollen  -     neomycin-polymyxin-dexamethasone (MAXITROL) ophthalmic suspension  -     loratadine (CLARITIN) 10 mg tablet; Take 1 tablet (10 mg total) by mouth daily    Essential hypertension  -     amLODIPine (NORVASC) 5 mg tablet; Take 1 tablet (5 mg total) by mouth 2 (two) times a day  -     metoprolol tartrate (LOPRESSOR) 50 mg tablet; Take 1 tablet (50 mg total) by mouth every 12 (twelve) hours    Acute renal failure, unspecified acute renal failure type ([facility] )  -     Comprehensive metabolic panel;  Future    Other age-related cataract of both eyes    Other orders  -     Cancel: POCT ECG          Pre-Surgery Instructions:   Medication Instructions    albuterol (PROVENTIL HFA,VENTOLIN HFA) 90 mcg/act inhaler per anesthesia guidelines     amLODIPine (NORVASC) 5 mg tablet per anesthesia guidelines     FEROSUL 325 (65 Fe) MG tablet per anesthesia guidelines     loratadine (CLARITIN) 10 mg tablet per anesthesia guidelines     metoprolol tartrate (LOPRESSOR) 50 mg tablet per anesthesia guidelines           Surgery clearance already faxed to [facility] ([phone])

## 2020-08-23 ENCOUNTER — TELEPHONE (OUTPATIENT)
Dept: FAMILY MEDICINE CLINIC | Facility: CLINIC | Age: 85
End: 2020-08-23

## 2020-08-23 PROBLEM — A04.72 CLOSTRIDIUM DIFFICILE COLITIS: Status: RESOLVED | Noted: 2018-09-28 | Resolved: 2020-08-23

## 2020-08-23 PROBLEM — Z87.898 HISTORY OF BACTEREMIA: Status: RESOLVED | Noted: 2018-10-01 | Resolved: 2020-08-23

## 2020-08-23 PROBLEM — K83.09 ACUTE CHOLANGITIS: Status: RESOLVED | Noted: 2018-10-04 | Resolved: 2020-08-23

## 2020-08-24 DIAGNOSIS — Z01.89 LABORATORY TEST: ICD-10-CM

## 2020-08-24 PROCEDURE — U0003 INFECTIOUS AGENT DETECTION BY NUCLEIC ACID (DNA OR RNA); SEVERE ACUTE RESPIRATORY SYNDROME CORONAVIRUS 2 (SARS-COV-2) (CORONAVIRUS DISEASE [COVID-19]), AMPLIFIED PROBE TECHNIQUE, MAKING USE OF HIGH THROUGHPUT TECHNOLOGIES AS DESCRIBED BY CMS-2020-01-R: HCPCS | Performed by: OPHTHALMOLOGY

## 2020-08-25 LAB — SARS-COV-2 RNA SPEC QL NAA+PROBE: NOT DETECTED

## 2020-08-26 RX ORDER — METOPROLOL TARTRATE 50 MG/1
TABLET, FILM COATED ORAL
Qty: 180 TABLET | Refills: 0 | Status: SHIPPED | OUTPATIENT
Start: 2020-08-26 | End: 2020-09-21

## 2020-09-16 DIAGNOSIS — I10 ESSENTIAL HYPERTENSION: Chronic | ICD-10-CM

## 2020-09-21 RX ORDER — METOPROLOL TARTRATE 50 MG/1
TABLET, FILM COATED ORAL
Qty: 180 TABLET | Refills: 0 | Status: SHIPPED | OUTPATIENT
Start: 2020-09-21 | End: 2020-10-16

## 2020-10-16 DIAGNOSIS — I10 ESSENTIAL HYPERTENSION: Chronic | ICD-10-CM

## 2020-10-16 RX ORDER — METOPROLOL TARTRATE 50 MG/1
TABLET, FILM COATED ORAL
Qty: 180 TABLET | Refills: 0 | Status: SHIPPED | OUTPATIENT
Start: 2020-10-16 | End: 2020-11-16

## 2020-11-14 DIAGNOSIS — I10 ESSENTIAL HYPERTENSION: Chronic | ICD-10-CM

## 2020-11-15 DIAGNOSIS — I10 ESSENTIAL HYPERTENSION: Chronic | ICD-10-CM

## 2020-11-16 RX ORDER — AMLODIPINE BESYLATE 5 MG/1
TABLET ORAL
Qty: 180 TABLET | Refills: 0 | Status: SHIPPED | OUTPATIENT
Start: 2020-11-16 | End: 2021-02-16

## 2020-11-16 RX ORDER — METOPROLOL TARTRATE 50 MG/1
TABLET, FILM COATED ORAL
Qty: 180 TABLET | Refills: 0 | Status: SHIPPED | OUTPATIENT
Start: 2020-11-16 | End: 2021-02-16

## 2021-02-13 DIAGNOSIS — Z74.8 ASSISTANCE NEEDED WITH TRANSPORTATION: Primary | ICD-10-CM

## 2021-02-13 DIAGNOSIS — I10 ESSENTIAL HYPERTENSION: Chronic | ICD-10-CM

## 2021-02-16 RX ORDER — AMLODIPINE BESYLATE 5 MG/1
TABLET ORAL
Qty: 28 TABLET | Refills: 0 | Status: SHIPPED | OUTPATIENT
Start: 2021-02-16 | End: 2021-05-05 | Stop reason: SDUPTHER

## 2021-02-16 RX ORDER — METOPROLOL TARTRATE 50 MG/1
TABLET, FILM COATED ORAL
Qty: 28 TABLET | Refills: 0 | Status: SHIPPED | OUTPATIENT
Start: 2021-02-16 | End: 2021-03-02

## 2021-02-16 NOTE — TELEPHONE ENCOUNTER
Reviewed chart  Patient has not been seen in 6 months  Failed to show for appointment in October  I wrote for 2 weeks worth of meds  Will ask staff to schedule a visit within that time

## 2021-02-16 NOTE — TELEPHONE ENCOUNTER
Called and spoke to pt's wife, Asha Chawla  She states the pt is not very mobile and doesn't come out of his home & they don't have money to get around  She does wish for him to be seen, however, she doesn't see how it can happen right now  Pt's wife is willing to get assistance if possible

## 2021-03-02 DIAGNOSIS — I10 ESSENTIAL HYPERTENSION: Chronic | ICD-10-CM

## 2021-03-02 RX ORDER — METOPROLOL TARTRATE 50 MG/1
TABLET, FILM COATED ORAL
Qty: 28 TABLET | Refills: 0 | Status: SHIPPED | OUTPATIENT
Start: 2021-03-02 | End: 2021-03-18

## 2021-03-02 NOTE — TELEPHONE ENCOUNTER
Reviewed chart  Staff has contacted patient's wife  Patient is less mobile and can't get out of house easily  Transportation is an issue  Social Service Consult has been placed  I signed for One month of meds  Will ask staff to set up a telemedicine visit with any provider

## 2021-03-09 ENCOUNTER — PATIENT OUTREACH (OUTPATIENT)
Dept: FAMILY MEDICINE CLINIC | Facility: CLINIC | Age: 86
End: 2021-03-09

## 2021-03-09 DIAGNOSIS — Z74.8 ASSISTANCE WITH TRANSPORTATION: Primary | ICD-10-CM

## 2021-03-09 NOTE — PROGRESS NOTES
MATTHEW BELTRAN completed chart review and noted that Pt was referred to MATTHEW BELTRAN for assistance with transportation to medical appointments  MATTHEW BELTRAN has placed referral for CHW to assist with applying for [facility] so that Pt is able to get to appointments  MATTHEW BELTRAN will remain available for additional assistance as needed and continue to follow

## 2021-03-17 DIAGNOSIS — I10 ESSENTIAL HYPERTENSION: Chronic | ICD-10-CM

## 2021-03-18 DIAGNOSIS — I10 ESSENTIAL HYPERTENSION: Chronic | ICD-10-CM

## 2021-03-18 RX ORDER — METOPROLOL TARTRATE 50 MG/1
TABLET, FILM COATED ORAL
Qty: 180 TABLET | OUTPATIENT
Start: 2021-03-18

## 2021-03-18 RX ORDER — METOPROLOL TARTRATE 50 MG/1
TABLET, FILM COATED ORAL
Qty: 60 TABLET | Refills: 0 | Status: SHIPPED | OUTPATIENT
Start: 2021-03-18 | End: 2021-05-05 | Stop reason: SDUPTHER

## 2021-03-18 NOTE — TELEPHONE ENCOUNTER
Reviewed chart  Patient does not yet have an appointment  However,  has referred for assistance with [place] transportation  Will prescribe another month worth of meds

## 2021-03-22 ENCOUNTER — PATIENT OUTREACH (OUTPATIENT)
Dept: FAMILY MEDICINE CLINIC | Facility: CLINIC | Age: 86
End: 2021-03-22

## 2021-03-22 NOTE — PROGRESS NOTES
Chart Review / Outgoing Call:  3/22/2021    CHW did call pt and did speak with Kaia Hernadez, pt's wife, in reference to the referral for transportation services  Kaia Hernadez stated that the pt was still asleep but that she could assist in this call  Kaia Hernadez stated that the pt does not need transportation services as she takes him to all of his appointments  She stated that she has a very reliable [address]  Kaia Hernadez did state that her 's medical bills are piling up and that they have been denied Medicare Part B  She is not sure if they have applied for financial assistance with [facility] or [employer] offered to assist in this area  CHW did schedule a phone conference wt pt and his wife for tomorrow morning  We will call [place] to inquire about coverage pt currently has and how to move forward wit applying for Medicare Part B       Next outreach is scheduled for 3/23/2021 at 10:30AM

## 2021-03-23 ENCOUNTER — PATIENT OUTREACH (OUTPATIENT)
Dept: FAMILY MEDICINE CLINIC | Facility: CLINIC | Age: 86
End: 2021-03-23

## 2021-03-23 NOTE — PROGRESS NOTES
Outgoing Calls:  3/23/2021    CHW did call Faby Brown, pt's wife, to assist in calling [place] and inquire about previous denial for Medicare Part B  CHW did facilitate a call to [facility] RE[facility]  CHW and Faby Stephanie were informed that pt has had Medicare B since July 2020 and Faby Brown has had it as well since January 2021  Faby Freemanrufino was surprised by this and stated that she was not aware  We were told to call Medicare to request new cards  Faby Brown also was not aware that she and her  had been approved for SNAP benefits some time ago and they have a balance of almost $4,000 in SNAP benefits  CHW did facilitate a conference call to Medicare to request new cards  CHW antonio Faby Brown were informed that although she and her  have been approved and the premium is being paid by the state, they will need to enroll in a secondary insurance  CHW was instructed on how to go about doing this  Faby Brown agreed she would be available tomorrow so that CHW could complete this step with her and the pt  Next outreach is scheduled for 3/24/2021

## 2021-03-26 ENCOUNTER — PATIENT OUTREACH (OUTPATIENT)
Dept: FAMILY MEDICINE CLINIC | Facility: CLINIC | Age: 86
End: 2021-03-26

## 2021-03-26 NOTE — PROGRESS NOTES
Chart Review / Outgoing Call:  3/26/2021    CHW did complete an online search on [employer] and was able to locate the CMS-40B form, so that pt and his wife could apply for Medicare Part B  CHW did complete the application but did call Rickey Gilliam, pt's wife,  to obtain her Medicare number  Rickey Mane did provide CHW with this  Rickey Mane agreed to meet with CHW to sign off on forms  CHW did complete applications for pt and his wife       Next outreach is scheduled for 3/29/2021, at 3:30PM, at [facility]

## 2021-03-29 ENCOUNTER — PATIENT OUTREACH (OUTPATIENT)
Dept: FAMILY MEDICINE CLINIC | Facility: CLINIC | Age: 86
End: 2021-03-29

## 2021-03-29 NOTE — PROGRESS NOTES
In Person:  3/29/2021    CHW did meet with Laure Baldwin, pt's wife, at [address] did sign forms to process Medicare Part B application for herself and pt  CHW agreed to fax to [facility] on 3/30/21  Next outreach is scheduled for 3/30/2021

## 2021-03-30 ENCOUNTER — PATIENT OUTREACH (OUTPATIENT)
Dept: FAMILY MEDICINE CLINIC | Facility: CLINIC | Age: 86
End: 2021-03-30

## 2021-03-30 DIAGNOSIS — D64.9 ANEMIA, UNSPECIFIED TYPE: ICD-10-CM

## 2021-03-30 RX ORDER — FERROUS SULFATE 325(65) MG
TABLET ORAL
Qty: 90 TABLET | Refills: 1 | OUTPATIENT
Start: 2021-03-30

## 2021-03-30 NOTE — TELEPHONE ENCOUNTER
Reviewed chart  Patient has not humera seen for a regular follow up visit in well over a year  No further medication refills until he is seen  Will ask staff to schedule

## 2021-03-31 NOTE — TELEPHONE ENCOUNTER
Spoke to wife states Prema Piper has been doing just fine not taking his medication and shes not sure who put the request in, she states that miriam was asleep but that she will let him know we called

## 2021-03-31 NOTE — PROGRESS NOTES
Outgoing Fax / Chart Review:  3/31/2021    CHW did fax Medicare forms for both pt and his wife, to Medicare, for review  CHW will f/u with Medicare on Monday  Next outreach is scheduled for 4/5/2021

## 2021-04-06 ENCOUNTER — PATIENT OUTREACH (OUTPATIENT)
Dept: FAMILY MEDICINE CLINIC | Facility: CLINIC | Age: 86
End: 2021-04-06

## 2021-04-06 NOTE — PROGRESS NOTES
Outgoing Call:  4/6/2021    CHW did call Parveen Burciaga, pt's wife, to discuss status of Medicare Part B  Parveen Burciaga stated that she had not received anything from [facility] or [facility]  CHW informed Parveen Burciaga that she will call her again within a week and assist her in calling Medicare if she had not received her new card by then  Parveen Burciaga agreed  Next outreach is scheduled for 4/13/2021

## 2021-04-14 ENCOUNTER — PATIENT OUTREACH (OUTPATIENT)
Dept: FAMILY MEDICINE CLINIC | Facility: CLINIC | Age: 86
End: 2021-04-14

## 2021-04-14 NOTE — PROGRESS NOTES
Outgoing Calls / Fax:  4/14/2021    CHW did call Medicare to discuss status of Medicare B enrollment forms which were submitted last week  CHW was informed to call [facility] and inquire about status  CHW did call and was placed on hold for 20 minutes before call was disconnected  CHW did find fax number to local [address] office on [place]  gov website  CHW did fax the forms to them at [phone]  Fax confirmation was received  CHW did call Chiqui Hancock, pt's wife, and informed her of this  Chiqui Hancock stated that she had not received a new card in the mail and is concerned about some medical bills she had received  CHW informed her that she could try and call [facility] again tomorrow and that she did fax again the enrollment forms  Chiqui Hancock agreed this would be a good idea  Next outreach is scheduled for 4/15/2021

## 2021-04-15 ENCOUNTER — PATIENT OUTREACH (OUTPATIENT)
Dept: FAMILY MEDICINE CLINIC | Facility: CLINIC | Age: 86
End: 2021-04-15

## 2021-04-15 NOTE — PROGRESS NOTES
Outgoing Calls:  4/15/2021    CHW did call Danette Alamo, pt's wife, and facilitated a three way call to [facility] [place] to inquire about status of Medicare Part B enrollment  CHW did submit the CMS-40B forms  Jackie Florence did assist in this call and informed CHW and Danette Alamo that [facility] is responsible for enrolling pts into Medicare Part B when they are paying for the premium but this did not happen  CHW and Danette Alamo were told by Jackie Florence to call [place] Dept of [facility] and ask why pt and his wife have not been automatically enrolled into Medicare Part Jesica Green instructed CHW to mention to [facility] that since both pt and his wife are in the [facility] program the state is supposed to take action as far as enrolling them in Medicare Part B but that this was not done  CHW did facilitate call to [facility] but the offices were closed  CHW informed Danette Alamo that she will call her first ting Monday morning to attempt to resolve this issue  Rosa Maria thanked CHW for her time  Next outreach is scheduled for 4/19/2021

## 2021-04-19 ENCOUNTER — PATIENT OUTREACH (OUTPATIENT)
Dept: FAMILY MEDICINE CLINIC | Facility: CLINIC | Age: 86
End: 2021-04-19

## 2021-04-19 NOTE — PROGRESS NOTES
Outgoing Calls:  4/19/2021    CHW did call Lillian Kaur, pt's wife, to assist in calling [place] to inquire about Medicare [place] B enrollment  We were assisted by Michelle Hampton at [facility] and were informed that pt and his wife are not enrolled in the [facility] program and that they do not enroll anyone into the Medicare program  We were instructed to call Medicare  CHW did facilitate a call to Medicare and we were assisted by Demi Alvarado  CHW did explained difficulties pt and his wife have been experiencing with enrolling in Medicare  Laci Collin and stated that she is going to forward this case to the situation escalation and advanced resolution team for review  Red Covert informed us that it could take up to seven business days before we receive a call back  Lillian Kaur did provide verbal authorization for Medicare to contact CHW and discuss her case as well as pt's case  CHW informed Yukoelizabethmadhu Kaur that she will call her as soon as Medicare returns the call and that if no call was received, CHW would call them back  Rosa Maria thanked CHW for her time  Next outreach is scheduled for 4/26/2021

## 2021-04-27 ENCOUNTER — PATIENT OUTREACH (OUTPATIENT)
Dept: FAMILY MEDICINE CLINIC | Facility: CLINIC | Age: 86
End: 2021-04-27

## 2021-04-27 NOTE — PROGRESS NOTES
Outgoing Calls:  4/27/2021    CHW did call pt and facilitated a three-way call to [address] CHW and Rola Vicente, pt's wife, were assisted by Chele Da Silva did inform us that we would need to contact the "source of the problem" which he stated was Social Security Administration  Juana Da Silva did connect us with SSA via conference call  We were all assisted by Bird Pang did complete a review and stated that she is not seeing the CMS-40B forms on file but did reach out to the local [address] office to inquire about these forms  Bird stated that the  did just get back from vacation and has received the enrollment forms for Medicare Part B  Pt and his wife will receive their new Medicare cards within four weeks however coverage will not begin until July  We were informed by Juana Da Silva from [facility] that once this coverage begins, pt and his wife may be penalized for late enrollment since they have been eligible for over ten years and did not enroll  Juana Da Silva stated that if [facility] pays for the premium, pt and his wife will not incur any charges  If pt and his wife are not picked up by the state they can be penalized 10% fee for every 12 months for the past 15 years  Pt was encouraged to call [place] and inquire if pt and wife are eligible for financial assistance to cover Part B Medicare  CHW did facilitate a call to [place] and CHW were informed that pt and his wife will need to wait closer to July before applying for premium coverage assistance  CHW informed Rola Vicente that she will assist with this  Next outreach is scheduled for 5/4/2021

## 2021-05-01 DIAGNOSIS — D64.9 ANEMIA, UNSPECIFIED TYPE: ICD-10-CM

## 2021-05-03 RX ORDER — FERROUS SULFATE 325(65) MG
TABLET ORAL
Qty: 90 TABLET | Refills: 1 | OUTPATIENT
Start: 2021-05-03

## 2021-05-03 NOTE — TELEPHONE ENCOUNTER
Pt wife called left msg on the nurse line asking if the pt still requires the ferous sulfate medication as he is now out of it   Please advise thank you

## 2021-05-03 NOTE — TELEPHONE ENCOUNTER
Patient has not been seen since August 2020  At that time blood work had been ordered but never done  He does have appointment with Yuridia Mcnally on 5/5/21  He is to keep that appointment so further plans discussing iron should take place   Thanks, Dr Liliya Chan

## 2021-05-04 ENCOUNTER — PATIENT OUTREACH (OUTPATIENT)
Dept: FAMILY MEDICINE CLINIC | Facility: CLINIC | Age: 86
End: 2021-05-04

## 2021-05-04 NOTE — PROGRESS NOTES
Outgoing Call:  5/4/2021    CHW did call Isaac Real, pt's wife, to inquire about status of Medicare cards  Isaac Real stated that she did not receive anything for herself and pt other than solicitation type letters for Medicare recipients  CHW encouraged her to gather letters that she believes may be from [place], [place], and [employer], so that CHW can review them  Isaac Real stated that she will attempt to locate letters as she tends to hide them from pt so that he does not throw them away but ends up forgetting where she hides them at  Broderickteri Addie stated that she will be at [place] tomorrow with pt for an appointment with his PCP  CHW agreed to meet with them prior to or after that appointment  Isaac Real stated that she will bring any documents that she finds, with her       Next outreach is scheduled for 5/5/2021 at [place]

## 2021-05-05 ENCOUNTER — PATIENT OUTREACH (OUTPATIENT)
Dept: FAMILY MEDICINE CLINIC | Facility: CLINIC | Age: 86
End: 2021-05-05

## 2021-05-05 ENCOUNTER — OFFICE VISIT (OUTPATIENT)
Dept: FAMILY MEDICINE CLINIC | Facility: CLINIC | Age: 86
End: 2021-05-05

## 2021-05-05 VITALS
OXYGEN SATURATION: 98 % | SYSTOLIC BLOOD PRESSURE: 162 MMHG | DIASTOLIC BLOOD PRESSURE: 78 MMHG | WEIGHT: 129.9 LBS | HEART RATE: 106 BPM | RESPIRATION RATE: 18 BRPM | TEMPERATURE: 96.3 F | BODY MASS INDEX: 22.18 KG/M2 | HEIGHT: 64 IN

## 2021-05-05 DIAGNOSIS — Z28.21 COVID-19 VACCINE SERIES DECLINED: ICD-10-CM

## 2021-05-05 DIAGNOSIS — J45.30 MILD PERSISTENT ASTHMA WITHOUT COMPLICATION: ICD-10-CM

## 2021-05-05 DIAGNOSIS — Z28.310 COVID-19 VACCINE SERIES DECLINED: ICD-10-CM

## 2021-05-05 DIAGNOSIS — Z00.00 ANNUAL PHYSICAL EXAM: Primary | ICD-10-CM

## 2021-05-05 DIAGNOSIS — D69.6 THROMBOCYTOPENIA (HCC): ICD-10-CM

## 2021-05-05 DIAGNOSIS — I35.0 AORTIC STENOSIS, MODERATE: ICD-10-CM

## 2021-05-05 DIAGNOSIS — Z28.21 PNEUMOCOCCAL VACCINATION DECLINED: ICD-10-CM

## 2021-05-05 DIAGNOSIS — I10 ESSENTIAL HYPERTENSION: Chronic | ICD-10-CM

## 2021-05-05 DIAGNOSIS — J30.1 SEASONAL ALLERGIC RHINITIS DUE TO POLLEN: ICD-10-CM

## 2021-05-05 DIAGNOSIS — Z00.00 HEALTHCARE MAINTENANCE: ICD-10-CM

## 2021-05-05 DIAGNOSIS — H61.23 IMPACTED CERUMEN OF BOTH EARS: ICD-10-CM

## 2021-05-05 PROCEDURE — 99397 PER PM REEVAL EST PAT 65+ YR: CPT | Performed by: NURSE PRACTITIONER

## 2021-05-05 RX ORDER — AMLODIPINE BESYLATE 5 MG/1
5 TABLET ORAL 2 TIMES DAILY
Qty: 180 TABLET | Refills: 3 | Status: SHIPPED | OUTPATIENT
Start: 2021-05-05 | End: 2022-02-23 | Stop reason: SDUPTHER

## 2021-05-05 RX ORDER — METOPROLOL TARTRATE 50 MG/1
50 TABLET, FILM COATED ORAL DAILY
Qty: 90 TABLET | Refills: 3 | Status: SHIPPED | OUTPATIENT
Start: 2021-05-05 | End: 2022-02-23 | Stop reason: SDUPTHER

## 2021-05-05 RX ORDER — LORATADINE 10 MG/1
10 TABLET ORAL DAILY
Qty: 90 TABLET | Refills: 0 | Status: SHIPPED | OUTPATIENT
Start: 2021-05-05 | End: 2022-04-20 | Stop reason: SDUPTHER

## 2021-05-06 PROBLEM — H61.23 IMPACTED CERUMEN OF BOTH EARS: Status: ACTIVE | Noted: 2021-05-06

## 2021-05-06 NOTE — ASSESSMENT & PLAN NOTE
10/208 ECHO: "Aortic valve sclerosis and focal calcification on non and right coronary cusps  Suspected moderate aortic valve stenosis  Peak trans aortic velocity is 3 m/sec, mean gradient is 20 mmHg and calculated aortic valve area is around 0 9 centimeter squared  No aortic valve regurgitation was noted "    Grade 2/3 systolic murmur appreciated on exam today  Pt denies lightheadedness, syncope, weakness/fatigue, or chest pain  Was going to f/u with cardiology in 2019 but doesn't appear this happened  Will make new referral and order ECHO

## 2021-05-06 NOTE — PATIENT INSTRUCTIONS

## 2021-05-06 NOTE — PROGRESS NOTES
In Person:  5/5/2021    CHW did meet with pt for a few minutes prior to his PCP appointment at [address] [place] [address] Drive did bring with him forms/letters he and his wife had received in reference to Medicare  CHW did review mail while pt was in with his PCP  CHW was able to determine that the letters received are not from Medicare but are solicitation letters from other agencies attempting to sell health insurance  CHW did meet again with pt after is PCP appointment and returned the mail to pt, informing him that the forms are not the Medicare cards we are expecting  CHW also reminded pt that it could be another three weeks before he receives them  CHW will reach out to pt's wife, Hermelinda Kurtz, and inform her of this  Next outreach is scheduled for 5/12/2021

## 2021-05-06 NOTE — ASSESSMENT & PLAN NOTE
Historically stable on amlodipine and lopressor  Ran out of medication one week ago  Acknowledges understanding of importance of obtaining refills today and compliance with daily medication as instructed  Denies chest pain, peripheral edema, syncope or lightheadedness

## 2021-05-06 NOTE — PROGRESS NOTES
[address] [facility]    NAME: Tyrese Mcmillan  AGE: 80 y o  SEX: male  : 1932     DATE: 2021     Assessment and Plan:     Problem List Items Addressed This Visit        Respiratory    Allergic rhinitis     Continue w/claritin  No complications  Relevant Medications    loratadine (CLARITIN) 10 mg tablet    Mild persistent asthma without complication     Stable  Continue current medication regimen  Will reassess at next scheduled follow-up  Cardiovascular and Mediastinum    Essential hypertension (Chronic)     Historically stable on amlodipine and lopressor  Ran out of medication one week ago  Acknowledges understanding of importance of obtaining refills today and compliance with daily medication as instructed  Denies chest pain, peripheral edema, syncope or lightheadedness  Relevant Medications    amLODIPine (NORVASC) 5 mg tablet    metoprolol tartrate (LOPRESSOR) 50 mg tablet    Other Relevant Orders    Ambulatory referral to Cardiology    Aortic stenosis, moderate     10/208 ECHO: "Aortic valve sclerosis and focal calcification on non and right coronary cusps  Suspected moderate aortic valve stenosis  Peak trans aortic velocity is 3 m/sec, mean gradient is 20 mmHg and calculated aortic valve area is around 0 9 centimeter squared  No aortic valve regurgitation was noted "    Grade 2/3 systolic murmur appreciated on exam today  Pt denies lightheadedness, syncope, weakness/fatigue, or chest pain  Was going to f/u with cardiology in 2019 but doesn't appear this happened  Will make new referral and order ECHO           Relevant Medications    amLODIPine (NORVASC) 5 mg tablet    metoprolol tartrate (LOPRESSOR) 50 mg tablet    Other Relevant Orders    Echo complete with contrast if indicated    Ambulatory referral to Cardiology       Nervous and Auditory    Impacted cerumen of both ears    Relevant Medications    carbamide peroxide (DEBROX) 6 5 % otic solution       Other    Thrombocytopenia ([address] )      Other Visit Diagnoses     Annual physical exam    -  Primary    Healthcare maintenance        Relevant Orders    Lipid panel    CBC and differential    Comprehensive metabolic panel    [id number] vaccine series declined        Pneumococcal vaccination declined              Immunizations and preventive care screenings were discussed with patient today  Appropriate education was printed on patient's after visit summary  Counseling:  Alcohol/drug use: discussed moderation in alcohol intake, the recommendations for healthy alcohol use, and avoidance of illicit drug use  Dental Health: discussed importance of regular tooth brushing, flossing, and dental visits  Injury prevention: discussed safety/seat belts, safety helmets, smoke detectors, carbon dioxide detectors, and smoking near bedding or upholstery  Sexual health: discussed sexually transmitted diseases, partner selection, use of condoms, avoidance of unintended pregnancy, and contraceptive alternatives  · Exercise: the importance of regular exercise/physical activity was discussed  Recommend exercise 3-5 times per week for at least 30 minutes  Falls Plan of Care: balance, strength, and gait training instructions were provided  Medications that increase falls were reviewed  Assessed feet and footwear  Return if symptoms worsen or fail to improve  Chief Complaint:     Chief Complaint   Patient presents with    Hypertension      History of Present Illness:     Adult Annual Physical   Patient here for a comprehensive physical exam  Mr Amara Welsh is a very pleasant 80 y o male who presents for physical and medication refills  He also 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 to complete [employer]    He has bilateral hearing loss and has difficulty hearing others speak however was 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  He had an echocardiogram in 2018 which found aortic stenosis; he has grade 2/3 systolic murmur  He denies weakness / fatigue, chest pain, lightheadedness, peripheral edema, shortness of breath, or syncope  He states that his wife manages his medications for him but he did run out of his blood pressure medications 1 week ago  Despite his chronic health issues patient states he feels very well and remains active  He has no new health concerns and denies any recent falls or illnesses  He declines pneumonia and COVID vaccines at this time  Diet and Physical Activity  · Diet/Nutrition: well balanced diet  · Exercise: no formal exercise  Depression Screening  PHQ-9 Depression Screening    PHQ-9:   Frequency of the following problems over the past two weeks:      Little interest or pleasure in doing things: 0 - not at all  Feeling down, depressed, or hopeless: 0 - not at all  PHQ-2 Score: 0       General Health  · Sleep: sleeps well  · Hearing: decreased - bilateral   · Vision: vision problems: wears glasses, follows with optometrist    · Dental: full dentures   Health  · Symptoms include: none     Review of Systems:     Review of Systems   Constitutional: Negative for activity change, chills, fatigue, fever and unexpected weight change  HENT: Positive for hearing loss  Negative for congestion, ear pain, rhinorrhea, sinus pressure and sore throat  Eyes: Negative for pain and visual disturbance  Respiratory: Negative for cough, shortness of breath and wheezing  Cardiovascular: Negative for chest pain, palpitations and leg swelling  Gastrointestinal: Negative for abdominal pain, nausea and vomiting  Genitourinary: Negative for dysuria and hematuria  Musculoskeletal: Negative for arthralgias, back pain, gait problem, joint swelling and myalgias  Skin: Negative for color change and rash     Neurological: Negative for dizziness, tremors, seizures, syncope, weakness, light-headedness and headaches  Psychiatric/Behavioral: Negative for agitation, behavioral problems, confusion, dysphoric mood, self-injury, sleep disturbance and suicidal ideas  The patient is not nervous/anxious  All other systems reviewed and are negative  Past Medical History:     Past Medical History:   Diagnosis Date    Acute cholangitis 10/4/2018    Added automatically from request for surgery [id number]    Clostridium difficile colitis 9/28/2018    History of bacteremia 10/1/2018    Hypertension     Medical history unknown     Mild persistent asthma without complication 4/79/9175    Renal disorder       Past Surgical History:     Past Surgical History:   Procedure Laterality Date    ERCP N/A 9/18/2018    Procedure: ENDOSCOPIC RETROGRADE CHOLANGIOPANCREATOGRAPHY (ERCP) with stent placement;  Surgeon: Evelyn Hooks MD;  Location: [facility];  Service: Gastroenterology    NO PAST SURGERIES      HI ERCP DX COLLECTION SPECIMEN BRUSHING/WASHING N/A 11/23/2018    Procedure: ENDOSCOPIC RETROGRADE CHOLANGIOPANCREATOGRAPHY (ERCP); Surgeon: Evelyn Hooks MD;  Location: [facility]; Service: Gastroenterology      Family History:     No family history on file     Social History:        Social History     Socioeconomic History    Marital status: /Civil Union     Spouse name: None    Number of children: None    Years of education: None    Highest education level: None   Occupational History    None   Social Needs    Financial resource strain: None    Food insecurity     Worry: None     Inability: None    Transportation needs     Medical: None     Non-medical: None   Tobacco Use    Smoking status: Former Smoker     Types: Cigars    Smokeless tobacco: Former User    Tobacco comment: states quit about 50 years ago, cigars "a few per week"    Substance and Sexual Activity    Alcohol use: No     Comment: denies ETOH use    Drug use: No     Comment: denies    Sexual activity: Never   Lifestyle  Physical activity     Days per week: None     Minutes per session: None    Stress: None   Relationships    Social connections     Talks on phone: None     Gets together: None     Attends Hinduism service: None     Active member of club or organization: None     Attends meetings of clubs or organizations: None     Relationship status: None    Intimate partner violence     Fear of current or ex partner: None     Emotionally abused: None     Physically abused: None     Forced sexual activity: None   Other Topics Concern    None   Social History Narrative    None      Current Medications:     Current Outpatient Medications   Medication Sig Dispense Refill    albuterol (PROVENTIL HFA,VENTOLIN HFA) 90 mcg/act inhaler INHALE 2 PUFFS BY MOUTH EVERY 6 HOURS AS NEEDED FOR WHEEZING 54 g 0    amLODIPine (NORVASC) 5 mg tablet Take 1 tablet (5 mg total) by mouth 2 (two) times a day 180 tablet 3    ferrous sulfate (FeroSul) 325 (65 Fe) mg tablet Take 1 tablet (325 mg total) by mouth daily with breakfast 90 tablet 1    loratadine (CLARITIN) 10 mg tablet Take 1 tablet (10 mg total) by mouth daily 90 tablet 0    metoprolol tartrate (LOPRESSOR) 50 mg tablet Take 1 tablet (50 mg total) by mouth daily 90 tablet 3    carbamide peroxide (DEBROX) 6 5 % otic solution Administer 5 drops into both ears 2 (two) times a day 15 mL 0     No current facility-administered medications for this visit  Allergies:     No Known Allergies   Physical Exam:     /78 (BP Location: Left arm, Patient Position: Sitting, Cuff Size: Standard)   Pulse (!) 106   Temp (!) 96 3 °F (35 7 °C) (Temporal)   Resp 18   Ht 5' 4" (1 626 m)   Wt 58 9 kg (129 lb 14 4 oz)   SpO2 98%   BMI 22 30 kg/m²     Physical Exam  Vitals signs and nursing note reviewed  Constitutional:       Appearance: Normal appearance  He is well-developed  HENT:      Head: Normocephalic and atraumatic        Right Ear: Ear canal and external ear normal  There is impacted cerumen  Left Ear: Ear canal and external ear normal  There is impacted cerumen  Nose: Nose normal       Mouth/Throat:      Mouth: Mucous membranes are moist    Eyes:      Extraocular Movements: Extraocular movements intact  Conjunctiva/sclera: Conjunctivae normal       Pupils: Pupils are equal, round, and reactive to light  Neck:      Musculoskeletal: Normal range of motion and neck supple  Cardiovascular:      Rate and Rhythm: Normal rate and regular rhythm  Pulses: Normal pulses  Posterior tibial pulses are 2+ on the right side and 2+ on the left side  Heart sounds: Murmur present  Systolic murmur present with a grade of 3/6  Pulmonary:      Effort: Pulmonary effort is normal  No respiratory distress  Breath sounds: Normal breath sounds  Abdominal:      Palpations: Abdomen is soft  Tenderness: There is no abdominal tenderness  Musculoskeletal: Normal range of motion  Right lower leg: No edema  Left lower leg: No edema  Skin:     General: Skin is warm and dry  Capillary Refill: Capillary refill takes less than 2 seconds  Neurological:      General: No focal deficit present  Mental Status: He is alert and oriented to person, place, and time     Psychiatric:         Mood and Affect: Mood normal          Behavior: Behavior normal           Charlytello Hanley, [address]

## 2021-05-12 ENCOUNTER — PATIENT OUTREACH (OUTPATIENT)
Dept: FAMILY MEDICINE CLINIC | Facility: CLINIC | Age: 86
End: 2021-05-12

## 2021-05-12 NOTE — PROGRESS NOTES
Outgoing Call:  5/12/2021    CHW did call Rosa Maria to discuss status of Medicare cards and if she had received them for herself and pt  Danette Alamo stated that she had not received them  Danette Alamo also mentioned she is feeling discouraged by this  CHW encouraged her and assured her that she will continue to work with her until she and pt are officially on Medicare Part B  Rosa Maria thanked CHW for her time  Next outreach is scheduled for 5/19/2021

## 2021-05-19 ENCOUNTER — PATIENT OUTREACH (OUTPATIENT)
Dept: FAMILY MEDICINE CLINIC | Facility: CLINIC | Age: 86
End: 2021-05-19

## 2021-05-19 NOTE — PROGRESS NOTES
Outgoing Call:  5/19/2021    CHW did call Afua Prom, pt's wife and discussed letter received from [facility]  Rosa Maria informed CHW that she completed the application but does not really know what she signed  Afua Prom agreed to meet with CHW tomorrow at [place] to review this packet       Next outreach is scheduled for 5/20/2021, at [facility], at at [place]

## 2021-05-20 ENCOUNTER — PATIENT OUTREACH (OUTPATIENT)
Dept: FAMILY MEDICINE CLINIC | Facility: CLINIC | Age: 86
End: 2021-05-20

## 2021-05-27 ENCOUNTER — PATIENT OUTREACH (OUTPATIENT)
Dept: FAMILY MEDICINE CLINIC | Facility: CLINIC | Age: 86
End: 2021-05-27

## 2021-05-27 NOTE — PROGRESS NOTES
Chart Review / Outgoing Call:  5/27/2021    CHW did call pt to discuss status of [place] application and to see if they received their Medicare Part B cards  Josephjoan Garcia, pt's wife was not available at the time of call and pt had a difficult time hearing and understanding CHW over the phone  CHW asked pt to have Rosa Maria call her once she had a chance  Next outreach is scheduled for 6/3/2021

## 2021-06-03 ENCOUNTER — PATIENT OUTREACH (OUTPATIENT)
Dept: FAMILY MEDICINE CLINIC | Facility: CLINIC | Age: 86
End: 2021-06-03

## 2021-06-03 NOTE — PROGRESS NOTES
Outgoing Call:  6/3/2021    CHW did call pt to ask if pt and his wife have received their new Medicare cards  Pt's wife, Joseph Garcia, stated that they had received something and she completed the form and sent it back to [facility] but does not know what it was for  CHW reminded her to speak with CHW before submitting any documents to Medicare or [facility] to prevent completing documents inaccurately  Joseph Garcia stated that she didn't think it would matter and signed and agreed to terms on card but also stating that she does not know what she agreed to  CHW reminded her that she was previously informed by Medicare that she may be charged several thousand dollars for penalty fees due to late enrollment  CHW reminded her that we would appeal that decision in the event she and pt are charged  Joseph Garcia agreed to look through her paperwork and bring mail she also received today from Medicare to CHW tomorrow       Next outreach is scheduled for 6/4/2021 at [address] [place] [address] Zenia, at [place]

## 2021-06-04 ENCOUNTER — PATIENT OUTREACH (OUTPATIENT)
Dept: FAMILY MEDICINE CLINIC | Facility: CLINIC | Age: 86
End: 2021-06-04

## 2021-06-04 NOTE — PROGRESS NOTES
In Person / [place] Call:  6/4/2021    CHW did meet with pt at [address] did bring with him letters he believes are related to Medicare  CHW did review letter and one was from [address] [place] [address] for pt's wife  It was a monthly benefits statement letter, not a bill, and not related to Medicare Part B  The second was a post card to sell pt health insurance  CHW explained to pt what the letter was for and also wrote notes so that he could explain to his wife  Pt thanked CHW for her assistance and stated that his wife will reach out to me whenever they receive mail from [place], [place] or [place], in reference to health insurance  CHW informed pt that she will touch base with him within a week  Next outreach is scheduled for 6/11/2021

## 2021-06-11 ENCOUNTER — PATIENT OUTREACH (OUTPATIENT)
Dept: FAMILY MEDICINE CLINIC | Facility: CLINIC | Age: 86
End: 2021-06-11

## 2021-06-11 NOTE — PROGRESS NOTES
Outgoing Call:  6/11/2021    CHW did call Lillian Kaur, pt's wife, in reference to Medicare Part B  Lillian Kaur stated that she did not receive anything in the mail from Medicare  CHW informed her that she will f/u with her next week and assist her in calling Medicare if she hadn't received anything by then  Pt and his wife are eligible for Medicare Part B as of July 2021 and they are expecting new cards  [place] [place]  Next outreach is scheduled for 6/18/2021

## 2021-06-18 ENCOUNTER — PATIENT OUTREACH (OUTPATIENT)
Dept: FAMILY MEDICINE CLINIC | Facility: CLINIC | Age: 86
End: 2021-06-18

## 2021-06-18 NOTE — PROGRESS NOTES
Outgoing Call:  6/18/2021    CHW did call Cris Carballo, pt's wife, and did ask if she received Medicare Part B cards  She stated that she did not  CHW did assist in calling Medicare to inquire  We were informed that they do not have a start date for benefits although they see the request for enrollment in pt and Cris Carballo' chart and that [facility] has not processed this request as of yet  We were encouraged to call [facility] next week to inquire  Next outreach is scheduled for 6/21/2021

## 2021-06-21 ENCOUNTER — PATIENT OUTREACH (OUTPATIENT)
Dept: FAMILY MEDICINE CLINIC | Facility: CLINIC | Age: 86
End: 2021-06-21

## 2021-06-21 NOTE — PROGRESS NOTES
Outgoing Call / Chart Review:  6/21/2021    CHW did call pt to facilitate a conference call to [facility] in reference to Medicare Part B  Pt did not answer and CHW was unable to leave voicemail as it did not appear to be set up  Next outreach is scheduled for 6/22/2021

## 2021-06-22 ENCOUNTER — PATIENT OUTREACH (OUTPATIENT)
Dept: FAMILY MEDICINE CLINIC | Facility: CLINIC | Age: 86
End: 2021-06-22

## 2021-06-22 NOTE — PROGRESS NOTES
Outgoing Call:  6/22/2021    CHW did call pt to assist in calling SSA  Pt not available and CHW is unable to leave message as voicemail is not set up  Next outreach is scheduled for 6/29/2021

## 2021-06-29 ENCOUNTER — PATIENT OUTREACH (OUTPATIENT)
Dept: FAMILY MEDICINE CLINIC | Facility: CLINIC | Age: 86
End: 2021-06-29

## 2021-06-29 NOTE — LETTER
06/29/21    Dear Ramya Lyons,    I am a Community Health Worker with [facility]  [address] [address]    I have made several attempts to call you by phone  It is important that you contact me back at [phone] so that I can assist with your care needs       Sincerely,       Morales Justice, W

## 2021-06-29 NOTE — PROGRESS NOTES
Outgoing Call:  6/29/2021    CHW did call pt to discuss status of Medicare A & B, pt not available  Unable to leave voice message as it is not set up  CHW did send letter  Next outreach is scheduled for 7/6/2021

## 2021-07-06 ENCOUNTER — PATIENT OUTREACH (OUTPATIENT)
Dept: FAMILY MEDICINE CLINIC | Facility: CLINIC | Age: 86
End: 2021-07-06

## 2021-07-06 NOTE — PROGRESS NOTES
Outgoing Call:  7/6/2021    CHW did call Kev Monique, pt's wife, to discuss status of Medicare approval letter pt has been expecting  Pt stated that she received a letter which looked "formal" to her and believes this may be what we have been waiting for  Kev Amaya stated that she hid it for safe keeping from her  however managed to lose it  CHW informed her this was ok and she should take her time looking for it and bring with her when she meets with CHW  Kev Amaya agreed       Next outreach is scheduled for 7/8/21 at [facility], Counts include [facility] beds at the [facility]

## 2021-07-08 ENCOUNTER — PATIENT OUTREACH (OUTPATIENT)
Dept: FAMILY MEDICINE CLINIC | Facility: CLINIC | Age: 86
End: 2021-07-08

## 2021-07-08 NOTE — PROGRESS NOTES
In Person / Outgoing Calls:  7/8/2021    CHW did meet with pt at [facility] for scheduled appointment  Pt did bring letter with him from [place] [employer]  This letter indicated that pt and his wife have been re-certified for [facility] and Medicaid benefits  CHW did also call them for clarification and were informed that pt is eligible for Medicare Part B payments for premium however [facility] has not enrolled them into plan as of yet  We were instructed to call SSA  CHW did assist pt in calling and were placed on hold for 36 minutes before being assisted by Mando Carter  We were informed that a local rep from [address] will need to enroll pt and his wife via phone  CHW did schedule this appointment with them for 8/17/2021 at 1PM via phone  CHW did write a note to Con, pt's wife, as she was unable to attend appointment today with pt  CHW updated her on status of benefits in question  This is copy of note CHW wrote to Rosa Maria:    Kay Easley,  I met with Lisa Singh today at [facility]  I am writing this note to you so that you understand what we worked on today  I did look over the letter that you both received from [facility] and it states that both of you have been approved for SNAP benefits (food stamps) for the next two years and also for Medicaid  Just to be sure I contacted BRYANT REINA with Lisa Singh and this is what we were told: You have been approved for $19 a month in SNAP benefits, however due to pandemic you are both eligible for the max allowed amount of $430 a month until September 2021  You will receive this in two separate payments each month  One payment will be for $19 and second payment will be for $411  You will not need to reapply for this benefit until January 2022  This is a result of the re-certification application we had completed some time ago  In reference to Medicaid, you have both been approved for Medicaid and you both will need to select a health plan or they may select one for you   You can call [phone] so that they can assist you in selecting the best plan available for both of you  As far as Medicare Part B is concerned they still have not resolved this issue  [place] rep did tell me that they did approve premium coverage for Medicare Part B and this is effective as of July 1, 2021  They informed us to call back to Social Security and ask why you both have not been enrolled with them  Bijal and I did call and were placed on hold for over 35 minutes  We were informed by Mark Jon at [facility] that that a local  will need to enroll both of you in Medicare Part B  I did schedule this appointment for 8/17/21 at 1PM  They will call my number but please be available on this date and time as I will need to have you with me in person to enroll for Medicare Part B, on this call  It is also possible that they will call me sooner as this has been an ongoing issue and nothing has been completed by them  I will remind you the week of this appointment  Dont lose hope, we will resolve this matter  Take care,  Dimple    Pt agreed to give letter to his wife  Next outreach is scheduled for 7/15/2021

## 2021-07-15 ENCOUNTER — PATIENT OUTREACH (OUTPATIENT)
Dept: FAMILY MEDICINE CLINIC | Facility: CLINIC | Age: 86
End: 2021-07-15

## 2021-07-15 NOTE — PROGRESS NOTES
Outgoing Call:  7/15/2021    CHW did call Eleno [place], pt's wife, to discuss letter CHW sent to her last week and discuss any questions or concerns that she may have  Eleno [place] stated that she read the letter and was not sure what it meant but did not want to call CHW as she felt she was being over bearing  CHW encouraged her to call anytime she had concerns  She thanked CHW  CHW and Eleno Heheidi discussed what the next step will be in reference to applying for Medicare Part B  CHW told her she'd call weekly until the week of appointment and remind her to come to [place] in person with pt for phone conference with Social Security  She agreed to be present  Next outreach is scheduled for 7/22/2021

## 2021-07-23 ENCOUNTER — PATIENT OUTREACH (OUTPATIENT)
Dept: FAMILY MEDICINE CLINIC | Facility: CLINIC | Age: 86
End: 2021-07-23

## 2021-07-23 NOTE — PROGRESS NOTES
Outgoing Call:  7/23/2021    CHW did call pt to discuss any mail received from [address] [place] and to remind him about SSA appointment next month  Voice message left  Next outreach is scheduled for 7/30/2021

## 2021-07-30 ENCOUNTER — PATIENT OUTREACH (OUTPATIENT)
Dept: FAMILY MEDICINE CLINIC | Facility: CLINIC | Age: 86
End: 2021-07-30

## 2021-07-30 NOTE — PROGRESS NOTES
Outgoing Call:  7/30/2021    CHW did call pt and reminded him of upcoming appointment with SSA to officially be enrolled in Medicare Part B  Pt expressed understanding  Pt stated that all is well with him and his wife  CHW did ask if he had received any letters in reference to this upcoming appointment and he stated that he was not sure  CHW asked if pt's wife was available as she usually handles the mail, pt sated that she is visiting with their daughter  CHW informed pt she will call next week to f/u with them and continue reminding them of SSA appointment  Next outreach is scheduled for 8/6/2021

## 2021-08-06 ENCOUNTER — PATIENT OUTREACH (OUTPATIENT)
Dept: FAMILY MEDICINE CLINIC | Facility: CLINIC | Age: 86
End: 2021-08-06

## 2021-08-06 NOTE — PROGRESS NOTES
Chart Review / Outgoing Call:  8/6/2021    CHW did call pt to discuss Medicare and upcoming appointment  Pt not available, message left  Next outreach is scheduled for 8/13/2021

## 2021-08-16 ENCOUNTER — PATIENT OUTREACH (OUTPATIENT)
Dept: FAMILY MEDICINE CLINIC | Facility: CLINIC | Age: 86
End: 2021-08-16

## 2021-08-16 NOTE — PROGRESS NOTES
Outgoing Call:  8/16/2021    CHW did call Chiquis Kinney, pt's wife, to remind her and pt of upcoming phone appointment with SSA to enroll for Medicare  SSA will enroll pt and his wife via phone  CHW did schedule with SSA and we have an appointment with them for 8/17/2021 at 1PM via phone  Rosa Maria expressed understanding and CHW informed her that it was very important that they are both present for this meeting       Next outreach is scheduled for 8/17/21 at 12:45PM, at [place]

## 2021-08-17 ENCOUNTER — PATIENT OUTREACH (OUTPATIENT)
Dept: FAMILY MEDICINE CLINIC | Facility: CLINIC | Age: 86
End: 2021-08-17

## 2021-08-17 NOTE — PROGRESS NOTES
In Person / [place] Call:  8/17/2021    CHW did meet with pt and his wife at [place] for scheduled appointment  SSA was to call pt and CHW at [address] [place] [address] for Part B enrollment via phone  Pt and wife did bring their cell phone with them to receive call from [facility]  However during time we were waiting for call, Jeannette Choe realized she had her cell phone off  We may have missed the call  CHW did facilitate a call to [facility] and were placed on hold for over 30 minutes  Twice call was disconnected on their end  [facility]  We were assisted by Marshall Carlos who informed us that all we needed to do was complete a CMS-40B form  CHW informed him that we have been given the run around for the past six months and not Medicare, [place], nor SSA have provided accurate information on how to enroll  CHW also informed him that the CMS-40B form he was referencing was completed several months ago (March) by pt, his wife, and CHW  SSA acknowledged receiving this form back in early April  CHW requested that enrollment be completed today if possible  Marshall Carlos attempted to reach out to the worker that called at 1PM to see if they were available  Marshall Carlos stated that the worker was not available and pt will need to wait until next enrollment period which is in January 2022  CHW did not accept this response and requested assistance at a higher level  Marshall Carlos placed us on hold and after another twenty minutes then stated that he does not believe pt and his wife can apply as they were supposed to apply at age 72, or in between January-March or for late enrollment if they qualify, of which they do not qualify  CHW informed Marshall Carlos that she would like to file a grievance as she has been attempting to have pt and his wife enrolled on Medicare Part B  He stated that this could be completed online  CHW will gather all pertinent information related to this matter and draft a letter on behalf of pt and his wife to assist in the grievance process       CHW explained to pt and his wife what had taken place and what to expect moving forward  Understandably they were both upset and are feeling discouraged as they have been given the run around for years at this point  CHW will assist both pt and his wife in filing a grievance within the next week and possibly refer for legal assistance from [facility] if necessary  Next outreach is scheduled for 8/24/2021

## 2021-08-25 ENCOUNTER — PATIENT OUTREACH (OUTPATIENT)
Dept: FAMILY MEDICINE CLINIC | Facility: CLINIC | Age: 86
End: 2021-08-25

## 2021-08-25 NOTE — PROGRESS NOTES
Incoming Call / [facility] Text:  8/25/2021    CHW did receive a call from Con pt's wife  She stated that she had received a call from her PCP's office and she identified herself as a SW  She was unable to provide name of SW and gave CHW permission to access her chart to retrieve this information  SW in question is Cecil Fiore  CHW did send her a [employer] inviting her to discuss what CHW has competed on behalf of pt and his wife in reference to [facility] feels it may be beneficial to have an additional person involved that may be able to better assist  Hasmukh Urbina did give consent for this  CHW will wait for Kevin Allen to respond and discuss if she has any other avenues CHW may have not explored  Next outreach is scheduled for 9/1/2021

## 2021-09-02 ENCOUNTER — PATIENT OUTREACH (OUTPATIENT)
Dept: FAMILY MEDICINE CLINIC | Facility: CLINIC | Age: 86
End: 2021-09-02

## 2021-09-02 NOTE — PROGRESS NOTES
Outgoing Call:  9/2/2021    CHW did call pt to discuss filing a grievance with SSA  Pt not available  Message left  Next outreach is scheduled for 9/9/2021

## 2021-09-09 ENCOUNTER — PATIENT OUTREACH (OUTPATIENT)
Dept: FAMILY MEDICINE CLINIC | Facility: CLINIC | Age: 86
End: 2021-09-09

## 2021-09-09 NOTE — PROGRESS NOTES
Outgoing Call:  9/9/2021    CHW did call pt to discuss filing grievance with [facility] over Medicare Part B issue  Pt not available  Message left  Next outreach is scheduled for 9/16/2021

## 2021-09-17 ENCOUNTER — PATIENT OUTREACH (OUTPATIENT)
Dept: FAMILY MEDICINE CLINIC | Facility: CLINIC | Age: 86
End: 2021-09-17

## 2021-09-17 NOTE — PROGRESS NOTES
Outgoing Call:  9/17/2021    CHW did call Brian Bautista, pt's wife, to discuss seeking legal assistance for denial of Medicare Part B for her and pt  CHW offered to contact [employer] for free legal representation  Brian Bautista is open to this idea  CHW informed her that they would need to schedule a time to complete the online application  Next outreach is scheduled for 9/22/2021 at noon, via phone

## 2021-09-22 ENCOUNTER — PATIENT OUTREACH (OUTPATIENT)
Dept: FAMILY MEDICINE CLINIC | Facility: CLINIC | Age: 86
End: 2021-09-22

## 2021-09-23 ENCOUNTER — PATIENT OUTREACH (OUTPATIENT)
Dept: FAMILY MEDICINE CLINIC | Facility: CLINIC | Age: 86
End: 2021-09-23

## 2021-09-23 NOTE — PROGRESS NOTES
Outgoing Call:  9/22/2021    CHW did call pt and left voicemail requesting a call in return  CHW to assist pt in seeking legal assistance from [employer]  Pt has been denied Medicare Part B as well as his wife  Next outreach is schedule for 9/23/2021

## 2021-09-23 NOTE — PROGRESS NOTES
Outgoing Calls / Email / Chart Review:  9/23/2021    CHW did attempt to compete an application on the [employer], seeking legal assistance with Medicare Part B denial  First five sections of application were completed however once CHW reached section 6 of application, page would lag and nothing would happen  CHW attempted to restart application twice and both times it gets stuck on section 6  CHW did call [facility] to attempt to apply over the phone  Message was left discussing issue and also requesting a call in return  CHW did also email Ahmet Handy at [address] [place] [address] him of this issue  He emailed back and stated that he is informing their IT department about this issue  CHW did call Vita Tolentino, pt's wife and discussed this with her  She expressed understanding  CHW promised to call her once she received a call back from [facility]  Next outreach is scheduled for 9/27/2021

## 2021-09-27 ENCOUNTER — PATIENT OUTREACH (OUTPATIENT)
Dept: FAMILY MEDICINE CLINIC | Facility: CLINIC | Age: 86
End: 2021-09-27

## 2021-09-27 NOTE — PROGRESS NOTES
Outgoing Calls / Chart Review:  9/27/2021    CHW did attempt to complete referral application with [employer] for pt and wife  Last week CHW did attempt and was only amaury to complete up to section 6 before website stopped working  Today CHW could not get past section 3  CHW did call [employer] to request an appointment for an intake for pt to request assistance in getting enrolled in Medicare Part B  Voice message was left requesting a call in return  CHW did call Jeannette Choe, pt's wife, and informed her that she will call her as soon as [employer] calls, to complete or at least schedule an intake  [employer] for trying  Jeannette Choe also spoke to CHW about an injury she sustained on her arm  She stated that she was in the middle of changing her dressing when CHW called  Lately Jeannette Choe has had some falls in her home but has refused waiver services  CHW reminded her again that she could assist her in applying if she changed her mind  Currently she is focused on Medicare Part B  Next outreach is scheduled for 9/30/2021

## 2021-09-30 ENCOUNTER — PATIENT OUTREACH (OUTPATIENT)
Dept: FAMILY MEDICINE CLINIC | Facility: CLINIC | Age: 86
End: 2021-09-30

## 2021-09-30 NOTE — PROGRESS NOTES
Chart Review / Outgoing Call:  9/30/2021    CHW did call Floresita Chao, pt's wife, and was able to complete the [facility] [employer] application online with her  CHW has attempted numerous times but website not functioning well  CHW was informed by their IT people that CHW should attempt to use a different   CHW did do this and was able to complete application this way  Floresita Chao was informed she will receive a call from them within 5 business days and should call CHW and inform her when she does  [employer]  Next outreach is scheduled for 10/06/2021

## 2021-10-05 ENCOUNTER — PATIENT OUTREACH (OUTPATIENT)
Dept: FAMILY MEDICINE CLINIC | Facility: CLINIC | Age: 86
End: 2021-10-05

## 2021-10-12 ENCOUNTER — PATIENT OUTREACH (OUTPATIENT)
Dept: FAMILY MEDICINE CLINIC | Facility: CLINIC | Age: 86
End: 2021-10-12

## 2021-10-15 ENCOUNTER — PATIENT OUTREACH (OUTPATIENT)
Dept: FAMILY MEDICINE CLINIC | Facility: CLINIC | Age: 86
End: 2021-10-15

## 2021-10-19 ENCOUNTER — PATIENT OUTREACH (OUTPATIENT)
Dept: FAMILY MEDICINE CLINIC | Facility: CLINIC | Age: 86
End: 2021-10-19

## 2021-10-21 ENCOUNTER — PATIENT OUTREACH (OUTPATIENT)
Dept: FAMILY MEDICINE CLINIC | Facility: CLINIC | Age: 86
End: 2021-10-21

## 2021-10-25 ENCOUNTER — PATIENT OUTREACH (OUTPATIENT)
Dept: FAMILY MEDICINE CLINIC | Facility: CLINIC | Age: 86
End: 2021-10-25

## 2021-10-29 ENCOUNTER — PATIENT OUTREACH (OUTPATIENT)
Dept: FAMILY MEDICINE CLINIC | Facility: CLINIC | Age: 86
End: 2021-10-29

## 2021-11-05 ENCOUNTER — PATIENT OUTREACH (OUTPATIENT)
Dept: FAMILY MEDICINE CLINIC | Facility: CLINIC | Age: 86
End: 2021-11-05

## 2021-11-12 ENCOUNTER — PATIENT OUTREACH (OUTPATIENT)
Dept: FAMILY MEDICINE CLINIC | Facility: CLINIC | Age: 86
End: 2021-11-12

## 2021-11-15 ENCOUNTER — PATIENT OUTREACH (OUTPATIENT)
Dept: FAMILY MEDICINE CLINIC | Facility: CLINIC | Age: 86
End: 2021-11-15

## 2021-11-18 ENCOUNTER — PATIENT OUTREACH (OUTPATIENT)
Dept: FAMILY MEDICINE CLINIC | Facility: CLINIC | Age: 86
End: 2021-11-18

## 2021-11-24 ENCOUNTER — PATIENT OUTREACH (OUTPATIENT)
Dept: FAMILY MEDICINE CLINIC | Facility: CLINIC | Age: 86
End: 2021-11-24

## 2021-12-07 ENCOUNTER — PATIENT OUTREACH (OUTPATIENT)
Dept: FAMILY MEDICINE CLINIC | Facility: CLINIC | Age: 86
End: 2021-12-07

## 2021-12-09 ENCOUNTER — PATIENT OUTREACH (OUTPATIENT)
Dept: FAMILY MEDICINE CLINIC | Facility: CLINIC | Age: 86
End: 2021-12-09

## 2021-12-16 ENCOUNTER — PATIENT OUTREACH (OUTPATIENT)
Dept: FAMILY MEDICINE CLINIC | Facility: CLINIC | Age: 86
End: 2021-12-16

## 2021-12-21 ENCOUNTER — PATIENT OUTREACH (OUTPATIENT)
Dept: FAMILY MEDICINE CLINIC | Facility: CLINIC | Age: 86
End: 2021-12-21

## 2021-12-23 ENCOUNTER — PATIENT OUTREACH (OUTPATIENT)
Dept: FAMILY MEDICINE CLINIC | Facility: CLINIC | Age: 86
End: 2021-12-23

## 2021-12-30 ENCOUNTER — PATIENT OUTREACH (OUTPATIENT)
Dept: FAMILY MEDICINE CLINIC | Facility: CLINIC | Age: 86
End: 2021-12-30

## 2022-01-06 ENCOUNTER — PATIENT OUTREACH (OUTPATIENT)
Dept: FAMILY MEDICINE CLINIC | Facility: CLINIC | Age: 87
End: 2022-01-06

## 2022-01-06 NOTE — PROGRESS NOTES
Outgoing Call:  1/6/22    CHW did call pt and was unable to get through and unable to leave voicemail  Letter sent to pt last week  Will f/u next week  Next outreach is scheduled for 1/13/22

## 2022-01-14 ENCOUNTER — PATIENT OUTREACH (OUTPATIENT)
Dept: FAMILY MEDICINE CLINIC | Facility: CLINIC | Age: 87
End: 2022-01-14

## 2022-01-14 NOTE — PROGRESS NOTES
Outgoing Call:  1/14/2022    CHW did call Mili Congo at [facility] to inquire about status of pt's Medicare case  Voice message left  Chart reviewed  Next outreach is scheduled for 1/21/2022

## 2022-01-21 ENCOUNTER — PATIENT OUTREACH (OUTPATIENT)
Dept: FAMILY MEDICINE CLINIC | Facility: CLINIC | Age: 87
End: 2022-01-21

## 2022-01-21 NOTE — PROGRESS NOTES
Outgoing Call:  1/21/2022    CHW did call pt to discuss status of Medicare case with [facility] as Franc[facility] has not responded to CHW's call  Pt did not answer and no option for voice message  Final outreach is scheduled for 1/28/2022

## 2022-02-02 ENCOUNTER — PATIENT OUTREACH (OUTPATIENT)
Dept: FAMILY MEDICINE CLINIC | Facility: CLINIC | Age: 87
End: 2022-02-02

## 2022-02-02 NOTE — PROGRESS NOTES
Email:  2/2/22    CHW did email Abebe Stringer at [facility] to inquire about status of pt's Medicare case  CHW has reached out numerous times in past few weeks and have not received a response  CHW to f/u  Next outreach is scheduled for 2/9/2022

## 2022-02-04 ENCOUNTER — PATIENT OUTREACH (OUTPATIENT)
Dept: FAMILY MEDICINE CLINIC | Facility: CLINIC | Age: 87
End: 2022-02-04

## 2022-02-04 NOTE — PROGRESS NOTES
Incoming / [place] Calls:  2/4/2022    CHW did receive a call from Hoang at [facility] in reference to calls CHW had made to her  She stated that she has not been able to make contact with pt  Hoang was very upset and stated that she does not want CHW calling any other number for her other than her line  CHW expressed understanding and informed her she had attempted to call her numerous times and contact her via email with no response  CHW stated that she had to reach out to office in order to leave message with  to request a return call  Hoang raised her voice at [facility] and stated that CHW should contact her and no one else  CHW informed Hoang she will reach out to pt and see if they are still wanting to move forward as they will need to reapply for [place] and Medicare Part B  CHW will then inform Hoang of any updates  CHW did call pt and he was not available  CHW will attempt to call on 2/8 and complete a home visit if needed  Next outreach is scheduled for 2/8/22

## 2022-02-08 ENCOUNTER — PATIENT OUTREACH (OUTPATIENT)
Dept: FAMILY MEDICINE CLINIC | Facility: CLINIC | Age: 87
End: 2022-02-08

## 2022-02-08 NOTE — PROGRESS NOTES
Outgoing Call:  2/8/22    CHW did call Maricruz Moise and discussed calls made between [employer] and herself  CHW explained that she is being asked to reapply for MA  Maricruz Moise agreed to this       Next outreach is scheduled for 2/10/22 at [place] [address], at [facility]

## 2022-02-10 ENCOUNTER — PATIENT OUTREACH (OUTPATIENT)
Dept: FAMILY MEDICINE CLINIC | Facility: CLINIC | Age: 87
End: 2022-02-10

## 2022-02-10 NOTE — PROGRESS NOTES
In Person / Email:  2/10/2022    CHW did meet with Vicente Rice at [place] for scheduled appointment  A new [place] application was completed to apply for Medicare Part B and request financial assistance for Medicare monthly premiums  CHW did email Saranya Samuel at [facility] to inform her of this update  CHW provided list of needed documents to Vicente Rice to complete application  Car registrations, medical bills, life insurance policy and bank statements needed  Rosa Maria to work on this and provide next week  Next outreach is scheduled for 2/17/2022

## 2022-02-16 ENCOUNTER — PATIENT OUTREACH (OUTPATIENT)
Dept: FAMILY MEDICINE CLINIC | Facility: CLINIC | Age: 87
End: 2022-02-16

## 2022-02-16 NOTE — PROGRESS NOTES
Chart Review:  2/16/2022    [facility] did receive a packet of paperwork containing documents needed to complete MA application which Kenia Parra had dropped off  [facility] did scan and attached to application on BRYANT Obrien [facility]  [facility] did attempt to call pt but was unable and vm not available  Next outreach is scheduled for 2/23/2022

## 2022-02-23 ENCOUNTER — PATIENT OUTREACH (OUTPATIENT)
Dept: FAMILY MEDICINE CLINIC | Facility: CLINIC | Age: 87
End: 2022-02-23

## 2022-02-23 ENCOUNTER — OFFICE VISIT (OUTPATIENT)
Dept: FAMILY MEDICINE CLINIC | Facility: CLINIC | Age: 87
End: 2022-02-23

## 2022-02-23 VITALS
DIASTOLIC BLOOD PRESSURE: 86 MMHG | RESPIRATION RATE: 18 BRPM | WEIGHT: 126 LBS | TEMPERATURE: 98.1 F | HEART RATE: 87 BPM | SYSTOLIC BLOOD PRESSURE: 142 MMHG | HEIGHT: 62 IN | BODY MASS INDEX: 23.19 KG/M2 | OXYGEN SATURATION: 98 %

## 2022-02-23 DIAGNOSIS — I10 ESSENTIAL HYPERTENSION: Chronic | ICD-10-CM
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
## 2022-02-23 DIAGNOSIS — I35.0 AORTIC STENOSIS, MODERATE: ICD-10-CM
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
## 2022-02-23 DIAGNOSIS — H61.23 IMPACTED CERUMEN OF BOTH EARS: ICD-10-CM

## 2022-02-23 DIAGNOSIS — D69.6 THROMBOCYTOPENIA (HCC): ICD-10-CM

## 2022-02-23 DIAGNOSIS — D64.9 ANEMIA, UNSPECIFIED TYPE: ICD-10-CM

## 2022-02-23 DIAGNOSIS — B96.89 BACTERIAL URI: ICD-10-CM

## 2022-02-23 DIAGNOSIS — J06.9 BACTERIAL URI: ICD-10-CM

## 2022-02-23 PROCEDURE — 99214 OFFICE O/P EST MOD 30 MIN: CPT | Performed by: NURSE PRACTITIONER

## 2022-02-23 RX ORDER — AMLODIPINE BESYLATE 5 MG/1
5 TABLET ORAL 2 TIMES DAILY
Qty: 180 TABLET | Refills: 3 | Status: SHIPPED | OUTPATIENT
Start: 2022-02-23 | End: 2023-02-18

## 2022-02-23 RX ORDER — FERROUS SULFATE 325(65) MG
325 TABLET ORAL
Qty: 90 TABLET | Refills: 1 | Status: SHIPPED | OUTPATIENT
Start: 2022-02-23

## 2022-02-23 RX ORDER — AMOXICILLIN 500 MG/1
500 CAPSULE ORAL EVERY 8 HOURS SCHEDULED
Qty: 30 CAPSULE | Refills: 0 | Status: SHIPPED | OUTPATIENT
Start: 2022-02-23 | End: 2022-03-05

## 2022-02-23 RX ORDER — METOPROLOL TARTRATE 50 MG/1
50 TABLET, FILM COATED ORAL DAILY
Qty: 90 TABLET | Refills: 3 | Status: SHIPPED | OUTPATIENT
Start: 2022-02-23

## 2022-02-23 NOTE — TELEPHONE ENCOUNTER
Please contact patient  Remind him to go for COVID testing and additional blood work that I ordered  He needs this done Monday 8/24/20   Thank you, Dr Zina Garcia Pt feeling better. Voice has rreturned. Gave positive strep g results - but since patient is better no treatment.    Patient called back and stated she does feel worse and would like treatment. Will call in pen vk and magic mouthwash.
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
## 2022-02-24 NOTE — PROGRESS NOTES
Assessment/Plan:    Problem List Items Addressed This Visit        Cardiovascular and Mediastinum    Essential hypertension (Chronic)     Historically stable on amlodipine and lopressor  Acknowledges understanding of importance of obtaining refills today and compliance with daily medication as instructed  Denies chest pain, peripheral edema, syncope or lightheadedness  Relevant Medications    amLODIPine (NORVASC) 5 mg tablet    metoprolol tartrate (LOPRESSOR) 50 mg tablet    Aortic stenosis, moderate     10/208 ECHO: "Aortic valve sclerosis and focal calcification on non and right coronary cusps  Suspected moderate aortic valve stenosis  Peak trans aortic velocity is 3 m/sec, mean gradient is 20 mmHg and calculated aortic valve area is around 0 9 centimeter squared  No aortic valve regurgitation was noted "    Grade 2/3 systolic murmur appreciated on exam today  Pt denies lightheadedness, syncope, weakness/fatigue, or chest pain  Was going to f/u with cardiology in 2019 but doesn't appear this happened  Did not f/u with cardiology after last encounter  I will sent referral to complex care to assist patient with this  Relevant Medications    amLODIPine (NORVASC) 5 mg tablet    metoprolol tartrate (LOPRESSOR) 50 mg tablet    Other Relevant Orders    Ambulatory Referral to Cardiology    Ambulatory Referral to Complex Care Management Program       Nervous and Auditory    Impacted cerumen of both ears     Start debrox, states he has some at home, advised no q-tips  Relevant Medications    carbamide peroxide (DEBROX) 6 5 % otic solution       Other    Thrombocytopenia (HCC)     Does endorse easy bruising  Hadn't obtained his bloodwork  I encouraged him to get this done  Anemia     Needs repeat CBC and iron panel  Refilled ferrous sulfate         Relevant Medications    ferrous sulfate (FeroSul) 325 (65 Fe) mg tablet    Fall at home, initial encounter - Primary     Refused ED  No signs of bleeding, ecchymosis around right eye  Declines PT or assistive device  He is ambulating well today  States he's mainly tired all the time - likely 2/2 anemia which he hasn't f/u on BW since 2020, also has aortic stenosis, needs Echo and cardiology f/u  Reviewed fall precautions such as keeping home well lit, no throw rugs, watch pats, walks slow, use railings, suggest assistive device  Other Visit Diagnoses     Bacterial URI        Relevant Medications    amoxicillin (AMOXIL) 500 mg capsule            Return in about 2 months (around 4/23/2022) for Recheck aortics stenosis, htn  A chart review was performed and previous primary care visit notes were reviewed  All applicable imaging studies were reviewed and images were reviewed personally  All applicable laboratory studies were reviewed personally  Care everywhere review was performed if  available and all pertinent notes were reviewed  Subjective:     HPI: Sandrita Winchester is a 80 y o  male who  has a past medical history of Acute cholangitis, Clostridium difficile colitis, History of bacteremia, Hypertension, Medical history unknown, Mild persistent asthma without complication, and Renal disorder  He has no past medical history of Arthritis, CHF (congestive heart failure) ([facility] ), COPD (chronic obstructive pulmonary disease) ([facility] ), Coronary artery disease, Diabetes mellitus ([facility] Disease of thyroid gland, History of transfusion, Psychiatric disorder, Stroke [facility]), or TIA (transient ischemic attack)  who presented to the office today for  Initial encounter after fall last week in the home  His wife called to make the appointment because he adamantly refused to go to the emergency room  Patient states he simply feels tired all the time which may be the reason why he fell  He does have a known history of anemia but has not followed up on lab work since 20/20    He also has aortic stenosis but has not seen Cardiology since 2019   He otherwise states that he feels well  He declines physical therapy  He does not ambulate with an assistive device and is not while on at this time  He has no new health concerns today  The following portions of the patient's history were reviewed and updated as appropriate: allergies, current medications, past family history, past medical history, past social history, past surgical history, and problem list     Current Outpatient Medications on File Prior to Visit   Medication Sig Dispense Refill    albuterol (PROVENTIL HFA,VENTOLIN HFA) 90 mcg/act inhaler INHALE 2 PUFFS BY MOUTH EVERY 6 HOURS AS NEEDED FOR WHEEZING 54 g 0    loratadine (CLARITIN) 10 mg tablet Take 1 tablet (10 mg total) by mouth daily 90 tablet 0     No current facility-administered medications on file prior to visit  Review of Systems   Constitutional: Positive for fatigue  HENT: Negative  Eyes: Negative  Respiratory: Negative  Cardiovascular: Negative  Gastrointestinal: Negative  Genitourinary: Negative  Musculoskeletal: Negative  Skin:        Bruising around right eye   Neurological: Negative  Psychiatric/Behavioral: Negative  Objective:    /86 (BP Location: Left arm, Patient Position: Sitting, Cuff Size: Standard)   Pulse 87   Temp 98 1 °F (36 7 °C) (Temporal)   Resp 18   Ht 5' 2" (1 575 m)   Wt 57 2 kg (126 lb)   SpO2 98%   BMI 23 05 kg/m²     Physical Exam  Constitutional:       Appearance: Normal appearance  He is normal weight  HENT:      Head: Normocephalic  Comments:   Bruising, no open wound     Right Ear: External ear normal  There is impacted cerumen  Left Ear: External ear normal  There is impacted cerumen  Nose: Nose normal       Mouth/Throat:      Mouth: Mucous membranes are moist    Eyes:      Extraocular Movements: Extraocular movements intact        Conjunctiva/sclera: Conjunctivae normal    Cardiovascular:      Rate and Rhythm: Normal rate and regular rhythm  Pulses: Normal pulses  Heart sounds: Normal heart sounds  Pulmonary:      Effort: Pulmonary effort is normal       Breath sounds: Normal breath sounds  Musculoskeletal:         General: No tenderness or signs of injury  Normal range of motion  Cervical back: Normal range of motion  Right lower leg: No edema  Left lower leg: No edema  Skin:     General: Skin is warm and dry  Capillary Refill: Capillary refill takes less than 2 seconds  Findings: Bruising present  No lesion or rash  Neurological:      General: No focal deficit present  Mental Status: He is alert and oriented to person, place, and time  Psychiatric:         Mood and Affect: Mood normal          Behavior: Behavior normal          Thought Content: Thought content normal          Nellene Brittle, CRNP  02/24/22  4:13 PM    There are no Patient Instructions on file for this visit

## 2022-02-24 NOTE — ASSESSMENT & PLAN NOTE
Refused ED  No signs of bleeding, ecchymosis around right eye  Declines PT or assistive device  He is ambulating well today  States he's mainly tired all the time - likely 2/2 anemia which he hasn't f/u on BW since 2020, also has aortic stenosis, needs Echo and cardiology f/u  Reviewed fall precautions such as keeping home well lit, no throw rugs, watch pats, walks slow, use railings, suggest assistive device

## 2022-02-24 NOTE — ASSESSMENT & PLAN NOTE
10/208 ECHO: "Aortic valve sclerosis and focal calcification on non and right coronary cusps  Suspected moderate aortic valve stenosis  Peak trans aortic velocity is 3 m/sec, mean gradient is 20 mmHg and calculated aortic valve area is around 0 9 centimeter squared  No aortic valve regurgitation was noted "    Grade 2/3 systolic murmur appreciated on exam today  Pt denies lightheadedness, syncope, weakness/fatigue, or chest pain  Was going to f/u with cardiology in 2019 but doesn't appear this happened  Did not f/u with cardiology after last encounter  I will sent referral to complex care to assist patient with this

## 2022-02-24 NOTE — PROGRESS NOTES
Outgoing Call:  2/23/2022    [facility] did call [place] [employer] to inquire about status of [place]'s [place]  [place] [employer] [place] states that [place] is still pending  CMOC was assisted by [facility] rep and she stated that they do have 30 days to review application and it is still pending  CMOC to follow up next week  Next outreach is scheduled for 3/2/2022

## 2022-02-24 NOTE — ASSESSMENT & PLAN NOTE
Historically stable on amlodipine and lopressor  Acknowledges understanding of importance of obtaining refills today and compliance with daily medication as instructed  Denies chest pain, peripheral edema, syncope or lightheadedness

## 2022-02-25 ENCOUNTER — PATIENT OUTREACH (OUTPATIENT)
Dept: FAMILY MEDICINE CLINIC | Facility: CLINIC | Age: 87
End: 2022-02-25

## 2022-02-25 NOTE — PROGRESS NOTES
Direct Provider Referral     I called the patient but spoke to his wife, Keysha Bloom  I explained that I was asked to assist in scheduling the patient's Cardiology appointment as well as Echo  Keysha Helenevelyn accepted my help  I called Cardiology and scheduled his appointment for March 28 at 3pm and I called Central Scheduling to schedule the Echo which is March 23 at 3pm   I called Keysha Cervantesevelyn with the appointment information and she was very thankful  I also reminded her to have the patient get his labs drawn and to be fasting for the draw  I will follow up next month before the appointments as a reminder  Keysha Bloom stated the patient has enough medications and has no questions or concerns at this time  Chart reviewed

## 2022-02-28 ENCOUNTER — APPOINTMENT (OUTPATIENT)
Dept: LAB | Facility: HOSPITAL | Age: 87
End: 2022-02-28
Payer: COMMERCIAL

## 2022-02-28 DIAGNOSIS — Z00.00 HEALTHCARE MAINTENANCE: ICD-10-CM

## 2022-02-28 LAB
ALBUMIN SERPL BCP-MCNC: 4.2 G/DL (ref 3–5.2)
ALP SERPL-CCNC: 93 U/L (ref 43–122)
ALT SERPL W P-5'-P-CCNC: 14 U/L
ANION GAP SERPL CALCULATED.3IONS-SCNC: 11 MMOL/L (ref 5–14)
AST SERPL W P-5'-P-CCNC: 30 U/L (ref 17–59)
BASOPHILS # BLD AUTO: 0.04 THOUSAND/UL (ref 0–0.1)
BASOPHILS NFR MAR MANUAL: 1 % (ref 0–1)
BILIRUB SERPL-MCNC: 1.25 MG/DL
BUN SERPL-MCNC: 26 MG/DL (ref 5–25)
CALCIUM SERPL-MCNC: 8.7 MG/DL (ref 8.4–10.2)
CHLORIDE SERPL-SCNC: 104 MMOL/L (ref 97–108)
CHOLEST SERPL-MCNC: 151 MG/DL
CO2 SERPL-SCNC: 21 MMOL/L (ref 22–30)
CREAT SERPL-MCNC: 2.1 MG/DL (ref 0.7–1.5)
EOSINOPHIL # BLD AUTO: 0.16 THOUSAND/UL (ref 0–0.4)
EOSINOPHIL NFR BLD MANUAL: 4 % (ref 0–6)
ERYTHROCYTE [DISTWIDTH] IN BLOOD BY AUTOMATED COUNT: 13.4 %
GFR SERPL CREATININE-BSD FRML MDRD: 27 ML/MIN/1.73SQ M
GLUCOSE P FAST SERPL-MCNC: 123 MG/DL (ref 70–99)
HCT VFR BLD AUTO: 33.3 % (ref 41–53)
HDLC SERPL-MCNC: 41 MG/DL
HGB BLD-MCNC: 11 G/DL (ref 13.5–17.5)
LDLC SERPL CALC-MCNC: 95 MG/DL
LYMPHOCYTES # BLD AUTO: 0.74 THOUSAND/UL (ref 0.5–4)
LYMPHOCYTES # BLD AUTO: 18 % (ref 25–45)
MCH RBC QN AUTO: 33.1 PG (ref 26.8–34.3)
MCHC RBC AUTO-ENTMCNC: 33.1 G/DL (ref 31.4–37.4)
MCV RBC AUTO: 100 FL (ref 80–100)
MONOCYTES # BLD AUTO: 0.57 THOUSAND/UL (ref 0.2–0.9)
MONOCYTES NFR BLD AUTO: 14 % (ref 1–10)
NEUTS SEG # BLD: 2.58 THOUSAND/UL (ref 1.8–7.8)
NEUTS SEG NFR BLD AUTO: 63 %
NONHDLC SERPL-MCNC: 110 MG/DL
PLATELET # BLD AUTO: 148 THOUSANDS/UL (ref 150–450)
PLATELET BLD QL SMEAR: ABNORMAL
PMV BLD AUTO: 9.4 FL (ref 8.9–12.7)
POTASSIUM SERPL-SCNC: 4.4 MMOL/L (ref 3.6–5)
PROT SERPL-MCNC: 7.5 G/DL (ref 5.9–8.4)
RBC # BLD AUTO: 3.33 MILLION/UL (ref 4.5–5.9)
RBC MORPH BLD: ABNORMAL
SODIUM SERPL-SCNC: 136 MMOL/L (ref 137–147)
TOTAL CELLS COUNTED SPEC: 100
TRIGL SERPL-MCNC: 73 MG/DL
WBC # BLD AUTO: 4.1 THOUSAND/UL (ref 4.31–10.16)

## 2022-02-28 PROCEDURE — 36415 COLL VENOUS BLD VENIPUNCTURE: CPT

## 2022-02-28 PROCEDURE — 85027 COMPLETE CBC AUTOMATED: CPT

## 2022-02-28 PROCEDURE — 80053 COMPREHEN METABOLIC PANEL: CPT

## 2022-02-28 PROCEDURE — 80061 LIPID PANEL: CPT

## 2022-02-28 PROCEDURE — 85007 BL SMEAR W/DIFF WBC COUNT: CPT

## 2022-03-01 ENCOUNTER — TELEPHONE (OUTPATIENT)
Dept: NEPHROLOGY | Facility: CLINIC | Age: 87
End: 2022-03-01

## 2022-03-01 NOTE — TELEPHONE ENCOUNTER
New Patient Intake Form   Patient Details   Chico Sanchez     12/9/1932     [id number]     Appointment Information   Who is calling to schedule? If not patient, what is callers name? Patient    Referring Provider  Dr Andrei Nixon    Referring Provider Number [phone]      Reason for Appt (Diagnosis) N18 4 (ICD-10-CM) - CKD (chronic kidney disease) stage 4, GFR 15-29 ml/min ([facility] )   Is patient aware of why they are being referred? Yes   Does Patient have labs done at [place] [address]? If not, where do they go? Yes   Has patient had labs / urine work done? List date of most recent lab / urine work Yes   Has patient had a BMP & CBC done in the past 2 years? If so, list the date Yes   Has patient been hospitalized recently? If yes, list name and location of hospital they were In No   Has patient been seen by a Nephrologist before? If yes, list name, location and phone number N/a   Has patient been see by another Specialty before (ex  Neurology, urology, cardiology)? If yes, please list name, and specialty Yes   Has the patient had imaging done? If so, list the most recent date and type of imagineg Yes   Does the patient has a stone analysis report if history of kidney stones? N/a   Appointment Details   Is there a referral on file?  Yes    Appointment Date 4/25/2022   Location [place]   Miscellaneous

## 2022-03-03 ENCOUNTER — PATIENT OUTREACH (OUTPATIENT)
Dept: FAMILY MEDICINE CLINIC | Facility: CLINIC | Age: 87
End: 2022-03-03

## 2022-03-03 NOTE — PROGRESS NOTES
Chart Review / Outgoing Call:  3/3/89952    Van Winchester did complete a chart review and checked on status of MA application  Application is still pending  Van Winchester did call [place] [employer] to inquire about this and was informed they have until 3/10/22 to make a decision  Carl Tamia Winchester did call Vicente Rice and explained this to her  She stated that she was well but is not hopeful she and pt will be approved for Medicare Part B  [address] reminded her they will receive assistance from [facility] if she and pt are denied  She expressed understanding and did thank Carl Tamia Winchester for her time  Next outreach is scheduled for 3/10/2022

## 2022-03-10 ENCOUNTER — PATIENT OUTREACH (OUTPATIENT)
Dept: FAMILY MEDICINE CLINIC | Facility: CLINIC | Age: 87
End: 2022-03-10

## 2022-03-10 NOTE — PROGRESS NOTES
MATTHEW BELTRAN did call Pt to follow up with no response  MATTHEW BELTRAN unable to leave a message  MATTHEW BELTRAN is remain available for further assistance as needed  MATTHEW BELTRAN will continue to follow

## 2022-03-10 NOTE — PROGRESS NOTES
Outgoing Calls / Email:  3/10/2022    CMOC did check on [place] [employer] for status of [place], SNAP, and LIHEAP applications  Status stated that pt and his wife were denied  CMOC did call [place] [employer] to inquire about this  CMOC was informed pt and his wife were denied Medicare Part B and state assistance for Medicare premium coverage however have been approved for [address] [place] [address] [employer]  Both have had this coverage for past year  This insurance covers all medical and prescription expenses at 100%  CMOC was also informed that pt and his wife will continue to receive $459 a month in SNAP benefits and when state lifts pandemic relief they will receive only $20 a month due to their income  CMOC was also informed pt and his wife had previously applied for [facility] and [employer] did pay [employer] $500 on 10/27/21 for heating costs  [facility] did call Leatha Palmer at [facility] and updated her on this  Fabricio Fady will close out pt's case as they now ave a secondary insurance  She requested an email stating this and [facility] Tamia Winchester did send this to her  [address] [place] [address] did call Ramya Rolon and explained this to her  She was surprised and does remember [employer] being paid $500 but does not remember who applied for her  She stated that she and pt do not have their secondary insurance cards  [facility] Tamia Winchester did call [address] [place] [address] with her and ordered cards for pt and  Ramya Rolon was very thankful for Gene Pennington assistance in this matter  She is aware this referral will be closed today as all needs have been met  No further outreach is scheduled at this time

## 2022-03-14 ENCOUNTER — PATIENT OUTREACH (OUTPATIENT)
Dept: FAMILY MEDICINE CLINIC | Facility: CLINIC | Age: 87
End: 2022-03-14

## 2022-03-14 NOTE — PROGRESS NOTES
MATTHEW BELTRAN placed second call to Pt to follow up with no response  MATTHEW BELTRAN unable to leave VM  MATTHEW BELTRAN will send letter and she is closing case due to unable to contact Pt  MATTHEW BELTRAN is remain available for further assistance as needed

## 2022-03-14 NOTE — LETTER
[facility], [facility] Jyoti Damon  [address]  [phone]    Re: Care coordination   3/14/2022       Dear Baldev Boateng,    We tried to reach you by phone on 3/10/22 and 3/14/22 and was unfortunately unable to reach you    It is important that you contact the [facility] at [phone] as soon as possible     Sincerely,         Adriana Jha MSW

## 2022-03-22 ENCOUNTER — PATIENT OUTREACH (OUTPATIENT)
Dept: FAMILY MEDICINE CLINIC | Facility: CLINIC | Age: 87
End: 2022-03-22

## 2022-03-22 NOTE — PROGRESS NOTES
I called the patient to remind him of his Echo appointment for tomorrow at 3pm and his Cardiology appointment 3/28 at 3pm; he plans on attending both  I will continue to follow

## 2022-03-23 ENCOUNTER — HOSPITAL ENCOUNTER (OUTPATIENT)
Dept: NON INVASIVE DIAGNOSTICS | Facility: HOSPITAL | Age: 87
Discharge: HOME | End: 2022-03-23
Payer: COMMERCIAL

## 2022-03-23 VITALS
HEART RATE: 63 BPM | SYSTOLIC BLOOD PRESSURE: 142 MMHG | HEIGHT: 62 IN | WEIGHT: 126 LBS | BODY MASS INDEX: 23.19 KG/M2 | DIASTOLIC BLOOD PRESSURE: 86 MMHG

## 2022-03-23 DIAGNOSIS — I35.0 AORTIC STENOSIS, MODERATE: ICD-10-CM

## 2022-03-23 PROCEDURE — 93306 TTE W/DOPPLER COMPLETE: CPT

## 2022-03-24 LAB
AORTIC VALVE MEAN VELOCITY: 22.6 M/S
AV AREA BY CONTINUOUS VTI: 0.8 CM2
AV AREA PEAK VELOCITY: 0.5 CM2
AV LVOT MEAN GRADIENT: 1 MMHG
AV LVOT PEAK GRADIENT: 2 MMHG
AV MEAN GRADIENT: 26 MMHG
AV PEAK GRADIENT: 54 MMHG
AV VALVE AREA: 0.78 CM2
AV VELOCITY RATIO: 0.19
DOP CALC AO PEAK VEL: 3.67 M/S
DOP CALC AO VTI: 82.02 CM
DOP CALC LVOT AREA: 2.54 CM2
DOP CALC LVOT DIAMETER: 1.8 CM
DOP CALC LVOT PEAK VEL VTI: 25.19 CM
DOP CALC LVOT PEAK VEL: 0.71 M/S
DOP CALC LVOT STROKE INDEX: 40.8 ML/M2
DOP CALC LVOT STROKE VOLUME: 64.07 CM3
E WAVE DECELERATION TIME: 322 MS
FRACTIONAL SHORTENING: 35 % (ref 28–44)
INTERVENTRICULAR SEPTUM IN DIASTOLE (PARASTERNAL SHORT AXIS VIEW): 1 CM
INTERVENTRICULAR SEPTUM: 1 CM (ref 0.49–0.91)
LEFT ATRIUM SIZE: 4.3 CM
LEFT INTERNAL DIMENSION IN SYSTOLE: 2.6 CM (ref 2.29–3.46)
LEFT VENTRICULAR INTERNAL DIMENSION IN DIASTOLE: 4 CM (ref 3.72–5.54)
LEFT VENTRICULAR POSTERIOR WALL IN END DIASTOLE: 1 CM (ref 0.47–0.9)
LEFT VENTRICULAR STROKE VOLUME: 45 ML
LVSV (TEICH): 45 ML
MV E'TISSUE VEL-SEP: 5 CM/S
MV PEAK A VEL: 0.91 M/S
MV PEAK E VEL: 77 CM/S
MV STENOSIS PRESSURE HALF TIME: 93 MS
MV VALVE AREA P 1/2 METHOD: 2.37 CM2
SL CV PED ECHO LEFT VENTRICLE DIASTOLIC VOLUME (MOD BIPLANE) 2D: 70 ML
SL CV PED ECHO LEFT VENTRICLE SYSTOLIC VOLUME (MOD BIPLANE) 2D: 25 ML
Z-SCORE OF INTERVENTRICULAR SEPTUM IN END DIASTOLE: 2.79
Z-SCORE OF LEFT VENTRICULAR DIMENSION IN END DIASTOLE: -1.26
Z-SCORE OF LEFT VENTRICULAR DIMENSION IN END SYSTOLE: -0.63
Z-SCORE OF LEFT VENTRICULAR POSTERIOR WALL IN END DIASTOLE: 2.9

## 2022-03-24 PROCEDURE — 93321 DOPPLER ECHO F-UP/LMTD STD: CPT | Performed by: INTERNAL MEDICINE

## 2022-03-24 PROCEDURE — 93325 DOPPLER ECHO COLOR FLOW MAPG: CPT | Performed by: INTERNAL MEDICINE

## 2022-03-24 PROCEDURE — 93308 TTE F-UP OR LMTD: CPT | Performed by: INTERNAL MEDICINE

## 2022-03-26 PROBLEM — N18.4 CHRONIC KIDNEY DISEASE, STAGE 4 (SEVERE) (HCC): Status: ACTIVE | Noted: 2022-03-26

## 2022-03-26 PROBLEM — I35.0 AORTIC VALVE STENOSIS, SEVERE: Status: ACTIVE | Noted: 2022-03-26

## 2022-03-28 ENCOUNTER — CONSULT (OUTPATIENT)
Dept: CARDIOLOGY CLINIC | Facility: CLINIC | Age: 87
End: 2022-03-28
Payer: COMMERCIAL

## 2022-03-28 VITALS
SYSTOLIC BLOOD PRESSURE: 140 MMHG | BODY MASS INDEX: 22.5 KG/M2 | WEIGHT: 123 LBS | DIASTOLIC BLOOD PRESSURE: 60 MMHG | HEART RATE: 62 BPM

## 2022-03-28 DIAGNOSIS — I35.0 AORTIC STENOSIS, MODERATE: ICD-10-CM

## 2022-03-28 DIAGNOSIS — N18.4 CHRONIC KIDNEY DISEASE, STAGE 4 (SEVERE) (HCC): ICD-10-CM

## 2022-03-28 DIAGNOSIS — I35.0 AORTIC VALVE STENOSIS, SEVERE: Primary | ICD-10-CM

## 2022-03-28 DIAGNOSIS — R60.0 LOWER EXTREMITY EDEMA: ICD-10-CM

## 2022-03-28 DIAGNOSIS — D64.9 ANEMIA, UNSPECIFIED TYPE: ICD-10-CM

## 2022-03-28 DIAGNOSIS — I10 ESSENTIAL HYPERTENSION: Chronic | ICD-10-CM

## 2022-03-28 PROCEDURE — 93000 ELECTROCARDIOGRAM COMPLETE: CPT | Performed by: INTERNAL MEDICINE

## 2022-03-28 PROCEDURE — 99243 OFF/OP CNSLTJ NEW/EST LOW 30: CPT | Performed by: INTERNAL MEDICINE

## 2022-03-28 NOTE — PROGRESS NOTES
[facility]  [address] [facility]   [address]  [address]  Phone#  [phone]   Fax#  [phone]  *-*-*-*-*-*-*-*-*-*-*-*-*-*-*-*-*-*-*-*-*-*-*-*-*-*-*-*-*-*-*-*-*-*-*-*-*-*-*-*-*-*-*-*-*-*-*-*-*-*-*-*-*-*                                              Cardiology Consultation       ENCOUNTER DATE: 22 3:25 PM  PATIENT NAME: Re Vuong   : 1932    MRN: [id number]  AGE:89 y o  SEX: male  [address] Levon Marks MD     PRIMARY CARE PHYSICIAN: Juanita Gomez DO    ACTIVE DIAGNOSIS THIS VISIT  1  Aortic valve stenosis, severe  POCT ECG   2  Essential hypertension  Ambulatory referral to Cardiology    POCT ECG   3  Chronic kidney disease, stage 4 (severe) ([address] )     4  Lower extremity edema     5  Anemia, unspecified type     6  Aortic stenosis, moderate  Ambulatory referral to Cardiology    Ambulatory Referral to Cardiology     ACTIVE PROBLEM LIST  Patient Active Problem List   Diagnosis    Thrombocytopenia ([address] )    Sepsis ([address] )    Essential hypertension    Ambulatory dysfunction    Cholelithiasis with biliary obstruction    Acute renal failure (ARF) (HCC)    Anemia    Lower extremity edema    Blurry vision, left eye    Allergic rhinitis    Mild persistent asthma without complication    Presbycusis    Impacted cerumen of both ears    Fall at home, initial encounter    Aortic valve stenosis, severe    Chronic kidney disease, stage 4 (severe) ([address] )       CARDIOLOGY SPECIALTY COMMENTS  Patient referred for evaluation of aortic stenosis    2022 echocardiogram:  Normal LV systolic function, EF 17%, mild LVH, grade 1 diastolic dysfunction  Severe calcific stenosis  Maximum aortic valve velocity 3 7 M/S, mean gradient 26 mmHg, aortic valve area 0 5-0 8 cm2, dimensionless index 0 31  Echocardiogram considered poor quality  Chronic kidney disease stage 4 with GFR 27    HPI:        Patient denies cardiac symptoms   Patient denies chest discomfort or shortness of breath  Patient has no palpitations  Patient denies symptoms of dizziness, lightheadedness or near-syncope/syncope  Patient denies leg edema  Patient denies symptoms of orthopnea or paroxysmal nocturnal dyspnea  He is moderately severe aortic stenosis and is from a cardiac perspective a candidate for TAVR however, he also has chronic kidney disease, stage IV and I am concerned that the TAVR evaluation and procedure will resulted in him needing to go on dialysis  I am reaching out to Dr See Yarbrough, who will be seeing the patient on April 25th, concerning his impression regarding the risk of TAVR are evaluation and procedure on patient's kidneys  If he agrees, that there is a substantial risk of requiring dialysis if he proceeds with TAVR, I feel I should hold off until he becomes symptomatic  DISCUSSION/PLAN:    1  Discuss patient after his office visit with Dr See Yarbrough, Nephrology   2   Return in 3 months    Lab Studies:    Lab Results   Component Value Date    CHOLESTEROL 151 02/28/2022     Lab Results   Component Value Date    TRIG 73 02/28/2022     Lab Results   Component Value Date    HDL 41 02/28/2022     Lab Results   Component Value Date    LDLCALC 95 02/28/2022         Lab Results   Component Value Date    NTBNP 5,250 (H) 10/05/2018       Lab Results   Component Value Date    EGFR 27 02/28/2022    EGFR 42 (L) 10/10/2018    EGFR 45 (L) 10/10/2018    SODIUM 136 (L) 02/28/2022    SODIUM 137 10/10/2018    SODIUM 136 (L) 10/10/2018    K 4 4 02/28/2022    K 4 1 10/10/2018    K 5 8 (H) 10/10/2018     02/28/2022     10/10/2018     10/10/2018    CO2 21 (L) 02/28/2022    CO2 28 10/10/2018    CO2 26 10/10/2018    BUN 26 (H) 02/28/2022    BUN 23 10/10/2018    BUN 23 10/10/2018    CREATININE 2 10 (H) 02/28/2022    CREATININE 1 51 (H) 10/10/2018    CREATININE 1 41 10/10/2018     Lab Results   Component Value Date    WBC 4 10 (L) 02/28/2022    WBC 3 10 (L) 10/10/2018    WBC 4 00 (L) 10/07/2018    HGB 11 0 (L) 02/28/2022    HGB 8 0 (L) 10/10/2018    HGB 7 6 (L) 10/07/2018    HCT 33 3 (L) 02/28/2022    HCT 24 2 (L) 10/10/2018    HCT 23 2 (L) 10/07/2018     02/28/2022     10/10/2018    MCV 98 10/07/2018    MCH 33 1 02/28/2022    MCH 33 0 10/10/2018    MCH 32 2 10/07/2018    MCHC 33 1 02/28/2022    MCHC 33 1 10/10/2018    MCHC 32 9 10/07/2018     (L) 02/28/2022     (L) 10/10/2018     (L) 10/07/2018        Lab Results   Component Value Date    GLUCOSE 75 09/17/2018    CALCIUM 8 7 02/28/2022    CALCIUM 7 7 (L) 10/10/2018    CALCIUM 7 5 (L) 10/10/2018    AST 30 02/28/2022    AST 54 10/07/2018    AST 24 09/23/2018    ALT 14 02/28/2022    ALT 30 10/07/2018    ALT 13 09/23/2018    ALKPHOS 93 02/28/2022    ALKPHOS 118 10/07/2018    ALKPHOS 151 (H) 09/23/2018    MG 2 1 09/19/2018    MG 1 5 (L) 09/18/2018       Lab Results   Component Value Date    TROPONINI 0 58 (H) 09/17/2018     Lab Results   Component Value Date    DDIMER >10,000 (H) 09/20/2018       Lab Results   Component Value Date    FERRITIN 470 (H) 09/30/2018    IRON 78 09/30/2018    TIBC 152 (L) 09/30/2018       Results for orders placed or performed in visit on 03/28/22   POCT ECG    Narrative    Normal sinus rhythm at a rate of 62 beats per minute  Normal EKG           Current Outpatient Medications:     albuterol (PROVENTIL HFA,VENTOLIN HFA) 90 mcg/act inhaler, INHALE 2 PUFFS BY MOUTH EVERY 6 HOURS AS NEEDED FOR WHEEZING, Disp: 54 g, Rfl: 0    amLODIPine (NORVASC) 5 mg tablet, Take 1 tablet (5 mg total) by mouth 2 (two) times a day, Disp: 180 tablet, Rfl: 3    carbamide peroxide (DEBROX) 6 5 % otic solution, Administer 5 drops into both ears 2 (two) times a day, Disp: 15 mL, Rfl: 0    ferrous sulfate (FeroSul) 325 (65 Fe) mg tablet, Take 1 tablet (325 mg total) by mouth daily with breakfast, Disp: 90 tablet, Rfl: 1    loratadine (CLARITIN) 10 mg tablet, Take 1 tablet (10 mg total) by mouth daily, Disp: 90 tablet, Rfl: 0    metoprolol tartrate (LOPRESSOR) 50 mg tablet, Take 1 tablet (50 mg total) by mouth daily, Disp: 90 tablet, Rfl: 3  No Known Allergies    Past Medical History:   Diagnosis Date    Acute cholangitis 10/4/2018    Added automatically from request for surgery [id number]    Clostridium difficile colitis 9/28/2018    History of bacteremia 10/1/2018    Hypertension     Medical history unknown     Mild persistent asthma without complication 4/61/6160    Renal disorder      Social History     Socioeconomic History    Marital status: /Civil Union     Spouse name: Not on file    Number of children: Not on file    Years of education: Not on file    Highest education level: Not on file   Occupational History    Not on file   Tobacco Use    Smoking status: Former Smoker     Types: Cigars    Smokeless tobacco: Former User    Tobacco comment: states quit about 50 years ago, cigars "a few per week"    Substance and Sexual Activity    Alcohol use: No     Comment: denies ETOH use    Drug use: No     Comment: denies    Sexual activity: Never   Other Topics Concern    Not on file   Social History Narrative    Not on file     Social Determinants of Health     Financial Resource Strain: Not on file   Food Insecurity: Not on file   Transportation Needs: Not on file   Physical Activity: Not on file   Stress: Not on file   Social Connections: Not on file   Intimate Partner Violence: Not on file   Housing Stability: Not on file      No family history on file  Past Surgical History:   Procedure Laterality Date    ERCP N/A 9/18/2018    Procedure: ENDOSCOPIC RETROGRADE CHOLANGIOPANCREATOGRAPHY (ERCP) with stent placement;  Surgeon: Calin Henriquez MD;  Location: [facility];  Service: Gastroenterology    NO PAST SURGERIES      [facility] ERCP DX COLLECTION SPECIMEN BRUSHING/WASHING N/A 11/23/2018    Procedure: ENDOSCOPIC RETROGRADE CHOLANGIOPANCREATOGRAPHY (ERCP);   Surgeon: Calin Henriquez MD;  Location: [facility];  Service: Gastroenterology       Review of Systems:  Review of Systems   Constitutional: Negative for activity change  Respiratory: Negative for cough, chest tightness, shortness of breath and wheezing  Cardiovascular: Negative for chest pain, palpitations and leg swelling  Musculoskeletal: Negative for gait problem  Skin: Negative for color change  Neurological: Negative for dizziness, tremors, syncope, weakness, light-headedness and headaches  Psychiatric/Behavioral: Negative for agitation and confusion  Physical Exam:  /60 (BP Location: Right arm, Patient Position: Sitting, Cuff Size: Adult)   Pulse 62   Wt 55 8 kg (123 lb)   BMI 22 50 kg/m²     PREVIOUS WEIGHTS:   Wt Readings from Last 10 Encounters:   03/28/22 55 8 kg (123 lb)   03/23/22 57 2 kg (126 lb)   02/23/22 57 2 kg (126 lb)   05/05/21 58 9 kg (129 lb 14 4 oz)   08/20/20 57 2 kg (126 lb)   01/10/20 60 3 kg (133 lb)   07/15/19 59 4 kg (131 lb)   04/26/19 59 4 kg (131 lb)   01/04/19 56 7 kg (125 lb)   12/06/18 57 2 kg (126 lb)      Physical Exam  Vitals reviewed  Constitutional:       General: He is not in acute distress  Appearance: He is well-developed  HENT:      Head: Normocephalic and atraumatic  Neck:      Thyroid: No thyromegaly  Vascular: No carotid bruit or JVD  Trachea: No tracheal deviation  Cardiovascular:      Rate and Rhythm: Normal rate and regular rhythm  Pulses: Normal pulses  Heart sounds: Murmur heard  No friction rub  No gallop  Comments: Grade 3/6 mid to late peaking systolic ejection murmur  Pulmonary:      Effort: Pulmonary effort is normal  No respiratory distress  Breath sounds: Normal breath sounds  No wheezing, rhonchi or rales  Chest:      Chest wall: No tenderness  Abdominal:      General: Bowel sounds are normal  There is no distension  Palpations: Abdomen is soft  Tenderness: There is no abdominal tenderness     Musculoskeletal: Cervical back: Normal range of motion and neck supple  Right lower leg: No edema  Left lower leg: No edema  Skin:     General: Skin is warm and dry  Neurological:      General: No focal deficit present  Mental Status: He is alert and oriented to person, place, and time  Gait: Gait normal    Psychiatric:         Mood and Affect: Mood normal          Behavior: Behavior normal          Thought Content: Thought content normal          Judgment: Judgment normal        --------------------------------------------------------------------------------  ECHO:   Name: Prema Painting                       : 1932  MRN: [id number]                       Age: 80 y o  Patient Status: Outpatient          Gender: male    Vitals    Height Weight BSA (Calculated - m2) BP Pulse   5' 2" (1 575 m) 57 2 kg (126 lb) 1 57 sq meters 142/86 63     Study Details    This transthoracic echocardiogram was performed in the echo lab  This was a routine, outpatient study  Study quality was poor  This was a technically difficult study due to lung interference and poor acoustic windows  A limited 2D, color flow Doppler, spectral Doppler, 2D, color flow Doppler and spectral Doppler transthoracic echocardiogram was performed  The apical, parasternal, subcostal and suprasternal views were obtained  Indications  Priority: Routine  Dx: Aortic stenosis, moderate [I35 0 (ICD-10-CM)]     History    Asthma, aortic stenosis, HTN, anemia, bacteremia, former smoker  Interpretation Summary       Technically very difficult transthoracic echocardiogram study due to lung interference  Limited imaging was possible      1  Mild concentric left ventricular hypertrophy, normal left ventricular systolic function, grade 1 diastolic dysfunction  Ejection fraction is visually estimated as around 60%  2  Normal right ventricular size and systolic function  3  Mild left atrial cavity enlargement    4  Heavily calcified aortic valve   Moderate to severe aortic valve stenosis  Peak transaortic velocity is 3 67 m/sec, mean gradient is 25 7 mmHg and calculated aortic valve area is 0 5-0 8 cm2  Suspected mild aortic valve regurgitation  5  Mitral annular calcification and mitral valve leaflet sclerosis, trace mitral valve regurgitation  6  Tricuspid valve was poorly visualized  7  No obvious pulmonary hypertension  8  No pericardial effusion      Compared to report of previous echocardiogram from October 5, 2018 there is progression of aortic valve stenosis  Current echocardiogram study is of  poor quality          Findings    Left Ventricle Left ventricle is not well visualized  Based on limited visualization left ventricular cavity appears to be normal in size, is there is mild concentric left ventricular hypertrophy, left ventricular systolic function is normal   Ejection fraction is visually around 60%  She study is inadequate for reliable wall motion assessment  Grade 1 diastolic dysfunction  Estimated left ventricular filling pressure is increased  Right Ventricle Right ventricle is not well visualised  Normal right ventricular function based on limited visualization  Right ventricular systolic pressure could not be estimated due to inadequate tricuspid regurgitation profile  Left Atrium Left atrium is not well visualized  Mild left atrial cavity enlargement  Right Atrium Right atrium is not well visualized and could not be assessed  Aortic Valve The aortic valve was not well visualized  Aortic valve is heavily calcified  Doppler evaluation of the aortic valve suggests moderate to severe aortic valve stenosis  Peak transaortic velocity is 3 67 m/sec, mean gradient is 25 70 mmHg, calculated aortic valve area is 0 5-0 8 cm2  Doppler velocity index is 0 31  There is suspected mild aortic valve regurgitation  Mitral Valve Mild mitral  annular calcification  Mitral valve leaflet are thickened and sclerosed  There is mild mitral valve regurgitation  Tricuspid Valve The tricuspid valve was not well visualized  Tricuspid valve appears to be thickened and sclerosed  She is in no significant tricuspid valve regurgitation was visualized  Pulmonic Valve The pulmonic valve was not well visualized  Ascending Aorta The aorta was not well visualized  IVC/SVC Inferior vena cava is normal in size and  demonstrates appropriate respiratory phasic changes in diameter       Left Ventricle Measurements    Function/Volumes   LVOT stroke volume 64 07 cm3      LVOT SI 40 8 ml/m2      Dimensions   LVIDd 4 cm      LVIDS 2 6 cm      IVSd 1 cm      LVPWd 1 cm      LVOT area 2 54 cm2      FS 35 %      Diastolic Filling   MV E' Tissue Velocity Septal 5 cm/s      E wave deceleration time 322 ms      MV Peak E Arsenio 77 cm/s      MV Peak A Arsenio 0 91 m/s       Report Measurements   AV LVOT peak gradient 2 mmHg           Interventricular Septum Measurements    Shunt Ratio   LVOT peak VTI 25 19 cm      LVOT peak arsenio 0 71 m/s           Left Atrium Measurements    Dimensions   LA size 4 3 cm            Atrial Septum Measurements    Shunt Ratio   LVOT peak VTI 25 19 cm      LVOT peak arsenio 0 71 m/s            Aortic Valve Measurements    Stenosis   Ao peak arsenio retrograde 3 67 m/s      LVOT peak arsenio 0 71 m/s      Ao VTI 82 02 cm      LVOT peak VTI 25 19 cm      AV mean gradient 26 mmHg      LVOT mn grad 1 mmHg      AV peak gradient 54 mmHg      AV LVOT peak gradient 2 mmHg      AV Velocity Ratio 0 19       Area/Dimensions   AV valve area 0 78 cm2      AV area by cont VTI 0 8 cm2      AV area peak arsenio 0 5 cm2      LVOT diameter 1 8 cm      LVOT area 2 54 cm2            Mitral Valve Measurements    Stenosis   MV stenosis pressure 1/2 time 93 ms      MV valve area p 1/2 method 2 37 cm2            Exam Details    Performed Procedure Technologist Supporting Staff Performing Physician   Echo complete Rosalio Leiva           Appointment Date/Status Modality Department    3/23/2022     Completed [facility] ECHO 2 [facility] NON INV           Begin Exam End Exam  End Exam Questionnaires   3/23/2022  2:51 PM 3/23/2022  3:31 PM  PATIENT EDUCATION            Signed    Electronically signed by Kushal Chairez MD on 3/24/22 at [address] EDT         ======================================================   I have personally reviewed pertinent films in PACS    Portions of the record may have been created with voice recognition software  Occasional wrong word or "sound a like" substitutions may have occurred due to the inherent limitations of voice recognition software  Read the chart carefully and recognize, using context, where substitutions have occurred      SIGNATURES:   Rodriguez Feliciano MD

## 2022-04-19 ENCOUNTER — PATIENT OUTREACH (OUTPATIENT)
Dept: FAMILY MEDICINE CLINIC | Facility: CLINIC | Age: 87
End: 2022-04-19

## 2022-04-19 NOTE — PROGRESS NOTES
I called the patient but his wife, Sara Jean, answered  I reminded her of the patient's PCP appointment for tomorrow at [phone]; she was not aware  She wrote the information down and will inform the patient  I will call in a few days to remind her of the patient's upcoming [address] [place] [address] appointment

## 2022-04-20 ENCOUNTER — OFFICE VISIT (OUTPATIENT)
Dept: FAMILY MEDICINE CLINIC | Facility: CLINIC | Age: 87
End: 2022-04-20

## 2022-04-20 VITALS
HEIGHT: 62 IN | TEMPERATURE: 99.1 F | RESPIRATION RATE: 16 BRPM | BODY MASS INDEX: 22.26 KG/M2 | HEART RATE: 72 BPM | WEIGHT: 121 LBS | OXYGEN SATURATION: 97 % | DIASTOLIC BLOOD PRESSURE: 90 MMHG | SYSTOLIC BLOOD PRESSURE: 138 MMHG

## 2022-04-20 DIAGNOSIS — J30.1 SEASONAL ALLERGIC RHINITIS DUE TO POLLEN: ICD-10-CM

## 2022-04-20 DIAGNOSIS — I10 ESSENTIAL HYPERTENSION: Chronic | ICD-10-CM

## 2022-04-20 DIAGNOSIS — I35.0 AORTIC VALVE STENOSIS, SEVERE: Primary | ICD-10-CM

## 2022-04-20 DIAGNOSIS — N18.4 CHRONIC KIDNEY DISEASE, STAGE 4 (SEVERE) (HCC): ICD-10-CM

## 2022-04-20 PROBLEM — N17.9 ACUTE RENAL FAILURE (ARF) (HCC): Status: RESOLVED | Noted: 2018-09-28 | Resolved: 2022-04-20

## 2022-04-20 PROCEDURE — 99213 OFFICE O/P EST LOW 20 MIN: CPT | Performed by: FAMILY MEDICINE

## 2022-04-20 RX ORDER — LORATADINE 10 MG/1
10 TABLET ORAL DAILY
Qty: 90 TABLET | Refills: 0 | Status: SHIPPED | OUTPATIENT
Start: 2022-04-20 | End: 2022-04-20

## 2022-04-20 RX ORDER — LORATADINE 10 MG/1
10 TABLET ORAL EVERY OTHER DAY
Qty: 15 TABLET | Refills: 1 | Status: SHIPPED | OUTPATIENT
Start: 2022-04-20

## 2022-04-20 NOTE — PROGRESS NOTES
Assessment/Plan:    Allergic rhinitis  Treat with loratidine 10 mg every other day for GFR <30    Aortic valve stenosis, severe  Saw cardiologist at end of March 2022  He is a candidate for TAVR, but there is concern for need of dialysis after procedure  So cardiologist reaching out to nephrologist to make that determination  Essential hypertension  BP stable on metoprolol and amlodipine    Chronic kidney disease, stage 4 (severe) ([facility])  Lab Results   Component Value Date    EGFR 27 02/28/2022    EGFR 42 (L) 10/10/2018    EGFR 45 (L) 10/10/2018    CREATININE 2 10 (H) 02/28/2022    CREATININE 1 51 (H) 10/10/2018    CREATININE 1 41 10/10/2018     Follow up nephrologist on 4/25/22       Diagnoses and all orders for this visit:    Aortic valve stenosis, severe    Seasonal allergic rhinitis due to pollen  -     Discontinue: loratadine (CLARITIN) 10 mg tablet; Take 1 tablet (10 mg total) by mouth daily  -     loratadine (CLARITIN) 10 mg tablet; Take 1 tablet (10 mg total) by mouth every other day    Chronic kidney disease, stage 4 (severe) ([facility])    Essential hypertension          Subjective:      Patient ID: Re Vuong is a 80 y o  male  Saw cardiologist at end of March 2022  He is a candidate for TAVR, but there is concern for need of dialysis after procedure  So cardiologist reaching out to nephrologist to make that determination  Patient offers no complaints            The following portions of the patient's history were reviewed and updated as appropriate: allergies, current medications, past family history, past medical history, past social history, past surgical history and problem list     Review of Systems   Constitutional: Negative for appetite change, fatigue, fever and unexpected weight change  HENT: Negative for congestion, ear pain, postnasal drip, rhinorrhea and sore throat  Eyes: Negative for pain and visual disturbance     Respiratory: Negative for cough, chest tightness and shortness of breath  Cardiovascular: Negative for chest pain, palpitations and leg swelling  Gastrointestinal: Negative for abdominal pain, constipation, diarrhea, nausea and vomiting  Genitourinary: Negative for difficulty urinating and dysuria  Musculoskeletal: Negative for arthralgias and back pain  Skin: Negative for rash  Neurological: Negative for dizziness, numbness and headaches  Psychiatric/Behavioral: Negative for behavioral problems and suicidal ideas  The patient is not nervous/anxious  Objective:      /90 (BP Location: Left arm, Patient Position: Sitting, Cuff Size: Large)   Pulse 72   Temp 99 1 °F (37 3 °C) (Temporal)   Resp 16   Ht 5' 2" (1 575 m)   Wt 54 9 kg (121 lb)   SpO2 97%   BMI 22 13 kg/m²          Physical Exam  Constitutional:       Appearance: He is well-developed  HENT:      Head: Normocephalic and atraumatic  Right Ear: External ear normal       Left Ear: External ear normal       Nose: Nose normal    Eyes:      Conjunctiva/sclera: Conjunctivae normal       Pupils: Pupils are equal, round, and reactive to light  Cardiovascular:      Rate and Rhythm: Normal rate and regular rhythm  Heart sounds: Normal heart sounds  Pulmonary:      Effort: Pulmonary effort is normal       Breath sounds: Normal breath sounds  Abdominal:      General: Bowel sounds are normal       Palpations: Abdomen is soft  Musculoskeletal:         General: Normal range of motion  Cervical back: Normal range of motion and neck supple  Skin:     General: Skin is warm  Neurological:      Mental Status: He is alert and oriented to person, place, and time     Psychiatric:         Behavior: Behavior normal

## 2022-04-22 ENCOUNTER — PATIENT OUTREACH (OUTPATIENT)
Dept: FAMILY MEDICINE CLINIC | Facility: CLINIC | Age: 87
End: 2022-04-22

## 2022-04-22 ENCOUNTER — TELEPHONE (OUTPATIENT)
Dept: NEPHROLOGY | Facility: CLINIC | Age: 87
End: 2022-04-22

## 2022-04-22 NOTE — PROGRESS NOTES
I called Eliana Moran to remind her of the patient's Neph appointment on Monday, 4/25 at 2pm; she plans on him attending  Eliana Moran stated she would like me to continue to stay on the case to remind them of appointments  I will continue to follow

## 2022-04-25 ENCOUNTER — CONSULT (OUTPATIENT)
Dept: NEPHROLOGY | Facility: CLINIC | Age: 87
End: 2022-04-25
Payer: COMMERCIAL

## 2022-04-25 VITALS
OXYGEN SATURATION: 97 % | HEIGHT: 62 IN | DIASTOLIC BLOOD PRESSURE: 82 MMHG | SYSTOLIC BLOOD PRESSURE: 120 MMHG | HEART RATE: 64 BPM | BODY MASS INDEX: 22.26 KG/M2 | WEIGHT: 121 LBS

## 2022-04-25 DIAGNOSIS — I35.0 AORTIC VALVE STENOSIS, SEVERE: ICD-10-CM

## 2022-04-25 DIAGNOSIS — N18.4 CHRONIC KIDNEY DISEASE, STAGE 4 (SEVERE) (HCC): ICD-10-CM

## 2022-04-25 DIAGNOSIS — N18.4 CKD (CHRONIC KIDNEY DISEASE) STAGE 4, GFR 15-29 ML/MIN (HCC): Primary | ICD-10-CM

## 2022-04-25 DIAGNOSIS — I10 ESSENTIAL HYPERTENSION: Chronic | ICD-10-CM

## 2022-04-25 PROCEDURE — 99204 OFFICE O/P NEW MOD 45 MIN: CPT | Performed by: INTERNAL MEDICINE

## 2022-04-25 NOTE — PROGRESS NOTES
OFFICE CONSULT - Nephrology   Ricardo Hallman 80 y o  male MRN: [id number]        ASSESSMENT and PLAN:  Pepito Schulte was seen today for consult and chronic kidney disease  Diagnoses and all orders for this visit:    CKD (chronic kidney disease) stage 4, GFR 15-29 ml/min ([facility] [address] )  -     Ambulatory Referral to Nephrology  -     CBC; Future  -     PTH, intact; Future  -     Renal function panel; Future  -     Protein / creatinine ratio, urine; Future    Essential hypertension    Chronic kidney disease, stage 4 (severe) ([facility])    Aortic valve stenosis, severe        This is an 70-year-old gentleman with history of advanced chronic kidney disease, episode of severe acute kidney injury hospitalization in 2018 creatinine peak at 6 2, on discharge day creatinine settles in the mid 1s most recent creatinine 2 1 with an estimated GFR of 27, patient also with history of severe aortic stenosis, hypertension, anemia, who was referred to our office for evaluation given advanced chronic kidney disease as well as recommendation regarding TAVR workup  1  Advanced chronic kidney disease stage 4 most recent creatinine of 2 1 and estimated GFR of 27  Patient with episode of severe acute kidney injury during hospitalization in 2018  Discussed with patient about the risk of TAVR workup causing worsening kidney function including need for dialysis  Patient currently seems to be pretty asymptomatic  Agree with Cardiology recommendation regarding hold on TAVR workup unless worsen or patient became symptomatic  Discussed with patient about importance avoid NSAIDs, follow low-salt diet, stay well-hydrated  I would like to see him back in the office in 4 months with repeat blood and urine test       2  Severe aortic stenosis, continue follow-up with Cardiology  Patient seems to be asymptomatic    3  Hypertension, blood pressure currently well controlled, follow low-salt diet, no changes in medications    4   Anemia, suspected secondary to chronic kidney disease, follow labs in 4 months    5  Mineral bone disease, will check phosphorus and PTH with the upcoming labs in 4 months    Patient Instructions   As we discussed in the office visit, based on your previous blood test you have advanced chronic kidney disease for several years  You have significant tightness in one of your heart valves but based on what you describe use do not have any significant symptoms associated with  Recommend to continue close with your follow-up with primary care doctor and your cardiologist   Follow low-salt diet less than 2 g of salt in a day  Avoid NSAIDs (no ibuprofen, Motrin, Advil, Aleve, naproxen)  Okay to take Tylenol or paracetamol or acetaminophen as needed for pain  Stay well-hydrated  I would like to see you back in the office in 4 months with repeat blood and urine test        HPI:  Johanny Hurt is a 80 y o male who was referred by Themikel Monroe DO for evaluation of Consult and Chronic Kidney Disease    This is a patient history of chronic kidney disease, severe aortic stenosis, hypertension, episode of severe acute kidney injury during hospitalization in 2018, who presents to the office referred for evaluation of chronic kidney disease as well as further recommendation regarding TAVR workup and risk of worsening kidney function  Patient presents to the office alone  In general he is doing well, patient is very hard of hearing  Denies any chest pain, denies any shortness of breath, denies any dyspnea on exertion, no lower extremity edema  Denies any abdominal pain, no nausea, no vomiting, no diarrhea or constipation  Denies any urinary problems no burning sensation, no gross hematuria  Denies NSAID use  I personally spent over half of a total 40 minutes face to face with the patient in counseling and discussion and/or coordination of care as described above      ROS: All the systems were reviewed and were negative except as documented on the HPI  Allergies: Patient has no known allergies  Medications:   Current Outpatient Medications:     albuterol (PROVENTIL HFA,VENTOLIN HFA) 90 mcg/act inhaler, INHALE 2 PUFFS BY MOUTH EVERY 6 HOURS AS NEEDED FOR WHEEZING, Disp: 54 g, Rfl: 0    amLODIPine (NORVASC) 5 mg tablet, Take 1 tablet (5 mg total) by mouth 2 (two) times a day, Disp: 180 tablet, Rfl: 3    carbamide peroxide (DEBROX) 6 5 % otic solution, Administer 5 drops into both ears 2 (two) times a day, Disp: 15 mL, Rfl: 0    ferrous sulfate (FeroSul) 325 (65 Fe) mg tablet, Take 1 tablet (325 mg total) by mouth daily with breakfast, Disp: 90 tablet, Rfl: 1    loratadine (CLARITIN) 10 mg tablet, Take 1 tablet (10 mg total) by mouth every other day, Disp: 15 tablet, Rfl: 1    metoprolol tartrate (LOPRESSOR) 50 mg tablet, Take 1 tablet (50 mg total) by mouth daily, Disp: 90 tablet, Rfl: 3    Past Medical History:   Diagnosis Date    Acute cholangitis 10/4/2018    Added automatically from request for surgery [id number]    Clostridium difficile colitis 9/28/2018    History of bacteremia 10/1/2018    Hypertension     Medical history unknown     Mild persistent asthma without complication 7/14/5608    Renal disorder      Past Surgical History:   Procedure Laterality Date    ERCP N/A 9/18/2018    Procedure: ENDOSCOPIC RETROGRADE CHOLANGIOPANCREATOGRAPHY (ERCP) with stent placement;  Surgeon: Job Truong MD;  Location: [facility];  Service: Gastroenterology    NO PAST SURGERIES      IA ERCP DX COLLECTION SPECIMEN BRUSHING/WASHING N/A 11/23/2018    Procedure: ENDOSCOPIC RETROGRADE CHOLANGIOPANCREATOGRAPHY (ERCP); Surgeon: Job Truong MD;  Location: [facility]; Service: Gastroenterology     History reviewed  No pertinent family history  reports that he has quit smoking  His smoking use included cigars  He has quit using smokeless tobacco  He reports that he does not drink alcohol and does not use drugs        Physical Exam:   Vitals:    04/25/22 1404   BP: 120/82   BP Location: Left arm   Patient Position: Sitting   Cuff Size: Adult   Pulse: 64   SpO2: 97%   Weight: 54 9 kg (121 lb)   Height: 5' 2" (1 575 m)     Body mass index is 22 13 kg/m²  General: conscious, cooperative, in not acute distress  Eyes: conjunctivae pink, anicteric sclerae  ENT: lips and mucous membranes moist  Neck: supple, no JVD  Chest: clear breath sounds bilateral, no crackles, ronchus or wheezings  CVS: distinct S1 & S2, normal rate, regular rhythm, + systolic ejection murmur over aortic area  Abdomen: soft, non-tender, non-distended, normoactive bowel sounds  Back: no CVA tenderness  Extremities: no edema of both legs  Skin: no rash  Neuro: awake, alert, oriented      Laboratory Results:  Lab Results   Component Value Date    WBC 4 10 (L) 02/28/2022    HGB 11 0 (L) 02/28/2022    HCT 33 3 (L) 02/28/2022     02/28/2022     (L) 02/28/2022     Lab Results   Component Value Date    SODIUM 136 (L) 02/28/2022    K 4 4 02/28/2022     02/28/2022    CO2 21 (L) 02/28/2022    BUN 26 (H) 02/28/2022    CREATININE 2 10 (H) 02/28/2022    GLUC 83 10/10/2018    CALCIUM 8 7 02/28/2022     Lab Results   Component Value Date    CALCIUM 8 7 02/28/2022    PHOS 4 8 (H) 09/18/2018                 Portions of the record may have been created with voice recognition software  Occasional wrong word or "sound a like" substitutions may have occurred due to the inherent limitations of voice recognition software  Read the chart carefully and recognize, using context, where substitutions have occurred  If you have any questions, please contact the dictating provider

## 2022-04-25 NOTE — PATIENT INSTRUCTIONS
As we discussed in the office visit, based on your previous blood test you have advanced chronic kidney disease for several years  You have significant tightness in one of your heart valves but based on what you describe use do not have any significant symptoms associated with  Recommend to continue close with your follow-up with primary care doctor and your cardiologist   Follow low-salt diet less than 2 g of salt in a day  Avoid NSAIDs (no ibuprofen, Motrin, Advil, Aleve, naproxen)  Okay to take Tylenol or paracetamol or acetaminophen as needed for pain  Stay well-hydrated    I would like to see you back in the office in 4 months with repeat blood and urine test

## 2022-04-26 ENCOUNTER — DOCUMENTATION (OUTPATIENT)
Dept: CARDIOLOGY CLINIC | Facility: CLINIC | Age: 87
End: 2022-04-26

## 2022-04-26 NOTE — PROGRESS NOTES
I just saw this gentleman today at the office  Unfortunately as you both know he has advanced CKD and his risk of ending on dialysis as part of TAVR workup is moderately higher  He reports to me feeling OK and seems to be pretty asymptomatic  Agree with holding further workup unless he gets worse or become symptomatic  I will see him every 4 months     Thanks,     Ravindra Cano MD

## 2022-05-06 ENCOUNTER — PATIENT OUTREACH (OUTPATIENT)
Dept: FAMILY MEDICINE CLINIC | Facility: CLINIC | Age: 87
End: 2022-05-06

## 2022-06-14 ENCOUNTER — TELEPHONE (OUTPATIENT)
Dept: NEPHROLOGY | Facility: CLINIC | Age: 87
End: 2022-06-14

## 2022-07-08 ENCOUNTER — PATIENT OUTREACH (OUTPATIENT)
Dept: FAMILY MEDICINE CLINIC | Facility: CLINIC | Age: 87
End: 2022-07-08

## 2022-07-08 NOTE — PROGRESS NOTES
I called Con to remind her of the patient's appointment at the [facility] on 7/11 at 320pm   She was very appreciative of the reminder

## 2022-07-10 PROBLEM — A41.9 SEPSIS (HCC): Status: RESOLVED | Noted: 2018-09-17 | Resolved: 2022-07-10

## 2022-07-11 ENCOUNTER — OFFICE VISIT (OUTPATIENT)
Dept: FAMILY MEDICINE CLINIC | Facility: CLINIC | Age: 87
End: 2022-07-11

## 2022-07-11 VITALS
HEART RATE: 68 BPM | BODY MASS INDEX: 22.08 KG/M2 | SYSTOLIC BLOOD PRESSURE: 144 MMHG | RESPIRATION RATE: 18 BRPM | WEIGHT: 120 LBS | DIASTOLIC BLOOD PRESSURE: 86 MMHG | HEIGHT: 62 IN | TEMPERATURE: 98.7 F | OXYGEN SATURATION: 99 %

## 2022-07-11 DIAGNOSIS — Z02.89 ENCOUNTER FOR COMPLETION OF FORM WITH PATIENT: Primary | ICD-10-CM

## 2022-07-11 DIAGNOSIS — R26.2 AMBULATORY DYSFUNCTION: ICD-10-CM

## 2022-07-11 DIAGNOSIS — I10 ESSENTIAL HYPERTENSION: Chronic | ICD-10-CM

## 2022-07-11 DIAGNOSIS — N18.4 CHRONIC KIDNEY DISEASE, STAGE 4 (SEVERE) (HCC): ICD-10-CM

## 2022-07-11 PROCEDURE — 99213 OFFICE O/P EST LOW 20 MIN: CPT | Performed by: FAMILY MEDICINE

## 2022-07-11 NOTE — ASSESSMENT & PLAN NOTE
Plan  - Controlled   Goal bp as outlined by [facility] <140/90  - Current medication= Home medication:  Amlodipine 5 mg daily, metoprolol 50 mg daily  - Lipid Panel has of 02/28/2022 WNL  - ASCVD Score= not warranted as patient older than 76years old  - ECG done on 03/28/2022= normal sinus rhythm normal EKG  - Echo done on 03/23/2022= EF 60%  - Lifestyle modifications (decrease salt intake, increase vegetables/fruits in diet, exercise for a minimum of 40min a day for 3-4 days a week, decrease smoking, weight loss)  - Advised to go to emergency room if alarming symptoms occur (severe headache, visual changes, dizziness, sudden weakness or numbness, chest pain)

## 2022-07-11 NOTE — PROGRESS NOTES
Assessment/Plan:    Chronic kidney disease, stage 4 (severe) ([facility])  Lab Results   Component Value Date    EGFR 27 02/28/2022    EGFR 42 (L) 10/10/2018    EGFR 45 (L) 10/10/2018    CREATININE 2 10 (H) 02/28/2022    CREATININE 1 51 (H) 10/10/2018    CREATININE 1 41 10/10/2018     Has nephrology appointment on 08/30/2022    Essential hypertension  Plan  - Controlled  Goal bp as outlined by [facility] <140/90  - Current medication= Home medication:  Amlodipine 5 mg daily, metoprolol 50 mg daily  - Lipid Panel has of 02/28/2022 WNL  - ASCVD Score= not warranted as patient older than 76years old  - ECG done on 03/28/2022= normal sinus rhythm normal EKG  - Echo done on 03/23/2022= EF 60%  - Lifestyle modifications (decrease salt intake, increase vegetables/fruits in diet, exercise for a minimum of 40min a day for 3-4 days a week, decrease smoking, weight loss)  - Advised to go to emergency room if alarming symptoms occur (severe headache, visual changes, dizziness, sudden weakness or numbness, chest pain)      Ambulatory dysfunction  Does not use cane or walker  Abl to ambulate without difficulty  Encounter for completion of form with patient  DMV form half completed as pt will go to Ophthalmology office for vision inspection  Diagnoses and all orders for this visit:    Encounter for completion of form with patient    Essential hypertension    Chronic kidney disease, stage 4 (severe) ([facility] [address] )    Ambulatory dysfunction          Subjective:      Patient ID: Prema Painting is a 80 y o  male  Pt presenting for completion of DMV form and HTN f/u  Endorsing no concerns at this time  The following portions of the patient's history were reviewed and updated as appropriate: allergies, current medications, past family history, past medical history, past social history, past surgical history and problem list     Review of Systems   Constitutional: Negative for chills and fever     HENT: Negative for congestion, sore throat and trouble swallowing  Presbycusis and gets occasional cerumen impaction   Eyes: Negative for pain and visual disturbance (Wears glasses and has appointment with Ophthalmology)  Respiratory: Negative for cough and shortness of breath  Cardiovascular: Negative for chest pain and palpitations  Gastrointestinal: Negative for abdominal pain, blood in stool, constipation, diarrhea, nausea and vomiting  Genitourinary: Negative for difficulty urinating, dysuria and hematuria  Musculoskeletal: Negative for arthralgias and back pain  Skin: Negative for color change and rash  Neurological: Negative for dizziness, tremors, seizures, syncope, weakness, light-headedness, numbness and headaches  Psychiatric/Behavioral: Negative for suicidal ideas  All other systems reviewed and are negative  Objective:      /86 (BP Location: Right arm, Patient Position: Sitting, Cuff Size: Standard)   Pulse 68   Temp 98 7 °F (37 1 °C) (Temporal)   Resp 18   Ht 5' 2" (1 575 m)   Wt 54 4 kg (120 lb)   SpO2 99%   BMI 21 95 kg/m²          Physical Exam  Vitals and nursing note reviewed  Constitutional:       Appearance: Normal appearance  He is normal weight  HENT:      Head: Normocephalic and atraumatic  Right Ear: Tympanic membrane, ear canal and external ear normal  Decreased hearing noted  Left Ear: Tympanic membrane, ear canal and external ear normal  Decreased hearing noted  Nose: Nose normal       Mouth/Throat:      Mouth: Mucous membranes are moist       Pharynx: Oropharynx is clear  Eyes:      Conjunctiva/sclera: Conjunctivae normal    Cardiovascular:      Rate and Rhythm: Normal rate and regular rhythm  Pulses: Normal pulses  Heart sounds: Normal heart sounds  Pulmonary:      Effort: Pulmonary effort is normal       Breath sounds: Normal breath sounds  Abdominal:      General: Abdomen is flat  There is no distension  Palpations: Abdomen is soft  Tenderness: There is no abdominal tenderness  Musculoskeletal:         General: No swelling or tenderness  Cervical back: Normal range of motion and neck supple  Right lower leg: No edema  Left lower leg: No edema  Skin:     General: Skin is warm and dry  Neurological:      General: No focal deficit present  Mental Status: He is alert and oriented to person, place, and time  Sensory: Sensation is intact  Motor: Motor function is intact     Psychiatric:         Mood and Affect: Mood normal          Behavior: Behavior normal

## 2022-07-11 NOTE — ASSESSMENT & PLAN NOTE
Lab Results   Component Value Date    EGFR 27 02/28/2022    EGFR 42 (L) 10/10/2018    EGFR 45 (L) 10/10/2018    CREATININE 2 10 (H) 02/28/2022    CREATININE 1 51 (H) 10/10/2018    CREATININE 1 41 10/10/2018     Has nephrology appointment on 08/30/2022

## 2022-07-12 PROBLEM — Z02.89 ENCOUNTER FOR COMPLETION OF FORM WITH PATIENT: Status: ACTIVE | Noted: 2022-07-12

## 2022-07-15 NOTE — ASSESSMENT & PLAN NOTE
Saw cardiologist at end of March 2022  He is a candidate for TAVR, but there is concern for need of dialysis after procedure  So cardiologist reaching out to nephrologist to make that determination

## 2022-07-15 NOTE — ASSESSMENT & PLAN NOTE
Lab Results   Component Value Date    EGFR 27 02/28/2022    EGFR 42 (L) 10/10/2018    EGFR 45 (L) 10/10/2018    CREATININE 2 10 (H) 02/28/2022    CREATININE 1 51 (H) 10/10/2018    CREATININE 1 41 10/10/2018     Follow up nephrologist on 4/25/22

## 2022-08-29 ENCOUNTER — TELEPHONE (OUTPATIENT)
Dept: NEPHROLOGY | Facility: CLINIC | Age: 87
End: 2022-08-29

## 2022-08-29 ENCOUNTER — PATIENT OUTREACH (OUTPATIENT)
Dept: FAMILY MEDICINE CLINIC | Facility: CLINIC | Age: 87
End: 2022-08-29

## 2022-08-29 NOTE — PROGRESS NOTES
I called Con but the line was busy  I then called the alternate number in the chart but the voicemail is full  I will continue further outreach  I called Con to remind her of the patient's [facility] appointment for tomorrow at 230; she wrote down the address and plans on the patient attending  I reminded Con the patient is in need of a Cardiology appointment; phone number provided  I will continue to follow

## 2022-08-29 NOTE — TELEPHONE ENCOUNTER
Appointment Confirmation   Person confirmed appointment with  If not patient, name of the person Tried 3x- phone busy    Date and time of appointment 8/30   2:30   Patient acknowledged and will be at appointment? no    Did you advise the patient that they will need a urine sample if they are a new patient?  N/A    Did you advise the patient to bring their current medications for verification? (including any OTC) No    Additional Information

## 2022-08-31 DIAGNOSIS — D64.9 ANEMIA, UNSPECIFIED TYPE: ICD-10-CM

## 2022-09-02 RX ORDER — FERROUS SULFATE 325(65) MG
325 TABLET ORAL
Qty: 90 TABLET | Refills: 1 | Status: SHIPPED | OUTPATIENT
Start: 2022-09-02

## 2022-09-09 ENCOUNTER — TELEPHONE (OUTPATIENT)
Dept: NEPHROLOGY | Facility: CLINIC | Age: 87
End: 2022-09-09

## 2022-09-09 DIAGNOSIS — N18.4 CKD (CHRONIC KIDNEY DISEASE) STAGE 4, GFR 15-29 ML/MIN (HCC): Primary | ICD-10-CM

## 2022-09-09 NOTE — TELEPHONE ENCOUNTER
Received a vm from patients wife, Maricruz Moise  I called Maricruz Moise and she wants to know if patient needs lab work  I explained that I do not see anything in the system   Please advise

## 2022-09-12 ENCOUNTER — TELEPHONE (OUTPATIENT)
Dept: NEPHROLOGY | Facility: CLINIC | Age: 87
End: 2022-09-12

## 2022-09-12 DIAGNOSIS — N18.4 CHRONIC KIDNEY DISEASE, STAGE 4 (SEVERE) (HCC): Primary | ICD-10-CM

## 2022-09-12 NOTE — TELEPHONE ENCOUNTER
Patient has been rescheduled from 10/20 to 10/4 with Dr Sai Johns in the [facility] office and is inquiring if he will have to have repeat labs drawn  If so, patient would like it mailed out to home address with appointment reminder card

## 2022-09-22 ENCOUNTER — PATIENT OUTREACH (OUTPATIENT)
Dept: FAMILY MEDICINE CLINIC | Facility: CLINIC | Age: 87
End: 2022-09-22

## 2022-09-22 NOTE — PROGRESS NOTES
I called Acosta Marquez to offer the [place] [facility] to go the patient's home to draw Neph labs instead of him having to go to a lab  She stated he likes to get out of the house and he does not mind going to the lab  She agreed for me to call in a few weeks to remind her of the patient's appointment      Patient is due for a PCP appointment; note sent to clerical

## 2022-09-26 ENCOUNTER — APPOINTMENT (OUTPATIENT)
Dept: LAB | Facility: HOSPITAL | Age: 87
End: 2022-09-26
Payer: COMMERCIAL

## 2022-09-26 DIAGNOSIS — N18.4 CKD (CHRONIC KIDNEY DISEASE) STAGE 4, GFR 15-29 ML/MIN (HCC): ICD-10-CM

## 2022-09-26 DIAGNOSIS — N18.4 CHRONIC KIDNEY DISEASE, STAGE 4 (SEVERE) (HCC): ICD-10-CM

## 2022-09-26 LAB
ALBUMIN SERPL BCP-MCNC: 3.5 G/DL (ref 3.5–5)
ANION GAP SERPL CALCULATED.3IONS-SCNC: 9 MMOL/L (ref 4–13)
BACTERIA UR QL AUTO: ABNORMAL /HPF
BILIRUB UR QL STRIP: NEGATIVE
BUN SERPL-MCNC: 35 MG/DL (ref 5–25)
CALCIUM SERPL-MCNC: 8.8 MG/DL (ref 8.3–10.1)
CHLORIDE SERPL-SCNC: 109 MMOL/L (ref 96–108)
CLARITY UR: CLEAR
CO2 SERPL-SCNC: 24 MMOL/L (ref 21–32)
COLOR UR: YELLOW
CREAT SERPL-MCNC: 2.11 MG/DL (ref 0.6–1.3)
CREAT UR-MCNC: 275.6 MG/DL
ERYTHROCYTE [DISTWIDTH] IN BLOOD BY AUTOMATED COUNT: 13.2 % (ref 11.6–15.1)
GFR SERPL CREATININE-BSD FRML MDRD: 26 ML/MIN/1.73SQ M
GLUCOSE SERPL-MCNC: 134 MG/DL (ref 65–140)
GLUCOSE UR STRIP-MCNC: NEGATIVE MG/DL
HCT VFR BLD AUTO: 34.2 % (ref 36.5–49.3)
HGB BLD-MCNC: 10.9 G/DL (ref 12–17)
HGB UR QL STRIP.AUTO: NEGATIVE
KETONES UR STRIP-MCNC: ABNORMAL MG/DL
LEUKOCYTE ESTERASE UR QL STRIP: NEGATIVE
MCH RBC QN AUTO: 33.4 PG (ref 26.8–34.3)
MCHC RBC AUTO-ENTMCNC: 31.9 G/DL (ref 31.4–37.4)
MCV RBC AUTO: 105 FL (ref 82–98)
NITRITE UR QL STRIP: NEGATIVE
NON-SQ EPI CELLS URNS QL MICRO: ABNORMAL /HPF
PH UR STRIP.AUTO: 6 [PH]
PHOSPHATE SERPL-MCNC: 3.4 MG/DL (ref 2.3–4.1)
PLATELET # BLD AUTO: 125 THOUSANDS/UL (ref 149–390)
PMV BLD AUTO: 10.8 FL (ref 8.9–12.7)
POTASSIUM SERPL-SCNC: 5.1 MMOL/L (ref 3.5–5.3)
PROT UR STRIP-MCNC: NEGATIVE MG/DL
PROT UR-MCNC: 34 MG/DL
PROT/CREAT UR: 0.12 MG/G{CREAT} (ref 0–0.1)
PTH-INTACT SERPL-MCNC: 169.8 PG/ML (ref 18.4–80.1)
RBC # BLD AUTO: 3.26 MILLION/UL (ref 3.88–5.62)
RBC #/AREA URNS AUTO: ABNORMAL /HPF
SODIUM SERPL-SCNC: 142 MMOL/L (ref 135–147)
SP GR UR STRIP.AUTO: 1.02 (ref 1–1.03)
UROBILINOGEN UR QL STRIP.AUTO: 1 E.U./DL
WBC # BLD AUTO: 5.06 THOUSAND/UL (ref 4.31–10.16)
WBC #/AREA URNS AUTO: ABNORMAL /HPF

## 2022-09-26 PROCEDURE — 81001 URINALYSIS AUTO W/SCOPE: CPT | Performed by: NURSE PRACTITIONER

## 2022-09-26 PROCEDURE — 36415 COLL VENOUS BLD VENIPUNCTURE: CPT

## 2022-09-26 PROCEDURE — 82570 ASSAY OF URINE CREATININE: CPT

## 2022-09-26 PROCEDURE — 80069 RENAL FUNCTION PANEL: CPT

## 2022-09-26 PROCEDURE — 83970 ASSAY OF PARATHORMONE: CPT

## 2022-09-26 PROCEDURE — 85027 COMPLETE CBC AUTOMATED: CPT

## 2022-09-26 PROCEDURE — 84156 ASSAY OF PROTEIN URINE: CPT

## 2022-10-03 ENCOUNTER — TELEPHONE (OUTPATIENT)
Dept: NEPHROLOGY | Facility: CLINIC | Age: 87
End: 2022-10-03

## 2022-10-03 ENCOUNTER — PATIENT OUTREACH (OUTPATIENT)
Dept: FAMILY MEDICINE CLINIC | Facility: CLINIC | Age: 87
End: 2022-10-03

## 2022-10-03 NOTE — TELEPHONE ENCOUNTER
Appointment Confirmation   Person confirmed appointment with  If not patient, name of the person uncnfd- home- access code needed: cell- full mailbox    Date and time of appointment 10/4  3:30    Patient acknowledged and will be at appointment? no    Did you advise the patient that they will need a urine sample if they are a new patient?  N/A    Did you advise the patient to bring their current medications for verification? (including any OTC) No    Additional Information

## 2022-10-03 NOTE — PROGRESS NOTES
I called Nayana Brito to remind her of the patient's Neph appointment for tomorrow at 330; she plans on him attending

## 2022-10-04 ENCOUNTER — OFFICE VISIT (OUTPATIENT)
Dept: NEPHROLOGY | Facility: CLINIC | Age: 87
End: 2022-10-04
Payer: COMMERCIAL

## 2022-10-04 VITALS
HEIGHT: 62 IN | HEART RATE: 66 BPM | OXYGEN SATURATION: 98 % | DIASTOLIC BLOOD PRESSURE: 78 MMHG | BODY MASS INDEX: 22.26 KG/M2 | SYSTOLIC BLOOD PRESSURE: 140 MMHG | WEIGHT: 121 LBS

## 2022-10-04 DIAGNOSIS — I35.0 AORTIC VALVE STENOSIS, SEVERE: ICD-10-CM

## 2022-10-04 DIAGNOSIS — N18.4 CHRONIC KIDNEY DISEASE, STAGE 4 (SEVERE) (HCC): Primary | ICD-10-CM

## 2022-10-04 DIAGNOSIS — H91.13 PRESBYCUSIS OF BOTH EARS: ICD-10-CM

## 2022-10-04 DIAGNOSIS — N25.81 SECONDARY HYPERPARATHYROIDISM OF RENAL ORIGIN (HCC): ICD-10-CM

## 2022-10-04 DIAGNOSIS — I10 ESSENTIAL HYPERTENSION: Chronic | ICD-10-CM

## 2022-10-04 PROCEDURE — 99214 OFFICE O/P EST MOD 30 MIN: CPT | Performed by: INTERNAL MEDICINE

## 2022-10-04 NOTE — PROGRESS NOTES
OFFICE FOLLOW UP - Nephrology   Santana Kim 80 y o  male MRN: [id number]       ASSESSMENT and PLAN:  Ru Schwartz was seen today for follow-up, hypertension and chronic kidney disease  Diagnoses and all orders for this visit:    Chronic kidney disease, stage 4 (severe) ([facility])  -     CBC; Future  -     Renal function panel; Future  -     PTH, intact; Future  -     Protein / creatinine ratio, urine; Future    Aortic valve stenosis, severe    Essential hypertension    Presbycusis of both ears        This is an 79-year-old gentleman with CKD stage 4, episode of severe acute kidney injury in 2018, severe aortic stenosis, hypertension, anemia, secondary hyperparathyroidism was initially seen for consultation on 04/2022  Today returns for CKD follow-up  1  Chronic kidney disease stage 4, based on the most recent blood test creatinine stable at 2 1  CKD suspected multifactorial in the setting of prior episode of severe acute kidney injury in 2018, age-related nephron loss, hypertensive nephrosclerosis and possible atherosclerotic disease  His kidney function overall remains stable  I would like to see him back in the office in 4 months with repeat labs  Once again discussed about importance avoid NSAIDs, follow low-salt diet, stay well-hydrated  2  Hypertension, blood pressure acceptable, on amlodipine and metoprolol  Discussed about importance to follow low-salt diet  3  Severe aortic stenosis patient seems to be asymptomatic  Advised to continue close follow-up with Cardiology    4  Anemia suspected component of chronic kidney disease last hemoglobin stable at 10 9, follow CBC in 4 months    5   Mineral bone disease, PTH elevated but acceptable for degree of kidney dysfunction, will follow labs in 4 months, if worsening will consider start Calcitrol    My recommendations were also conveyed to patient's wife over the phone    Patient Instructions   As we discussed in the office visit, based on the most recent blood test your kidney function remains stable  I would like to see you back in the office in 4 months with repeat blood and urine test   Remember to follow low-salt diet less salt in a day  Continue close follow-up with primary care doctor and your cardiologist   Avoid NSAIDs (no ibuprofen, Motrin, Advil, Aleve, naproxen)  Okay to take Tylenol or paracetamol or acetaminophen as needed for pain        HPI: Inocencio Villeda is a 80 y o  male who is here for Follow-up, Hypertension, and Chronic Kidney Disease    Patient initially seen on 04/2022 for evaluation of chronic kidney disease  Patient with history of CKD stage 4, severe aortic stenosis, hypertension, presbycusis, who returns to the office for CKD follow-up    Since our last visit, there has been no ER visits or hospitilizations  Patient currently has no complaints at this time and is doing well  Patient denies any chest pain, shortness of breath and swelling  Denies any abdominal pain, no nausea, vomiting, no diarrhea or constipation  Denies any urinary problems, no burning sensation or gross hematuria  Called and spoke with patient's wife over the phone and reviewed his medications list     The last blood work was done on 09/26/2022, which we have reviewed together  Most recent creatinine 2 11    ROS:   All the systems were reviewed and were negative except as documented on the HPI  Allergies: Patient has no known allergies      Medications:   Current Outpatient Medications:     albuterol (PROVENTIL HFA,VENTOLIN HFA) 90 mcg/act inhaler, INHALE 2 PUFFS BY MOUTH EVERY 6 HOURS AS NEEDED FOR WHEEZING, Disp: 54 g, Rfl: 0    amLODIPine (NORVASC) 5 mg tablet, Take 1 tablet (5 mg total) by mouth 2 (two) times a day, Disp: 180 tablet, Rfl: 3    carbamide peroxide (DEBROX) 6 5 % otic solution, Administer 5 drops into both ears 2 (two) times a day, Disp: 15 mL, Rfl: 0    ferrous sulfate (FeroSul) 325 (65 Fe) mg tablet, Take 1 tablet (325 mg total) by mouth daily with breakfast, Disp: 90 tablet, Rfl: 1    metoprolol tartrate (LOPRESSOR) 50 mg tablet, Take 1 tablet (50 mg total) by mouth daily, Disp: 90 tablet, Rfl: 3    loratadine (CLARITIN) 10 mg tablet, Take 1 tablet (10 mg total) by mouth every other day (Patient not taking: Reported on 10/4/2022), Disp: 15 tablet, Rfl: 1    Past Medical History:   Diagnosis Date    Acute cholangitis 10/4/2018    Added automatically from request for surgery [id number]    Clostridium difficile colitis 9/28/2018    History of bacteremia 10/1/2018    Hypertension     Medical history unknown     Mild persistent asthma without complication 1/79/[birth year]14    Renal disorder      Past Surgical History:   Procedure Laterality Date    ERCP N/A 9/18/2018    Procedure: ENDOSCOPIC RETROGRADE CHOLANGIOPANCREATOGRAPHY (ERCP) with stent placement;  Surgeon: Sharla King MD;  Location: [facility];  Service: Gastroenterology    NO PAST SURGERIES      [place] ERCP DX COLLECTION SPECIMEN BRUSHING/WASHING N/A 11/23/2018    Procedure: ENDOSCOPIC RETROGRADE CHOLANGIOPANCREATOGRAPHY (ERCP); Surgeon: Sharla King MD;  Location: [facility]; Service: Gastroenterology     History reviewed  No pertinent family history  reports that he has quit smoking  His smoking use included cigars  He has quit using smokeless tobacco  He reports that he does not drink alcohol and does not use drugs  Physical Exam:   Vitals:    10/04/22 1506   BP: 140/78   BP Location: Left arm   Patient Position: Sitting   Cuff Size: Adult   Pulse: 66   SpO2: 98%   Weight: 54 9 kg (121 lb)   Height: 5' 2" (1 575 m)     Body mass index is 22 13 kg/m²      General: cooperative, in not acute distress  Eyes: conjunctivae pink, anicteric sclerae  ENT: lips and mucous membranes moist  Neck: supple, no JVD  Chest: clear breath sounds bilateral, no crackles, ronchus or wheezings  CVS: distinct S1 & S2, normal rate, regular rhythm positive systolic ejection murmur  Abdomen: soft, non-tender, non-distended, normoactive bowel sounds  Back: no CVA tenderness  Extremities: no edema of both legs  Skin: no rash  Neuro: awake, alert, oriented, hard of hearing      Lab Results:  Results for orders placed or performed in visit on 09/26/22   PTH, intact   Result Value Ref Range     8 (H) 18 4 - 80 1 pg/mL   Renal function panel   Result Value Ref Range    Albumin 3 5 3 5 - 5 0 g/dL    Calcium 8 8 8 3 - 10 1 mg/dL    Phosphorus 3 4 2 3 - 4 1 mg/dL    Glucose 134 65 - 140 mg/dL    BUN 35 (H) 5 - 25 mg/dL    Creatinine 2 11 (H) 0 60 - 1 30 mg/dL    Sodium 142 135 - 147 mmol/L    Potassium 5 1 3 5 - 5 3 mmol/L    Chloride 109 (H) 96 - 108 mmol/L    CO2 24 21 - 32 mmol/L    ANION GAP 9 4 - 13 mmol/L    eGFR 26 ml/min/1 73sq m   CBC and Platelet   Result Value Ref Range    WBC 5 06 4 31 - 10 16 Thousand/uL    RBC 3 26 (L) 3 88 - 5 62 Million/uL    Hemoglobin 10 9 (L) 12 0 - 17 0 g/dL    Hematocrit 34 2 (L) 36 5 - 49 3 %     (H) 82 - 98 fL    MCH 33 4 26 8 - 34 3 pg    MCHC 31 9 31 4 - 37 4 g/dL    RDW 13 2 11 6 - 15 1 %    Platelets 678 (L) 628 - 390 Thousands/uL    MPV 10 8 8 9 - 12 7 fL             Portions of the record may have been created with voice recognition software  Occasional wrong word or "sound a like" substitutions may have occurred due to the inherent limitations of voice recognition software  Read the chart carefully and recognize, using context, where substitutions have occurred  If you have any questions, please contact the dictating provider

## 2022-10-04 NOTE — PATIENT INSTRUCTIONS
As we discussed in the office visit, based on the most recent blood test your kidney function remains stable  I would like to see you back in the office in 4 months with repeat blood and urine test   Remember to follow low-salt diet less salt in a day    Continue close follow-up with primary care doctor and your cardiologist   Avoid NSAIDs (no ibuprofen, Motrin, Advil, Aleve, naproxen)  Okay to take Tylenol or paracetamol or acetaminophen as needed for pain

## 2022-10-12 PROBLEM — H61.23 IMPACTED CERUMEN OF BOTH EARS: Status: RESOLVED | Noted: 2021-05-06 | Resolved: 2022-10-12

## 2022-11-01 ENCOUNTER — PATIENT OUTREACH (OUTPATIENT)
Dept: FAMILY MEDICINE CLINIC | Facility: CLINIC | Age: 87
End: 2022-11-01

## 2022-11-01 NOTE — PROGRESS NOTES
I called Jaylin Greene to remind her of the patient's appointment at [facility] tomorrow at 320pm; she plans on the patient attending  I will continue to follow

## 2022-11-02 ENCOUNTER — OFFICE VISIT (OUTPATIENT)
Dept: FAMILY MEDICINE CLINIC | Facility: CLINIC | Age: 87
End: 2022-11-02

## 2022-11-02 VITALS
HEART RATE: 71 BPM | HEIGHT: 62 IN | WEIGHT: 124 LBS | BODY MASS INDEX: 22.82 KG/M2 | RESPIRATION RATE: 16 BRPM | OXYGEN SATURATION: 96 % | SYSTOLIC BLOOD PRESSURE: 138 MMHG | TEMPERATURE: 98.7 F | DIASTOLIC BLOOD PRESSURE: 60 MMHG

## 2022-11-02 DIAGNOSIS — Z23 ENCOUNTER FOR IMMUNIZATION: ICD-10-CM

## 2022-11-02 DIAGNOSIS — I10 ESSENTIAL HYPERTENSION: Primary | Chronic | ICD-10-CM

## 2022-11-02 DIAGNOSIS — J45.30 MILD PERSISTENT ASTHMA WITHOUT COMPLICATION: ICD-10-CM

## 2022-11-02 RX ORDER — ALBUTEROL SULFATE 90 UG/1
2 AEROSOL, METERED RESPIRATORY (INHALATION) EVERY 6 HOURS PRN
Qty: 54 G | Refills: 0 | Status: SHIPPED | OUTPATIENT
Start: 2022-11-02

## 2022-11-02 NOTE — PROGRESS NOTES
Name: Barry Balderas      : 1932      MRN: [id number]  Encounter Provider: Sofi Richardson MD  Encounter Date: 2022   Encounter department: [address] [place] [address]  Essential hypertension  Assessment & Plan:  BP well controlled on current medication  Continue with this regimen     - Goal bp as outlined by [facility] [place] <150/90  - Current home medication: Amlodipine 5 mg BID, metoprolol 50 mg daily  - Lipid Panel has of 2022 WNL  - ASCVD Score= not warranted as patient older than 76years old  - ECG done on 2022= normal sinus rhythm normal EKG  - Echo done on 2022= EF 60%  - Lifestyle modifications (decrease salt intake, increase vegetables/fruits in diet, exercise for a minimum of 40min a day for 3-4 days a week, decrease smoking, weight loss)  - Advised to go to emergency room if alarming symptoms occur (severe headache, visual changes, dizziness, sudden weakness or numbness, chest pain)      2  Mild persistent asthma without complication  Assessment & Plan:  Well controlled with albuterol inhaler  No night time awakenings and no interference with daily activity  Continue with current management  Orders:  -     influenza vaccine, high-dose, PF 0 7 mL (FLUZONE HIGH-DOSE)  -     albuterol (PROVENTIL HFA,VENTOLIN HFA) 90 mcg/act inhaler; Inhale 2 puffs every 6 (six) hours as needed for wheezing    3  Encounter for immunization  -     influenza vaccine, high-dose, PF 0 7 mL (FLUZONE HIGH-DOSE)         Subjective      Very pleasant 79yo male presenting to clinic for follow up of HTN  Pt currently endorsing no concerns  Requesting refill of asthma medication  Review of Systems   Constitutional: Negative for chills, fever and unexpected weight change  HENT: Negative for congestion, rhinorrhea, sore throat and trouble swallowing  Eyes: Negative for visual disturbance     Respiratory: Negative for cough, chest tightness and shortness of breath  Cardiovascular: Negative for chest pain, palpitations and leg swelling  Gastrointestinal: Negative for abdominal pain, blood in stool, constipation, diarrhea, nausea and vomiting  Genitourinary: Negative for dysuria and hematuria  Skin: Negative for color change and rash  Neurological: Negative for dizziness, seizures, syncope, weakness, light-headedness, numbness and headaches  Current Outpatient Medications on File Prior to Visit   Medication Sig   • amLODIPine (NORVASC) 5 mg tablet Take 1 tablet (5 mg total) by mouth 2 (two) times a day   • carbamide peroxide (DEBROX) 6 5 % otic solution Administer 5 drops into both ears 2 (two) times a day   • ferrous sulfate (FeroSul) 325 (65 Fe) mg tablet Take 1 tablet (325 mg total) by mouth daily with breakfast   • loratadine (CLARITIN) 10 mg tablet Take 1 tablet (10 mg total) by mouth every other day (Patient not taking: Reported on 10/4/2022)   • metoprolol tartrate (LOPRESSOR) 50 mg tablet Take 1 tablet (50 mg total) by mouth daily   • [DISCONTINUED] albuterol (PROVENTIL HFA,VENTOLIN HFA) 90 mcg/act inhaler INHALE 2 PUFFS BY MOUTH EVERY 6 HOURS AS NEEDED FOR WHEEZING       Objective     /60 (BP Location: Right arm, Patient Position: Sitting, Cuff Size: Standard)   Pulse 71   Temp 98 7 °F (37 1 °C) (Temporal)   Resp 16   Ht 5' 2" (1 575 m)   Wt 56 2 kg (124 lb)   SpO2 96%   BMI 22 68 kg/m²     Physical Exam  Vitals and nursing note reviewed  Constitutional:       General: He is not in acute distress  Appearance: Normal appearance  He is normal weight  He is not ill-appearing, toxic-appearing or diaphoretic  HENT:      Head: Normocephalic and atraumatic  Right Ear: External ear normal       Left Ear: External ear normal    Eyes:      General: No scleral icterus  Right eye: No discharge  Left eye: No discharge  Extraocular Movements: Extraocular movements intact        Conjunctiva/sclera: Conjunctivae normal    Cardiovascular:      Rate and Rhythm: Normal rate and regular rhythm  Pulses: Normal pulses  Heart sounds: Normal heart sounds  No murmur heard  No friction rub  No gallop  Pulmonary:      Effort: Pulmonary effort is normal  No respiratory distress  Breath sounds: Normal breath sounds  No stridor  No wheezing, rhonchi or rales  Musculoskeletal:         General: Normal range of motion  Cervical back: Normal range of motion  Skin:     General: Skin is warm and dry  Coloration: Skin is not jaundiced  Neurological:      Mental Status: He is alert  Mental status is at baseline     Psychiatric:         Mood and Affect: Mood normal          Behavior: Behavior normal        Oneil Mclain MD

## 2022-11-02 NOTE — ASSESSMENT & PLAN NOTE
BP well controlled on current medication   Continue with this regimen     - Goal bp as outlined by [facility] [place] <150/90  - Current home medication: Amlodipine 5 mg BID, metoprolol 50 mg daily  - Lipid Panel has of 02/28/2022 WNL  - ASCVD Score= not warranted as patient older than 76years old  - ECG done on 03/28/2022= normal sinus rhythm normal EKG  - Echo done on 03/23/2022= EF 60%  - Lifestyle modifications (decrease salt intake, increase vegetables/fruits in diet, exercise for a minimum of 40min a day for 3-4 days a week, decrease smoking, weight loss)  - Advised to go to emergency room if alarming symptoms occur (severe headache, visual changes, dizziness, sudden weakness or numbness, chest pain)

## 2022-11-02 NOTE — ASSESSMENT & PLAN NOTE
Well controlled with albuterol inhaler  No night time awakenings and no interference with daily activity  Continue with current management

## 2022-12-14 ENCOUNTER — OFFICE VISIT (OUTPATIENT)
Dept: FAMILY MEDICINE CLINIC | Facility: CLINIC | Age: 87
End: 2022-12-14

## 2022-12-14 VITALS
HEART RATE: 76 BPM | TEMPERATURE: 97.6 F | RESPIRATION RATE: 18 BRPM | HEIGHT: 62 IN | SYSTOLIC BLOOD PRESSURE: 144 MMHG | WEIGHT: 122.3 LBS | BODY MASS INDEX: 22.5 KG/M2 | OXYGEN SATURATION: 98 % | DIASTOLIC BLOOD PRESSURE: 68 MMHG

## 2022-12-14 DIAGNOSIS — Z00.00 ENCOUNTER FOR ANNUAL WELLNESS VISIT (AWV) IN MEDICARE PATIENT: Primary | ICD-10-CM

## 2022-12-14 NOTE — PROGRESS NOTES
Assessment and Plan:     Problem List Items Addressed This Visit        Other    Encounter for annual wellness visit (AWV) in Medicare patient - Primary     Reviewed age appropriate screenings and vaccinations  Pt states he is up to date with Covid boosters, Tdap, and Pneumococcal vaccine however he received these at outside pharmacy  Will bring record of vaccinations as soon as possible  Also reviewed 5 wishes with pt, to be completed with wife and daughter and brought to clinic for documentation at earliest convenience  - to return to clinic in 1year for annual physical or sooner as needed for health concerns  Depression Screening and Follow-up Plan: Patient was screened for depression during today's encounter  They screened negative with a PHQ-2 score of 0  Preventive health issues were discussed with patient, and age appropriate screening tests were ordered as noted in patient's After Visit Summary  Personalized health advice and appropriate referrals for health education or preventive services given if needed, as noted in patient's After Visit Summary  History of Present Illness:     Patient presents for a Medicare Wellness Visit    Pleasant 79yo male presenting to clinic for AWV  Pt endorsing no concerns at this time  Patient Care Team:  Anthony Valdez DO as PCP - General (Family Medicine)  Camden Eastman RN as Lead OP Care Mgr  Imani Miles MD (Nephrology)     Review of Systems:     Review of Systems   Constitutional: Negative for chills and fever  HENT: Negative for congestion, rhinorrhea, sore throat and trouble swallowing  Eyes: Negative for visual disturbance  Respiratory: Negative for cough, chest tightness and shortness of breath  Cardiovascular: Negative for chest pain, palpitations and leg swelling  Gastrointestinal: Negative for abdominal pain, blood in stool, constipation, diarrhea, nausea and vomiting     Genitourinary: Negative for dysuria and hematuria  Skin: Negative for color change  Neurological: Negative for dizziness, seizures, syncope, weakness, light-headedness, numbness and headaches  Psychiatric/Behavioral: Negative for suicidal ideas  Problem List:     Patient Active Problem List   Diagnosis   • Thrombocytopenia (HCC)   • Essential hypertension   • Ambulatory dysfunction   • Cholelithiasis with biliary obstruction   • Anemia   • Lower extremity edema   • Blurry vision, left eye   • Allergic rhinitis   • Mild persistent asthma without complication   • Presbycusis   • Fall at home, initial encounter   • Aortic valve stenosis, severe   • Chronic kidney disease, stage 4 (severe) ([facility])   • Encounter for completion of form with patient   • Secondary hyperparathyroidism of renal origin ([facility] [address] )   • Encounter for annual wellness visit (AWV) in Medicare patient      Past Medical and Surgical History:     Past Medical History:   Diagnosis Date   • Acute cholangitis 10/4/2018    Added automatically from request for surgery [id number]   • Clostridium difficile colitis 9/28/2018   • History of bacteremia 10/1/2018   • Hypertension    • Medical history unknown    • Mild persistent asthma without complication 0/32/5200   • Renal disorder      Past Surgical History:   Procedure Laterality Date   • ERCP N/A 9/18/2018    Procedure: ENDOSCOPIC RETROGRADE CHOLANGIOPANCREATOGRAPHY (ERCP) with stent placement;  Surgeon: Evelyn Hooks MD;  Location: [facility];  Service: Gastroenterology   • NO PAST SURGERIES     • CT ERCP DX COLLECTION SPECIMEN BRUSHING/WASHING N/A 11/23/2018    Procedure: ENDOSCOPIC RETROGRADE CHOLANGIOPANCREATOGRAPHY (ERCP); Surgeon: Evelyn Hooks MD;  Location: [facility]; Service: Gastroenterology      Family History:     History reviewed  No pertinent family history     Social History:     Social History     Socioeconomic History   • Marital status: /Civil Union     Spouse name: None   • Number of children: None   • Years of education: None   • Highest education level: None   Occupational History   • None   Tobacco Use   • Smoking status: Former     Types: Cigars   • Smokeless tobacco: Former   • Tobacco comments:     states quit about 50 years ago, cigars "a few per week"    Substance and Sexual Activity   • Alcohol use: No     Comment: denies ETOH use   • Drug use: No     Comment: denies   • Sexual activity: Never   Other Topics Concern   • None   Social History Narrative   • None     Social Determinants of Health     Financial Resource Strain: Low Risk    • Difficulty of Paying Living Expenses: Not hard at all   Food Insecurity: No Food Insecurity   • Worried About Running Out of Food in the Last Year: Never true   • Ran Out of Food in the Last Year: Never true   Transportation Needs: No Transportation Needs   • Lack of Transportation (Medical): No   • Lack of Transportation (Non-Medical): No   Physical Activity: Not on file   Stress: Not on file   Social Connections: Not on file   Intimate Partner Violence: Not on file   Housing Stability: Not on file      Medications and Allergies:     Current Outpatient Medications   Medication Sig Dispense Refill   • albuterol (PROVENTIL HFA,VENTOLIN HFA) 90 mcg/act inhaler Inhale 2 puffs every 6 (six) hours as needed for wheezing 54 g 0   • amLODIPine (NORVASC) 5 mg tablet Take 1 tablet (5 mg total) by mouth 2 (two) times a day 180 tablet 3   • carbamide peroxide (DEBROX) 6 5 % otic solution Administer 5 drops into both ears 2 (two) times a day 15 mL 0   • ferrous sulfate (FeroSul) 325 (65 Fe) mg tablet Take 1 tablet (325 mg total) by mouth daily with breakfast 90 tablet 1   • loratadine (CLARITIN) 10 mg tablet Take 1 tablet (10 mg total) by mouth every other day (Patient not taking: Reported on 10/4/2022) 15 tablet 1   • metoprolol tartrate (LOPRESSOR) 50 mg tablet Take 1 tablet (50 mg total) by mouth daily 90 tablet 3     No current facility-administered medications for this visit       No Known Allergies   Immunizations:     Immunization History   Administered Date(s) Administered   • COVID-19 MODERNA VACC 0 5 ML IM 05/11/2021, 06/10/2021   • Influenza, high dose seasonal 0 7 mL 09/28/2018, 11/02/2022   • Tuberculin Skin Test-PPD Intradermal 09/30/2018      Health Maintenance: There are no preventive care reminders to display for this patient  Topic Date Due   • Hepatitis B Vaccine (1 of 3 - 3-dose series) Never done   • Pneumococcal Vaccine: 65+ Years (1 - PCV) Never done   • DTaP,Tdap,and Td Vaccines (1 - Tdap) Never done   • COVID-19 Vaccine (3 - Booster for Xiomy  series) 11/10/2021      Medicare Screening Tests and Risk Assessments:     Skyla Treviño is here for his Subsequent Wellness visit  Health Risk Assessment:   Patient rates overall health as fair  Patient feels that their physical health rating is same  Patient is satisfied with their life  Eyesight was rated as slightly worse  Hearing was rated as much worse  Patient feels that their emotional and mental health rating is same  Patients states they are sometimes angry  Patient states they are often unusually tired/fatigued  Pain experienced in the last 7 days has been none  Patient states that he has experienced no weight loss or gain in last 6 months  Depression Screening:   PHQ-2 Score: 0      Fall Risk Screening: In the past year, patient has experienced: no history of falling in past year      Home Safety:  Patient does not have trouble with stairs inside or outside of their home  Patient has working smoke alarms and has working carbon monoxide detector  Home safety hazards include: none  Nutrition:   Current diet is Regular  Medications:   Patient is currently taking over-the-counter supplements  OTC medications include: see medication list  Patient is able to manage medications       Activities of Daily Living (ADLs)/Instrumental Activities of Daily Living (IADLs):   Walk and transfer into and out of bed and chair?: Yes  Dress and groom yourself?: Yes    Bathe or shower yourself?: Yes    Feed yourself? Yes  Do your laundry/housekeeping?: Yes  Manage your money, pay your bills and track your expenses?: Yes  Make your own meals?: Yes    Do your own shopping?: Yes    Previous Hospitalizations:   Any hospitalizations or ED visits within the last 12 months?: No      Advance Care Planning:   Living will: No    Durable POA for healthcare: No    Advanced directive: No    Five wishes given: Yes    Patient declined ACP directive: No    End of Life Decisions reviewed with patient: No    Provider agrees with end of life decisions: No      Comments: Will review 5 wishes with wife and daughter    PREVENTIVE SCREENINGS      Cardiovascular Screening:    General: Screening Current      Diabetes Screening:     General: Screening Current      Colorectal Cancer Screening:     General: Screening Not Indicated      Prostate Cancer Screening:    General: Screening Not Indicated      Abdominal Aortic Aneurysm (AAA) Screening:    Risk factors include: tobacco use        General: Screening Not Indicated      Lung Cancer Screening:     General: Screening Not Indicated    Screening, Brief Intervention, and Referral to Treatment (SBIRT)    Screening  Typical number of drinks in a day: 0  Typical number of drinks in a week: 0  Interpretation: Low risk drinking behavior  Single Item Drug Screening:  How often have you used an illegal drug (including marijuana) or a prescription medication for non-medical reasons in the past year? never    Single Item Drug Screen Score: 0  Interpretation: Negative screen for possible drug use disorder    No results found       Physical Exam:     /68 (BP Location: Right arm, Patient Position: Sitting, Cuff Size: Standard)   Pulse 76   Temp 97 6 °F (36 4 °C) (Temporal)   Resp 18   Ht 5' 2" (1 575 m)   Wt 55 5 kg (122 lb 4 8 oz)   SpO2 98%   BMI 22 37 kg/m²     Physical Exam  Vitals and nursing note reviewed  Constitutional:       General: He is not in acute distress  Appearance: Normal appearance  He is well-developed and normal weight  He is not ill-appearing, toxic-appearing or diaphoretic  HENT:      Head: Normocephalic and atraumatic  Right Ear: External ear normal  There is impacted cerumen  Left Ear: External ear normal  There is impacted cerumen  Nose: Nose normal       Mouth/Throat:      Mouth: Mucous membranes are moist       Pharynx: Oropharynx is clear  Eyes:      Extraocular Movements: Extraocular movements intact  Conjunctiva/sclera: Conjunctivae normal       Pupils: Pupils are equal, round, and reactive to light  Cardiovascular:      Rate and Rhythm: Normal rate and regular rhythm  Heart sounds: Murmur heard  Systolic murmur is present  No friction rub  No gallop  Pulmonary:      Effort: Pulmonary effort is normal  No respiratory distress  Breath sounds: Normal breath sounds  Abdominal:      General: Abdomen is flat  There is no distension  Palpations: Abdomen is soft  Tenderness: There is no abdominal tenderness  There is no guarding or rebound  Musculoskeletal:         General: Normal range of motion  Cervical back: Normal range of motion and neck supple  Skin:     General: Skin is warm and dry  Capillary Refill: Capillary refill takes less than 2 seconds  Neurological:      General: No focal deficit present  Mental Status: He is alert and oriented to person, place, and time     Psychiatric:         Mood and Affect: Mood normal          Behavior: Behavior normal           Katarina Rodriguez MD

## 2022-12-15 NOTE — ASSESSMENT & PLAN NOTE
Reviewed age appropriate screenings and vaccinations  Pt states he is up to date with Covid boosters, Tdap, and Pneumococcal vaccine however he received these at outside pharmacy  Will bring record of vaccinations as soon as possible  Also reviewed 5 wishes with pt, to be completed with wife and daughter and brought to clinic for documentation at earliest convenience  - to return to clinic in 1year for annual physical or sooner as needed for health concerns

## 2022-12-29 ENCOUNTER — PATIENT OUTREACH (OUTPATIENT)
Dept: FAMILY MEDICINE CLINIC | Facility: CLINIC | Age: 87
End: 2022-12-29

## 2022-12-29 NOTE — PROGRESS NOTES
I called Sushant Galicia to remind her the patient is due for a Cards appointment  Phone number was provided; she stated she will call to schedule  Sushant Galicia stated the patient has proof of his Covid vaccinations as well as flu shot  I asked that they stop by the office so we have a copy for the chart  I will continue to follow

## 2023-01-20 ENCOUNTER — PATIENT OUTREACH (OUTPATIENT)
Dept: FAMILY MEDICINE CLINIC | Facility: CLINIC | Age: 88
End: 2023-01-20

## 2023-01-20 DIAGNOSIS — Z78.9 NEEDS ASSISTANCE WITH COMMUNITY RESOURCES: Primary | ICD-10-CM

## 2023-01-20 NOTE — PROGRESS NOTES
Outgoing Call:  1/20/2023    [facility] called pt's wife Valentin Owen to discuss referral received for interest in seeking Medicare Part B  [facility] had attempted this in past with pt and his wife  Valentin Owen did remember [facility] as we have worked together for a long period of time  [facility] closed referral last when pt and Valentin Owen were referred to a legal services however pt and wife do not have the Medicare Part B they have been pursuing for past two years  Valentin Owen agreed to meet with [facility] next week and begin process of applying       Next outreach is scheduled for 1/23/23 at [address] [place] [address] at [facility]

## 2023-01-23 ENCOUNTER — PATIENT OUTREACH (OUTPATIENT)
Dept: FAMILY MEDICINE CLINIC | Facility: CLINIC | Age: 88
End: 2023-01-23

## 2023-01-23 NOTE — PROGRESS NOTES
In Person:  1/23/2023    [facility] did meet with pt and his wife at [facility] for scheduled appointment  [facility] provided three forms which needed to be completed to be considered for financial assistance at [facility]  Both pt and his wife signed forms and provided proof of income  [facility] did provide to [facility] Dana BERNAL  She informed [facility] she will work on financial aid application tomorrow and provide [facility] with an update  [facility] spoke to pt and his wife about attempting to apply for Medicare Part B  [facility] had previously attempted to assist pt and his wife in applying in the past however were not approved  [facility] informed them that at this point [facility] will seek assistance from local state rep's office as many agencies have been contacted in the past and none have been able to resolve or enroll pt and his wife in [employer] Part B insurance  Both pt and his wife agreed to this  [facility] informed she will need to complete a chart review and create a timeline of all attempts made to assist in this matter in order to present to state rep  Both pt and his wife agreed this would be a good idea  [facility] to f/u  Next outreach is scheduled for 1/25/2023

## 2023-01-24 ENCOUNTER — PATIENT OUTREACH (OUTPATIENT)
Dept: FAMILY MEDICINE CLINIC | Facility: CLINIC | Age: 88
End: 2023-01-24

## 2023-01-24 NOTE — PROGRESS NOTES
Incoming Call:  1/24/2023    [facility] did receive a call from [facility] Supervisor  [facility] and Miranda Weaver discussed current concerns in reference to pt not having Medicare Part B  [facility] explained previous attempts to assist pt and his wife in applying for Medicare Part B  Miranda Weaver suggested calling [address] [place] [address] whose specializes in [place] cases  She informed [facility] she will reach out to [employer] and speak with someone who may be able to assist in this case  [facility] agreed to proceed with calling [employer] in the event [employer] is unable to assist  [facility] to f/u  Next outreach is scheduled for 1/27/2023

## 2023-01-27 ENCOUNTER — PATIENT OUTREACH (OUTPATIENT)
Dept: FAMILY MEDICINE CLINIC | Facility: CLINIC | Age: 88
End: 2023-01-27

## 2023-01-27 NOTE — PROGRESS NOTES
Incoming Call:  1/27/2023    [address] [place] [address] did receive a message from Dia Flores [facility] [employer] Supervisor stating she has been in contact with [id number] at [facility] who stated that pt's case for Medicare Part B is very complicated however will attempt to resolve the issue and may need or week or so to investigate the case and possibly provide options for pt  She informed she will reach out to [address] [place] [address] once she hears back from [id number]  [facility] was also informed by ECHO Santiago, that pt has been approved for financial assistance with [employer] bills and currently has a zero balance  She stated that she did inform pt of this  [facility] to f/u  Next outreach is scheduled for 2/3/2023

## 2023-02-03 ENCOUNTER — PATIENT OUTREACH (OUTPATIENT)
Dept: FAMILY MEDICINE CLINIC | Facility: CLINIC | Age: 88
End: 2023-02-03

## 2023-02-03 NOTE — PROGRESS NOTES
Teams:  2/3/2023    [facility] did send message to Elisabet River, [facility] GABE Supervisor requesting an update on pt's Medicare case which is pending review from [facility]  She informed Barry is good at getting back but will need another week  She informed she will contact [facility] once this takes place  [facility] to f/u  Next outreach is scheduled for 2/10/2023

## 2023-02-06 ENCOUNTER — PATIENT OUTREACH (OUTPATIENT)
Dept: FAMILY MEDICINE CLINIC | Facility: CLINIC | Age: 88
End: 2023-02-06

## 2023-02-08 ENCOUNTER — PATIENT OUTREACH (OUTPATIENT)
Dept: FAMILY MEDICINE CLINIC | Facility: CLINIC | Age: 88
End: 2023-02-08

## 2023-02-08 ENCOUNTER — TELEPHONE (OUTPATIENT)
Dept: FAMILY MEDICINE CLINIC | Facility: CLINIC | Age: 88
End: 2023-02-08

## 2023-02-08 NOTE — TELEPHONE ENCOUNTER
----- Message from Rebekah Singleton MD sent at 2/8/2023 10:09 AM EST -----  Hi,    Can you please have the patient schedule an appointment for follow up for Htn and Asthma in 4 weeks?     Thanks,  Dr Brad Meeks

## 2023-02-08 NOTE — PROGRESS NOTES
I called Shyanne Vela to remind her of the patient's Cards appointment for tomorrow; she was not aware  I provided the time and address  I will continue to follow

## 2023-02-09 ENCOUNTER — OFFICE VISIT (OUTPATIENT)
Dept: CARDIOLOGY CLINIC | Facility: CLINIC | Age: 88
End: 2023-02-09

## 2023-02-09 VITALS
WEIGHT: 120.8 LBS | OXYGEN SATURATION: 97 % | HEART RATE: 66 BPM | DIASTOLIC BLOOD PRESSURE: 64 MMHG | HEIGHT: 62 IN | BODY MASS INDEX: 22.23 KG/M2 | SYSTOLIC BLOOD PRESSURE: 148 MMHG

## 2023-02-09 DIAGNOSIS — N18.4 CHRONIC KIDNEY DISEASE, STAGE 4 (SEVERE) (HCC): ICD-10-CM

## 2023-02-09 DIAGNOSIS — I35.0 AORTIC VALVE STENOSIS, SEVERE: Primary | ICD-10-CM

## 2023-02-09 DIAGNOSIS — D64.9 ANEMIA, UNSPECIFIED TYPE: ICD-10-CM

## 2023-02-09 DIAGNOSIS — R26.2 AMBULATORY DYSFUNCTION: ICD-10-CM

## 2023-02-09 DIAGNOSIS — D69.6 THROMBOCYTOPENIA (HCC): ICD-10-CM

## 2023-02-09 DIAGNOSIS — R60.0 LOWER EXTREMITY EDEMA: ICD-10-CM

## 2023-02-09 NOTE — PROGRESS NOTES
[facility]  [address]  Phone#  [phone]   Fax#  [phone]  *-*-*-*-*-*-*-*-*-*-*-*-*-*-*-*-*-*-*-*-*-*-*-*-*-*-*-*-*-*-*-*-*-*-*-*-*-*-*-*-*-*-*-*-*-*-*-*-*-*-*-*-*-*                                   Cardiology Follow Up      ENCOUNTER DATE: 23 3:40 PM  PATIENT NAME: Emi Patel   : 1932    MRN: [id number]  AGE:89 y o  SEX: male  [address] MD     PRIMARY CARE PHYSICIAN: Juanita Gomez DO    ACTIVE DIAGNOSIS THIS VISIT  1  Aortic valve stenosis, severe  POCT ECG    Echo complete w/ contrast if indicated      2  Thrombocytopenia ([address] )        3  Ambulatory dysfunction        4  Anemia, unspecified type        5  Lower extremity edema        6  Chronic kidney disease, stage 4 (severe) ([address] )          ACTIVE PROBLEM LIST  Patient Active Problem List   Diagnosis   • Thrombocytopenia (HCC)   • Essential hypertension   • Ambulatory dysfunction   • Cholelithiasis with biliary obstruction   • Anemia   • Lower extremity edema   • Blurry vision, left eye   • Allergic rhinitis   • Mild persistent asthma without complication   • Presbycusis   • Fall at home, initial encounter   • Aortic valve stenosis, severe   • Chronic kidney disease, stage 4 (severe) ([address] )   • Encounter for completion of form with patient   • Secondary hyperparathyroidism of renal origin ([address] )   • Encounter for annual wellness visit (AWV) in Medicare patient       [address]  Patient referred for evaluation of aortic stenosis    2022 echocardiogram:  Normal LV systolic function, EF 45%, mild LVH, grade 1 diastolic dysfunction  Severe calcific stenosis  Maximum aortic valve velocity 3 7 M/S, mean gradient 26 mmHg, aortic valve area 0 5-0 8 cm2, dimensionless index 0 31  Echocardiogram considered poor quality      Chronic kidney disease stage 4 with GFR 27    INTERVAL HISTORY:        Patient has severe probably critical aortic stenosis along with stage IV chronic kidney disease  He is asymptomatic  Because of his kidney disease, he is not being referred for TAVR  He does not want to be placed on dialysis  If his kidneys continue to get worse, he may end up on dialysis anyway and then he could be considered for TAVR    He denies chest discomfort of any type  Does not feel he is any more short of breath than he was a year ago  When he stands up quickly he gets a bit lightheaded but once he is standing or walking he does not get lightheaded  Although he is active, I think that he is slowing down significantly      DISCUSSION/PLAN:          Echocardiogram at [facility]  Return in 6 months    Lab Studies:    Lab Results   Component Value Date    CHOLESTEROL 151 02/28/2022     Lab Results   Component Value Date    TRIG 73 02/28/2022     Lab Results   Component Value Date    HDL 41 02/28/2022     Lab Results   Component Value Date    LDLCALC 95 02/28/2022       Lab Results   Component Value Date    NTBNP 5,250 (H) 10/05/2018       Lab Results   Component Value Date    EGFR 26 09/26/2022    EGFR 27 02/28/2022    EGFR 42 (L) 10/10/2018    SODIUM 142 09/26/2022    SODIUM 136 (L) 02/28/2022    SODIUM 137 10/10/2018    K 5 1 09/26/2022    K 4 4 02/28/2022    K 4 1 10/10/2018     (H) 09/26/2022     02/28/2022     10/10/2018    CO2 24 09/26/2022    CO2 21 (L) 02/28/2022    CO2 28 10/10/2018    BUN 35 (H) 09/26/2022    BUN 26 (H) 02/28/2022    BUN 23 10/10/2018    CREATININE 2 11 (H) 09/26/2022    CREATININE 2 10 (H) 02/28/2022    CREATININE 1 51 (H) 10/10/2018     Lab Results   Component Value Date    WBC 5 06 09/26/2022    WBC 4 10 (L) 02/28/2022    WBC 3 10 (L) 10/10/2018    HGB 10 9 (L) 09/26/2022    HGB 11 0 (L) 02/28/2022    HGB 8 0 (L) 10/10/2018    HCT 34 2 (L) 09/26/2022    HCT 33 3 (L) 02/28/2022    HCT 24 2 (L) 10/10/2018     (H) 09/26/2022     02/28/2022     10/10/2018    MCH 33 4 09/26/2022 MCH 33 1 02/28/2022    MCH 33 0 10/10/2018    MCHC 31 9 09/26/2022    MCHC 33 1 02/28/2022    MCHC 33 1 10/10/2018     (L) 09/26/2022     (L) 02/28/2022     (L) 10/10/2018      Lab Results   Component Value Date    GLUCOSE 75 09/17/2018    CALCIUM 8 8 09/26/2022    CALCIUM 8 7 02/28/2022    CALCIUM 7 7 (L) 10/10/2018    AST 30 02/28/2022    AST 54 10/07/2018    AST 24 09/23/2018    ALT 14 02/28/2022    ALT 30 10/07/2018    ALT 13 09/23/2018    ALKPHOS 93 02/28/2022    ALKPHOS 118 10/07/2018    ALKPHOS 151 (H) 09/23/2018    MG 2 1 09/19/2018    MG 1 5 (L) 09/18/2018       Lab Results   Component Value Date    DDIMER >10,000 (H) 09/20/2018       Lab Results   Component Value Date    FERRITIN 470 (H) 09/30/2018    IRON 78 09/30/2018    TIBC 152 (L) 09/30/2018     Results for orders placed or performed in visit on 02/09/23   POCT ECG    Narrative    Normal sinus rhythm at a rate of 67 bpm   Normal EKG           Current Outpatient Medications:   •  albuterol (PROVENTIL HFA,VENTOLIN HFA) 90 mcg/act inhaler, Inhale 2 puffs every 6 (six) hours as needed for wheezing, Disp: 54 g, Rfl: 0  •  amLODIPine (NORVASC) 5 mg tablet, Take 1 tablet (5 mg total) by mouth 2 (two) times a day, Disp: 180 tablet, Rfl: 3  •  carbamide peroxide (DEBROX) 6 5 % otic solution, Administer 5 drops into both ears 2 (two) times a day, Disp: 15 mL, Rfl: 0  •  ferrous sulfate (FeroSul) 325 (65 Fe) mg tablet, Take 1 tablet (325 mg total) by mouth daily with breakfast, Disp: 90 tablet, Rfl: 1  •  metoprolol tartrate (LOPRESSOR) 50 mg tablet, Take 1 tablet (50 mg total) by mouth daily, Disp: 90 tablet, Rfl: 3  •  loratadine (CLARITIN) 10 mg tablet, Take 1 tablet (10 mg total) by mouth every other day (Patient not taking: Reported on 10/4/2022), Disp: 15 tablet, Rfl: 1  No Known Allergies    Past Medical History:   Diagnosis Date   • Acute cholangitis 10/4/2018    Added automatically from request for surgery [id number]   • Clostridium difficile colitis 9/28/2018   • History of bacteremia 10/1/2018   • Hypertension    • Medical history unknown    • Mild persistent asthma without complication 2/58/6700   • Renal disorder      Social History     Socioeconomic History   • Marital status: /Civil Union     Spouse name: Not on file   • Number of children: Not on file   • Years of education: Not on file   • Highest education level: Not on file   Occupational History   • Not on file   Tobacco Use   • Smoking status: Former     Types: Cigars   • Smokeless tobacco: Former   • Tobacco comments:     states quit about 50 years ago, cigars "a few per week"    Substance and Sexual Activity   • Alcohol use: No     Comment: denies ETOH use   • Drug use: No     Comment: denies   • Sexual activity: Never   Other Topics Concern   • Not on file   Social History Narrative   • Not on file     Social Determinants of Health     Financial Resource Strain: Low Risk    • Difficulty of Paying Living Expenses: Not hard at all   Food Insecurity: No Food Insecurity   • Worried About Running Out of Food in the Last Year: Never true   • Ran Out of Food in the Last Year: Never true   Transportation Needs: No Transportation Needs   • Lack of Transportation (Medical): No   • Lack of Transportation (Non-Medical): No   Physical Activity: Not on file   Stress: Not on file   Social Connections: Not on file   Intimate Partner Violence: Not on file   Housing Stability: Not on file      History reviewed  No pertinent family history  Past Surgical History:   Procedure Laterality Date   • ERCP N/A 9/18/2018    Procedure: ENDOSCOPIC RETROGRADE CHOLANGIOPANCREATOGRAPHY (ERCP) with stent placement;  Surgeon: Alexia Lowery MD;  Location: [facility];  Service: Gastroenterology   • NO PAST SURGERIES     • [place] ERCP DX COLLECTION SPECIMEN BRUSHING/WASHING N/A 11/23/2018    Procedure: ENDOSCOPIC RETROGRADE CHOLANGIOPANCREATOGRAPHY (ERCP);   Surgeon: Alexia Lowery MD;  [facility]; Service: Gastroenterology       PREVIOUS WEIGHTS:   Wt Readings from Last 10 Encounters:   02/09/23 54 8 kg (120 lb 12 8 oz)   12/14/22 55 5 kg (122 lb 4 8 oz)   11/02/22 56 2 kg (124 lb)   10/04/22 54 9 kg (121 lb)   07/11/22 54 4 kg (120 lb)   04/25/22 54 9 kg (121 lb)   04/20/22 54 9 kg (121 lb)   03/28/22 55 8 kg (123 lb)   03/23/22 57 2 kg (126 lb)   02/23/22 57 2 kg (126 lb)        Review of Systems:  Review of Systems   Constitutional: Negative for activity change  Respiratory: Negative for cough, chest tightness, shortness of breath and wheezing  Cardiovascular: Negative for chest pain, palpitations and leg swelling  Musculoskeletal: Negative for gait problem  Skin: Negative for color change  Neurological: Negative for dizziness, tremors, syncope, weakness, light-headedness and headaches  Psychiatric/Behavioral: Negative for agitation and confusion  Physical Exam:  /64 (BP Location: Left arm, Patient Position: Sitting, Cuff Size: Standard)   Pulse 66   Ht 5' 2" (1 575 m)   Wt 54 8 kg (120 lb 12 8 oz)   SpO2 97%   BMI 22 09 kg/m²     Physical Exam  Vitals reviewed  Constitutional:       General: He is not in acute distress  Appearance: He is well-developed  HENT:      Head: Normocephalic and atraumatic  Neck:      Thyroid: No thyromegaly  Vascular: No carotid bruit or JVD  Trachea: No tracheal deviation  Cardiovascular:      Rate and Rhythm: Normal rate and regular rhythm  Pulses: Normal pulses  Heart sounds: Murmur heard  Crescendo decrescendo systolic murmur is present with a grade of 3/6  No friction rub  No gallop  Comments: Grade 3/6 late peaking systolic ejection murmur  Pulmonary:      Effort: Pulmonary effort is normal  No respiratory distress  Breath sounds: Normal breath sounds  No wheezing, rhonchi or rales  Chest:      Chest wall: No tenderness     Musculoskeletal:      Cervical back: Normal range of motion and neck supple  Right lower leg: No edema  Left lower leg: No edema  Skin:     General: Skin is warm and dry  Neurological:      General: No focal deficit present  Mental Status: He is alert and oriented to person, place, and time  Psychiatric:         Mood and Affect: Mood normal          Behavior: Behavior normal          Thought Content: Thought content normal          Judgment: Judgment normal          ======================================================  Imaging:   I have personally reviewed pertinent reports  Portions of the record may have been created with voice recognition software  Occasional wrong word or "sound a like" substitutions may have occurred due to the inherent limitations of voice recognition software  Read the chart carefully and recognize, using context, where substitutions have occurred      SIGNATURES:   Idalia Joy MD

## 2023-02-10 ENCOUNTER — PATIENT OUTREACH (OUTPATIENT)
Dept: FAMILY MEDICINE CLINIC | Facility: CLINIC | Age: 88
End: 2023-02-10

## 2023-02-10 NOTE — PROGRESS NOTES
Chart Review:  2/10/2023    Message sent to Fely Trinidad via Teams requesting update from [place] on pt's case for Medicare Part B  Waiting on a response  CMOC to f/u  Nex outreach is scheduled for 2/13/2023

## 2023-02-12 PROBLEM — Z00.00 ENCOUNTER FOR ANNUAL WELLNESS VISIT (AWV) IN MEDICARE PATIENT: Status: RESOLVED | Noted: 2022-12-14 | Resolved: 2023-02-12

## 2023-02-13 ENCOUNTER — TELEPHONE (OUTPATIENT)
Dept: NEPHROLOGY | Facility: CLINIC | Age: 88
End: 2023-02-13

## 2023-02-13 ENCOUNTER — PATIENT OUTREACH (OUTPATIENT)
Dept: FAMILY MEDICINE CLINIC | Facility: CLINIC | Age: 88
End: 2023-02-13

## 2023-02-13 NOTE — TELEPHONE ENCOUNTER
Called patient and spoke with Valentin Owen to remained to please have blood and urine work done before the follow up appointment

## 2023-02-13 NOTE — PROGRESS NOTES
Outgoing Calls:  2/13/2023    CMOC did message Rolando Ladd, Supervisor in [place] Dept to inquire about status of benefits review for Medicare Part B she is expecting from [place] at [facility]  She informed she is still currently working on this matter  [facility] did call Allyson Garcias at [facility] to inquire about services pt received last year  CMOC did refer pt and his wife to [employer] one year ago to assist in obtaining Medicare Part B benefits  VM left requesting a call in return  Next outreach is scheduled for 2/15/2023

## 2023-02-14 ENCOUNTER — APPOINTMENT (OUTPATIENT)
Dept: LAB | Facility: HOSPITAL | Age: 88
End: 2023-02-14

## 2023-02-14 DIAGNOSIS — N18.4 CHRONIC KIDNEY DISEASE, STAGE 4 (SEVERE) (HCC): ICD-10-CM

## 2023-02-14 LAB
ALBUMIN SERPL BCP-MCNC: 3.9 G/DL (ref 3.5–5)
ANION GAP SERPL CALCULATED.3IONS-SCNC: 7 MMOL/L (ref 4–13)
BUN SERPL-MCNC: 39 MG/DL (ref 5–25)
CALCIUM SERPL-MCNC: 8.6 MG/DL (ref 8.4–10.2)
CHLORIDE SERPL-SCNC: 110 MMOL/L (ref 96–108)
CO2 SERPL-SCNC: 22 MMOL/L (ref 21–32)
CREAT SERPL-MCNC: 2.17 MG/DL (ref 0.6–1.3)
CREAT UR-MCNC: 273.8 MG/DL
ERYTHROCYTE [DISTWIDTH] IN BLOOD BY AUTOMATED COUNT: 13.2 % (ref 11.6–15.1)
GFR SERPL CREATININE-BSD FRML MDRD: 25 ML/MIN/1.73SQ M
GLUCOSE P FAST SERPL-MCNC: 138 MG/DL (ref 65–99)
HCT VFR BLD AUTO: 34.9 % (ref 36.5–49.3)
HGB BLD-MCNC: 11 G/DL (ref 12–17)
MCH RBC QN AUTO: 33.5 PG (ref 26.8–34.3)
MCHC RBC AUTO-ENTMCNC: 31.5 G/DL (ref 31.4–37.4)
MCV RBC AUTO: 106 FL (ref 82–98)
PHOSPHATE SERPL-MCNC: 3.2 MG/DL (ref 2.3–4.1)
PLATELET # BLD AUTO: 119 THOUSANDS/UL (ref 149–390)
PMV BLD AUTO: 11.3 FL (ref 8.9–12.7)
POTASSIUM SERPL-SCNC: 5 MMOL/L (ref 3.5–5.3)
PROT UR-MCNC: 21 MG/DL
PROT/CREAT UR: 0.08 MG/G{CREAT} (ref 0–0.1)
PTH-INTACT SERPL-MCNC: 134.4 PG/ML (ref 18.4–80.1)
RBC # BLD AUTO: 3.28 MILLION/UL (ref 3.88–5.62)
SODIUM SERPL-SCNC: 139 MMOL/L (ref 135–147)
WBC # BLD AUTO: 4.14 THOUSAND/UL (ref 4.31–10.16)

## 2023-02-15 ENCOUNTER — PATIENT OUTREACH (OUTPATIENT)
Dept: FAMILY MEDICINE CLINIC | Facility: CLINIC | Age: 88
End: 2023-02-15

## 2023-02-15 NOTE — PROGRESS NOTES
Outgoing Call:  2/15/2023    [facility] called Yann Montaño at [facility] to discuss outcome of services pt was to receive from her when referred by [facility] in reference to [address] informed there was nothing she could do as she did not have proof that pt was denied Medicare Part B at any time  She also stated that she was made aware by [employer] that pt and his wife were offered Medicare Part B and they denied coverage  She did not provide date of when this occurred and stated that her role is not to help pt's apply for Medicare of any sort  [facility] informed pt and wife were referred for reasons other than applying for services  [facility] was not open to discussing case further and informed pt will need to reapply for the benefits again  [facility] thanked her for her time and ended call  [facility] awaiting on call from Juan Luis Wheeler who informed she has been in contact with Barry at [facility] who may be able to assist  Shane Manuel previously informed Barry did notice that there is a discrepancy on [employer]'s system which appears to be the issue as to why pt and wife currently do not have Medicare Part B  Barry to work on having this "internal issue" resolved and inform Shane Manuel of any updates  CMOC to f/u  Next outreach is scheduled for 2/22/2023

## 2023-02-22 ENCOUNTER — PATIENT OUTREACH (OUTPATIENT)
Dept: FAMILY MEDICINE CLINIC | Facility: CLINIC | Age: 88
End: 2023-02-22

## 2023-02-22 NOTE — PROGRESS NOTES
I called Maricruz Moise to remind her of the patient's [facility] appointment for tomorrow at  with address confirmed; she plans on him attending  I will continue to follow

## 2023-02-23 ENCOUNTER — OFFICE VISIT (OUTPATIENT)
Dept: NEPHROLOGY | Facility: CLINIC | Age: 88
End: 2023-02-23

## 2023-02-23 VITALS
HEIGHT: 62 IN | DIASTOLIC BLOOD PRESSURE: 64 MMHG | WEIGHT: 120 LBS | SYSTOLIC BLOOD PRESSURE: 143 MMHG | BODY MASS INDEX: 22.08 KG/M2

## 2023-02-23 DIAGNOSIS — I10 ESSENTIAL HYPERTENSION: Chronic | ICD-10-CM

## 2023-02-23 DIAGNOSIS — N25.81 SECONDARY HYPERPARATHYROIDISM OF RENAL ORIGIN (HCC): ICD-10-CM

## 2023-02-23 DIAGNOSIS — N18.4 CHRONIC KIDNEY DISEASE, STAGE 4 (SEVERE) (HCC): Primary | ICD-10-CM

## 2023-02-23 DIAGNOSIS — I35.0 AORTIC VALVE STENOSIS, SEVERE: ICD-10-CM

## 2023-02-23 DIAGNOSIS — N18.4 ANEMIA IN STAGE 4 CHRONIC KIDNEY DISEASE (HCC): ICD-10-CM

## 2023-02-23 DIAGNOSIS — H91.13 PRESBYCUSIS OF BOTH EARS: ICD-10-CM

## 2023-02-23 DIAGNOSIS — D63.1 ANEMIA IN STAGE 4 CHRONIC KIDNEY DISEASE (HCC): ICD-10-CM

## 2023-02-23 NOTE — PROGRESS NOTES
OFFICE FOLLOW UP - Nephrology   Caren Castaneda 80 y o  male MRN: [id number]       ASSESSMENT and PLAN:  Nallely Herbert was seen today for follow-up and chronic kidney disease  Diagnoses and all orders for this visit:    Chronic kidney disease, stage 4 (severe) ([facility])  -     CBC; Future  -     Renal function panel; Future  -     PTH, intact; Future  -     Protein / creatinine ratio, urine; Future    Essential hypertension    Aortic valve stenosis, severe    Presbycusis of both ears    Secondary hyperparathyroidism of renal origin ([facility] )    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 ([facility] )        This is an 44-year-old gentleman with CKD stage 4, episode of severe acute kidney injury in 2018, severe aortic stenosis, hypertension, anemia suspected secondary to chronic kidney disease, secondary hyperparathyroidism, who was initially seen for consultation on 04/2022  Today returns to the office for chronic kidney disease follow-up    1  Chronic kidney disease stage 4, creatinine lately in the low 2s since 2022  Most recent creatinine 2 17 on 12/14/2023  CKD suspected multifactorial in the setting of prior episode of severe acute kidney injury in 2018, age-related nephron loss, hypertensive nephrosclerosis and possible atherosclerotic disease  Kidney function overall stable over the last year  I would like to see him back in the office in 6 months with repeat labs  Once again discussed about importance avoid NSAIDs, follow low-salt diet, stay well-hydrated  Recommend to go to kidney smart class for further education but patient declined at this moment    2  Hypertension, blood pressure acceptable after recheck, on amlodipine and metoprolol  Discussed about importance to follow low-salt diet  3  Severe aortic stenosis patient seems to be fairly asymptomatic  Continue follow-up with cardiology    4  Anemia suspected component of chronic kidney disease last hemoglobin stable at 11 0, follow CBC in 6 months    5  Mineral bone disease, secondary hyperparathyroidism of renal origin, PTH elevated but acceptable for degree of kidney dysfunction, will follow labs in 6 months        Patient Instructions   As we discussed in the office visit, you have advanced chronic kidney disease but remains stable  I would like to see you back in the office in 4 to 6 months with repeat labs  Remember to follow low-salt diet less than 2 g of sodium a day  Remember to avoid NSAIDs (no ibuprofen, Motrin, Advil, Aleve, regular aspirin)  Okay to take Tylenol or paracetamol or acetaminophen as needed for pain  Continue close follow-up with your primary care doctor and your cardiologist   Recommend to go to kidney smart class for further education regarding your kidney disease but you declined at this moment  HPI: Rodrick Dockery is a 80 y o  male who is here for Follow-up and Chronic Kidney Disease    Patient initially seen on 04/2022 for evaluation of chronic kidney disease  Patient with history of CKD stage 4, severe aortic stenosis, hypertension, presbycusis, who returns to the office for CKD follow-up    Last time patient was seen was on 10/2022  Since our last visit, there has been no ER visits or hospitilizations  Patient currently has no complaints at this time and is doing well  Patient is hard of hearing  Patient denies any chest pain, shortness of breath or leg swelling  Denies dyspnea on exertion  Denies any abdominal pain, no nausea, vomiting, no diarrhea or constipation  Denies any urinary problems, no burning sensation or gross hematuria  The last blood work was done on 2/14/2023, which we have reviewed together  Most recent creatinine 2 17      ROS: All the systems were reviewed and were negative except as documented on the H&P  Allergies: Patient has no known allergies      Medications:   Current Outpatient Medications:   •  albuterol (PROVENTIL HFA,VENTOLIN HFA) 90 mcg/act inhaler, Inhale 2 puffs every 6 (six) hours as needed for wheezing, Disp: 54 g, Rfl: 0  •  amLODIPine (NORVASC) 5 mg tablet, Take 1 tablet (5 mg total) by mouth 2 (two) times a day, Disp: 180 tablet, Rfl: 3  •  ferrous sulfate (FeroSul) 325 (65 Fe) mg tablet, Take 1 tablet (325 mg total) by mouth daily with breakfast, Disp: 90 tablet, Rfl: 1  •  metoprolol tartrate (LOPRESSOR) 50 mg tablet, Take 1 tablet (50 mg total) by mouth daily, Disp: 90 tablet, Rfl: 3  •  carbamide peroxide (DEBROX) 6 5 % otic solution, Administer 5 drops into both ears 2 (two) times a day (Patient not taking: Reported on 2/23/2023), Disp: 15 mL, Rfl: 0  •  loratadine (CLARITIN) 10 mg tablet, Take 1 tablet (10 mg total) by mouth every other day (Patient not taking: Reported on 10/4/2022), Disp: 15 tablet, Rfl: 1    Past Medical History:   Diagnosis Date   • Acute cholangitis 10/4/2018    Added automatically from request for surgery [id number]   • Clostridium difficile colitis 9/28/2018   • History of bacteremia 10/1/2018   • Hypertension    • Medical history unknown    • Mild persistent asthma without complication 9/22/2761   • Renal disorder      Past Surgical History:   Procedure Laterality Date   • ERCP N/A 9/18/2018    Procedure: ENDOSCOPIC RETROGRADE CHOLANGIOPANCREATOGRAPHY (ERCP) with stent placement;  Surgeon: Elisa Erickson MD;  [facility];  Service: Gastroenterology   • NO PAST SURGERIES     • [place] ERCP DX COLLECTION SPECIMEN BRUSHING/WASHING N/A 11/23/2018    Procedure: ENDOSCOPIC RETROGRADE CHOLANGIOPANCREATOGRAPHY (ERCP); Surgeon: Elisa Erickson MD;  [facility]; Service: Gastroenterology     No family history on file  reports that he has quit smoking  His smoking use included cigars  He has quit using smokeless tobacco  He reports that he does not drink alcohol and does not use drugs        Physical Exam:   Vitals:    02/23/23 1511 02/23/23 1526   BP: 168/74 143/64   BP Location: Left arm Left arm   Patient Position: Sitting Sitting Cuff Size: Standard Standard   Weight: 54 4 kg (120 lb)    Height: 5' 2" (1 575 m)      Body mass index is 21 95 kg/m²  General: conscious, cooperative, in not acute distress  Eyes: conjunctivae pink, anicteric sclerae  ENT: lips and mucous membranes moist  Neck: supple, no JVD  Chest: clear breath sounds bilateral, no crackles, ronchus or wheezings  CVS: distinct S1 & S2, normal rate, regular rhythm, positive aortic murmur  Abdomen: soft, non-tender, non-distended, normoactive bowel sounds  Back: no CVA tenderness  Extremities: no edema of both legs  Skin: no rash  Neuro: awake, alert, oriented, hard of hearing          Lab Results:  Results for orders placed or performed in visit on 02/14/23   CBC   Result Value Ref Range    WBC 4 14 (L) 4 31 - 10 16 Thousand/uL    RBC 3 28 (L) 3 88 - 5 62 Million/uL    Hemoglobin 11 0 (L) 12 0 - 17 0 g/dL    Hematocrit 34 9 (L) 36 5 - 49 3 %     (H) 82 - 98 fL    MCH 33 5 26 8 - 34 3 pg    MCHC 31 5 31 4 - 37 4 g/dL    RDW 13 2 11 6 - 15 1 %    Platelets 207 (L) 350 - 390 Thousands/uL    MPV 11 3 8 9 - 12 7 fL   Renal function panel   Result Value Ref Range    Albumin 3 9 3 5 - 5 0 g/dL    Calcium 8 6 8 4 - 10 2 mg/dL    Phosphorus 3 2 2 3 - 4 1 mg/dL    BUN 39 (H) 5 - 25 mg/dL    Creatinine 2 17 (H) 0 60 - 1 30 mg/dL    Sodium 139 135 - 147 mmol/L    Potassium 5 0 3 5 - 5 3 mmol/L    Chloride 110 (H) 96 - 108 mmol/L    CO2 22 21 - 32 mmol/L    ANION GAP 7 4 - 13 mmol/L    eGFR 25 ml/min/1 73sq m    Glucose, Fasting 138 (H) 65 - 99 mg/dL   PTH, intact   Result Value Ref Range     4 (H) 18 4 - 80 1 pg/mL   Protein / creatinine ratio, urine   Result Value Ref Range    Creatinine, Ur 273 8 mg/dL    Protein Urine Random 21 mg/dL    Prot/Creat Ratio, Ur 0 08 0 00 - 0 10             Portions of the record may have been created with voice recognition software   Occasional wrong word or "sound a like" substitutions may have occurred due to the inherent limitations of voice recognition software  Read the chart carefully and recognize, using context, where substitutions have occurred  If you have any questions, please contact the dictating provider

## 2023-02-23 NOTE — PATIENT INSTRUCTIONS
As we discussed in the office visit, you have advanced chronic kidney disease but remains stable  I would like to see you back in the office in 4 to 6 months with repeat labs  Remember to follow low-salt diet less than 2 g of sodium a day  Remember to avoid NSAIDs (no ibuprofen, Motrin, Advil, Aleve, regular aspirin)  Okay to take Tylenol or paracetamol or acetaminophen as needed for pain  Continue close follow-up with your primary care doctor and your cardiologist   Recommend to go to kidney smart class for further education regarding your kidney disease but you declined at this moment

## 2023-02-24 ENCOUNTER — PATIENT OUTREACH (OUTPATIENT)
Dept: FAMILY MEDICINE CLINIC | Facility: CLINIC | Age: 88
End: 2023-02-24

## 2023-02-24 NOTE — PROGRESS NOTES
Outgoing Call:  2/24/2023    [address] [place] [address] [facility] called pt's wife, Parveen Burciaga, to inform her of update on insurance issue  [facility] was provided with an update by [facility] and also a print out of what pt is currently enrolled in  [facility] informed she can meet with pt and his wife and discuss/review plans they currently have as well as review insurance cards they actually have to be sure they have updated benefit cards with them  Parveen Burciaga agreed to meet with [facility]       Next outreach is scheduled for 2/27/2023 at [facility]

## 2023-02-27 ENCOUNTER — PATIENT OUTREACH (OUTPATIENT)
Dept: FAMILY MEDICINE CLINIC | Facility: CLINIC | Age: 88
End: 2023-02-27

## 2023-02-27 NOTE — PROGRESS NOTES
In Person:  2/27/2023    [address] [place] [address] did meet with pt's wife at [facility] for scheduled appointment  Lin Buck [address] [place] [address] updated Rosa Maria on what DPW determined pt and Rosa Saba are eligible for  Pt and Rosa Maria both have Medicare Part A and Part D  Both also have [employer]  Co-pays for doctor visits, IP care, labs, x-rays, and medications all vary between $1-$3  [address] [place] [address] was able to confirm Rosa Saba and pt have all cards with the exception of Medicare Part D card  CMOC assisted Rosa Saba in calling Medicare to order new cards  We were informed Rosa Saba has prescription coverage through [employer] and pt has prescription coverage through [employer]  [address] [place] [address] assisted Rosa Saba in calling to order new cards however wait periods was in excess of 30 minutes  CMOC provided Rosa Saba with numbers to both places of which she could call and order prescription coverage cards  [address] [place] [address] informed she could call [address] [place] [address] if she is having issues completing this task  Rosa Maria thanked [address] [place] [address] for her time  Rosa Saba is aware Part D is no longer going to be pursued as DPW informed she and pt will have in excess of $60,000 in penalty fees  Currently both have medical, dental, behavioral health and prescription coverage through Medicare Part, D, and [place]  This referral will be closed today as need have been met  No further outreach is scheduled

## 2023-03-07 ENCOUNTER — PATIENT OUTREACH (OUTPATIENT)
Dept: FAMILY MEDICINE CLINIC | Facility: CLINIC | Age: 88
End: 2023-03-07

## 2023-03-07 NOTE — PROGRESS NOTES
I placed several calls to [facility] but there was no answer and no option to leave a message  I will continue further outreach

## 2023-03-07 NOTE — PROGRESS NOTES
Incoming Call:  3/7/2023    [facility] received a call from Edis Bingham[address] [facility] Specialist at [facility]  She informed she received CMOC's information from [place] with [facility] Billing Dept  She is currently working on resolving medical bills from early 2022 and under the impression COB has not been resolved with DPW  [facility] directed her to note in pt's chart dated 2/27/23 which breaks down health insurance issues/coverage  She inquired about status of Medicare Part B  [facility] informed pt will not be applying at this time as he is fully covered with MA and doing so would cost him over $60,000 in penalty fees  She expressed understanding and thanked [facility] for her time  No further outreach is scheduled

## 2023-03-09 ENCOUNTER — OFFICE VISIT (OUTPATIENT)
Dept: FAMILY MEDICINE CLINIC | Facility: CLINIC | Age: 88
End: 2023-03-09

## 2023-03-09 ENCOUNTER — TELEPHONE (OUTPATIENT)
Dept: FAMILY MEDICINE CLINIC | Facility: CLINIC | Age: 88
End: 2023-03-09

## 2023-03-09 VITALS
HEART RATE: 84 BPM | SYSTOLIC BLOOD PRESSURE: 148 MMHG | BODY MASS INDEX: 22.26 KG/M2 | TEMPERATURE: 97.2 F | WEIGHT: 121 LBS | RESPIRATION RATE: 18 BRPM | DIASTOLIC BLOOD PRESSURE: 70 MMHG | HEIGHT: 62 IN | OXYGEN SATURATION: 98 %

## 2023-03-09 DIAGNOSIS — D64.9 ANEMIA, UNSPECIFIED TYPE: ICD-10-CM

## 2023-03-09 DIAGNOSIS — I35.0 AORTIC VALVE STENOSIS, SEVERE: Primary | ICD-10-CM

## 2023-03-09 DIAGNOSIS — N18.4 ANEMIA IN STAGE 4 CHRONIC KIDNEY DISEASE (HCC): ICD-10-CM

## 2023-03-09 DIAGNOSIS — I10 ESSENTIAL HYPERTENSION: Chronic | ICD-10-CM

## 2023-03-09 DIAGNOSIS — D63.1 ANEMIA IN STAGE 4 CHRONIC KIDNEY DISEASE (HCC): ICD-10-CM

## 2023-03-09 DIAGNOSIS — R26.2 AMBULATORY DYSFUNCTION: ICD-10-CM

## 2023-03-09 RX ORDER — METOPROLOL TARTRATE 50 MG/1
50 TABLET, FILM COATED ORAL DAILY
Qty: 90 TABLET | Refills: 3 | Status: SHIPPED | OUTPATIENT
Start: 2023-03-09

## 2023-03-09 RX ORDER — AMLODIPINE BESYLATE 5 MG/1
5 TABLET ORAL 2 TIMES DAILY
Qty: 180 TABLET | Refills: 3 | Status: SHIPPED | OUTPATIENT
Start: 2023-03-09 | End: 2024-03-03

## 2023-03-09 RX ORDER — FERROUS SULFATE 325(65) MG
325 TABLET ORAL
Qty: 90 TABLET | Refills: 1 | Status: SHIPPED | OUTPATIENT
Start: 2023-03-09

## 2023-03-09 NOTE — PROGRESS NOTES
Name: Brendan Coyne      : 1932      MRN: [id number]  Encounter Provider: Tamiko Keith MD  Encounter Date: 3/9/2023   Encounter department: [address] [place] [address]     1  Aortic valve stenosis, severe  Assessment & Plan:  Patient has severe aortic stenosis  Follows with Cardiology- last saw in 2023- believe his aortic stenosis is likely critical    Cannot be referred for TAVR due to his severe kidney disease  Denies chest pain/discomfort, dyspnea, or palpitations  - Continue following with Cardiology            2  Essential hypertension  Assessment & Plan:  BP Today: 148/70  At goal per JNC-8 guidelines  ECG done on 2022: normal sinus rhythm normal EKG  Echo done on 2022: EF 60%    - Continue Amlodipine 5 mg BID and Metoprolol 50 mg daily  - Counseled on lifestyle modifications (decrease salt intake, increase vegetables/fruits in diet, exercise for a minimum of 40 min a day for 3-4 days a week)  - Advised to go to emergency room if alarming symptoms occur (severe headache, visual changes, dizziness, sudden weakness or numbness, chest pain)    Orders:  -     amLODIPine (NORVASC) 5 mg tablet; Take 1 tablet (5 mg total) by mouth 2 (two) times a day  -     metoprolol tartrate (LOPRESSOR) 50 mg tablet; Take 1 tablet (50 mg total) by mouth daily    3  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)  Assessment & Plan:  Likely secondary to CKD  Most recent hemoglobin in 2023 stable at 11 0 (patient's baseline appears to be around high 10s/11)  No overt signs of bleeding  4  Anemia, unspecified type  -     ferrous sulfate (FeroSul) 325 (65 Fe) mg tablet; Take 1 tablet (325 mg total) by mouth daily with breakfast    5  Ambulatory dysfunction  Assessment & Plan:  Ambulates on own without cane or walker  No recent falls              Subjective      This is a very pleasant 80 y o  male who presents to the clinic for management of their chronic medical conditions  Patient's medical conditions are stable unless noted otherwise above  Patient has not had any recent hospitalizations or medical emergencies since last visit  Patient has no further complaints other than what is mentioned in the review of systems  Review of Systems   Constitutional: Negative for activity change, appetite change, chills, fatigue and fever  Respiratory: Negative for cough and shortness of breath  Cardiovascular: Negative for chest pain, palpitations and leg swelling  Gastrointestinal: Negative for abdominal pain, constipation, diarrhea, nausea and vomiting  Musculoskeletal: Negative for back pain and gait problem  Neurological: Negative for dizziness, seizures, weakness, light-headedness and headaches  Current Outpatient Medications on File Prior to Visit   Medication Sig   • albuterol (PROVENTIL HFA,VENTOLIN HFA) 90 mcg/act inhaler Inhale 2 puffs every 6 (six) hours as needed for wheezing   • carbamide peroxide (DEBROX) 6 5 % otic solution Administer 5 drops into both ears 2 (two) times a day (Patient not taking: Reported on 2/23/2023)   • loratadine (CLARITIN) 10 mg tablet Take 1 tablet (10 mg total) by mouth every other day (Patient not taking: Reported on 10/4/2022)   • [DISCONTINUED] amLODIPine (NORVASC) 5 mg tablet Take 1 tablet (5 mg total) by mouth 2 (two) times a day   • [DISCONTINUED] ferrous sulfate (FeroSul) 325 (65 Fe) mg tablet Take 1 tablet (325 mg total) by mouth daily with breakfast   • [DISCONTINUED] metoprolol tartrate (LOPRESSOR) 50 mg tablet Take 1 tablet (50 mg total) by mouth daily       Objective     /70 (BP Location: Left arm, Patient Position: Sitting, Cuff Size: Child)   Pulse 84   Temp (!) 97 2 °F (36 2 °C) (Temporal)   Resp 18   Ht 5' 2" (1 575 m)   Wt 54 9 kg (121 lb)   SpO2 98%   BMI 22 13 kg/m²     Physical Exam  Vitals reviewed  Constitutional:       General: He is not in acute distress  Appearance: He is well-developed  He is not diaphoretic  Comments: Frail appearing    HENT:      Head: Normocephalic and atraumatic  Eyes:      Conjunctiva/sclera: Conjunctivae normal    Cardiovascular:      Rate and Rhythm: Normal rate and regular rhythm  Heart sounds: Normal heart sounds  No murmur heard  No friction rub  No gallop  Comments: Systolic ejection murmur heard in aortic region  Pulmonary:      Effort: Pulmonary effort is normal  No respiratory distress  Breath sounds: Normal breath sounds  No wheezing or rales  Abdominal:      General: Bowel sounds are normal  There is no distension  Palpations: Abdomen is soft  There is no mass  Tenderness: There is no abdominal tenderness  There is no guarding  Musculoskeletal:         General: No tenderness or deformity  Normal range of motion  Cervical back: Normal range of motion  Skin:     General: Skin is warm and dry  Neurological:      Mental Status: He is alert and oriented to person, place, and time         Zakiya Amador MD

## 2023-03-09 NOTE — ASSESSMENT & PLAN NOTE
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

## 2023-03-09 NOTE — TELEPHONE ENCOUNTER
Service recovery call placed today, 3/9/23 at 11:30 am   I spoke with Rosa Saba, patients’ wife for the past 61 years  I identified myself and title, I began to apologize for their experience yesterday  Rosa Maria cut me off stating “I’m sorry, don’t worry about it, I told “Bijal” that his appointment was at 2:30 pm and he must have thought I was lying to him  It was his fault; he left the house too late”  I continued to listen to Rosa Wandy, she was very pleasant and sweet, conversation was pleasant  I advised Rosa Saba that we do agree with her voicemail and assured her that we are going to propose some changes to benefit our elderly population  I thanked her for letting us know  Rosa Maria thanked me for the call stating “you didn’t have to call for that, it is ok” i advised Rosa Maria that Madeline Angel has an appointment today at 3:20 pm to which she playfully stated, “I’m going to tell him at 2:30 pm so he gets there on time; he is always late to everything”  I bid her a good day  Conversation ended

## 2023-03-09 NOTE — ASSESSMENT & PLAN NOTE
Likely secondary to CKD  Most recent hemoglobin in 2/2023 stable at 11 0 (patient's baseline appears to be around high 10s/11)  No overt signs of bleeding

## 2023-03-09 NOTE — ASSESSMENT & PLAN NOTE
BP Today: 148/70  At goal per JNC-8 guidelines  ECG done on 03/28/2022: normal sinus rhythm normal EKG  Echo done on 03/23/2022: EF 60%    - Continue Amlodipine 5 mg BID and Metoprolol 50 mg daily  - Counseled on lifestyle modifications (decrease salt intake, increase vegetables/fruits in diet, exercise for a minimum of 40 min a day for 3-4 days a week)    - Advised to go to emergency room if alarming symptoms occur (severe headache, visual changes, dizziness, sudden weakness or numbness, chest pain)

## 2023-03-09 NOTE — ASSESSMENT & PLAN NOTE
Patient has severe aortic stenosis  Follows with Cardiology- last saw in 2/2023- believe his aortic stenosis is likely critical    Cannot be referred for TAVR due to his severe kidney disease  Denies chest pain/discomfort, dyspnea, or palpitations      - Continue following with Cardiology

## 2023-03-13 ENCOUNTER — PATIENT OUTREACH (OUTPATIENT)
Dept: FAMILY MEDICINE CLINIC | Facility: CLINIC | Age: 88
End: 2023-03-13

## 2023-03-13 NOTE — PROGRESS NOTES
Per chart review [address] [place] [address] assisted Pt and closed the referral since the Pt need have been met  SW CM will close this referral today due to Pt need have been met  SW CM is remain available for further assistance as needed

## 2023-03-13 NOTE — PROGRESS NOTES
I called Rubio Horan to remind her of the patient's Echo scheduled for tomorrow at 2pm; she plans on him attending  I will continue to follow

## 2023-03-14 ENCOUNTER — HOSPITAL ENCOUNTER (OUTPATIENT)
Dept: NON INVASIVE DIAGNOSTICS | Facility: HOSPITAL | Age: 88
Discharge: HOME | End: 2023-03-14

## 2023-03-14 VITALS
WEIGHT: 120 LBS | DIASTOLIC BLOOD PRESSURE: 70 MMHG | BODY MASS INDEX: 22.08 KG/M2 | HEART RATE: 70 BPM | SYSTOLIC BLOOD PRESSURE: 148 MMHG | HEIGHT: 62 IN

## 2023-03-14 DIAGNOSIS — I35.0 AORTIC VALVE STENOSIS, SEVERE: ICD-10-CM

## 2023-03-14 LAB
AORTIC VALVE MEAN VELOCITY: 29.5 M/S
APICAL FOUR CHAMBER EJECTION FRACTION: 70 %
AV AREA BY CONTINUOUS VTI: 0.7 CM2
AV AREA PEAK VELOCITY: 0.5 CM2
AV LVOT MEAN GRADIENT: 2 MMHG
AV LVOT PEAK GRADIENT: 3 MMHG
AV MEAN GRADIENT: 42 MMHG
AV PEAK GRADIENT: 89 MMHG
AV VALVE AREA: 0.71 CM2
AV VELOCITY RATIO: 0.17
DOP CALC AO PEAK VEL: 4.73 M/S
DOP CALC AO VTI: 114.47 CM
DOP CALC LVOT AREA: 3.14 CM2
DOP CALC LVOT DIAMETER: 2 CM
DOP CALC LVOT PEAK VEL VTI: 25.89 CM
DOP CALC LVOT PEAK VEL: 0.82 M/S
DOP CALC LVOT STROKE INDEX: 51.9 ML/M2
DOP CALC LVOT STROKE VOLUME: 81.29
E WAVE DECELERATION TIME: 574 MS
MV E'TISSUE VEL-SEP: 5 CM/S
MV PEAK A VEL: 1.08 M/S
MV PEAK E VEL: 68 CM/S
MV STENOSIS PRESSURE HALF TIME: 166 MS
MV VALVE AREA P 1/2 METHOD: 1.33
RA PRESSURE ESTIMATED: 5 MMHG
RV PSP: 61 MMHG
SL CV LV EF: 65
TR MAX PG: 56 MMHG
TR PEAK VELOCITY: 3.7 M/S
TRICUSPID ANNULAR PLANE SYSTOLIC EXCURSION: 1.8 CM
TRICUSPID VALVE PEAK REGURGITATION VELOCITY: 3.73 M/S

## 2023-03-24 NOTE — RESULT ENCOUNTER NOTE
Please make an appointment for patient to come in and see me as soon as you find an available slot  You could give him the next available slot but put him on a cancellation list   I want to discuss with him his last echocardiogram which demonstrated that his aortic valve has gotten significantly worse

## 2023-04-18 PROBLEM — I35.0 AORTIC VALVE STENOSIS, CRITICAL: Status: ACTIVE | Noted: 2023-04-18

## 2023-06-07 ENCOUNTER — PATIENT OUTREACH (OUTPATIENT)
Dept: FAMILY MEDICINE CLINIC | Facility: CLINIC | Age: 88
End: 2023-06-07

## 2023-06-08 ENCOUNTER — PATIENT OUTREACH (OUTPATIENT)
Dept: FAMILY MEDICINE CLINIC | Facility: CLINIC | Age: 88
End: 2023-06-08

## 2023-06-08 NOTE — PROGRESS NOTES
I called Karyle Hector but again the call would not go through and I am unable to remind her of the patient's PCP appointment today

## 2023-08-14 ENCOUNTER — TELEPHONE (OUTPATIENT)
Dept: NEPHROLOGY | Facility: CLINIC | Age: 88
End: 2023-08-14

## 2023-08-14 NOTE — TELEPHONE ENCOUNTER
Called patient and spoke with Jose Santos to  reminding to please complete labwork prior to 8/25 appointment with Dr. Lorenza Acosta.

## 2023-08-16 NOTE — TELEPHONE ENCOUNTER
Left voicemail for the patient reminding to please complete labwork prior to 8/25 appointment with Dr. Elton García. Advised patient to call back with any questions or concerns.

## 2023-08-18 NOTE — TELEPHONE ENCOUNTER
Called patient and spoke with Hussain Davis to  reminding to please complete labwork prior to 8/25 appointment with Dr. Lizy Lyman.

## 2023-08-28 ENCOUNTER — TELEPHONE (OUTPATIENT)
Dept: NEPHROLOGY | Facility: CLINIC | Age: 88
End: 2023-08-28

## 2023-08-28 NOTE — TELEPHONE ENCOUNTER
Placed call to pt and spoke with pt wife, pt does not want to schedule trying to catch up on the medical bills from office visits. Wife Tone Echeverria will call back if pt wants to schedule, she apologized and thanked for the call.

## 2023-08-31 ENCOUNTER — PATIENT OUTREACH (OUTPATIENT)
Dept: FAMILY MEDICINE CLINIC | Facility: CLINIC | Age: 88
End: 2023-08-31

## 2023-08-31 NOTE — PROGRESS NOTES
Chart reviewed. Note from Neph states the patient is trying to catch up on bills and not scheduling appointments. I called CMOC to discuss and was told the patient should not have to pay medical bills as everything is covered. The patient is overdue for PCP, Neph and Cards appointments. I called Graciela Dooley but there was no answer and no option to leave a message; note sent to Dr Aquiles Schmidt. I will continue further outreach.

## 2023-09-08 ENCOUNTER — PATIENT OUTREACH (OUTPATIENT)
Dept: FAMILY MEDICINE CLINIC | Facility: CLINIC | Age: 88
End: 2023-09-08

## 2023-09-08 NOTE — PROGRESS NOTES
I called Alexis King but there was no answer and no option to leave a message. I called the patient's daughter but received her voicemail. Message was left asking for a return call.

## 2023-09-11 ENCOUNTER — PATIENT OUTREACH (OUTPATIENT)
Dept: FAMILY MEDICINE CLINIC | Facility: CLINIC | Age: 88
End: 2023-09-11

## 2023-09-11 DIAGNOSIS — Z59.9 INADEQUATE COMMUNITY RESOURCES: Primary | ICD-10-CM

## 2023-09-11 SDOH — ECONOMIC STABILITY - INCOME SECURITY: PROBLEM RELATED TO HOUSING AND ECONOMIC CIRCUMSTANCES, UNSPECIFIED: Z59.9

## 2023-09-11 NOTE — PROGRESS NOTES
Incoming / Rodrick Duarte Calls:  9/11/2023    Referral received from RN, Frankey Fermo K. Pt has missed appointments due to concerns about medical bills. [facility] reviewed chart. Pt does have Medicare Part A and [place]. Pt is also on a michelle care plan with [address] noticed pt's wife, Jacobo Merida had contact with Tahir CLARK A at Nephrology and pt canceled his appointment due to bills received. [facility] did TT/Teams message Tahir Rehman to discuss any outstanding bills. Lisa did call [facility] and discussed 2 outstanding bills. One from cardiology and the other from nephrology. CMOC was informed pt will need to apply for michelle care program individually with any specialist he sees. CMOC to assist in this process. [facility] called pt's wife, Jacobo Merida and explained this to her. She is unsure of bills received. CMOC offered to meet with her this week and call [facility] to inquire about any outstanding balances. Jacobo Merida agreed to meet.      Next outreach is scheduled for 9/15/2023 at [facility] at [facility].

## 2023-09-11 NOTE — PROGRESS NOTES
I received a call from the patient's wife Joe Lacey, who is returning my call regarding appointment scheduling. She states the patient does not want to reschedule specialist appointments because he is getting billed. I explained to her the patient should not be receiving bills; note sent to [facility]. Joe Lacey did state the patient's PCP visits are covered and is willing to schedule an appointment; note sent to clerical.  Once the appointment is scheduled, I will call Joe Lacey to inform her. I received a note from [facility], stating she will be meeting with the patient's wife later this week; referral placed.

## 2023-09-13 ENCOUNTER — PATIENT OUTREACH (OUTPATIENT)
Dept: FAMILY MEDICINE CLINIC | Facility: CLINIC | Age: 88
End: 2023-09-13

## 2023-09-13 NOTE — PROGRESS NOTES
Outgoing Calls:  9/13/2023    Chart reviewed. [address] [place] [address] discussed medical bills pt and his wife have been receiving although they have Medicare Part A and MA with Corrina Winslow, [address] [place] [address] Supervisor, on 9/11/23. She reviewed pt's MA status and noticed pt bills are being declined by MA as they are under the impression pt may have Medicare Part B and are requesting billing department to resubmit. Pt and his wife do not have Medicare Part B and should not be receiving bills at this time as they are fully covered with MA. Ana Anthony informed [address] [place] [address] to call Anthony Mendieta, Supervisor at [employer]. CMOC called Barry at [employer] and discussed concern with him. He reviewed case for both pt and his wife. He stated it is possible the system keeps queuing MA to bill to Medicare Part B which pt and wife do not have. After reviewing case record he states it is not an error on their end and encouraged [address] [place] [address] to call [address] and ask them to resolve issue on their end. Barry also informed pt will no longer be eligible for MA after January 2024 and pt's wife will no longer be eligible for MA after March 2024 due to excessive income. Barry stated that pt and his wife are now qualifying under the spend down program which was issued during pandemic. He states this takes place when someone receives a very high hospital bill and typically lasts only six months. Barry stated pt and wife will exceed financial guidelines by several hundred dollars. He stated that due to age they may qualify for waiver services and should consider applying as the income guidelines are different and if approved will be issued MA. [address] [place] [address] has offered waiver services to pt and his wife in the past and both declined. CMOC will bring up to them again. CMOC to call [address] tomorrow. Next outreach is scheduled for 9/14/2023.

## 2023-09-15 ENCOUNTER — PATIENT OUTREACH (OUTPATIENT)
Dept: FAMILY MEDICINE CLINIC | Facility: CLINIC | Age: 88
End: 2023-09-15

## 2023-09-15 NOTE — PROGRESS NOTES
In Person:  9/15/2023    [facility] met with pt's wife, Colby Donnelly at [facility] for scheduled appointment to discuss billing issues. CMOC explained to Colby Donnelly calls which took place on 9/13/23 with Barry at [facility]. CMOC explained [address] is kicking back bills assuming pt and wife have Medicare Part B although they do not. CMOC was told to call [address] to discuss further. CMOC facilitated this call with Colby Donnelly. We were instructed to email the Provider Relations Rep department and they will address billing issues. Reference number for this call is  [id number]. [facility] emailed said department and is waiting a response. CMOC explained to Colby Donnelly that next year both she and pt will lose their MA due to excessive income. This is per DPW. [facility] also explained she and pt may qualify for waiver services and with this would be fully covered with MA. Colby Donnelly declined services and stated that this service would be an interruption of their daily lives. CMOC expressed understanding. CMOC scheduled an appointment at  for pt. Pt will see PCP for a f/u on 9/27/23 at [address] [place] [address].     Next outreach is scheduled for 9/22/2023. Incoming Email:  [facility] received response from [address]:    Good afternoon,    I’ve added your dedicated Provider  to this response. They will follow up with you at their earliest opportunity. Thank you,  Gene Maradiaga  Provider  [email or web address]   Provider Services [phone]

## 2023-09-23 NOTE — PROGRESS NOTES
In Person:  5/20/2021    CHW did meet with pt at [facility] for scheduled appointment  CHW did assist pt in completing his MA renewal application  CHW did mail and fax the 29 page application to [address] [place] [address]  CHW did provide copy for pt  Next outreach is scheduled for 5/27/2021  Spiritual Care Progress Note  Supportive follow-up visit with the patient and her mom Revilyn for ongoing spiritual care and to assess for support needs.     Mell was sitting next to her mom on the sofa bed happily playing with her toys when writer entered. Both welcomed writer and the patient playfully engaged with writer using her play-anthony as a ball.   Mom explained the ongoing plan of care and is grateful that patient is very happy and playful during the current section of treatment. The family is looking forward to celebrate Gustabo's 3rd birthday on October 14th together with friends at [employer] in [place].    Mom also reflected on how the patient's diagnosis and treatment have changed their life style with a need for more isolation to protect Mell. The family's kahlil community community continues to journey with the family offering solid support.    Upon mom's request, writer returned in the afternoon to pray for Mell ongoing therapy and healing.  Writer continues to follow.     Referral source:     Reason for visit: Routine Visit    Visited with: Patient, Parent/Guardian (Mother)    Taxonomy:    · Intended Effects: Demonstrate caring and concern  · Methods: Offer spiritual/Muslim support, Encourage story-telling  · Interventions: Ask guided questions, Active listening, [place]    Spiritual Plan of Care: Routine follow up    Rev. Julio Ochoa  Pediatric Staff   Phone  (CARE) // Pager

## 2023-09-25 ENCOUNTER — PATIENT OUTREACH (OUTPATIENT)
Dept: FAMILY MEDICINE CLINIC | Facility: CLINIC | Age: 88
End: 2023-09-25

## 2023-09-25 NOTE — PROGRESS NOTES
Outgoing Calls:  9/25/2023    CMOC called Kim Pham , Provider  II to discuss billing issue on behalf of pt. [facility] received an email from [employer] on 9/15/23 informing a Specialist would reach out to [facility] but this has not happened. CMOC was informed Specialist assigned to pt's case is [facility]. CMOC called her number and VM is full. Will need to f/u. Reference ID is [id number]. Next outreach is scheduled for 9/28/2023.

## 2023-09-26 ENCOUNTER — PATIENT OUTREACH (OUTPATIENT)
Dept: FAMILY MEDICINE CLINIC | Facility: CLINIC | Age: 88
End: 2023-09-26

## 2023-09-26 NOTE — PROGRESS NOTES
I called Merlin Ba to remind her of the patient's appointment for tomorrow but she said she would like it rescheduled; note sent to clerical.  I will continue further outreach.

## 2023-09-27 ENCOUNTER — APPOINTMENT (OUTPATIENT)
Dept: LAB | Facility: CLINIC | Age: 88
End: 2023-09-27
Payer: COMMERCIAL

## 2023-09-27 DIAGNOSIS — N18.4 CHRONIC KIDNEY DISEASE, STAGE 4 (SEVERE) (HCC): ICD-10-CM

## 2023-09-27 LAB
ALBUMIN SERPL BCP-MCNC: 3.7 G/DL (ref 3.5–5)
ANION GAP SERPL CALCULATED.3IONS-SCNC: 10 MMOL/L
BUN SERPL-MCNC: 39 MG/DL (ref 5–25)
CALCIUM SERPL-MCNC: 8.8 MG/DL (ref 8.4–10.2)
CHLORIDE SERPL-SCNC: 107 MMOL/L (ref 96–108)
CO2 SERPL-SCNC: 23 MMOL/L (ref 21–32)
CREAT SERPL-MCNC: 1.95 MG/DL (ref 0.6–1.3)
CREAT UR-MCNC: 168 MG/DL
ERYTHROCYTE [DISTWIDTH] IN BLOOD BY AUTOMATED COUNT: 13.1 % (ref 11.6–15.1)
GFR SERPL CREATININE-BSD FRML MDRD: 29 ML/MIN/1.73SQ M
GLUCOSE SERPL-MCNC: 150 MG/DL (ref 65–140)
HCT VFR BLD AUTO: 33.7 % (ref 36.5–49.3)
HGB BLD-MCNC: 10.8 G/DL (ref 12–17)
MCH RBC QN AUTO: 35 PG (ref 26.8–34.3)
MCHC RBC AUTO-ENTMCNC: 32 G/DL (ref 31.4–37.4)
MCV RBC AUTO: 109 FL (ref 82–98)
PHOSPHATE SERPL-MCNC: 3.1 MG/DL (ref 2.3–4.1)
PLATELET # BLD AUTO: 104 THOUSANDS/UL (ref 149–390)
PMV BLD AUTO: 11.5 FL (ref 8.9–12.7)
POTASSIUM SERPL-SCNC: 4.7 MMOL/L (ref 3.5–5.3)
PROT UR-MCNC: 18 MG/DL
PROT/CREAT UR: 0.11 MG/G{CREAT} (ref 0–0.1)
PTH-INTACT SERPL-MCNC: 91.5 PG/ML (ref 12–88)
RBC # BLD AUTO: 3.09 MILLION/UL (ref 3.88–5.62)
SODIUM SERPL-SCNC: 140 MMOL/L (ref 135–147)
WBC # BLD AUTO: 4.61 THOUSAND/UL (ref 4.31–10.16)

## 2023-09-27 PROCEDURE — 80069 RENAL FUNCTION PANEL: CPT

## 2023-09-27 PROCEDURE — 84156 ASSAY OF PROTEIN URINE: CPT

## 2023-09-27 PROCEDURE — 36415 COLL VENOUS BLD VENIPUNCTURE: CPT

## 2023-09-27 PROCEDURE — 82570 ASSAY OF URINE CREATININE: CPT

## 2023-09-27 PROCEDURE — 85027 COMPLETE CBC AUTOMATED: CPT

## 2023-09-27 PROCEDURE — 83970 ASSAY OF PARATHORMONE: CPT

## 2023-09-28 ENCOUNTER — TELEPHONE (OUTPATIENT)
Dept: FAMILY MEDICINE CLINIC | Facility: CLINIC | Age: 88
End: 2023-09-28

## 2023-09-28 ENCOUNTER — TELEPHONE (OUTPATIENT)
Dept: NEPHROLOGY | Facility: CLINIC | Age: 88
End: 2023-09-28

## 2023-09-28 ENCOUNTER — PATIENT OUTREACH (OUTPATIENT)
Dept: FAMILY MEDICINE CLINIC | Facility: CLINIC | Age: 88
End: 2023-09-28

## 2023-09-28 NOTE — TELEPHONE ENCOUNTER
Recent blood test reviewed, kidney function is stable, other labs are acceptable. Please contact patient, arrange 6-month follow-up or first available AP. Last time patient was seen was on 02/2023.   Thanks,      CC patient's PCP to keep her updated

## 2023-09-28 NOTE — TELEPHONE ENCOUNTER
Patient left message to reschedule appointment from 9/27. Tried calling, but there was no answer, voicemail is full.

## 2023-09-28 NOTE — TELEPHONE ENCOUNTER
Called patient and left a voicemail with the above message. Advised to please call the office to let us know he received the message and if have any questions or concerns.

## 2023-09-28 NOTE — PROGRESS NOTES
Outgoing Calls / Email:  9/28/2023    CMOC called Kumar Hinds at [address] to discuss billing concerns on behalf of pt. [employer] has made several attempts to reach out to her since 9/15/23 and she has not returned calls, VM is always full. Today Padma answers and states she met with [address] at [facility] informed she has not as [employer] is working remotely today. Mikayla Arnold insisted she did meet with [employer] and stated that she left after asking what the concern was and was told there were no issues. [employer] asked Padma who she spoke with and when, Mikayla Arnold then stated, "hold up, something is wrong with my phone." Call was disconnected at that time. CMOC did attempt to call her three additional times and she does not answer calls. CMOC called Arun Trevino, who originally assigned Padma to pt's case. CMOC was informed by  Rep that [employer] is in a meeting but informed [employer] should call her line directly. CMOC called direct line and VM is not set up. [employer] emailed [employer] expressing concern about lack of attention in pt's case. CMOC to f/u. CMOC reached out to  and asked if Mikayla Arnold had reached out in reference to pt. CMOC was informed that Mikayla Arnold was present at the clinic however only requested information on whether or not the clinic has received new providers since her last visit in March. Next outreach is scheduled for 10/4/2023. Incoming Call:  [employer] received a call from [employer] she is having phone issues and is now available to assist with billing concerns. CMOC explained issue pt is having with medical bills. Padma informed she is not the correct person to speak with as she only deals with providers. [employer] informed she was instructed to call her and asked who would be the best person to speak with. Padma agreed to look into the matter further if [employer] provided billing/claim numbers. [employer] informed she will call billing office tomorrow and request this information.  Padma agreed to wait for call. Next outreach is scheduled for 9/2/2023.

## 2023-10-03 ENCOUNTER — PATIENT OUTREACH (OUTPATIENT)
Dept: FAMILY MEDICINE CLINIC | Facility: CLINIC | Age: 88
End: 2023-10-03

## 2023-10-03 NOTE — PROGRESS NOTES
Outgoing Call:  10/3/2023    Chart reviewed. CMOC called Mac Denver, Pre- at [facility] as she had previously assisted with billing issues on behalf of pt. Victoria Cross was able to identify billing issues on her end and stated that she will reach out to [address] tomorrow and attempt to fix the billing error. She informed [facility] she will call and provide an update. Next outreach is scheduled for 10/5/2023.

## 2023-10-06 ENCOUNTER — PATIENT OUTREACH (OUTPATIENT)
Dept: FAMILY MEDICINE CLINIC | Facility: CLINIC | Age: 88
End: 2023-10-06

## 2023-10-06 NOTE — TELEPHONE ENCOUNTER
Last seen March 2023. Missed his appt with me two weeks ago on 9/27/23. Please reschedule him with me or any other provider within the next 2 weeks.  Thanks

## 2023-10-06 NOTE — PROGRESS NOTES
Outgoing Call:  10/6/2023    Chart reviewed. CMOC called Kassidy Eduardo at [facility]. VM left requesting a call in return to discuss billing issues. Next outreach is scheduled for 10/10/2023.

## 2023-10-10 ENCOUNTER — PATIENT OUTREACH (OUTPATIENT)
Dept: FAMILY MEDICINE CLINIC | Facility: CLINIC | Age: 88
End: 2023-10-10

## 2023-10-10 NOTE — PROGRESS NOTES
Outgoing Calls:  10/10/2023    OC called Delphine Lin, Pre- at [facility] and left  requesting an update in billing issues she was hoping to resolve. [address] [place] [address] called pt's wife and provided an update. Tim Lopes confirmed pt will be attending his PCP appointment scheduled for tomorrow. Next outreach is scheduled for 10/17/2023.

## 2023-10-11 ENCOUNTER — OFFICE VISIT (OUTPATIENT)
Dept: FAMILY MEDICINE CLINIC | Facility: CLINIC | Age: 88
End: 2023-10-11

## 2023-10-11 VITALS
RESPIRATION RATE: 16 BRPM | SYSTOLIC BLOOD PRESSURE: 140 MMHG | HEIGHT: 62 IN | DIASTOLIC BLOOD PRESSURE: 70 MMHG | WEIGHT: 117 LBS | OXYGEN SATURATION: 98 % | BODY MASS INDEX: 21.53 KG/M2 | TEMPERATURE: 98.7 F | HEART RATE: 80 BPM

## 2023-10-11 DIAGNOSIS — I10 ESSENTIAL HYPERTENSION: Chronic | ICD-10-CM

## 2023-10-11 DIAGNOSIS — Z23 FLU VACCINE NEED: ICD-10-CM

## 2023-10-11 DIAGNOSIS — D63.1 ANEMIA IN STAGE 4 CHRONIC KIDNEY DISEASE: ICD-10-CM

## 2023-10-11 DIAGNOSIS — N18.4 ANEMIA IN STAGE 4 CHRONIC KIDNEY DISEASE: ICD-10-CM

## 2023-10-11 DIAGNOSIS — I35.0 AORTIC VALVE STENOSIS, CRITICAL: Primary | ICD-10-CM

## 2023-10-11 DIAGNOSIS — N18.4 CHRONIC KIDNEY DISEASE, STAGE 4 (SEVERE) (HCC): ICD-10-CM

## 2023-10-11 DIAGNOSIS — N25.81 SECONDARY HYPERPARATHYROIDISM OF RENAL ORIGIN (HCC): ICD-10-CM

## 2023-10-11 PROCEDURE — 99214 OFFICE O/P EST MOD 30 MIN: CPT | Performed by: FAMILY MEDICINE

## 2023-10-11 NOTE — PROGRESS NOTES
Assessment/Plan: Aortic valve stenosis, critical  -Saw cardiologist in April 2023. Has severe aortic stenosis but it was decided to do nothing since patient had no symptoms nor does he have any insight to the gravity of his condition.   -Cardiologist mentioned if patient becomes symptomatic, he will need to be reevaluated and the decision might change.  -Patient remains asymptomatic    Essential hypertension  BP stable on amlodipine 5 mg twice per day and metoprolol 50 mg daily    Secondary hyperparathyroidism of renal origin ([address])  PTH 91.5 in September 2023 decreased 134.4 in February 2023  Following with nephrology    Chronic kidney disease, stage 4 (severe) ([address])  Lab Results   Component Value Date    EGFR 29 09/27/2023    EGFR 25 02/14/2023    EGFR 26 09/26/2022    CREATININE 1.95 (H) 09/27/2023    CREATININE 2.17 (H) 02/14/2023    CREATININE 2.11 (H) 09/26/2022     Stable  Continue avoiding NSAIDS and staying hydrated  Last time saw nephrology was in February 2023 - needs to schedule for 6 month follow-up    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   Lab Results   Component Value Date    EGFR 29 09/27/2023    EGFR 25 02/14/2023    EGFR 26 09/26/2022    CREATININE 1.95 (H) 09/27/2023    CREATININE 2.17 (H) 02/14/2023    CREATININE 2.11 (H) 09/26/2022       Hb 10.8 in September 2023  Stable  Following with Nephrology       Diagnoses and all orders for this visit:    Aortic valve stenosis, critical    Essential hypertension    Secondary hyperparathyroidism of renal origin ([address])    Chronic kidney disease, stage 4 (severe) ([facility])    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 male. Saw cardiologist in April 2023.  Has severe aortic stenosis but it was decided to do nothing since patient had no symptoms nor does he have any insight to the gravity of his condition. Cardiologist mentioned if patient becomes symptomatic, he will need to be reevaluated and the decision might change. The following portions of the patient's history were reviewed and updated as appropriate: allergies, current medications, past family history, past medical history, past social history, past surgical history, and problem list.    Review of Systems   Constitutional:  Negative for appetite change, fatigue, fever and unexpected weight change. HENT:  Negative for congestion, ear pain, postnasal drip, rhinorrhea and sore throat. Eyes:  Negative for pain and visual disturbance. Respiratory:  Negative for cough, chest tightness and shortness of breath. Cardiovascular:  Negative for chest pain, palpitations and leg swelling. Gastrointestinal:  Negative for abdominal pain, constipation, diarrhea, nausea and vomiting. Genitourinary:  Negative for difficulty urinating and dysuria. Musculoskeletal:  Negative for arthralgias and back pain. Skin:  Negative for rash. Neurological:  Negative for dizziness, numbness and headaches. Psychiatric/Behavioral:  Negative for behavioral problems and suicidal ideas. The patient is not nervous/anxious. Objective:      /70 (BP Location: Right arm, Patient Position: Sitting, Cuff Size: Standard)   Pulse 80   Temp 98.7 °F (37.1 °C) (Temporal)   Resp 16   Ht 5' 2" (1.575 m)   Wt 53.1 kg (117 lb)   SpO2 98%   BMI 21.40 kg/m²          Physical Exam  Constitutional:       Appearance: He is well-developed. HENT:      Head: Normocephalic and atraumatic. Right Ear: External ear normal.      Left Ear: External ear normal.      Nose: Nose normal.   Eyes:      Conjunctiva/sclera: Conjunctivae normal.      Pupils: Pupils are equal, round, and reactive to light. Cardiovascular:      Rate and Rhythm: Normal rate and regular rhythm.       Heart sounds: Normal heart sounds. Pulmonary:      Effort: Pulmonary effort is normal.      Breath sounds: Normal breath sounds. Abdominal:      General: Bowel sounds are normal.      Palpations: Abdomen is soft. Musculoskeletal:         General: Normal range of motion. Cervical back: Normal range of motion and neck supple. Skin:     General: Skin is warm. Neurological:      Mental Status: He is alert and oriented to person, place, and time.    Psychiatric:         Behavior: Behavior normal.

## 2023-10-11 NOTE — ASSESSMENT & PLAN NOTE
-Saw cardiologist in April 2023.  Has severe aortic stenosis but it was decided to do nothing since patient had no symptoms nor does he have any insight to the gravity of his condition.   -Cardiologist mentioned if patient becomes symptomatic, he will need to be reevaluated and the decision might change.  -Patient remains asymptomatic
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
## 2023-10-13 PROBLEM — K80.21 CHOLELITHIASIS WITH BILIARY OBSTRUCTION: Status: RESOLVED | Noted: 2018-09-28 | Resolved: 2023-10-13

## 2023-10-13 PROBLEM — R60.0 LOWER EXTREMITY EDEMA: Status: RESOLVED | Noted: 2018-10-10 | Resolved: 2023-10-13

## 2023-10-13 PROBLEM — Z02.89 ENCOUNTER FOR COMPLETION OF FORM WITH PATIENT: Status: RESOLVED | Noted: 2022-07-12 | Resolved: 2023-10-13

## 2023-10-13 PROBLEM — N18.4 ANEMIA IN STAGE 4 CHRONIC KIDNEY DISEASE: Status: RESOLVED | Noted: 2018-10-01 | Resolved: 2023-10-13

## 2023-10-13 PROBLEM — D63.1 ANEMIA IN STAGE 4 CHRONIC KIDNEY DISEASE: Status: RESOLVED | Noted: 2018-10-01 | Resolved: 2023-10-13
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
## 2023-10-13 NOTE — ASSESSMENT & PLAN NOTE
Lab Results   Component Value Date    EGFR 29 09/27/2023    EGFR 25 02/14/2023    EGFR 26 09/26/2022    CREATININE 1.95 (H) 09/27/2023    CREATININE 2.17 (H) 02/14/2023    CREATININE 2.11 (H) 09/26/2022       Hb 10.8 in September 2023  Stable  Following with Nephrology

## 2023-10-13 NOTE — ASSESSMENT & PLAN NOTE
Lab Results   Component Value Date    EGFR 29 09/27/2023    EGFR 25 02/14/2023    EGFR 26 09/26/2022    CREATININE 1.95 (H) 09/27/2023    CREATININE 2.17 (H) 02/14/2023    CREATININE 2.11 (H) 09/26/2022     Stable  Continue avoiding NSAIDS and staying hydrated  Last time saw nephrology was in February 2023 - needs to schedule for 6 month follow-up

## 2023-10-18 ENCOUNTER — PATIENT OUTREACH (OUTPATIENT)
Dept: FAMILY MEDICINE CLINIC | Facility: CLINIC | Age: 88
End: 2023-10-18

## 2023-10-18 NOTE — PROGRESS NOTES
Outgoing Calls:  10/18/2023    [facility] called Zhen Grande at [facility] to discuss status of Pt bills which were resubmitted for payment to insurance company. [facility] has made two previous attempts to reach out to Ranger Harjinder and have been unsuccessful in reaching her. Calls have not been returned. [facility] called main billing number and was assisted by Julee Beltran who informed two bills have now been paid on 10/11/2023 in the amounts of $210 each. Currently two other bills in the amounts of $235 and $148 are still pending with insurance. [facility] was informed by Julee Beltran she is unsure how [id number] [facility] will take to resolve billing issue. [facility] informed to check back next week. Next outreach is scheduled for 10/25/2023.

## 2023-10-23 DIAGNOSIS — D64.9 ANEMIA, UNSPECIFIED TYPE: ICD-10-CM

## 2023-10-25 ENCOUNTER — PATIENT OUTREACH (OUTPATIENT)
Dept: FAMILY MEDICINE CLINIC | Facility: CLINIC | Age: 88
End: 2023-10-25

## 2023-10-25 NOTE — PROGRESS NOTES
Outgoing Call:  10/25/2023    [facility] called [address] and spoke with Clement Sr to discuss two outstanding medical bills, one for $148 and the other for $235. Today Clement Sr informs the bills are actually for $148 and $87. She states bills are still pending for payment with insurance, [facility] to f/u. Next outreach is scheduled for 11/1/2023.

## 2023-10-26 RX ORDER — FERROUS SULFATE 325(65) MG
1 TABLET ORAL
Qty: 90 TABLET | Refills: 1 | Status: SHIPPED | OUTPATIENT
Start: 2023-10-26

## 2023-11-01 ENCOUNTER — PATIENT OUTREACH (OUTPATIENT)
Dept: FAMILY MEDICINE CLINIC | Facility: CLINIC | Age: 88
End: 2023-11-01

## 2023-11-01 NOTE — PROGRESS NOTES
Incoming Call:  11/1/2023    [facility] received a call from Mrs. Kavya Ivory with the [facility]. She states [facility] was listed as an authorized representative on Pt's appeals request. [facility] was informed Pt is requesting an expedited hearing for denial of benefits. Mrs. Kavya Ivory reviewed Pt's case with [facility] and informed as of November Pt and his wife will no longer be eligible for MA and SNAP benefits. She informs there is nothing that can be done as there is "no way around income guidelines." She informed CMOC that an unearned deductions list will be mailed to both Pt and [facility] for review. She does not believe it will help but that maybe Pt can claim some deductions. She states it is rare that applicants can claim more than $15 in deductions and Pt is over by $86. She asked CMOC to review with Pt once received and if Pt meets criteria and can claim deductions in excess of $86 he can request to move forward with the hearing. She informed Pt and wife may be considered for some sort of MA in the future if new medical bills are in excess of $15,000. CMOC to f/u. Next outreach is scheduled for 11/3/2023.

## 2023-11-03 ENCOUNTER — PATIENT OUTREACH (OUTPATIENT)
Dept: FAMILY MEDICINE CLINIC | Facility: CLINIC | Age: 88
End: 2023-11-03

## 2023-11-06 NOTE — PROGRESS NOTES
Outgoing Call:  11/3/2023  (Late entry)    [facility] discussed Pt MA and Medicare issue with Financial Counselor, Ike GRIMALDO. She informed Pt's case has been extensively worked on and all options have been exhausted. She informs there is nothing further that can be done in reference to Pt not receiving MA or Medicare Part B. She informs when she previously worked on [employer] as well as MA application all was reviewed with Supervisor Barry GAMEZ at [facility] and he informed Pt does not meet criteria. Due to current income and resources Pt is not eligible for Buy In or MA.     [facility] called [address] and left  requesting a call in return in reference to two outstanding balances. Next outreach is scheduled for 11/10/2023.

## 2023-11-10 ENCOUNTER — PATIENT OUTREACH (OUTPATIENT)
Dept: FAMILY MEDICINE CLINIC | Facility: CLINIC | Age: 88
End: 2023-11-10

## 2023-11-10 NOTE — PROGRESS NOTES
Outgoing Calls:  11/10/2023    CMOC called [address] to discuss two outstanding medical bills which were under review. [facility] spoke with Denise who reviewed Pt's chart and informed Pt currently does not have any outstanding balances. [facility] called Pt's wife, Tristan Ortega to discuss this however she was on a call with a Medicare rep and informed she will call [facility] back. OC to f/u. Next outreach is scheduled for 11/17/2023.

## 2023-11-21 ENCOUNTER — PATIENT OUTREACH (OUTPATIENT)
Dept: FAMILY MEDICINE CLINIC | Facility: CLINIC | Age: 88
End: 2023-11-21

## 2023-11-21 NOTE — PROGRESS NOTES
Outgoing Call:  11/21/2023    [facility] called Pt's wife, Jose Shearer, to discuss most recent call with [facility] and [employer]. CMOC informed Jose Shearer most recent bills she was concerned about have been paid for and Pt now has a zero balance. [facility] also discussed she and Pt will lose their MA as they are over the income limit. Rosa Maria informed [facility] she was on a call with Lisbeth Pitts last week and they informed her that she and Pt will have Medicare Part B beginning in January. She states she enrolled bothe her and Pt for [employer] coverage. She is confident that she received the correct information. [facility] informed she could call back in January if she has not received insurance cards. [facility] completed a Promise Check today via [place] [employer] and both Rosa Maria and Pt do not have MA. Rosa Maria thanked [facility] for her time and is aware this referral will be closed today. No further outreach is scheduled.

## 2024-01-05 NOTE — ASSESSMENT & PLAN NOTE
----- Message from Esmer Schmitt sent at 1/5/2024  2:21 PM CST -----  Regarding: Sooner Appt  Contact: pt @ [phone]  Pt is calling to speak to someone in the office; asking for a sooner appt than what they are scheduled for. Pt is already on the wait list; no available appts in Epic that are coming up soon. Pt is asking to speak to someone in the office. Please call to advise. Thanks.         Reason for sooner appt: Pt states he is having numbness in his feet            Recently admitted at [facility] for Klebsiella E coli bacteremia secondary to choledocholithiasis  Had ERCP and completed course of antibiotics x14 days

## 2024-01-16 ENCOUNTER — PATIENT OUTREACH (OUTPATIENT)
Dept: FAMILY MEDICINE CLINIC | Facility: CLINIC | Age: 89
End: 2024-01-16

## 2024-01-16 NOTE — PROGRESS NOTES
I called Rosa Maria to remind her of the patient's PCP appointment for tomorrow.  She would like to reschedule because of the weather; note sent to clerical.  Rosa Maria reports the patient has been doing well noting he is eating and drinking.  She notes he continues to have some difficulty with mobility.  She states he has been driving as well.  Rosa Maria reports the patient had his RSV vaccine and flu.  Chart reviewed:  I do not see the patient having his flu or pneum vaccines; note sent to PCP.

## 2024-02-06 ENCOUNTER — PATIENT OUTREACH (OUTPATIENT)
Dept: FAMILY MEDICINE CLINIC | Facility: CLINIC | Age: 89
End: 2024-02-06

## 2024-02-06 NOTE — PROGRESS NOTES
I called Rosa Maria to remind her of the patient's appointment tomorrow at 330; she plans on him attending.  I will continue to follow.

## 2024-02-07 ENCOUNTER — OFFICE VISIT (OUTPATIENT)
Dept: FAMILY MEDICINE CLINIC | Facility: CLINIC | Age: 89
End: 2024-02-07

## 2024-02-07 VITALS
SYSTOLIC BLOOD PRESSURE: 140 MMHG | HEIGHT: 60 IN | WEIGHT: 116.38 LBS | DIASTOLIC BLOOD PRESSURE: 64 MMHG | OXYGEN SATURATION: 98 % | BODY MASS INDEX: 22.85 KG/M2 | TEMPERATURE: 96.8 F | RESPIRATION RATE: 18 BRPM | HEART RATE: 85 BPM

## 2024-02-07 DIAGNOSIS — N25.81 SECONDARY HYPERPARATHYROIDISM OF RENAL ORIGIN (HCC): ICD-10-CM

## 2024-02-07 DIAGNOSIS — D63.1 ANEMIA IN STAGE 4 CHRONIC KIDNEY DISEASE: ICD-10-CM

## 2024-02-07 DIAGNOSIS — I10 ESSENTIAL HYPERTENSION: Primary | Chronic | ICD-10-CM

## 2024-02-07 DIAGNOSIS — H61.23 BILATERAL IMPACTED CERUMEN: ICD-10-CM

## 2024-02-07 DIAGNOSIS — I35.0 AORTIC VALVE STENOSIS, CRITICAL: ICD-10-CM

## 2024-02-07 DIAGNOSIS — N18.4 CHRONIC KIDNEY DISEASE, STAGE 4 (SEVERE) (HCC): ICD-10-CM

## 2024-02-07 DIAGNOSIS — N18.4 ANEMIA IN STAGE 4 CHRONIC KIDNEY DISEASE: ICD-10-CM

## 2024-02-07 DIAGNOSIS — D69.6 THROMBOCYTOPENIA (HCC): ICD-10-CM

## 2024-02-07 PROCEDURE — 99214 OFFICE O/P EST MOD 30 MIN: CPT | Performed by: FAMILY MEDICINE

## 2024-02-07 RX ORDER — FERROUS SULFATE 325(65) MG
1 TABLET ORAL
Qty: 90 TABLET | Refills: 1 | Status: SHIPPED | OUTPATIENT
Start: 2024-02-07

## 2024-02-07 RX ORDER — METOPROLOL TARTRATE 50 MG/1
50 TABLET, FILM COATED ORAL DAILY
Qty: 90 TABLET | Refills: 1 | Status: SHIPPED | OUTPATIENT
Start: 2024-02-07

## 2024-02-07 RX ORDER — AMLODIPINE BESYLATE 5 MG/1
5 TABLET ORAL 2 TIMES DAILY
Qty: 180 TABLET | Refills: 1 | Status: SHIPPED | OUTPATIENT
Start: 2024-02-07 | End: 2025-02-01

## 2024-02-07 NOTE — PATIENT INSTRUCTIONS
Use ear drops twice per day for two weeks. If no resolution, may call office to schedule appointment for ear wax removal.    Go for blood work within the next 1-2 weeks.    You are due to see your heart doctor and kidney doctor. Please call and schedule an appointment with each of them.

## 2024-02-18 NOTE — ASSESSMENT & PLAN NOTE
-PTH 91.5 in September 2023 decreased 134.4 in February 2023  -Last saw nephrologist was February 2023 - encouraged patient to call and schedule follow-up but patient refused - he does not feel the need to see nephrologist since he states he feels good - still encouraged to call and schedule  -Check PTH

## 2024-02-18 NOTE — PROGRESS NOTES
Assessment/Plan:    Secondary hyperparathyroidism of renal origin ([facility])  -PTH 91.5 in September 2023 decreased 134.4 in February 2023  -Last saw nephrologist was February 2023 - encouraged patient to call and schedule follow-up but patient refused - he does not feel the need to see nephrologist since he states he feels good - still encouraged to call and schedule  -Check PTH    Essential hypertension  BP stable on amlodipine 5 mg twice per day and metoprolol 50 mg daily    Aortic valve stenosis, critical  -Saw cardiologist in April 2023. Has severe aortic stenosis but it was decided to do nothing since patient had no symptoms nor does he have any insight to the gravity of his condition.   -Cardiologist mentioned if patient becomes symptomatic, he will need to be reevaluated and the decision might change.  -Patient remains asymptomatic - patient does not feel the need to follow-up with cardiologist at this time since he states he feels good - still encouraged to call and schedule    Thrombocytopenia ([facility])  Platelets 104 in September 2023  Repeat CBC    Chronic kidney disease, stage 4 (severe) ([facility])  Lab Results   Component Value Date    EGFR 29 09/27/2023    EGFR 25 02/14/2023    EGFR 26 09/26/2022    CREATININE 1.95 (H) 09/27/2023    CREATININE 2.17 (H) 02/14/2023    CREATININE 2.11 (H) 09/26/2022     -Continue avoiding NSAIDS and staying hydrated  -Last saw nephrologist was February 2023 - encouraged patient to call and schedule follow-up but patient refused - he does not feel the need to see nephrologist since he states he feels good - still encouraged to call and schedule    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   Lab Results   Component Value Date    EGFR 29 09/27/2023    EGFR 25 02/14/2023    EGFR 26 09/26/2022    CREATININE 1.95 (H) 09/27/2023    CREATININE 2.17 (H) 02/14/2023    CREATININE 2.11 (H) 09/26/2022     Hb 10.8 in September 2023  Refilled ferrous sulfate  Repeat CBC and iron panel    Bilateral  impacted cerumen  Sent in script for debrox otic solution   If no resolution, may need ear lavage in our office        Diagnoses and all orders for this visit:    Essential hypertension  -     amLODIPine (NORVASC) 5 mg tablet; Take 1 tablet (5 mg total) by mouth 2 (two) times a day  -     metoprolol tartrate (LOPRESSOR) 50 mg tablet; Take 1 tablet (50 mg total) by mouth daily    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   -     ferrous sulfate (FeroSul) 325 (65 Fe) mg tablet; Take 1 tablet (325 mg total) by mouth daily with breakfast  -     CBC and differential; Future  -     Iron Panel (Includes Ferritin, Iron Sat%, Iron, and TIBC); Future  -     Comprehensive metabolic panel; Future    Secondary hyperparathyroidism of renal origin (H[facility])  -     PTH, intact; Future    Thrombocytopenia ([facility])    Bilateral impacted cerumen  -     carbamide peroxide (DEBROX) 6.5 % otic solution; Administer 5 drops into both ears 2 (two) times a day    Aortic valve stenosis, critical    Chronic kidney disease, stage 4 (severe) ([facility])          Subjective:      Patient ID: Arlyn L Follweiler is a 89 y.o. male.    Here for medication refills. Offers no complaints.    Does not want to go back to see cardiologist or nephrologist or any other specialist. States he feels good.         The following portions of the patient's history were reviewed and updated as appropriate: allergies, current medications, past family history, past medical history, past social history, past surgical history, and problem list.    Review of Systems   Constitutional:  Negative for appetite change, fatigue, fever and unexpected weight change.   HENT:  Negative for congestion, ear pain, postnasal drip, rhinorrhea and sore throat.    Eyes:  Negative for pain and visual disturbance.   Respiratory:  Negative for cough, chest tightness and shortness of breath.    Cardiovascular:  Negative for chest pain, palpitations and leg swelling.   Gastrointestinal:  Negative for  "abdominal pain, constipation, diarrhea, nausea and vomiting.   Genitourinary:  Negative for difficulty urinating and dysuria.   Musculoskeletal:  Negative for arthralgias and back pain.   Skin:  Negative for rash.   Neurological:  Negative for dizziness, numbness and headaches.   Psychiatric/Behavioral:  Negative for behavioral problems and suicidal ideas. The patient is not nervous/anxious.          Objective:      /64 (BP Location: Right arm, Patient Position: Sitting, Cuff Size: Standard)   Pulse 85   Temp (!) 96.8 °F (36 °C) (Temporal)   Resp 18   Ht 4' 9\" (1.448 m)   Wt 52.8 kg (116 lb 6 oz)   SpO2 98%   BMI 25.18 kg/m²          Physical Exam  Constitutional:       Appearance: He is well-developed.   HENT:      Head: Normocephalic and atraumatic.      Right Ear: External ear normal.      Left Ear: External ear normal.      Nose: Nose normal.   Eyes:      Conjunctiva/sclera: Conjunctivae normal.      Pupils: Pupils are equal, round, and reactive to light.   Cardiovascular:      Rate and Rhythm: Normal rate and regular rhythm.      Heart sounds: Murmur heard.   Pulmonary:      Effort: Pulmonary effort is normal.      Breath sounds: Normal breath sounds.   Abdominal:      General: Bowel sounds are normal.      Palpations: Abdomen is soft.   Musculoskeletal:         General: Normal range of motion.      Cervical back: Normal range of motion and neck supple.   Skin:     General: Skin is warm.   Neurological:      Mental Status: He is alert and oriented to person, place, and time.   Psychiatric:         Behavior: Behavior normal.           "

## 2024-02-18 NOTE — ASSESSMENT & PLAN NOTE
-Saw cardiologist in April 2023. Has severe aortic stenosis but it was decided to do nothing since patient had no symptoms nor does he have any insight to the gravity of his condition.   -Cardiologist mentioned if patient becomes symptomatic, he will need to be reevaluated and the decision might change.  -Patient remains asymptomatic - patient does not feel the need to follow-up with cardiologist at this time since he states he feels good - still encouraged to call and schedule

## 2024-02-18 NOTE — ASSESSMENT & PLAN NOTE
Lab Results   Component Value Date    EGFR 29 09/27/2023    EGFR 25 02/14/2023    EGFR 26 09/26/2022    CREATININE 1.95 (H) 09/27/2023    CREATININE 2.17 (H) 02/14/2023    CREATININE 2.11 (H) 09/26/2022     Hb 10.8 in September 2023  Refilled ferrous sulfate  Repeat CBC and iron panel

## 2024-02-18 NOTE — ASSESSMENT & PLAN NOTE
Lab Results   Component Value Date    EGFR 29 09/27/2023    EGFR 25 02/14/2023    EGFR 26 09/26/2022    CREATININE 1.95 (H) 09/27/2023    CREATININE 2.17 (H) 02/14/2023    CREATININE 2.11 (H) 09/26/2022     -Continue avoiding NSAIDS and staying hydrated  -Last saw nephrologist was February 2023 - encouraged patient to call and schedule follow-up but patient refused - he does not feel the need to see nephrologist since he states he feels good - still encouraged to call and schedule

## 2024-02-21 PROBLEM — H61.23 BILATERAL IMPACTED CERUMEN: Status: RESOLVED | Noted: 2021-05-06 | Resolved: 2024-02-21

## 2024-02-27 PROBLEM — I12.9 HYPERTENSIVE CKD (CHRONIC KIDNEY DISEASE): Status: ACTIVE | Noted: 2024-02-27

## 2024-04-30 ENCOUNTER — PATIENT OUTREACH (OUTPATIENT)
Dept: FAMILY MEDICINE CLINIC | Facility: CLINIC | Age: 89
End: 2024-04-30

## 2024-04-30 NOTE — PROGRESS NOTES
I called Rosa Maria to remind her the patient needs labs drawn. I encouraged her to have him go to a lab this week so the results can be reviewed at his appointment next week.  She states she will try.  She notes it will be difficult for him not to eat.  I offered the  [facility] but she declined.

## 2024-05-06 ENCOUNTER — APPOINTMENT (OUTPATIENT)
Dept: LAB | Facility: CLINIC | Age: 89
End: 2024-05-06
Payer: COMMERCIAL

## 2024-05-06 DIAGNOSIS — N25.81 SECONDARY HYPERPARATHYROIDISM OF RENAL ORIGIN (HCC): ICD-10-CM

## 2024-05-06 DIAGNOSIS — N18.4 ANEMIA IN STAGE 4 CHRONIC KIDNEY DISEASE  (HCC): ICD-10-CM

## 2024-05-06 DIAGNOSIS — D63.1 ANEMIA IN STAGE 4 CHRONIC KIDNEY DISEASE  (HCC): ICD-10-CM

## 2024-05-06 LAB
ALBUMIN SERPL BCP-MCNC: 3.7 G/DL (ref 3.5–5)
ALP SERPL-CCNC: 66 U/L (ref 34–104)
ALT SERPL W P-5'-P-CCNC: 26 U/L (ref 7–52)
ANION GAP SERPL CALCULATED.3IONS-SCNC: 9 MMOL/L (ref 4–13)
AST SERPL W P-5'-P-CCNC: 30 U/L (ref 13–39)
BASOPHILS # BLD AUTO: 0.03 THOUSANDS/ÂΜL (ref 0–0.1)
BASOPHILS NFR BLD AUTO: 1 % (ref 0–1)
BILIRUB SERPL-MCNC: 1.34 MG/DL (ref 0.2–1)
BUN SERPL-MCNC: 46 MG/DL (ref 5–25)
CALCIUM SERPL-MCNC: 8.6 MG/DL (ref 8.4–10.2)
CHLORIDE SERPL-SCNC: 110 MMOL/L (ref 96–108)
CO2 SERPL-SCNC: 23 MMOL/L (ref 21–32)
CREAT SERPL-MCNC: 1.92 MG/DL (ref 0.6–1.3)
EOSINOPHIL # BLD AUTO: 0.09 THOUSAND/ÂΜL (ref 0–0.61)
EOSINOPHIL NFR BLD AUTO: 2 % (ref 0–6)
ERYTHROCYTE [DISTWIDTH] IN BLOOD BY AUTOMATED COUNT: 13.4 % (ref 11.6–15.1)
FERRITIN SERPL-MCNC: 352 NG/ML (ref 24–336)
GFR SERPL CREATININE-BSD FRML MDRD: 29 ML/MIN/1.73SQ M
GLUCOSE P FAST SERPL-MCNC: 152 MG/DL (ref 65–99)
HCT VFR BLD AUTO: 32.3 % (ref 36.5–49.3)
HGB BLD-MCNC: 10.5 G/DL (ref 12–17)
IMM GRANULOCYTES # BLD AUTO: 0.01 THOUSAND/UL (ref 0–0.2)
IMM GRANULOCYTES NFR BLD AUTO: 0 % (ref 0–2)
IRON SATN MFR SERPL: 45 % (ref 15–50)
IRON SERPL-MCNC: 99 UG/DL (ref 50–212)
LYMPHOCYTES # BLD AUTO: 0.74 THOUSANDS/ÂΜL (ref 0.6–4.47)
LYMPHOCYTES NFR BLD AUTO: 16 % (ref 14–44)
MCH RBC QN AUTO: 34.9 PG (ref 26.8–34.3)
MCHC RBC AUTO-ENTMCNC: 32.5 G/DL (ref 31.4–37.4)
MCV RBC AUTO: 107 FL (ref 82–98)
MONOCYTES # BLD AUTO: 0.51 THOUSAND/ÂΜL (ref 0.17–1.22)
MONOCYTES NFR BLD AUTO: 11 % (ref 4–12)
NEUTROPHILS # BLD AUTO: 3.15 THOUSANDS/ÂΜL (ref 1.85–7.62)
NEUTS SEG NFR BLD AUTO: 70 % (ref 43–75)
NRBC BLD AUTO-RTO: 0 /100 WBCS
PLATELET # BLD AUTO: 116 THOUSANDS/UL (ref 149–390)
PMV BLD AUTO: 11.4 FL (ref 8.9–12.7)
POTASSIUM SERPL-SCNC: 4.9 MMOL/L (ref 3.5–5.3)
PROT SERPL-MCNC: 6.4 G/DL (ref 6.4–8.4)
PTH-INTACT SERPL-MCNC: 102.3 PG/ML (ref 12–88)
RBC # BLD AUTO: 3.01 MILLION/UL (ref 3.88–5.62)
SODIUM SERPL-SCNC: 142 MMOL/L (ref 135–147)
TIBC SERPL-MCNC: 221 UG/DL (ref 250–450)
UIBC SERPL-MCNC: 122 UG/DL (ref 155–355)
WBC # BLD AUTO: 4.53 THOUSAND/UL (ref 4.31–10.16)

## 2024-05-06 PROCEDURE — 80053 COMPREHEN METABOLIC PANEL: CPT

## 2024-05-06 PROCEDURE — 36415 COLL VENOUS BLD VENIPUNCTURE: CPT

## 2024-05-06 PROCEDURE — 85025 COMPLETE CBC W/AUTO DIFF WBC: CPT

## 2024-05-06 PROCEDURE — 83970 ASSAY OF PARATHORMONE: CPT

## 2024-05-06 PROCEDURE — 83540 ASSAY OF IRON: CPT

## 2024-05-06 PROCEDURE — 83550 IRON BINDING TEST: CPT

## 2024-05-06 PROCEDURE — 82728 ASSAY OF FERRITIN: CPT

## 2024-05-08 ENCOUNTER — OFFICE VISIT (OUTPATIENT)
Dept: FAMILY MEDICINE CLINIC | Facility: CLINIC | Age: 89
End: 2024-05-08

## 2024-05-08 VITALS
DIASTOLIC BLOOD PRESSURE: 64 MMHG | HEART RATE: 79 BPM | RESPIRATION RATE: 18 BRPM | SYSTOLIC BLOOD PRESSURE: 123 MMHG | HEIGHT: 60 IN | BODY MASS INDEX: 22.58 KG/M2 | OXYGEN SATURATION: 98 % | TEMPERATURE: 98.7 F | WEIGHT: 115 LBS

## 2024-05-08 DIAGNOSIS — D69.6 THROMBOCYTOPENIA (HCC): ICD-10-CM

## 2024-05-08 DIAGNOSIS — N25.81 SECONDARY HYPERPARATHYROIDISM OF RENAL ORIGIN (HCC): ICD-10-CM

## 2024-05-08 DIAGNOSIS — D63.1 ANEMIA IN STAGE 4 CHRONIC KIDNEY DISEASE  (HCC): ICD-10-CM

## 2024-05-08 DIAGNOSIS — I10 ESSENTIAL HYPERTENSION: Chronic | ICD-10-CM

## 2024-05-08 DIAGNOSIS — K14.8 TONGUE DISCOLORATION: ICD-10-CM

## 2024-05-08 DIAGNOSIS — N18.4 ANEMIA IN STAGE 4 CHRONIC KIDNEY DISEASE  (HCC): ICD-10-CM

## 2024-05-08 DIAGNOSIS — Z59.9 FINANCIAL DIFFICULTIES: ICD-10-CM

## 2024-05-08 DIAGNOSIS — I35.0 AORTIC VALVE STENOSIS, CRITICAL: ICD-10-CM

## 2024-05-08 DIAGNOSIS — Z00.00 MEDICARE ANNUAL WELLNESS VISIT, SUBSEQUENT: Primary | ICD-10-CM

## 2024-05-08 PROCEDURE — G0439 PPPS, SUBSEQ VISIT: HCPCS | Performed by: FAMILY MEDICINE

## 2024-05-08 PROCEDURE — 99214 OFFICE O/P EST MOD 30 MIN: CPT | Performed by: FAMILY MEDICINE

## 2024-05-08 SDOH — ECONOMIC STABILITY - INCOME SECURITY: PROBLEM RELATED TO HOUSING AND ECONOMIC CIRCUMSTANCES, UNSPECIFIED: Z59.9

## 2024-05-08 NOTE — PATIENT INSTRUCTIONS
Medicare Preventive Visit Patient Instructions  Thank you for completing your Welcome to Medicare Visit or Medicare Annual Wellness Visit today. Your next wellness visit will be due in one year (5/9/2025).  The screening/preventive services that you may require over the next 5-10 years are detailed below. Some tests may not apply to you based off risk factors and/or age. Screening tests ordered at today's visit but not completed yet may show as past due. Also, please note that scanned in results may not display below.  Preventive Screenings:  Service Recommendations Previous Testing/Comments   Colorectal Cancer Screening  Colonoscopy    Fecal Occult Blood Test (FOBT)/Fecal Immunochemical Test (FIT)  Fecal DNA/Cologuard Test  Flexible Sigmoidoscopy Age: 45-75 years old   Colonoscopy: every 10 years (May be performed more frequently if at higher risk)  OR  FOBT/FIT: every 1 year  OR  Cologuard: every 3 years  OR  Sigmoidoscopy: every 5 years  Screening may be recommended earlier than age 45 if at higher risk for colorectal cancer. Also, an individualized decision between you and your healthcare provider will decide whether screening between the ages of 76-85 would be appropriate. Colonoscopy: Not on file  FOBT/FIT: Not on file  Cologuard: Not on file  Sigmoidoscopy: Not on file    Screening Not Indicated     Prostate Cancer Screening Individualized decision between patient and health care provider in men between ages of 55-69   Medicare will cover every 12 months beginning on the day after your 50th birthday PSA: No results in last 5 years     Screening Not Indicated     Hepatitis C Screening Once for adults born between 1945 and 1965  More frequently in patients at high risk for Hepatitis C Hep C Antibody: Not on file        Diabetes Screening 1-2 times per year if you're at risk for diabetes or have pre-diabetes Fasting glucose: 152 mg/dL (5/6/2024)  A1C: No results in last 5 years (No results in last 5  years)  Screening Current   Cholesterol Screening Once every 5 years if you don't have a lipid disorder. May order more often based on risk factors. Lipid panel: 02/28/2022  Screening Current      Other Preventive Screenings Covered by Medicare:  Abdominal Aortic Aneurysm (AAA) Screening: covered once if your at risk. You're considered to be at risk if you have a family history of AAA or a male between the age of 65-75 who smoking at least 100 cigarettes in your lifetime.  Lung Cancer Screening: covers low dose CT scan once per year if you meet all of the following conditions: (1) Age 55-77; (2) No signs or symptoms of lung cancer; (3) Current smoker or have quit smoking within the last 15 years; (4) You have a tobacco smoking history of at least 20 pack years (packs per day x number of years you smoked); (5) You get a written order from a healthcare provider.  Glaucoma Screening: covered annually if you're considered high risk: (1) You have diabetes OR (2) Family history of glaucoma OR (3)  aged 50 and older OR (4)  American aged 65 and older  Osteoporosis Screening: covered every 2 years if you meet one of the following conditions: (1) Have a vertebral abnormality; (2) On glucocorticoid therapy for more than 3 months; (3) Have primary hyperparathyroidism; (4) On osteoporosis medications and need to assess response to drug therapy.  HIV Screening: covered annually if you're between the age of 15-65. Also covered annually if you are younger than 15 and older than 65 with risk factors for HIV infection. For pregnant patients, it is covered up to 3 times per pregnancy.    Immunizations:  Immunization Recommendations   Influenza Vaccine Annual influenza vaccination during flu season is recommended for all persons aged >= 6 months who do not have contraindications   Pneumococcal Vaccine   * Pneumococcal conjugate vaccine = PCV13 (Prevnar 13), PCV15 (Vaxneuvance), PCV20 (Prevnar 20)  *  Pneumococcal polysaccharide vaccine = PPSV23 (Pneumovax) Adults 19-65 yo with certain risk factors or if 65+ yo  If never received any pneumonia vaccine: recommend Prevnar 20 (PCV20)  Give PCV20 if previously received 1 dose of PCV13 or PPSV23   Hepatitis B Vaccine 3 dose series if at intermediate or high risk (ex: diabetes, end stage renal disease, liver disease)   Respiratory syncytial virus (RSV) Vaccine - COVERED BY MEDICARE PART D  * RSVPreF3 (Arexvy) CDC recommends that adults 60 years of age and older may receive a single dose of RSV vaccine using shared clinical decision-making (SCDM)   Tetanus (Td) Vaccine - COST NOT COVERED BY MEDICARE PART B Following completion of primary series, a booster dose should be given every 10 years to maintain immunity against tetanus. Td may also be given as tetanus wound prophylaxis.   Tdap Vaccine - COST NOT COVERED BY MEDICARE PART B Recommended at least once for all adults. For pregnant patients, recommended with each pregnancy.   Shingles Vaccine (Shingrix) - COST NOT COVERED BY MEDICARE PART B  2 shot series recommended in those 19 years and older who have or will have weakened immune systems or those 50 years and older     Health Maintenance Due:  There are no preventive care reminders to display for this patient.  Immunizations Due:      Topic Date Due   • Pneumococcal Vaccine: 65+ Years (1 of 2 - PCV) Never done   • DTaP,Tdap,and Td Vaccines (1 - Tdap) Never done   • COVID-19 Vaccine (7 - 2023-24 season) 11/23/2023     Advance Directives   What are advance directives?  Advance directives are legal documents that state your wishes and plans for medical care. These plans are made ahead of time in case you lose your ability to make decisions for yourself. Advance directives can apply to any medical decision, such as the treatments you want, and if you want to donate organs.   What are the types of advance directives?  There are many types of advance directives, and each  state has rules about how to use them. You may choose a combination of any of the following:  Living will:  This is a written record of the treatment you want. You can also choose which treatments you do not want, which to limit, and which to stop at a certain time. This includes surgery, medicine, IV fluid, and tube feedings.   Durable power of  for healthcare (DPAHC):  This is a written record that states who you want to make healthcare choices for you when you are unable to make them for yourself. This person, called a proxy, is usually a family member or a friend. You may choose more than 1 proxy.  Do not resuscitate (DNR) order:  A DNR order is used in case your heart stops beating or you stop breathing. It is a request not to have certain forms of treatment, such as CPR. A DNR order may be included in other types of advance directives.  Medical directive:  This covers the care that you want if you are in a coma, near death, or unable to make decisions for yourself. You can list the treatments you want for each condition. Treatment may include pain medicine, surgery, blood transfusions, dialysis, IV or tube feedings, and a ventilator (breathing machine).  Values history:  This document has questions about your views, beliefs, and how you feel and think about life. This information can help others choose the care that you would choose.  Why are advance directives important?  An advance directive helps you control your care. Although spoken wishes may be used, it is better to have your wishes written down. Spoken wishes can be misunderstood, or not followed. Treatments may be given even if you do not want them. An advance directive may make it easier for your family to make difficult choices about your care.       © Copyright [employer] 2018 Information is for End User's use only and may not be sold, redistributed or otherwise used for commercial purposes. All illustrations and images included in  CareNotes® are the copyrighted property of [employer].A[employer]M., Inc. or [employer]

## 2024-05-08 NOTE — PROGRESS NOTES
Assessment and Plan:     Problem List Items Addressed This Visit          Cardiovascular and Mediastinum    Essential hypertension (Chronic)     BP stable on amlodipine 5 mg twice per day and metoprolol 50 mg daily         Aortic valve stenosis, critical     -Saw cardiologist in April 2023. Has severe aortic stenosis but it was decided to do nothing since patient had no symptoms nor does he have any insight to the gravity of his condition.   -Cardiologist mentioned if patient becomes symptomatic, he will need to be reevaluated and the decision might change.  -Patient remains asymptomatic - patient does not feel the need to follow-up with cardiologist at this time since he states he feels good - still encouraged to call and schedule            Endocrine    Secondary hyperparathyroidism of renal origin (HCC)     -.3 on 5/6/24 increased from 91.5 in September 2023 (level 134.4 in February 2023)  -Last saw nephrologist was February 2023 - encouraged patient to call and schedule follow-up but patient refused - he does not feel the need to see nephrologist since he states he feels good - still encouraged to call and schedule              Genitourinary    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  (HCC)     Lab Results   Component Value Date    EGFR 29 05/06/2024    EGFR 29 09/27/2023    EGFR 25 02/14/2023    CREATININE 1.92 (H) 05/06/2024    CREATININE 1.95 (H) 09/27/2023    CREATININE 2.17 (H) 02/14/2023     Last iron panel in May 6, 2024  Continue ferrous sulfate  Patient does not want to go to nephrologist or hematologist            Hematopoietic and Hemostatic    Thrombocytopenia (HCC)     Platelets 116 on May 6, 2024  Noticed black discoloration of tongue - patient never saw it (see picture in Media)  Discussed referring to hematologist but patient refused at this time. Explained the gravity of having low platelets and manifestations but patient told me he will monitor to see how the tongue looks in the next  few weeks  ER precautions given            Other    Tongue discoloration     Never noticed by patient. I showed him it in the mirror. Not sure what it is from. Patient did not seem concerned  I expressed concern as his platelets are on the lower side. He told me he will monitor it and update me next week  ER precautions given          Other Visit Diagnoses       Medicare annual wellness visit, subsequent    -  Primary    Financial difficulties        pt needs help with food and housing    Relevant Orders    Ambulatory referral to social work care management program             Preventive health issues were discussed with patient, and age appropriate screening tests were ordered as noted in patient's After Visit Summary.  Personalized health advice and appropriate referrals for health education or preventive services given if needed, as noted in patient's After Visit Summary.     History of Present Illness:     Patient presents for a Medicare Wellness Visit    Feels good. Offers no complaints.          Patient Care Team:  Juanita Gomez DO as PCP - General (Family Medicine)  Yuli Sandoval RN as Lead OP Care Mgr  Hermes Gong MD (Nephrology)     Review of Systems:     Review of Systems   Constitutional:  Negative for appetite change, fatigue, fever and unexpected weight change.   HENT:  Negative for congestion, ear pain, postnasal drip, rhinorrhea and sore throat.    Eyes:  Negative for pain and visual disturbance.   Respiratory:  Negative for cough, chest tightness and shortness of breath.    Cardiovascular:  Negative for chest pain, palpitations and leg swelling.   Gastrointestinal:  Negative for abdominal pain, constipation, diarrhea, nausea and vomiting.   Genitourinary:  Negative for difficulty urinating and dysuria.   Musculoskeletal:  Negative for arthralgias and back pain.   Skin:  Negative for rash.   Neurological:  Negative for dizziness, numbness and headaches.   Psychiatric/Behavioral:  Negative for  behavioral problems and suicidal ideas. The patient is not nervous/anxious.         Problem List:     Patient Active Problem List   Diagnosis    Thrombocytopenia (HCC)    Essential hypertension    Ambulatory dysfunction    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  (HCC)    Blurry vision, left eye    Allergic rhinitis    Mild persistent asthma without complication    Presbycusis    Chronic kidney disease, stage 4 (severe) (HCC)    Secondary hyperparathyroidism of renal origin (HCC)    Aortic valve stenosis, critical    Hypertensive CKD (chronic kidney disease)      Past Medical and Surgical History:     Past Medical History:   Diagnosis Date    Acute cholangitis 10/04/2018    Added automatically from request for surgery [id number]    Cholelithiasis with biliary obstruction 09/28/2018    Clostridium difficile colitis 09/28/2018    Fall at home, initial encounter 02/24/2022    History of bacteremia 10/01/2018    Hypertension     Lower extremity edema 10/10/2018    Medical history unknown     Mild persistent asthma without complication 05/19/2020    Renal disorder      Past Surgical History:   Procedure Laterality Date    ERCP N/A 9/18/2018    Procedure: ENDOSCOPIC RETROGRADE CHOLANGIOPANCREATOGRAPHY (ERCP) with stent placement;  Surgeon: Alvaro Olivia MD;  Location: [facility];  Service: Gastroenterology    NO PAST SURGERIES      [place] ERCP DX COLLECTION SPECIMEN BRUSHING/WASHING N/A 11/23/2018    Procedure: ENDOSCOPIC RETROGRADE CHOLANGIOPANCREATOGRAPHY (ERCP);  Surgeon: Alvaro Olivia MD;  Location: [facility];  Service: Gastroenterology      Family History:     No family history on file.   Social History:     Social History     Socioeconomic History    Marital status: /Civil Union     Spouse name: None    Number of children: None    Years of education: None    Highest education level: None   Occupational History    None   Tobacco Use    Smoking status: Former     Types: Cigars     Passive exposure: Past    Smokeless  "tobacco: Former    Tobacco comments:     states quit about 50 years ago, cigars \"a few per week\"    Vaping Use    Vaping status: Never Used   Substance and Sexual Activity    Alcohol use: No     Comment: denies ETOH use    Drug use: No     Comment: denies    Sexual activity: Never   Other Topics Concern    None   Social History Narrative    None     Social Determinants of Health     Financial Resource Strain: High Risk (5/8/2024)    Overall Financial Resource Strain (CARDIA)     Difficulty of Paying Living Expenses: Hard   Food Insecurity: Food Insecurity Present (5/8/2024)    Hunger Vital Sign     Worried About Running Out of Food in the Last Year: Often true     Ran Out of Food in the Last Year: Often true   Transportation Needs: No Transportation Needs (2/7/2024)    PRAPARE - Transportation     Lack of Transportation (Medical): No     Lack of Transportation (Non-Medical): No   Physical Activity: Not on file   Stress: Not on file   Social Connections: Not on file   Intimate Partner Violence: Not on file   Housing Stability: Low Risk  (5/8/2024)    Housing Stability Vital Sign     Unable to Pay for Housing in the Last Year: No     Number of Places Lived in the Last Year: 1     Unstable Housing in the Last Year: No      Medications and Allergies:     Current Outpatient Medications   Medication Sig Dispense Refill    albuterol (PROVENTIL HFA,VENTOLIN HFA) 90 mcg/act inhaler Inhale 2 puffs every 6 (six) hours as needed for wheezing 54 g 0    amLODIPine (NORVASC) 5 mg tablet Take 1 tablet (5 mg total) by mouth 2 (two) times a day 180 tablet 1    carbamide peroxide (DEBROX) 6.5 % otic solution Administer 5 drops into both ears 2 (two) times a day 15 mL 0    ferrous sulfate (FeroSul) 325 (65 Fe) mg tablet Take 1 tablet (325 mg total) by mouth daily with breakfast 90 tablet 1    metoprolol tartrate (LOPRESSOR) 50 mg tablet Take 1 tablet (50 mg total) by mouth daily 90 tablet 1     No current facility-administered " medications for this visit.     No Known Allergies   Immunizations:     Immunization History   Administered Date(s) Administered    COVID-19 MODERNA VACC 0.5 ML IM 05/11/2021, 06/10/2021, 12/13/2021, 05/17/2022    COVID-19 Moderna Vac BIVALENT 12 Yr+ IM 0.5 ML 10/17/2022    COVID-19 Pfizer mRNA vacc PF elida-sucrose 12 yr and older (Comirnaty) 09/28/2023    INFLUENZA 09/28/2018, 11/02/2022, 11/30/2023    Influenza, high dose seasonal 0.7 mL 09/28/2018, 11/02/2022    Respiratory Syncytial Virus Vaccine (Recombinant, Adjuvanted) 09/28/2023    Tuberculin Skin Test-PPD Intradermal 09/30/2018      Health Maintenance:     There are no preventive care reminders to display for this patient.      Topic Date Due    Pneumococcal Vaccine: 65+ Years (1 of 2 - PCV) Never done    DTaP,Tdap,and Td Vaccines (1 - Tdap) Never done    COVID-19 Vaccine (7 - 2023-24 season) 11/23/2023      Medicare Screening Tests and Risk Assessments:     Bijal is here for his Initial Wellness visit.     Health Risk Assessment:   Patient rates overall health as fair. Patient feels that their physical health rating is same. Patient is satisfied with their life. Eyesight was rated as same. Hearing was rated as slightly worse. Patient feels that their emotional and mental health rating is same. Patients states they are never, rarely angry. Patient states they are sometimes unusually tired/fatigued. Pain experienced in the last 7 days has been none. Patient states that he has experienced no weight loss or gain in last 6 months.     Fall Risk Screening:   In the past year, patient has experienced: no history of falling in past year      Home Safety:  Patient does not have trouble with stairs inside or outside of their home. Patient has working smoke alarms and has working carbon monoxide detector. Home safety hazards include: none.     Nutrition:   Current diet is Regular.     Medications:   Patient is not currently taking any over-the-counter supplements.  Patient is able to manage medications.     Activities of Daily Living (ADLs)/Instrumental Activities of Daily Living (IADLs):   Walk and transfer into and out of bed and chair?: Yes  Dress and groom yourself?: Yes    Bathe or shower yourself?: Yes    Feed yourself? Yes  Do your laundry/housekeeping?: Yes  Manage your money, pay your bills and track your expenses?: Yes  Make your own meals?: Yes    Do your own shopping?: Yes    Previous Hospitalizations:   Any hospitalizations or ED visits within the last 12 months?: No      Advance Care Planning:   Living will: No    Durable POA for healthcare: No    Advanced directive: No    Advanced directive counseling given: Yes    ACP document given: Yes      PREVENTIVE SCREENINGS      Cardiovascular Screening:    General: Screening Current      Diabetes Screening:     General: Screening Current      Colorectal Cancer Screening:     General: Screening Not Indicated      Prostate Cancer Screening:    General: Screening Not Indicated      Osteoporosis Screening:    General: Screening Not Indicated      Abdominal Aortic Aneurysm (AAA) Screening:    Risk factors include: tobacco use        General: Screening Not Indicated      Lung Cancer Screening:     General: Screening Not Indicated      Hepatitis C Screening:    General: Screening Not Indicated    Screening, Brief Intervention, and Referral to Treatment (SBIRT)    Screening  Typical number of drinks in a day: 0  Typical number of drinks in a week: 0  Interpretation: Low risk drinking behavior.    Single Item Drug Screening:  How often have you used an illegal drug (including marijuana) or a prescription medication for non-medical reasons in the past year? never    Single Item Drug Screen Score: 0  Interpretation: Negative screen for possible drug use disorder    No results found.     Physical Exam:     /64 (BP Location: Right arm, Patient Position: Sitting, Cuff Size: Standard)   Pulse 79   Temp 98.7 °F (37.1 °C)  "(Temporal)   Resp 18   Ht 4' 9\" (1.448 m)   Wt 52.2 kg (115 lb)   SpO2 98%   BMI 24.89 kg/m²     Physical Exam  Vitals and nursing note reviewed.   Constitutional:       General: He is not in acute distress.     Appearance: He is well-developed.   HENT:      Head: Normocephalic and atraumatic.      Mouth/Throat:      Comments: Blackish discoloration in center of tongue - see Media  Eyes:      Conjunctiva/sclera: Conjunctivae normal.   Cardiovascular:      Rate and Rhythm: Normal rate and regular rhythm.      Heart sounds: No murmur heard.  Pulmonary:      Effort: Pulmonary effort is normal. No respiratory distress.      Breath sounds: Normal breath sounds.   Abdominal:      Palpations: Abdomen is soft.      Tenderness: There is no abdominal tenderness.   Musculoskeletal:         General: No swelling.      Cervical back: Neck supple.   Skin:     General: Skin is warm and dry.      Capillary Refill: Capillary refill takes less than 2 seconds.   Neurological:      Mental Status: He is alert.   Psychiatric:         Mood and Affect: Mood normal.        Rehab Tabtoan, DO  "

## 2024-05-13 NOTE — ASSESSMENT & PLAN NOTE
-.3 on 5/6/24 increased from 91.5 in September 2023 (level 134.4 in February 2023)  -Last saw nephrologist was February 2023 - encouraged patient to call and schedule follow-up but patient refused - he does not feel the need to see nephrologist since he states he feels good - still encouraged to call and schedule

## 2024-05-15 PROBLEM — K14.8 TONGUE DISCOLORATION: Status: ACTIVE | Noted: 2024-05-15

## 2024-05-15 NOTE — ASSESSMENT & PLAN NOTE
Lab Results   Component Value Date    EGFR 29 05/06/2024    EGFR 29 09/27/2023    EGFR 25 02/14/2023    CREATININE 1.92 (H) 05/06/2024    CREATININE 1.95 (H) 09/27/2023    CREATININE 2.17 (H) 02/14/2023     Last iron panel in May 6, 2024  Continue ferrous sulfate  Patient does not want to go to nephrologist or hematologist

## 2024-05-15 NOTE — ASSESSMENT & PLAN NOTE
Platelets 116 on May 6, 2024  Noticed black discoloration of tongue - patient never saw it (see picture in Media)  Discussed referring to hematologist but patient refused at this time. Explained the gravity of having low platelets and manifestations but patient told me he will monitor to see how the tongue looks in the next few weeks  ER precautions given

## 2024-05-15 NOTE — ASSESSMENT & PLAN NOTE
Never noticed by patient. I showed him it in the mirror. Not sure what it is from. Patient did not seem concerned  I expressed concern as his platelets are on the lower side. He told me he will monitor it and update me next week  ER precautions given

## 2024-06-26 ENCOUNTER — PATIENT OUTREACH (OUTPATIENT)
Dept: FAMILY MEDICINE CLINIC | Facility: CLINIC | Age: 89
End: 2024-06-26

## 2024-06-26 NOTE — PROGRESS NOTES
"MATTHEW BELTRAN called patient in regard to referral received for SDOH.   Patient's wife Rosa Maria answered the call.    Per spouse the patient is independent. Spouse stated that they have food in the house and food stamps but patient \"only likes to eat roast beef and steak sandwiches and is losing weight\"  spouse reported patient is bathing himself and does not need a cane or assistance to walk. Spouse stated patient drives himself and is having no difficulty with transportation. Spouse denied needing financial help and is current with rent/ mortgage.     Farm market vouchers offered to spouse for both patient and spouse. MATTHEW BELTRAN will complete application for farm market vouchers and send in the mail to patient and spouse for signature. Spouse thanked MATTHEW BELTRAN for the call. MATTHEW BELTRAN will use [employer] to send application.     MATTHEW BELTRAN will be available for follow up with patient to see if application was received    MATTHEW BELTRAN will continue to be available for patient if needed.    "

## 2024-07-10 ENCOUNTER — PATIENT OUTREACH (OUTPATIENT)
Dept: FAMILY MEDICINE CLINIC | Facility: CLINIC | Age: 89
End: 2024-07-10

## 2024-07-10 NOTE — LETTER
[address] [place] [address], [facility] [address]  [facility] [id number] [phone]    Re: Social Work Outreach   7/10/2024       Dear Bijal,    We tried to reach you by phone on 07/10/2024 and was unfortunately unable to reach you.  I am following up to see if you received the [employer] voucher application in the mail.  Any questions contact the [facility] as soon as possible at: [phone]    Sincerely,         Felecia Serna

## 2024-07-10 NOTE — PROGRESS NOTES
MATTHEW BELTRAN Student placed call to patient to ask if they received the [employer] application in the mail.     Patient did not answer the call and MATTHEW BELTRAN Student was unable to leave voice mail.   MATTHEW BELTRAN Student will close the referral at this time due to unable to reach patient for the second time. MATTHEW BELTRAN Student sent unable to reach letter to patients address due to patient not having an active Mychart.     MATTHEW BELTRAN Student will remain available for patient if needed.    MATTHEW BELRTAN Student discussed and reviewed case with preceptor MATTHEW Parisi who agreed with the plan.

## 2024-07-21 DIAGNOSIS — N18.4 ANEMIA IN STAGE 4 CHRONIC KIDNEY DISEASE  (HCC): ICD-10-CM

## 2024-07-21 DIAGNOSIS — D63.1 ANEMIA IN STAGE 4 CHRONIC KIDNEY DISEASE  (HCC): ICD-10-CM

## 2024-07-22 RX ORDER — FERROUS SULFATE 325(65) MG
1 TABLET ORAL
Qty: 90 TABLET | Refills: 1 | Status: SHIPPED | OUTPATIENT
Start: 2024-07-22

## 2024-08-24 ENCOUNTER — TELEPHONE (OUTPATIENT)
Dept: OTHER | Facility: OTHER | Age: 89
End: 2024-08-24

## 2024-08-24 NOTE — TELEPHONE ENCOUNTER
Patient called in with questions/ Concerns of medication. Patient states pharmacy told her med was discontinued by provider    FeroSul 325 (65 Fe) MG tablet

## 2024-10-09 ENCOUNTER — OFFICE VISIT (OUTPATIENT)
Dept: FAMILY MEDICINE CLINIC | Facility: CLINIC | Age: 89
End: 2024-10-09

## 2024-10-09 ENCOUNTER — PATIENT OUTREACH (OUTPATIENT)
Dept: FAMILY MEDICINE CLINIC | Facility: CLINIC | Age: 89
End: 2024-10-09

## 2024-10-09 VITALS
BODY MASS INDEX: 22.46 KG/M2 | TEMPERATURE: 98.2 F | WEIGHT: 114.4 LBS | HEIGHT: 60 IN | RESPIRATION RATE: 17 BRPM | OXYGEN SATURATION: 98 % | HEART RATE: 62 BPM | SYSTOLIC BLOOD PRESSURE: 128 MMHG | DIASTOLIC BLOOD PRESSURE: 66 MMHG

## 2024-10-09 DIAGNOSIS — I10 ESSENTIAL HYPERTENSION: Chronic | ICD-10-CM

## 2024-10-09 DIAGNOSIS — L03.011 PARONYCHIA OF RIGHT MIDDLE FINGER: Primary | ICD-10-CM

## 2024-10-09 DIAGNOSIS — D69.6 THROMBOCYTOPENIA (HCC): ICD-10-CM

## 2024-10-09 DIAGNOSIS — N18.4 ANEMIA IN STAGE 4 CHRONIC KIDNEY DISEASE  (HCC): ICD-10-CM

## 2024-10-09 DIAGNOSIS — I35.0 AORTIC VALVE STENOSIS, CRITICAL: ICD-10-CM

## 2024-10-09 DIAGNOSIS — D63.1 ANEMIA IN STAGE 4 CHRONIC KIDNEY DISEASE  (HCC): ICD-10-CM

## 2024-10-09 DIAGNOSIS — K14.8 TONGUE DISCOLORATION: ICD-10-CM

## 2024-10-09 DIAGNOSIS — N25.81 SECONDARY HYPERPARATHYROIDISM OF RENAL ORIGIN (HCC): ICD-10-CM

## 2024-10-09 PROCEDURE — G2211 COMPLEX E/M VISIT ADD ON: HCPCS | Performed by: FAMILY MEDICINE

## 2024-10-09 PROCEDURE — 99214 OFFICE O/P EST MOD 30 MIN: CPT | Performed by: FAMILY MEDICINE

## 2024-10-09 RX ORDER — MUPIROCIN 20 MG/G
OINTMENT TOPICAL 3 TIMES DAILY
Qty: 22 G | Refills: 1 | Status: SHIPPED | OUTPATIENT
Start: 2024-10-09

## 2024-10-09 NOTE — PROGRESS NOTES
Ambulatory Visit  Name: Arlyn L Follweiler      : 1932      MRN: [id number]  Encounter Provider: Juanita Gomez DO  Encounter Date: 10/9/2024   Encounter department: [facility]    Assessment & Plan  Paronychia of right middle finger  -Patient did not feel the need for oral antibiotics - if gets worse, instructed patient to call and then may need them  -For now treat with antibacterial ointment  Orders:    mupirocin (BACTROBAN) 2 % ointment; Apply topically 3 (three) times a day    Aortic valve stenosis, critical  -Saw cardiologist in 2023. Has severe aortic stenosis but it was decided to do nothing since patient had no symptoms nor does he have any insight to the gravity of his condition.   -Cardiologist mentioned if patient becomes symptomatic, he will need to be reevaluated and the decision might change.  -Patient remains asymptomatic - patient does not feel the need to follow-up with cardiologist at this time since he states he feels good - still encouraged to call and schedule       Essential hypertension  BP stable on amlodipine 5 mg twice per day and metoprolol 50 mg daily       Secondary hyperparathyroidism of renal origin (HCC)  -.3 on 24 increased from 91.5 in 2023 (level 134.4 in 2023)  -Last saw nephrologist was 2023 - encouraged patient to call and schedule follow-up but patient refused - he does not feel the need to see nephrologist since he states he feels good - still encouraged to call and schedule       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  (HCC)  Last iron panel in May 6, 2024  Continue ferrous sulfate  Patient does not want to go to nephrologist or hematologist       Thrombocytopenia ([facility])  -Platelets 116 on May 6, 2024  -Noticed black discoloration of tongue - patient never saw it (see picture in Media)  -Discussed referring to hematologist but patient refused at this time. Explained the gravity of having  low platelets and manifestations but patient told me he will monitor to see how the tongue looks in the next few weeks  -ER precautions given         Tongue discoloration  -Continues to be there - patient mentions it is from a throat lozenge.  -Patient did not seem concerned  -I expressed concern as his platelets are on the lower side. He told me he will monitor it  -ER precautions given          History of Present Illness     For the past two days, right middle finger swollen after cutting the nail. Patient mentioned feels a lot better.          History obtained from : patient  Review of Systems   Constitutional:  Negative for appetite change, fatigue, fever and unexpected weight change.   HENT:  Negative for congestion, ear pain, postnasal drip, rhinorrhea and sore throat.    Eyes:  Negative for pain and visual disturbance.   Respiratory:  Negative for cough, chest tightness and shortness of breath.    Cardiovascular:  Negative for chest pain, palpitations and leg swelling.   Gastrointestinal:  Negative for abdominal pain, constipation, diarrhea, nausea and vomiting.   Genitourinary:  Negative for difficulty urinating and dysuria.   Musculoskeletal:  Negative for arthralgias and back pain.   Skin:  Negative for rash.        +right middle finger swelling    Neurological:  Negative for dizziness, numbness and headaches.   Psychiatric/Behavioral:  Negative for behavioral problems and suicidal ideas. The patient is not nervous/anxious.      Medical History Reviewed by provider this encounter:  Tobacco  Allergies  Meds  Problems  Med Hx  Surg Hx  Fam Hx       Current Outpatient Medications on File Prior to Visit   Medication Sig Dispense Refill    FeroSul 325 (65 Fe) MG tablet TAKE 1 TABLET BY MOUTH DAILY WITH BREAKFAST 90 tablet 1    albuterol (PROVENTIL HFA,VENTOLIN HFA) 90 mcg/act inhaler Inhale 2 puffs every 6 (six) hours as needed for wheezing 54 g 0    carbamide peroxide (DEBROX) 6.5 % otic solution  "Administer 5 drops into both ears 2 (two) times a day 15 mL 0     No current facility-administered medications on file prior to visit.          Objective     /66 (BP Location: Right arm, Patient Position: Sitting, Cuff Size: Standard)   Pulse 62   Temp 98.2 °F (36.8 °C) (Temporal)   Resp 17   Ht 4' 9\" (1.448 m)   Wt 51.9 kg (114 lb 6.4 oz)   SpO2 98%   BMI 24.76 kg/m²     Physical Exam  Constitutional:       Appearance: He is well-developed.   HENT:      Head: Normocephalic and atraumatic.      Right Ear: External ear normal.      Left Ear: External ear normal.      Nose: Nose normal.   Eyes:      Conjunctiva/sclera: Conjunctivae normal.      Pupils: Pupils are equal, round, and reactive to light.   Cardiovascular:      Rate and Rhythm: Normal rate and regular rhythm.      Heart sounds: Normal heart sounds.   Pulmonary:      Effort: Pulmonary effort is normal.      Breath sounds: Normal breath sounds.   Abdominal:      General: Bowel sounds are normal.      Palpations: Abdomen is soft.   Musculoskeletal:         General: Normal range of motion.      Cervical back: Normal range of motion and neck supple.   Skin:     General: Skin is warm.      Comments: +right middle finger - mild erythema and mild swelling, no oozing   Neurological:      Mental Status: He is alert and oriented to person, place, and time.   Psychiatric:         Behavior: Behavior normal.       "

## 2024-10-09 NOTE — PROGRESS NOTES
I called Rosa Maria to remind her of the patient's appointment for this afternoon at 4pm; she was aware and states the patient plans on attending.  I will continue to follow.

## 2024-10-14 DIAGNOSIS — I10 ESSENTIAL HYPERTENSION: Chronic | ICD-10-CM

## 2024-10-14 RX ORDER — AMLODIPINE BESYLATE 5 MG/1
5 TABLET ORAL 2 TIMES DAILY
Qty: 180 TABLET | Refills: 1 | Status: SHIPPED | OUTPATIENT
Start: 2024-10-14

## 2024-10-14 RX ORDER — METOPROLOL TARTRATE 50 MG
50 TABLET ORAL DAILY
Qty: 90 TABLET | Refills: 1 | Status: SHIPPED | OUTPATIENT
Start: 2024-10-14

## 2024-10-14 NOTE — ASSESSMENT & PLAN NOTE
Sent in script for debrox otic solution   If no resolution, may need ear lavage in our office    Patient states wants to get better.

## 2024-10-21 NOTE — ASSESSMENT & PLAN NOTE
-Platelets 116 on May 6, 2024  -Noticed black discoloration of tongue - patient never saw it (see picture in Media)  -Discussed referring to hematologist but patient refused at this time. Explained the gravity of having low platelets and manifestations but patient told me he will monitor to see how the tongue looks in the next few weeks  -ER precautions given

## 2024-10-21 NOTE — ASSESSMENT & PLAN NOTE
-Continues to be there - patient mentions it is from a throat lozenge.  -Patient did not seem concerned  -I expressed concern as his platelets are on the lower side. He told me he will monitor it  -ER precautions given

## 2024-10-21 NOTE — ASSESSMENT & PLAN NOTE
Last iron panel in May 6, 2024  Continue ferrous sulfate  Patient does not want to go to nephrologist or hematologist

## 2025-01-01 ENCOUNTER — APPOINTMENT (INPATIENT)
Dept: RADIOLOGY | Facility: HOSPITAL | Age: OVER 89
DRG: 653 | End: 2025-01-01
Payer: COMMERCIAL

## 2025-01-01 ENCOUNTER — ANESTHESIA EVENT (INPATIENT)
Dept: PERIOP | Facility: HOSPITAL | Age: OVER 89
DRG: 653 | End: 2025-01-01
Payer: COMMERCIAL

## 2025-01-01 ENCOUNTER — APPOINTMENT (EMERGENCY)
Dept: CT IMAGING | Facility: HOSPITAL | Age: OVER 89
DRG: 653 | End: 2025-01-01
Payer: COMMERCIAL

## 2025-01-01 ENCOUNTER — PROCEDURE VISIT (OUTPATIENT)
Dept: FAMILY MEDICINE CLINIC | Facility: CLINIC | Age: OVER 89
End: 2025-01-01

## 2025-01-01 ENCOUNTER — ANESTHESIA (INPATIENT)
Dept: PERIOP | Facility: HOSPITAL | Age: OVER 89
DRG: 653 | End: 2025-01-01
Payer: COMMERCIAL

## 2025-01-01 ENCOUNTER — APPOINTMENT (INPATIENT)
Dept: CT IMAGING | Facility: HOSPITAL | Age: OVER 89
DRG: 653 | End: 2025-01-01
Payer: COMMERCIAL

## 2025-01-01 ENCOUNTER — HOSPITAL ENCOUNTER (INPATIENT)
Facility: HOSPITAL | Age: OVER 89
LOS: 2 days | DRG: 653 | End: 2025-03-06
Attending: EMERGENCY MEDICINE | Admitting: INTERNAL MEDICINE
Payer: COMMERCIAL

## 2025-01-01 VITALS
HEIGHT: 60 IN | WEIGHT: 113.6 LBS | TEMPERATURE: 98 F | BODY MASS INDEX: 22.3 KG/M2 | HEART RATE: 68 BPM | OXYGEN SATURATION: 98 % | DIASTOLIC BLOOD PRESSURE: 80 MMHG | SYSTOLIC BLOOD PRESSURE: 130 MMHG | RESPIRATION RATE: 14 BRPM

## 2025-01-01 VITALS
DIASTOLIC BLOOD PRESSURE: 95 MMHG | BODY MASS INDEX: 19.87 KG/M2 | SYSTOLIC BLOOD PRESSURE: 225 MMHG | TEMPERATURE: 94.8 F | HEART RATE: 74 BPM | WEIGHT: 101.19 LBS | HEIGHT: 60 IN | RESPIRATION RATE: 24 BRPM | OXYGEN SATURATION: 100 %

## 2025-01-01 DIAGNOSIS — R31.9 HEMATURIA: Primary | ICD-10-CM

## 2025-01-01 DIAGNOSIS — H61.23 BILATERAL IMPACTED CERUMEN: Primary | ICD-10-CM

## 2025-01-01 LAB
ABO GROUP BLD BPU: NORMAL
ABO GROUP BLD BPU: NORMAL
ABO GROUP BLD: NORMAL
ALBUMIN SERPL BCG-MCNC: 3.1 G/DL (ref 3.5–5)
ALP SERPL-CCNC: 50 U/L (ref 34–104)
ALT SERPL W P-5'-P-CCNC: 10 U/L (ref 7–52)
ANION GAP SERPL CALCULATED.3IONS-SCNC: 11 MMOL/L (ref 4–13)
ANION GAP SERPL CALCULATED.3IONS-SCNC: 6 MMOL/L (ref 4–13)
APTT PPP: 36 SECONDS (ref 23–34)
AST SERPL W P-5'-P-CCNC: 27 U/L (ref 13–39)
ATRIAL RATE: 104 BPM
ATRIAL RATE: 312 BPM
BACTERIA UR QL AUTO: ABNORMAL /HPF
BASE EX.OXY STD BLDV CALC-SCNC: 96.8 % (ref 60–80)
BASE EXCESS BLDA CALC-SCNC: -16 MMOL/L (ref -2–3)
BASE EXCESS BLDA CALC-SCNC: -7 MMOL/L (ref -2–3)
BASE EXCESS BLDA CALC-SCNC: 11 MMOL/L (ref -2–3)
BASE EXCESS BLDA CALC-SCNC: >30 MMOL/L (ref -2–3)
BASE EXCESS BLDV CALC-SCNC: -13.5 MMOL/L
BASOPHILS # BLD AUTO: 0.03 THOUSANDS/ÂΜL (ref 0–0.1)
BASOPHILS # BLD AUTO: 0.04 THOUSANDS/ÂΜL (ref 0–0.1)
BASOPHILS NFR BLD AUTO: 0 % (ref 0–1)
BASOPHILS NFR BLD AUTO: 1 % (ref 0–1)
BILIRUB SERPL-MCNC: 1.09 MG/DL (ref 0.2–1)
BILIRUB UR QL STRIP: ABNORMAL
BLD GP AB SCN SERPL QL: NEGATIVE
BPU ID: NORMAL
BPU ID: NORMAL
BUN SERPL-MCNC: 38 MG/DL (ref 5–25)
BUN SERPL-MCNC: 45 MG/DL (ref 5–25)
CA-I BLD-SCNC: 0.98 MMOL/L (ref 1.12–1.32)
CA-I BLD-SCNC: 1 MMOL/L (ref 1.12–1.32)
CA-I BLD-SCNC: 1.07 MMOL/L (ref 1.12–1.32)
CA-I BLD-SCNC: 1.97 MMOL/L (ref 1.12–1.32)
CA-I BLD-SCNC: >2.2 MMOL/L (ref 1.12–1.32)
CALCIUM ALBUM COR SERPL-MCNC: 9.1 MG/DL (ref 8.3–10.1)
CALCIUM SERPL-MCNC: 8.4 MG/DL (ref 8.4–10.2)
CALCIUM SERPL-MCNC: 8.7 MG/DL (ref 8.4–10.2)
CHLORIDE SERPL-SCNC: 108 MMOL/L (ref 96–108)
CHLORIDE SERPL-SCNC: 110 MMOL/L (ref 96–108)
CLARITY UR: ABNORMAL
CO2 SERPL-SCNC: 21 MMOL/L (ref 21–32)
CO2 SERPL-SCNC: 25 MMOL/L (ref 21–32)
COLOR UR: ABNORMAL
CREAT SERPL-MCNC: 1.73 MG/DL (ref 0.6–1.3)
CREAT SERPL-MCNC: 2.13 MG/DL (ref 0.6–1.3)
CROSSMATCH: NORMAL
CROSSMATCH: NORMAL
EOSINOPHIL # BLD AUTO: 0.04 THOUSAND/ÂΜL (ref 0–0.61)
EOSINOPHIL # BLD AUTO: 0.08 THOUSAND/ÂΜL (ref 0–0.61)
EOSINOPHIL NFR BLD AUTO: 0 % (ref 0–6)
EOSINOPHIL NFR BLD AUTO: 1 % (ref 0–6)
ERYTHROCYTE [DISTWIDTH] IN BLOOD BY AUTOMATED COUNT: 13.2 % (ref 11.6–15.1)
ERYTHROCYTE [DISTWIDTH] IN BLOOD BY AUTOMATED COUNT: 13.2 % (ref 11.6–15.1)
ERYTHROCYTE [DISTWIDTH] IN BLOOD BY AUTOMATED COUNT: 14.7 % (ref 11.6–15.1)
FIO2 GAS DIL.REBREATH: 0.21 L
GFR SERPL CREATININE-BSD FRML MDRD: 26 ML/MIN/1.73SQ M
GFR SERPL CREATININE-BSD FRML MDRD: 33 ML/MIN/1.73SQ M
GLUCOSE SERPL-MCNC: 138 MG/DL (ref 65–140)
GLUCOSE SERPL-MCNC: 139 MG/DL (ref 65–140)
GLUCOSE SERPL-MCNC: 143 MG/DL (ref 65–140)
GLUCOSE SERPL-MCNC: 26 MG/DL (ref 65–140)
GLUCOSE SERPL-MCNC: 40 MG/DL (ref 65–140)
GLUCOSE SERPL-MCNC: 71 MG/DL (ref 65–140)
GLUCOSE SERPL-MCNC: 99 MG/DL (ref 65–140)
GLUCOSE UR STRIP-MCNC: NEGATIVE MG/DL
HCO3 BLDA-SCNC: 14.5 MMOL/L (ref 24–30)
HCO3 BLDA-SCNC: 17.5 MMOL/L (ref 22–28)
HCO3 BLDA-SCNC: 37.9 MMOL/L (ref 24–30)
HCO3 BLDA-SCNC: 72.8 MMOL/L (ref 22–28)
HCO3 BLDV-SCNC: 11.9 MMOL/L (ref 24–30)
HCT VFR BLD AUTO: 19.4 % (ref 36.5–49.3)
HCT VFR BLD AUTO: 24.7 % (ref 36.5–49.3)
HCT VFR BLD AUTO: 29.1 % (ref 36.5–49.3)
HCT VFR BLD CALC: 15 % (ref 36.5–49.3)
HCT VFR BLD CALC: 21 % (ref 36.5–49.3)
HCT VFR BLD CALC: <15 % (ref 36.5–49.3)
HCT VFR BLD CALC: <15 % (ref 36.5–49.3)
HGB BLD-MCNC: 6.4 G/DL (ref 12–17)
HGB BLD-MCNC: 8.1 G/DL (ref 12–17)
HGB BLD-MCNC: 9.4 G/DL (ref 12–17)
HGB BLDA-MCNC: 5.1 G/DL (ref 12–17)
HGB BLDA-MCNC: 7.1 G/DL (ref 12–17)
HGB UR QL STRIP.AUTO: ABNORMAL
IMM GRANULOCYTES # BLD AUTO: 0.02 THOUSAND/UL (ref 0–0.2)
IMM GRANULOCYTES # BLD AUTO: 0.32 THOUSAND/UL (ref 0–0.2)
IMM GRANULOCYTES NFR BLD AUTO: 0 % (ref 0–2)
IMM GRANULOCYTES NFR BLD AUTO: 2 % (ref 0–2)
INR PPP: 1.17 (ref 0.85–1.19)
KETONES UR STRIP-MCNC: NEGATIVE MG/DL
LEUKOCYTE ESTERASE UR QL STRIP: NEGATIVE
LYMPHOCYTES # BLD AUTO: 1.09 THOUSANDS/ÂΜL (ref 0.6–4.47)
LYMPHOCYTES # BLD AUTO: 1.19 THOUSANDS/ÂΜL (ref 0.6–4.47)
LYMPHOCYTES NFR BLD AUTO: 19 % (ref 14–44)
LYMPHOCYTES NFR BLD AUTO: 6 % (ref 14–44)
MCH RBC QN AUTO: 33.8 PG (ref 26.8–34.3)
MCH RBC QN AUTO: 34.5 PG (ref 26.8–34.3)
MCH RBC QN AUTO: 35.8 PG (ref 26.8–34.3)
MCHC RBC AUTO-ENTMCNC: 32.3 G/DL (ref 31.4–37.4)
MCHC RBC AUTO-ENTMCNC: 32.8 G/DL (ref 31.4–37.4)
MCHC RBC AUTO-ENTMCNC: 33 G/DL (ref 31.4–37.4)
MCV RBC AUTO: 105 FL (ref 82–98)
MCV RBC AUTO: 105 FL (ref 82–98)
MCV RBC AUTO: 108 FL (ref 82–98)
MONOCYTES # BLD AUTO: 0.55 THOUSAND/ÂΜL (ref 0.17–1.22)
MONOCYTES # BLD AUTO: 0.87 THOUSAND/ÂΜL (ref 0.17–1.22)
MONOCYTES NFR BLD AUTO: 5 % (ref 4–12)
MONOCYTES NFR BLD AUTO: 9 % (ref 4–12)
NEUTROPHILS # BLD AUTO: 14.85 THOUSANDS/ÂΜL (ref 1.85–7.62)
NEUTROPHILS # BLD AUTO: 4.53 THOUSANDS/ÂΜL (ref 1.85–7.62)
NEUTS SEG NFR BLD AUTO: 70 % (ref 43–75)
NEUTS SEG NFR BLD AUTO: 87 % (ref 43–75)
NITRITE UR QL STRIP: NEGATIVE
NON-SQ EPI CELLS URNS QL MICRO: ABNORMAL /HPF
NRBC BLD AUTO-RTO: 0 /100 WBCS
NRBC BLD AUTO-RTO: 0 /100 WBCS
O2 CT BLDV-SCNC: 9.7 ML/DL
P AXIS: 46 DEGREES
PCO2 BLD: 17 MMOL/L (ref 21–32)
PCO2 BLD: 19 MMOL/L (ref 21–32)
PCO2 BLD: 32 MM HG (ref 36–44)
PCO2 BLD: 41 MMOL/L (ref 21–32)
PCO2 BLD: 66.8 MM HG (ref 36–44)
PCO2 BLD: 70.6 MM HG (ref 42–50)
PCO2 BLD: 96 MM HG
PCO2 BLD: 96.2 MM HG (ref 42–50)
PCO2 BLD: >45 MMOL/L (ref 21–32)
PCO2 BLDA: 31.6 MM HG
PCO2 BLDV: 25.4 MM HG (ref 42–50)
PH BLD: 6.92 [PH] (ref 7.3–7.4)
PH BLD: 7.2 [PH] (ref 7.3–7.4)
PH BLD: 7.35 [PH]
PH BLD: 7.35 [PH] (ref 7.35–7.45)
PH BLD: 7.64 [PH] (ref 7.35–7.45)
PH BLDV: 7.29 [PH] (ref 7.3–7.4)
PH UR STRIP.AUTO: 7 [PH]
PLATELET # BLD AUTO: 134 THOUSANDS/UL (ref 149–390)
PLATELET # BLD AUTO: 146 THOUSANDS/UL (ref 149–390)
PLATELET # BLD AUTO: 179 THOUSANDS/UL (ref 149–390)
PMV BLD AUTO: 10.4 FL (ref 8.9–12.7)
PMV BLD AUTO: 10.6 FL (ref 8.9–12.7)
PMV BLD AUTO: 10.7 FL (ref 8.9–12.7)
PO2 BLD: 106 MM HG (ref 35–45)
PO2 BLD: 16 MM HG (ref 35–45)
PO2 BLD: 210 MM HG (ref 75–129)
PO2 BLD: 98 MM HG (ref 75–129)
PO2 BLDV: 162.8 MM HG (ref 35–45)
POTASSIUM BLD-SCNC: 4.7 MMOL/L (ref 3.5–5.3)
POTASSIUM BLD-SCNC: 5.2 MMOL/L (ref 3.5–5.3)
POTASSIUM BLD-SCNC: 6.3 MMOL/L (ref 3.5–5.3)
POTASSIUM BLD-SCNC: 6.3 MMOL/L (ref 3.5–5.3)
POTASSIUM SERPL-SCNC: 4.4 MMOL/L (ref 3.5–5.3)
POTASSIUM SERPL-SCNC: 4.4 MMOL/L (ref 3.5–5.3)
PR INTERVAL: 156 MS
PROT SERPL-MCNC: 5.4 G/DL (ref 6.4–8.4)
PROT UR STRIP-MCNC: ABNORMAL MG/DL
PROTHROMBIN TIME: 15.1 SECONDS (ref 12.3–15)
QRS AXIS: -33 DEGREES
QRS AXIS: -82 DEGREES
QRSD INTERVAL: 158 MS
QRSD INTERVAL: 84 MS
QT INTERVAL: 354 MS
QT INTERVAL: 484 MS
QTC INTERVAL: 465 MS
QTC INTERVAL: 503 MS
RBC # BLD AUTO: 1.79 MILLION/UL (ref 3.88–5.62)
RBC # BLD AUTO: 2.35 MILLION/UL (ref 3.88–5.62)
RBC # BLD AUTO: 2.78 MILLION/UL (ref 3.88–5.62)
RBC #/AREA URNS AUTO: ABNORMAL /HPF
RH BLD: POSITIVE
SAO2 % BLD FROM PO2: 100 % (ref 60–85)
SAO2 % BLD FROM PO2: 9 % (ref 60–85)
SAO2 % BLD FROM PO2: 96 % (ref 60–85)
SAO2 % BLD FROM PO2: 97 % (ref 60–85)
SODIUM BLD-SCNC: 122 MMOL/L (ref 136–145)
SODIUM BLD-SCNC: 124 MMOL/L (ref 136–145)
SODIUM BLD-SCNC: 142 MMOL/L (ref 136–145)
SODIUM BLD-SCNC: 171 MMOL/L (ref 136–145)
SODIUM SERPL-SCNC: 140 MMOL/L (ref 135–147)
SODIUM SERPL-SCNC: 141 MMOL/L (ref 135–147)
SP GR UR STRIP.AUTO: 1.02 (ref 1–1.03)
SPECIMEN EXPIRATION DATE: NORMAL
SPECIMEN SOURCE: ABNORMAL
T WAVE AXIS: 104 DEGREES
T WAVE AXIS: 82 DEGREES
UNIT DISPENSE STATUS: NORMAL
UNIT DISPENSE STATUS: NORMAL
UNIT PRODUCT CODE: NORMAL
UNIT PRODUCT CODE: NORMAL
UNIT PRODUCT VOLUME: 300 ML
UNIT PRODUCT VOLUME: 300 ML
UNIT RH: NORMAL
UNIT RH: NORMAL
UROBILINOGEN UR STRIP-ACNC: <2 MG/DL
VENTRICULAR RATE: 104 BPM
VENTRICULAR RATE: 65 BPM
WBC # BLD AUTO: 17.21 THOUSAND/UL (ref 4.31–10.16)
WBC # BLD AUTO: 5.54 THOUSAND/UL (ref 4.31–10.16)
WBC # BLD AUTO: 6.4 THOUSAND/UL (ref 4.31–10.16)
WBC #/AREA URNS AUTO: ABNORMAL /HPF

## 2025-01-01 PROCEDURE — 82330 ASSAY OF CALCIUM: CPT

## 2025-01-01 PROCEDURE — 70450 CT HEAD/BRAIN W/O DYE: CPT

## 2025-01-01 PROCEDURE — 80053 COMPREHEN METABOLIC PANEL: CPT | Performed by: INTERNAL MEDICINE

## 2025-01-01 PROCEDURE — 94760 N-INVAS EAR/PLS OXIMETRY 1: CPT

## 2025-01-01 PROCEDURE — 84132 ASSAY OF SERUM POTASSIUM: CPT

## 2025-01-01 PROCEDURE — 0TBB8ZZ EXCISION OF BLADDER, VIA NATURAL OR ARTIFICIAL OPENING ENDOSCOPIC: ICD-10-PCS | Performed by: UROLOGY

## 2025-01-01 PROCEDURE — 86900 BLOOD TYPING SEROLOGIC ABO: CPT | Performed by: ANESTHESIOLOGY

## 2025-01-01 PROCEDURE — 82948 REAGENT STRIP/BLOOD GLUCOSE: CPT

## 2025-01-01 PROCEDURE — 0BH17EZ INSERTION OF ENDOTRACHEAL AIRWAY INTO TRACHEA, VIA NATURAL OR ARTIFICIAL OPENING: ICD-10-PCS | Performed by: INTERNAL MEDICINE

## 2025-01-01 PROCEDURE — 85014 HEMATOCRIT: CPT

## 2025-01-01 PROCEDURE — 85027 COMPLETE CBC AUTOMATED: CPT | Performed by: INTERNAL MEDICINE

## 2025-01-01 PROCEDURE — 99285 EMERGENCY DEPT VISIT HI MDM: CPT

## 2025-01-01 PROCEDURE — 84295 ASSAY OF SERUM SODIUM: CPT

## 2025-01-01 PROCEDURE — 5A1935Z RESPIRATORY VENTILATION, LESS THAN 24 CONSECUTIVE HOURS: ICD-10-PCS | Performed by: INTERNAL MEDICINE

## 2025-01-01 PROCEDURE — 99285 EMERGENCY DEPT VISIT HI MDM: CPT | Performed by: EMERGENCY MEDICINE

## 2025-01-01 PROCEDURE — 99232 SBSQ HOSP IP/OBS MODERATE 35: CPT

## 2025-01-01 PROCEDURE — 0TCB8ZZ EXTIRPATION OF MATTER FROM BLADDER, VIA NATURAL OR ARTIFICIAL OPENING ENDOSCOPIC: ICD-10-PCS | Performed by: UROLOGY

## 2025-01-01 PROCEDURE — NC001 PR NO CHARGE: Performed by: EMERGENCY MEDICINE

## 2025-01-01 PROCEDURE — 51798 US URINE CAPACITY MEASURE: CPT

## 2025-01-01 PROCEDURE — 36620 INSERTION CATHETER ARTERY: CPT

## 2025-01-01 PROCEDURE — 92950 HEART/LUNG RESUSCITATION CPR: CPT

## 2025-01-01 PROCEDURE — NC001 PR NO CHARGE: Performed by: PHYSICIAN ASSISTANT

## 2025-01-01 PROCEDURE — 86901 BLOOD TYPING SEROLOGIC RH(D): CPT | Performed by: ANESTHESIOLOGY

## 2025-01-01 PROCEDURE — 82947 ASSAY GLUCOSE BLOOD QUANT: CPT

## 2025-01-01 PROCEDURE — 99238 HOSP IP/OBS DSCHRG MGMT 30/<: CPT

## 2025-01-01 PROCEDURE — 97163 PT EVAL HIGH COMPLEX 45 MIN: CPT

## 2025-01-01 PROCEDURE — 51865 REPAIR OF BLADDER WOUND: CPT | Performed by: UROLOGY

## 2025-01-01 PROCEDURE — 94002 VENT MGMT INPAT INIT DAY: CPT

## 2025-01-01 PROCEDURE — 31500 INSERT EMERGENCY AIRWAY: CPT

## 2025-01-01 PROCEDURE — 99223 1ST HOSP IP/OBS HIGH 75: CPT | Performed by: INTERNAL MEDICINE

## 2025-01-01 PROCEDURE — 80048 BASIC METABOLIC PNL TOTAL CA: CPT | Performed by: EMERGENCY MEDICINE

## 2025-01-01 PROCEDURE — 69210 REMOVE IMPACTED EAR WAX UNI: CPT | Performed by: FAMILY MEDICINE

## 2025-01-01 PROCEDURE — 93005 ELECTROCARDIOGRAM TRACING: CPT

## 2025-01-01 PROCEDURE — NC001 PR NO CHARGE

## 2025-01-01 PROCEDURE — 99223 1ST HOSP IP/OBS HIGH 75: CPT | Performed by: UROLOGY

## 2025-01-01 PROCEDURE — 04HY32Z INSERTION OF MONITORING DEVICE INTO LOWER ARTERY, PERCUTANEOUS APPROACH: ICD-10-PCS | Performed by: INTERNAL MEDICINE

## 2025-01-01 PROCEDURE — 82803 BLOOD GASES ANY COMBINATION: CPT

## 2025-01-01 PROCEDURE — 85730 THROMBOPLASTIN TIME PARTIAL: CPT | Performed by: EMERGENCY MEDICINE

## 2025-01-01 PROCEDURE — 52235 CYSTOSCOPY AND TREATMENT: CPT | Performed by: UROLOGY

## 2025-01-01 PROCEDURE — 4A133J1 MONITORING OF ARTERIAL PULSE, PERIPHERAL, PERCUTANEOUS APPROACH: ICD-10-PCS | Performed by: INTERNAL MEDICINE

## 2025-01-01 PROCEDURE — 86850 RBC ANTIBODY SCREEN: CPT | Performed by: ANESTHESIOLOGY

## 2025-01-01 PROCEDURE — 88307 TISSUE EXAM BY PATHOLOGIST: CPT | Performed by: PATHOLOGY

## 2025-01-01 PROCEDURE — 99291 CRITICAL CARE FIRST HOUR: CPT

## 2025-01-01 PROCEDURE — 4A133B1 MONITORING OF ARTERIAL PRESSURE, PERIPHERAL, PERCUTANEOUS APPROACH: ICD-10-PCS | Performed by: INTERNAL MEDICINE

## 2025-01-01 PROCEDURE — 85610 PROTHROMBIN TIME: CPT | Performed by: EMERGENCY MEDICINE

## 2025-01-01 PROCEDURE — 81001 URINALYSIS AUTO W/SCOPE: CPT | Performed by: EMERGENCY MEDICINE

## 2025-01-01 PROCEDURE — 74176 CT ABD & PELVIS W/O CONTRAST: CPT

## 2025-01-01 PROCEDURE — 36415 COLL VENOUS BLD VENIPUNCTURE: CPT | Performed by: EMERGENCY MEDICINE

## 2025-01-01 PROCEDURE — 82805 BLOOD GASES W/O2 SATURATION: CPT

## 2025-01-01 PROCEDURE — 97167 OT EVAL HIGH COMPLEX 60 MIN: CPT

## 2025-01-01 PROCEDURE — 93010 ELECTROCARDIOGRAM REPORT: CPT | Performed by: STUDENT IN AN ORGANIZED HEALTH CARE EDUCATION/TRAINING PROGRAM

## 2025-01-01 PROCEDURE — 0TQB0ZZ REPAIR BLADDER, OPEN APPROACH: ICD-10-PCS | Performed by: UROLOGY

## 2025-01-01 PROCEDURE — 85025 COMPLETE CBC W/AUTO DIFF WBC: CPT | Performed by: EMERGENCY MEDICINE

## 2025-01-01 PROCEDURE — 0D9W0ZZ DRAINAGE OF PERITONEUM, OPEN APPROACH: ICD-10-PCS | Performed by: UROLOGY

## 2025-01-01 PROCEDURE — 85025 COMPLETE CBC W/AUTO DIFF WBC: CPT

## 2025-01-01 RX ORDER — NALOXONE HYDROCHLORIDE 0.4 MG/ML
0.04 INJECTION, SOLUTION INTRAMUSCULAR; INTRAVENOUS; SUBCUTANEOUS ONCE
Status: COMPLETED | OUTPATIENT
Start: 2025-01-01 | End: 2025-01-01

## 2025-01-01 RX ORDER — ROCURONIUM BROMIDE 10 MG/ML
INJECTION, SOLUTION INTRAVENOUS AS NEEDED
Status: DISCONTINUED | OUTPATIENT
Start: 2025-01-01 | End: 2025-01-01

## 2025-01-01 RX ORDER — AMIODARONE HYDROCHLORIDE 150 MG/3ML
INJECTION, SOLUTION INTRAVENOUS
Status: DISCONTINUED
Start: 2025-01-01 | End: 2025-01-01 | Stop reason: HOSPADM

## 2025-01-01 RX ORDER — HYDROMORPHONE HCL/PF 1 MG/ML
SYRINGE (ML) INJECTION AS NEEDED
Status: DISCONTINUED | OUTPATIENT
Start: 2025-01-01 | End: 2025-01-01

## 2025-01-01 RX ORDER — ONDANSETRON 2 MG/ML
INJECTION INTRAMUSCULAR; INTRAVENOUS AS NEEDED
Status: DISCONTINUED | OUTPATIENT
Start: 2025-01-01 | End: 2025-01-01

## 2025-01-01 RX ORDER — ACETAMINOPHEN 325 MG/1
650 TABLET ORAL EVERY 4 HOURS PRN
Status: DISCONTINUED | OUTPATIENT
Start: 2025-01-01 | End: 2025-01-01

## 2025-01-01 RX ORDER — LIDOCAINE HYDROCHLORIDE 20 MG/ML
INJECTION, SOLUTION EPIDURAL; INFILTRATION; INTRACAUDAL; PERINEURAL AS NEEDED
Status: DISCONTINUED | OUTPATIENT
Start: 2025-01-01 | End: 2025-01-01

## 2025-01-01 RX ORDER — CEFAZOLIN SODIUM 2 G/50ML
2000 SOLUTION INTRAVENOUS ONCE
Status: COMPLETED | OUTPATIENT
Start: 2025-01-01 | End: 2025-01-01

## 2025-01-01 RX ORDER — METOPROLOL TARTRATE 50 MG
50 TABLET ORAL DAILY
Status: DISCONTINUED | OUTPATIENT
Start: 2025-01-01 | End: 2025-01-01

## 2025-01-01 RX ORDER — EPHEDRINE SULFATE 50 MG/ML
5 INJECTION INTRAVENOUS ONCE
Status: COMPLETED | OUTPATIENT
Start: 2025-01-01 | End: 2025-01-01

## 2025-01-01 RX ORDER — SODIUM CHLORIDE 9 MG/ML
125 INJECTION, SOLUTION INTRAVENOUS CONTINUOUS
Status: DISCONTINUED | OUTPATIENT
Start: 2025-01-01 | End: 2025-01-01

## 2025-01-01 RX ORDER — EPINEPHRINE 0.1 MG/ML
INJECTION INTRAVENOUS CODE/TRAUMA/SEDATION MEDICATION
Status: COMPLETED | OUTPATIENT
Start: 2025-01-01 | End: 2025-01-01

## 2025-01-01 RX ORDER — CALCIUM CHLORIDE 100 MG/ML
0.5 INJECTION INTRAVENOUS; INTRAVENTRICULAR ONCE
Status: COMPLETED | OUTPATIENT
Start: 2025-01-01 | End: 2025-01-01

## 2025-01-01 RX ORDER — NOREPINEPHRINE BITARTRATE 1 MG/ML
20 INJECTION, SOLUTION INTRAVENOUS
Status: COMPLETED | OUTPATIENT
Start: 2025-01-01 | End: 2025-01-01

## 2025-01-01 RX ORDER — CALCIUM CHLORIDE 100 MG/ML
SYRINGE (ML) INTRAVENOUS CODE/TRAUMA/SEDATION MEDICATION
Status: COMPLETED | OUTPATIENT
Start: 2025-01-01 | End: 2025-01-01

## 2025-01-01 RX ORDER — FENTANYL CITRATE 50 UG/ML
INJECTION, SOLUTION INTRAMUSCULAR; INTRAVENOUS AS NEEDED
Status: DISCONTINUED | OUTPATIENT
Start: 2025-01-01 | End: 2025-01-01

## 2025-01-01 RX ORDER — SODIUM BICARBONATE 84 MG/ML
INJECTION, SOLUTION INTRAVENOUS CODE/TRAUMA/SEDATION MEDICATION
Status: COMPLETED | OUTPATIENT
Start: 2025-01-01 | End: 2025-01-01

## 2025-01-01 RX ORDER — ONDANSETRON 2 MG/ML
4 INJECTION INTRAMUSCULAR; INTRAVENOUS EVERY 4 HOURS PRN
Status: DISCONTINUED | OUTPATIENT
Start: 2025-01-01 | End: 2025-01-01

## 2025-01-01 RX ORDER — SODIUM CHLORIDE 9 MG/ML
INJECTION, SOLUTION INTRAVENOUS AS NEEDED
Status: DISCONTINUED | OUTPATIENT
Start: 2025-01-01 | End: 2025-01-01 | Stop reason: HOSPADM

## 2025-01-01 RX ORDER — CEFAZOLIN SODIUM 1 G/3ML
INJECTION, POWDER, FOR SOLUTION INTRAMUSCULAR; INTRAVENOUS AS NEEDED
Status: DISCONTINUED | OUTPATIENT
Start: 2025-01-01 | End: 2025-01-01

## 2025-01-01 RX ORDER — FERROUS SULFATE 325(65) MG
325 TABLET ORAL
Status: DISCONTINUED | OUTPATIENT
Start: 2025-01-01 | End: 2025-01-01

## 2025-01-01 RX ORDER — PROPOFOL 10 MG/ML
INJECTION, EMULSION INTRAVENOUS AS NEEDED
Status: DISCONTINUED | OUTPATIENT
Start: 2025-01-01 | End: 2025-01-01

## 2025-01-01 RX ORDER — AMLODIPINE BESYLATE 5 MG/1
5 TABLET ORAL 2 TIMES DAILY
Status: DISCONTINUED | OUTPATIENT
Start: 2025-01-01 | End: 2025-01-01

## 2025-01-01 RX ORDER — PROPOFOL 10 MG/ML
INJECTION, EMULSION INTRAVENOUS CONTINUOUS PRN
Status: DISCONTINUED | OUTPATIENT
Start: 2025-01-01 | End: 2025-01-01

## 2025-01-01 RX ORDER — AMIODARONE HYDROCHLORIDE 150 MG/3ML
INJECTION, SOLUTION INTRAVENOUS CODE/TRAUMA/SEDATION MEDICATION
Status: COMPLETED | OUTPATIENT
Start: 2025-01-01 | End: 2025-01-01

## 2025-01-01 RX ORDER — EPHEDRINE SULFATE 50 MG/ML
INJECTION INTRAVENOUS AS NEEDED
Status: DISCONTINUED | OUTPATIENT
Start: 2025-01-01 | End: 2025-01-01

## 2025-01-01 RX ORDER — BUPIVACAINE HYDROCHLORIDE 5 MG/ML
INJECTION, SOLUTION EPIDURAL; INTRACAUDAL AS NEEDED
Status: DISCONTINUED | OUTPATIENT
Start: 2025-01-01 | End: 2025-01-01 | Stop reason: HOSPADM

## 2025-01-01 RX ORDER — SUCCINYLCHOLINE/SOD CL,ISO/PF 100 MG/5ML
SYRINGE (ML) INTRAVENOUS AS NEEDED
Status: DISCONTINUED | OUTPATIENT
Start: 2025-01-01 | End: 2025-01-01

## 2025-01-01 RX ORDER — CALCIUM GLUCONATE 20 MG/ML
2 INJECTION, SOLUTION INTRAVENOUS ONCE
Status: DISCONTINUED | OUTPATIENT
Start: 2025-01-01 | End: 2025-01-01

## 2025-01-01 RX ORDER — ALBUTEROL SULFATE 90 UG/1
2 INHALANT RESPIRATORY (INHALATION) EVERY 6 HOURS PRN
Status: DISCONTINUED | OUTPATIENT
Start: 2025-01-01 | End: 2025-01-01

## 2025-01-01 RX ORDER — CALCIUM CHLORIDE 100 MG/ML
INJECTION INTRAVENOUS; INTRAVENTRICULAR
Status: DISCONTINUED
Start: 2025-01-01 | End: 2025-01-01 | Stop reason: HOSPADM

## 2025-01-01 RX ORDER — NOREPINEPHRINE BITARTRATE 1 MG/ML
10 INJECTION, SOLUTION INTRAVENOUS ONCE
Status: COMPLETED | OUTPATIENT
Start: 2025-01-01 | End: 2025-01-01

## 2025-01-01 RX ORDER — EPINEPHRINE 1 MG/ML
INJECTION, SOLUTION, CONCENTRATE INTRAVENOUS
Status: DISCONTINUED
Start: 2025-01-01 | End: 2025-01-01 | Stop reason: HOSPADM

## 2025-01-01 RX ORDER — VASOPRESSIN 20 [USP'U]/ML
INJECTION, SOLUTION INTRAVENOUS CODE/TRAUMA/SEDATION MEDICATION
Status: COMPLETED | OUTPATIENT
Start: 2025-01-01 | End: 2025-01-01

## 2025-01-01 RX ORDER — NOREPINEPHRINE BITARTRATE 1 MG/ML
20 INJECTION, SOLUTION INTRAVENOUS ONCE
Status: COMPLETED | OUTPATIENT
Start: 2025-01-01 | End: 2025-01-01

## 2025-01-01 RX ADMIN — CALCIUM CHLORIDE 1 G: 100 INJECTION INTRAVENOUS; INTRAVENTRICULAR at 23:05

## 2025-01-01 RX ADMIN — CALCIUM CHLORIDE 0.5 G: 100 INJECTION INTRAVENOUS; INTRAVENTRICULAR at 19:32

## 2025-01-01 RX ADMIN — EPINEPHRINE 100 MCG/MIN: 1 INJECTION, SOLUTION, CONCENTRATE INTRAVENOUS at 23:07

## 2025-01-01 RX ADMIN — CALCIUM CHLORIDE 1 G: 100 INJECTION INTRAVENOUS; INTRAVENTRICULAR at 23:09

## 2025-01-01 RX ADMIN — EPINEPHRINE 1 MG: 0.1 INJECTION INTRAVENOUS at 22:49

## 2025-01-01 RX ADMIN — CEFAZOLIN 2000 MG: 1 INJECTION, POWDER, FOR SOLUTION INTRAMUSCULAR; INTRAVENOUS; PARENTERAL at 15:57

## 2025-01-01 RX ADMIN — PROPOFOL 50 MG: 10 INJECTION, EMULSION INTRAVENOUS at 14:35

## 2025-01-01 RX ADMIN — EPINEPHRINE 1 MG: 0.1 INJECTION INTRAVENOUS at 23:08

## 2025-01-01 RX ADMIN — EPINEPHRINE 1 MG: 0.1 INJECTION INTRAVENOUS at 23:04

## 2025-01-01 RX ADMIN — FENTANYL CITRATE 25 MCG: 50 INJECTION INTRAMUSCULAR; INTRAVENOUS at 14:45

## 2025-01-01 RX ADMIN — CEFAZOLIN SODIUM 2000 MG: 2 SOLUTION INTRAVENOUS at 22:12

## 2025-01-01 RX ADMIN — SODIUM BICARBONATE 50 MEQ: 84 INJECTION INTRAVENOUS at 23:10

## 2025-01-01 RX ADMIN — SUGAMMADEX 100 MG: 100 INJECTION, SOLUTION INTRAVENOUS at 17:19

## 2025-01-01 RX ADMIN — EPINEPHRINE 1 MG: 0.1 INJECTION INTRAVENOUS at 22:57

## 2025-01-01 RX ADMIN — EPINEPHRINE 1 MG: 0.1 INJECTION INTRAVENOUS at 22:51

## 2025-01-01 RX ADMIN — ROCURONIUM 25 MG: 50 INJECTION, SOLUTION INTRAVENOUS at 15:50

## 2025-01-01 RX ADMIN — EPINEPHRINE 1 MG: 0.1 INJECTION INTRAVENOUS at 23:00

## 2025-01-01 RX ADMIN — Medication 100 MG: at 15:38

## 2025-01-01 RX ADMIN — CALCIUM CHLORIDE 1 G: 100 INJECTION INTRAVENOUS; INTRAVENTRICULAR at 23:07

## 2025-01-01 RX ADMIN — ROCURONIUM 10 MG: 50 INJECTION, SOLUTION INTRAVENOUS at 16:41

## 2025-01-01 RX ADMIN — VASOPRESSIN 2 UNITS: 20 INJECTION INTRAVENOUS at 22:49

## 2025-01-01 RX ADMIN — ROCURONIUM 5 MG: 50 INJECTION, SOLUTION INTRAVENOUS at 15:38

## 2025-01-01 RX ADMIN — LIDOCAINE HYDROCHLORIDE 60 MG: 20 INJECTION, SOLUTION EPIDURAL; INFILTRATION; INTRACAUDAL at 14:35

## 2025-01-01 RX ADMIN — NOREPINEPHRINE BITARTRATE 2 MCG/MIN: 1 INJECTION INTRAVENOUS at 21:21

## 2025-01-01 RX ADMIN — PROPOFOL 20 MG: 10 INJECTION, EMULSION INTRAVENOUS at 14:36

## 2025-01-01 RX ADMIN — EPINEPHRINE 1 MG: 0.1 INJECTION INTRAVENOUS at 23:07

## 2025-01-01 RX ADMIN — EPINEPHRINE 1 MG: 0.1 INJECTION INTRAVENOUS at 22:47

## 2025-01-01 RX ADMIN — VASOPRESSIN 10 UNITS: 20 INJECTION INTRAVENOUS at 23:09

## 2025-01-01 RX ADMIN — EPINEPHRINE 1 MG: 0.1 INJECTION INTRAVENOUS at 23:09

## 2025-01-01 RX ADMIN — NICARDIPINE HYDROCHLORIDE 100 MCG: 2.5 INJECTION, SOLUTION INTRAVENOUS at 15:48

## 2025-01-01 RX ADMIN — EPINEPHRINE 1 MG: 0.1 INJECTION INTRAVENOUS at 22:56

## 2025-01-01 RX ADMIN — NICARDIPINE HYDROCHLORIDE 100 MCG: 2.5 INJECTION, SOLUTION INTRAVENOUS at 15:51

## 2025-01-01 RX ADMIN — EPINEPHRINE 1 MG: 0.1 INJECTION INTRAVENOUS at 23:02

## 2025-01-01 RX ADMIN — FERROUS SULFATE TAB 325 MG (65 MG ELEMENTAL FE) 325 MG: 325 (65 FE) TAB at 08:07

## 2025-01-01 RX ADMIN — EPINEPHRINE 1 MG: 0.1 INJECTION INTRAVENOUS at 22:46

## 2025-01-01 RX ADMIN — EPHEDRINE SULFATE 5 MG: 50 INJECTION INTRAVENOUS at 19:20

## 2025-01-01 RX ADMIN — ONDANSETRON 4 MG: 2 INJECTION INTRAMUSCULAR; INTRAVENOUS at 14:43

## 2025-01-01 RX ADMIN — FENTANYL CITRATE 25 MCG: 50 INJECTION INTRAMUSCULAR; INTRAVENOUS at 14:54

## 2025-01-01 RX ADMIN — NOREPINEPHRINE BITARTRATE 8 MCG: 1 INJECTION, SOLUTION, CONCENTRATE INTRAVENOUS at 21:08

## 2025-01-01 RX ADMIN — SODIUM BICARBONATE 50 MEQ: 84 INJECTION INTRAVENOUS at 22:50

## 2025-01-01 RX ADMIN — AMLODIPINE BESYLATE 5 MG: 5 TABLET ORAL at 22:12

## 2025-01-01 RX ADMIN — NOREPINEPHRINE BITARTRATE 16 MCG: 1 INJECTION, SOLUTION, CONCENTRATE INTRAVENOUS at 19:20

## 2025-01-01 RX ADMIN — SODIUM CHLORIDE: 0.9 INJECTION, SOLUTION INTRAVENOUS at 15:45

## 2025-01-01 RX ADMIN — FENTANYL CITRATE 25 MCG: 50 INJECTION INTRAMUSCULAR; INTRAVENOUS at 14:40

## 2025-01-01 RX ADMIN — EPINEPHRINE 1 MG: 0.1 INJECTION INTRAVENOUS at 22:59

## 2025-01-01 RX ADMIN — AMLODIPINE BESYLATE 5 MG: 5 TABLET ORAL at 08:07

## 2025-01-01 RX ADMIN — PROPOFOL 80 MCG/KG/MIN: 10 INJECTION, EMULSION INTRAVENOUS at 14:36

## 2025-01-01 RX ADMIN — NOREPINEPHRINE BITARTRATE 16 MCG: 1 INJECTION, SOLUTION, CONCENTRATE INTRAVENOUS at 21:12

## 2025-01-01 RX ADMIN — METOPROLOL TARTRATE 50 MG: 50 TABLET, FILM COATED ORAL at 08:07

## 2025-01-01 RX ADMIN — NOREPINEPHRINE BITARTRATE 16 MCG: 1 INJECTION, SOLUTION, CONCENTRATE INTRAVENOUS at 21:10

## 2025-01-01 RX ADMIN — SODIUM BICARBONATE 50 MEQ: 84 INJECTION INTRAVENOUS at 23:11

## 2025-01-01 RX ADMIN — CALCIUM CHLORIDE 1 G: 100 INJECTION INTRAVENOUS; INTRAVENTRICULAR at 22:58

## 2025-01-01 RX ADMIN — SODIUM BICARBONATE 50 MEQ: 84 INJECTION INTRAVENOUS at 22:55

## 2025-01-01 RX ADMIN — NALOXONE HYDROCHLORIDE 0.04 MG: 0.4 INJECTION, SOLUTION INTRAMUSCULAR; INTRAVENOUS; SUBCUTANEOUS at 19:05

## 2025-01-01 RX ADMIN — AMIODARONE HYDROCHLORIDE 300 MG: 50 INJECTION, SOLUTION INTRAVENOUS at 22:56

## 2025-01-01 RX ADMIN — SODIUM CHLORIDE: 0.9 INJECTION, SOLUTION INTRAVENOUS at 14:30

## 2025-01-01 RX ADMIN — VASOPRESSIN 0.08 UNITS/MIN: 20 INJECTION INTRAVENOUS at 22:58

## 2025-01-01 RX ADMIN — EPHEDRINE SULFATE 5 MG: 50 INJECTION INTRAVENOUS at 14:49

## 2025-01-01 RX ADMIN — FENTANYL CITRATE 25 MCG: 50 INJECTION INTRAMUSCULAR; INTRAVENOUS at 15:02

## 2025-01-01 RX ADMIN — NICARDIPINE HYDROCHLORIDE 200 MCG: 2.5 INJECTION, SOLUTION INTRAVENOUS at 16:12

## 2025-01-01 RX ADMIN — NALOXONE HYDROCHLORIDE 0.4 MG: 0.4 INJECTION, SOLUTION INTRAMUSCULAR; INTRAVENOUS; SUBCUTANEOUS at 19:02

## 2025-01-01 RX ADMIN — HYDROMORPHONE HYDROCHLORIDE 0.5 MG: 1 INJECTION, SOLUTION INTRAMUSCULAR; INTRAVENOUS; SUBCUTANEOUS at 17:01

## 2025-01-01 RX ADMIN — SODIUM CHLORIDE 500 ML: 0.9 INJECTION, SOLUTION INTRAVENOUS at 21:23

## 2025-01-15 ENCOUNTER — PATIENT OUTREACH (OUTPATIENT)
Dept: FAMILY MEDICINE CLINIC | Facility: CLINIC | Age: OVER 89
End: 2025-01-15

## 2025-02-12 ENCOUNTER — TELEMEDICINE (OUTPATIENT)
Dept: FAMILY MEDICINE CLINIC | Facility: CLINIC | Age: OVER 89
End: 2025-02-12

## 2025-02-12 ENCOUNTER — TELEPHONE (OUTPATIENT)
Dept: FAMILY MEDICINE CLINIC | Facility: CLINIC | Age: OVER 89
End: 2025-02-12

## 2025-02-12 DIAGNOSIS — H91.13 PRESBYCUSIS OF BOTH EARS: ICD-10-CM

## 2025-02-12 DIAGNOSIS — I35.0 AORTIC VALVE STENOSIS, CRITICAL: Primary | ICD-10-CM

## 2025-02-12 DIAGNOSIS — D69.6 THROMBOCYTOPENIA (HCC): ICD-10-CM

## 2025-02-12 DIAGNOSIS — N25.81 SECONDARY HYPERPARATHYROIDISM OF RENAL ORIGIN (HCC): ICD-10-CM

## 2025-02-12 DIAGNOSIS — I10 ESSENTIAL HYPERTENSION: Chronic | ICD-10-CM

## 2025-02-12 DIAGNOSIS — D63.1 ANEMIA IN STAGE 4 CHRONIC KIDNEY DISEASE  (HCC): ICD-10-CM

## 2025-02-12 DIAGNOSIS — N18.4 ANEMIA IN STAGE 4 CHRONIC KIDNEY DISEASE  (HCC): ICD-10-CM

## 2025-02-12 PROCEDURE — 98008 SYNCH AUDIO-ONLY NEW SF 15: CPT | Performed by: FAMILY MEDICINE

## 2025-02-12 NOTE — PROGRESS NOTES
Virtual Brief Visit  Name: Arlyn L Follweiler      : 1932      MRN: [id number]  Encounter Provider: Juanita Gomez DO  Encounter Date: 2025   [facility]    This Visit is being completed by telephone. The Patient is located at Home and in the following state in which I hold an active license [place]    The patient was identified by name and date of birth. Arlyn L Follweiler was informed that this is a telemedicine visit and that the visit is being conducted through the [employer] Teams platform. He agrees to proceed..  My office door was closed. No one else was in the room.  He acknowledged consent and understanding of privacy and security of the video platform. The patient has agreed to participate and understands they can discontinue the visit at any time.    Patient is aware this is a billable service.     :  Assessment & Plan  Aortic valve stenosis, critical  -Saw cardiologist in 2023. Has severe aortic stenosis but it was decided to do nothing since patient had no symptoms nor does he have any insight to the gravity of his condition.   -Cardiologist mentioned if patient becomes symptomatic, he will need to be reevaluated and the decision might change.    -Patient remains asymptomatic - patient does not feel the need to follow-up with cardiologist at this time since he states he feels good - still encouraged to call and schedule      Orders:    Ambulatory referral to chronic care management; Future    Essential hypertension  BP stable on amlodipine 5 mg twice per day and metoprolol 50 mg daily           Secondary hyperparathyroidism of renal origin (HCC)  -.3 on 24 increased from 91.5 in 2023 (level 134.4 in 2023)  -Last saw nephrologist was 2023 - encouraged patient to call and schedule follow-up but patient refused - he does not feel the need to see nephrologist since he states he feels good - still  encouraged to call and schedule           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  (HCC)  Lab Results   Component Value Date    EGFR 29 05/06/2024    EGFR 29 09/27/2023    EGFR 25 02/14/2023    CREATININE 1.92 (H) 05/06/2024    CREATININE 1.95 (H) 09/27/2023    CREATININE 2.17 (H) 02/14/2023     Last iron panel in May 6, 2024  Continue ferrous sulfate  Patient does not want to go to nephrologist or hematologist  Send for blood work at next office visit           Thrombocytopenia (HCC)  -Platelets 116 on May 6, 2024  -Discussed referring to hematologist but patient refused at this time. -Explained the gravity of having low platelets and manifestations   -ER precautions given  -send for repeat CBC at next visit           Presbycusis of both ears  Patient feels ears stuffed up  Encouraged daily use of debrox ear wax solution  Schedule for cerumen removal on March 3 at 4pm          History of Present Illness   HPI  Spoke to independent historian Rosa Maria, his wife, with patient's permission  Goes to bed around 3 am. Eats breakfast around 1-2 pm so does not want to eat supper. Takes all his medications.     Complains that his ears are stuffed up and cannot hear. Had sparingly used debrox ear wax solution which has not worked for him.         Visit Time  Total Visit Duration: 15 minutes

## 2025-02-12 NOTE — TELEPHONE ENCOUNTER
Just had virtual visit with patient.     Please schedule for cerumen removal with Dr. Alfonso on March 3 at 4 pm for one hour.     Please schedule AWV on May 28 at 4 pm for one hour with me.     Thank you much!

## 2025-02-20 NOTE — TELEPHONE ENCOUNTER
3rd attempt:    Unable to contact patient due to phone call not being able to be completed.    Letter will be mailed.

## 2025-02-26 NOTE — ASSESSMENT & PLAN NOTE
-Platelets 116 on May 6, 2024  -Discussed referring to hematologist but patient refused at this time. -Explained the gravity of having low platelets and manifestations   -ER precautions given  -send for repeat CBC at next visit

## 2025-02-26 NOTE — ASSESSMENT & PLAN NOTE
Lab Results   Component Value Date    EGFR 29 05/06/2024    EGFR 29 09/27/2023    EGFR 25 02/14/2023    CREATININE 1.92 (H) 05/06/2024    CREATININE 1.95 (H) 09/27/2023    CREATININE 2.17 (H) 02/14/2023     Last iron panel in May 6, 2024  Continue ferrous sulfate  Patient does not want to go to nephrologist or hematologist  Send for blood work at next office visit

## 2025-02-26 NOTE — ASSESSMENT & PLAN NOTE
Patient feels ears stuffed up  Encouraged daily use of debrox ear wax solution  Schedule for cerumen removal on March 3 at 4pm

## 2025-02-26 NOTE — TELEPHONE ENCOUNTER
Pt scheduled for both appt's.     Pt's wife stated might be contacting office again to reschedule appt on 3/4 due to car being in the shop and she's not sure if they will be having it back by then. I offer lyft but they denied it.

## 2025-02-26 NOTE — TELEPHONE ENCOUNTER
Can you please call and schedule patient's next appts? Call the house number and speak to wife Rosa Maria. [phone]. She already wrote down March 3 at 4 pm for cererlindan impaction with any available Resident.    Also schedule with me on May 28 at 4 pm for f/u HTN. Thanks.

## 2025-02-26 NOTE — ASSESSMENT & PLAN NOTE
-Saw cardiologist in April 2023. Has severe aortic stenosis but it was decided to do nothing since patient had no symptoms nor does he have any insight to the gravity of his condition.   -Cardiologist mentioned if patient becomes symptomatic, he will need to be reevaluated and the decision might change.    -Patient remains asymptomatic - patient does not feel the need to follow-up with cardiologist at this time since he states he feels good - still encouraged to call and schedule      Orders:    Ambulatory referral to chronic care management; Future

## 2025-02-28 ENCOUNTER — PATIENT OUTREACH (OUTPATIENT)
Dept: FAMILY MEDICINE CLINIC | Facility: CLINIC | Age: OVER 89
End: 2025-02-28

## 2025-02-28 DIAGNOSIS — I10 ESSENTIAL HYPERTENSION: Primary | ICD-10-CM

## 2025-02-28 NOTE — PROGRESS NOTES
Chronic Care Management Program Consent:    Patient informed of availability of Chronic Care Management services. The services will billed monthly by their Primary Care Provider only. Patient is informed there may be a monthly cost sharing associated with the Chronic Care Management services. Patient is aware that financial counseling is available to assist with any co-pay questions or concerns.    Chronic Care Management services include:    24/7 access to care.  Comprehensive plan of care created by the provider.  Individualized care planning by the care manager(s).  Transitional care support.    The patient is informed that they have the right to stop Chronic Care Management services at anytime.       Patient consents to Chronic Care Management services? no      Patient informed that consent is needed only once unless the patient switches qualifying providers.      I called the patient's wife, Rosa Maria, and explained the CCM program.  She declines at this time but knows she can call if she changes her mind.    Rosa Maria noted the patient is eating and taking his meds.  She states he will get up during the night to void but he is otherwise doing well.    I reminded her of his appointment on Monday at 4pm for ear lavage.  She states she will call to cancel if their truck is not fixed by then.    Chart reviewed.  Case is being closed.      
Self

## 2025-03-03 NOTE — PROGRESS NOTES
Ear cerumen removal    Date/Time: 3/3/2025 4:00 PM    Performed by: Timur Alfonso MD  Authorized by: DO Meng Bullock Protocol:  Procedure performed by:  Consent: Verbal consent obtained. Written consent not obtained.  Consent given by: patient  Patient understanding: patient states understanding of the procedure being performed  Patient consent: the patient's understanding of the procedure matches consent given  Procedure consent: procedure consent matches procedure scheduled  Relevant documents: relevant documents not present or verified  Test results: test results not available  Site marked: the operative site was not marked  Radiology Images displayed and confirmed. If images not available, report reviewed: imaging studies not available  Patient identity confirmed: verbally with patient    Patient location:  Clinic  Indications / Diagnosis:  IMPACTED CERUMEN  Procedure details:     Local anesthetic:  None    Location:  Both ears    Procedure type: irrigation with instrumentation      Instrumentation: curette      Approach:  External  Post-procedure details:     Complication:  None    Hearing quality:  Improved    Patient tolerance of procedure:  Tolerated well, no immediate complications  Comments:      Patient tolerated well. There was slight bleeding in the left ear canal but no bloody drainage noted. Patient verbalized improvement in hearing. He will be sent to ENT for complete cerumen removal given amount of impaction noted especially in the right ear.

## 2025-03-04 PROBLEM — R91.1 NODULE OF RIGHT LUNG: Status: ACTIVE | Noted: 2025-01-01

## 2025-03-04 PROBLEM — R31.0 GROSS HEMATURIA: Status: ACTIVE | Noted: 2025-01-01

## 2025-03-04 NOTE — ED PROVIDER NOTES
Time reflects when diagnosis was documented in both MDM as applicable and the Disposition within this note       Time User Action Codes Description Comment    3/4/2025  4:09 PM Liz Luo Add [R31.9] Hematuria           ED Disposition       None          Assessment & Plan       Medical Decision Making  89 yo M presenting with painless hematuria- UA to r/o UTI, PVR, CBC to assess for leukocytosis/anemia, BMP to r/o JOSE E, CT A/P to assess for mass/stone/obstruction    Signed out pending CT  Outpt Urology f/u    Problems Addressed:  Hematuria: acute illness or injury    Amount and/or Complexity of Data Reviewed  Labs: ordered. Decision-making details documented in ED Course.  Radiology: ordered.        ED Course as of 03/04/25 1610   Tue Mar 04, 2025   1443 PVR 68   1506 Hemoglobin(!): 9.4  10.5- 11 prior   1523 Creatinine(!): 2.13  1.9- 2.1 baseline   1544 Bacteria, UA: Occasional   1545 WBC, UA: None Seen   1545 RBC Urine(!): Innumerable       Medications - No data to display    ED Risk Strat Scores                            SBIRT 20yo+      Flowsheet Row Most Recent Value   Initial Alcohol Screen: US AUDIT-C     1. How often do you have a drink containing alcohol? 0 Filed at: 03/04/2025 1525   2. How many drinks containing alcohol do you have on a typical day you are drinking?  0 Filed at: 03/04/2025 1525   3b. FEMALE Any Age, or MALE 65+: How often do you have 4 or more drinks on one occassion? 0 Filed at: 03/04/2025 1525   Audit-C Score 0 Filed at: 03/04/2025 1525   LINETTE: How many times in the past year have you...    Used an illegal drug or used a prescription medication for non-medical reasons? Never Filed at: 03/04/2025 1525                            History of Present Illness       Chief Complaint   Patient presents with    Blood in Urine     Pt reports blood in urine x2 days. Reports sometimes a lot sometimes a little        Past Medical History:   Diagnosis Date    Acute cholangitis 10/04/2018    Added  "automatically from request for surgery [id number]    Cholelithiasis with biliary obstruction 09/28/2018    Clostridium difficile colitis 09/28/2018    Fall at home, initial encounter 02/24/2022    History of bacteremia 10/01/2018    Hypertension     Lower extremity edema 10/10/2018    Medical history unknown     Mild persistent asthma without complication 05/19/2020    Renal disorder       Past Surgical History:   Procedure Laterality Date    ERCP N/A 9/18/2018    Procedure: ENDOSCOPIC RETROGRADE CHOLANGIOPANCREATOGRAPHY (ERCP) with stent placement;  Surgeon: Alvaro Olivia MD;  Location: [facility];  Service: Gastroenterology    NO PAST SURGERIES      [place] ERCP DX COLLECTION SPECIMEN BRUSHING/WASHING N/A 11/23/2018    Procedure: ENDOSCOPIC RETROGRADE CHOLANGIOPANCREATOGRAPHY (ERCP);  Surgeon: Alvaro Olivia MD;  Location: [facility];  Service: Gastroenterology      History reviewed. No pertinent family history.   Social History     Tobacco Use    Smoking status: Former     Types: Cigars     Passive exposure: Past    Smokeless tobacco: Former    Tobacco comments:     states quit about 50 years ago, cigars \"a few per week\"    Vaping Use    Vaping status: Never Used   Substance Use Topics    Alcohol use: No     Comment: denies ETOH use    Drug use: No     Comment: denies      E-Cigarette/Vaping    E-Cigarette Use Never User       E-Cigarette/Vaping Substances    Nicotine No     THC No     CBD No     Flavoring No     Other No     Unknown No       I have reviewed and agree with the history as documented.     89 yo M presenting for evaluation of hematuria.    Pt reports sx the past few days with maroon colored urine. Denies history of similar.  No blood thinners on med list    Denies abdominal pain, flank pain, N/V, dysuria        Review of Systems        Objective       ED Triage Vitals [03/04/25 1428]   Temperature Pulse Blood Pressure Respirations SpO2 Patient Position - Orthostatic VS   98.8 °F (37.1 °C) 87 143/72 18 99 % -- "      Temp src Heart Rate Source BP Location FiO2 (%) Pain Score    -- Monitor Right arm -- --      Vitals      Date and Time Temp Pulse SpO2 Resp BP Pain Score FACES Pain Rating User   03/04/25 1428 98.8 °F (37.1 °C) 87 99 % 18 143/72 -- -- JAKE            Physical Exam  Vitals and nursing note reviewed.   Constitutional:       General: He is not in acute distress.     Appearance: Normal appearance. He is well-developed.   HENT:      Head: Normocephalic and atraumatic.   Eyes:      Extraocular Movements: Extraocular movements intact.      Conjunctiva/sclera: Conjunctivae normal.   Cardiovascular:      Rate and Rhythm: Normal rate and regular rhythm.      Heart sounds: Normal heart sounds.   Pulmonary:      Effort: Pulmonary effort is normal. No respiratory distress.      Breath sounds: Normal breath sounds.   Abdominal:      General: There is no distension.      Palpations: Abdomen is soft.      Tenderness: There is no abdominal tenderness. There is no guarding or rebound.   Musculoskeletal:         General: No swelling or tenderness.      Cervical back: Normal range of motion and neck supple.   Skin:     General: Skin is warm and dry.      Findings: No rash.   Neurological:      General: No focal deficit present.      Mental Status: He is alert and oriented to person, place, and time.      Motor: No abnormal muscle tone.   Psychiatric:         Thought Content: Thought content normal.         Judgment: Judgment normal.         Results Reviewed       Procedure Component Value Units Date/Time    Urine Microscopic [[id number]]  (Abnormal) Collected: 03/04/25 1448    Lab Status: Final result Specimen: Urine, Clean Catch Updated: 03/04/25 1542     RBC, UA Innumerable /hpf      WBC, UA None Seen /hpf      Epithelial Cells Occasional /hpf      Bacteria, UA Occasional /hpf     UA w Reflex to Microscopic w Reflex to Culture [[id number]]  (Abnormal) Collected: 03/04/25 1448    Lab Status: Final result Specimen: Urine, Clean Catch  Updated: 03/04/25 1539     Color, UA Dark Red     Clarity, UA Extra Turbid     Specific Gravity, UA 1.021     pH, UA 7.0     Leukocytes, UA Negative     Nitrite, UA Negative     Protein,  (2+) mg/dl      Glucose, UA Negative mg/dl      Ketones, UA Negative mg/dl      Urobilinogen, UA <2.0 mg/dl      Bilirubin, UA Small     Occult Blood, UA Large    Basic metabolic panel [[id number]]  (Abnormal) Collected: 03/04/25 1448    Lab Status: Final result Specimen: Blood from Arm, Right Updated: 03/04/25 1520     Sodium 140 mmol/L      Potassium 4.4 mmol/L      Chloride 108 mmol/L      CO2 21 mmol/L      ANION GAP 11 mmol/L      BUN 45 mg/dL      Creatinine 2.13 mg/dL      Glucose 138 mg/dL      Calcium 8.7 mg/dL      eGFR 26 ml/min/1.73sq m     Narrative:      National Kidney Disease Foundation guidelines for Chronic Kidney Disease (CKD):     Stage 1 with normal or high GFR (GFR > 90 mL/min/1.73 square meters)    Stage 2 Mild CKD (GFR = 60-89 mL/min/1.73 square meters)    Stage 3A Moderate CKD (GFR = 45-59 mL/min/1.73 square meters)    Stage 3B Moderate CKD (GFR = 30-44 mL/min/1.73 square meters)    Stage 4 Severe CKD (GFR = 15-29 mL/min/1.73 square meters)    Stage 5 End Stage CKD (GFR <15 mL/min/1.73 square meters)  Note: GFR calculation is accurate only with a steady state creatinine    APTT [[id number]]  (Abnormal) Collected: 03/04/25 1448    Lab Status: Final result Specimen: Blood from Arm, Right Updated: 03/04/25 1511     PTT 36 seconds     Protime-INR [[id number]]  (Abnormal) Collected: 03/04/25 1448    Lab Status: Final result Specimen: Blood from Arm, Right Updated: 03/04/25 1511     Protime 15.1 seconds      INR 1.17    Narrative:      INR Therapeutic Range    Indication                                             INR Range      Atrial Fibrillation                                               2.0-3.0  Hypercoagulable State                                    2.0.2.3  Left Ventricular Asist Device                             2.0-3.0  Mechanical Heart Valve                                  -    Aortic(with afib, MI, embolism, HF, LA enlargement,    and/or coagulopathy)                                     2.0-3.0 (2.5-3.5)     Mitral                                                             2.5-3.5  Prosthetic/Bioprosthetic Heart Valve               2.0-3.0  Venous thromboembolism (VTE: VT, PE        2.0-3.0    CBC and differential [[id number]]  (Abnormal) Collected: 03/04/25 1448    Lab Status: Final result Specimen: Blood from Arm, Right Updated: 03/04/25 1456     WBC 6.40 Thousand/uL      RBC 2.78 Million/uL      Hemoglobin 9.4 g/dL      Hematocrit 29.1 %       fL      MCH 33.8 pg      MCHC 32.3 g/dL      RDW 13.2 %      MPV 10.7 fL      Platelets 179 Thousands/uL      nRBC 0 /100 WBCs      Segmented % 70 %      Immature Grans % 0 %      Lymphocytes % 19 %      Monocytes % 9 %      Eosinophils Relative 1 %      Basophils Relative 1 %      Absolute Neutrophils 4.53 Thousands/µL      Absolute Immature Grans 0.02 Thousand/uL      Absolute Lymphocytes 1.19 Thousands/µL      Absolute Monocytes 0.55 Thousand/µL      Eosinophils Absolute 0.08 Thousand/µL      Basophils Absolute 0.03 Thousands/µL             CT abdomen pelvis wo contrast    (Results Pending)       Procedures    ED Medication and Procedure Management   Prior to Admission Medications   Prescriptions Last Dose Informant Patient Reported? Taking?   FeroSul 325 (65 Fe) MG tablet   No No   Sig: TAKE 1 TABLET BY MOUTH DAILY WITH BREAKFAST   albuterol (PROVENTIL HFA,VENTOLIN HFA) 90 mcg/act inhaler  Self No Yes   Sig: Inhale 2 puffs every 6 (six) hours as needed for wheezing   amLODIPine (NORVASC) 5 mg tablet   No Yes   Sig: TAKE 1 TABLET(5 MG) BY MOUTH TWICE DAILY   carbamide peroxide (DEBROX) 6.5 % otic solution   No No   Sig: Administer 5 drops into both ears 2 (two) times a day   metoprolol tartrate (LOPRESSOR) 50 mg tablet   No Yes   Sig: TAKE 1 TABLET(50 MG)  BY MOUTH DAILY   mupirocin (BACTROBAN) 2 % ointment   No No   Sig: Apply topically 3 (three) times a day      Facility-Administered Medications: None     Patient's Medications   Discharge Prescriptions    No medications on file     No discharge procedures on file.  ED SEPSIS DOCUMENTATION   Time reflects when diagnosis was documented in both MDM as applicable and the Disposition within this note       Time User Action Codes Description Comment    3/4/2025  4:09 PM Liz Luo Add [R31.9] Hematuria                  Liz Luo DO  03/04/25 1532

## 2025-03-04 NOTE — ED NOTES
[facility] at bedside     Lisa Ivy RN  03/04/25 8755     immune Minoxidil Counseling: Minoxidil is a topical medication which can increase blood flow where it is applied. It is uncertain how this medication increases hair growth. Side effects are uncommon and include stinging and allergic reactions.

## 2025-03-04 NOTE — ED PROVIDER NOTES
Received sign out from prior team. Reviewed plan of care with them and will accept care of patient. Will follow up on labs and/or radiology, as well as dispo patient.     CT abdomen pelvis wo contrast   Final Result      1.  Indeterminant 8 cm hyperdensity in the dependent portion of the bladder possibly blood clot. Underlying mass lesion not excluded. Further clinical assessment advised.   2.  Prostatomegaly.   3.  Cholelithiasis.   4.  0.4 cm right middle lobe pulmonary nodule. Based on current Fleischner Society 2017 Guidelines on incidental pulmonary nodule, no routine follow-up is needed if the patient is low risk. If the patient is high risk, optional follow-up chest CT at 12    months can be considered. Considerations related to the patient's age and/or comorbidities may be used to alter these recommendations.      Workstation performed: [id number]           Discussed CT findings with urology.  Patient will need admission and OR tomorrow.      SLIM agreeable to inpatient admission.     Patient aware.     Time reflects when diagnosis was documented in both MDM as applicable and the Disposition within this note       Time User Action Codes Description Comment    3/4/2025  4:09 PM Liz Luo Add [R31.9] Hematuria           ED Disposition       ED Disposition   Admit    Condition   Stable    Date/Time   Tue Mar 4, 2025  6:04 PM    Comment   Case was discussed with ZAIRE and the patient's admission status was agreed to be Admission Status: inpatient status to the service of Dr. Thomas .               Follow-up Information       Follow up With Specialties Details Why Contact Info Additional Information    Rehab DO [facility]   [address] [place] [id number]  [phone]       [facility]   [address] [place], [place] [address]  [facility] [address]  [phone] [facility] [place] [address], [place]  [place], [address]   [phone]               Katie Mcclain,   03/04/25 1812

## 2025-03-04 NOTE — TREATMENT PLAN
I received epic secure chat message from ED provider regarding patient presenting with gross hematuria ongoing for a few days.  Per ER report, patient denied passage of clots.  He has been afebrile with stable vital signs.  CBC and BMP were within expected limits for patient's baseline history.  Negative leukocytosis, stable renal function with creatinine of 2.13 at baseline and hemoglobin stable at 9.4 at baseline.  Plan was for discharge with outpatient urology follow-up however CT imaging was pending.  CT imaging ultimately revealed a indeterminate 8 cm hyperdensity in the dependent portion of the bladder possibly blood clot. Underlying mass lesion not excluded.  Additional nonemergent findings.  Per ER, patient's bladder scan earlier was 68 cc, patient voiding dark maroon-colored urine without clots.    Vitals:    03/04/25 1428 03/04/25 1630   BP: 143/72 143/65   BP Location: Right arm Left arm   Pulse: 87 86   Resp: 18 18   Temp: 98.8 °F (37.1 °C)    SpO2: 99% 98%       Lab Results   Component Value Date    WBC 6.40 03/04/2025    HGB 9.4 (L) 03/04/2025    HCT 29.1 (L) 03/04/2025     (H) 03/04/2025     03/04/2025       Lab Results   Component Value Date    SODIUM 140 03/04/2025    K 4.4 03/04/2025     03/04/2025    CO2 21 03/04/2025    BUN 45 (H) 03/04/2025    CREATININE 2.13 (H) 03/04/2025    GLUC 138 03/04/2025    CALCIUM 8.7 03/04/2025         Recommendations:     Case was reviewed with on-call urology MD, recommend admitting to [facility]IM services. Formal Urology consult to follow in AM.  NPO at midnight for add on case tomorrow for possible cystoscopy with clot evacuation versus TURBT.   As long as patient is voiding and not passing large clots, recommend serial PVR testing overnight.   Please reach out to Urology [facility] covering all call service overnight for any question.     BROOKLYN Lang

## 2025-03-05 PROBLEM — R57.9 SHOCK (HCC): Status: ACTIVE | Noted: 2025-01-01

## 2025-03-05 PROBLEM — I46.9 CARDIAC ARREST (HCC): Status: ACTIVE | Noted: 2025-01-01

## 2025-03-05 NOTE — ANESTHESIA PREPROCEDURE EVALUATION
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         Additional Monitors:     Airway Plan:            Plan Factors-    Chart reviewed.   Existing labs reviewed.     Patient is not a current smoker.      Obstructive sleep apnea risk education given perioperatively.        Induction- intravenous.    Postoperative Plan-     Perioperative Resuscitation Plan - Level 1 - Full Code.       Informed Consent- Anesthetic plan and risks discussed with patient.        NPO Status:  Vitals Value Taken Time   Date of last liquid 03/04/25 03/05/25 1407   Time of last liquid 2200 03/05/25 1407   Date of last solid 03/04/25 03/05/25 1407   Time of last solid 2200 03/05/25 1407

## 2025-03-05 NOTE — PROGRESS NOTES
Progress Note - Hospitalist   Name: Arlyn L Follweiler 89 y.o. male I MRN: [id number]  Unit/Bed#: [place] [address] -01 I Date of Admission: 3/4/2025   Date of Service: 3/5/2025 I Hospital Day: 1    Assessment & Plan  Gross hematuria  History of hypertension CKD 4 and asthma who presented to the hospital for gross hematuria  Patient reported dark urine over the past couple days.  Denies any abdominal pain  Reportedly wife was giving patient aspirin to the patient unknowingly for a cough  Due to evidence of large mass/density on CT imaging and bladder, urology planning cystoscopy today  Urology -clot evacuation fulguration versus transurethral resection of bladder tumor pending intraoperative findings   Essential hypertension  Blood pressure stable continue amlodipine  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  (HCC)  Hemoglobin downtrending  Baseline hemoglobin appears to be about 10-11  Hemoglobin today 8.1 patient is continuing to have bleed  Continue ferrous sulfate, undergoing cystoscopy today    Results from last 7 days   Lab Units 03/05/25  0534 03/04/25  1448   HEMOGLOBIN g/dL 8.1* 9.4*     Nodule of right lung  4 mm: Could consider CT imaging in 1 year  Mild persistent asthma without complication  No exacerbation.  Albuterol as needed  Chronic kidney disease, stage 4 (severe) (HCC)  Stable.  Baseline creatinine approximately 2.0.    Results from last 7 days   Lab Units 03/05/25  0534 03/04/25  1448   BUN mg/dL 38* 45*   CREATININE mg/dL 1.73* 2.13*   EGFR ml/min/1.73sq m 33 26       VTE Pharmacologic Prophylaxis: VTE Score: 3 Moderate Risk (Score 3-4) - Pharmacological DVT Prophylaxis Contraindicated. Sequential Compression Devices Ordered.    Mobility:   Basic Mobility Inpatient Raw Score: 20  JH-HLM Goal: 6: Walk 10 steps or more  JH-HLM Achieved: 7: Walk 25 feet or more  JH-HLM Goal achieved. Continue to encourage appropriate mobility.    Patient Centered Rounds: I performed bedside rounds with nursing staff  today.   Discussions with Specialists or Other Care Team Provider: CM, uro-    Education and Discussions with Family / Patient: Attempted to update  (daughter) via phone. Left voicemail.     Current Length of Stay: 1 day(s)  Current Patient Status: Inpatient   Certification Statement: The patient will continue to require additional inpatient hospital stay due to awaiting cystoscopy and improvement in hemoglobin  Discharge Plan: Anticipate discharge in 24-48 hrs to home.    Code Status: Level 1 - Full Code    Subjective   Patient reports to be feeling relatively well.  He does report having intermittent lightheadedness and he has continued blood in his urine.  He currently denies any chest pain/pressure, palpitations, lightness, nausea, shortness of breath, or chills.    Objective :  Temp:  [98.6 °F (37 °C)-98.8 °F (37.1 °C)] 98.8 °F (37.1 °C)  HR:  [75-90] 75  BP: (133-148)/() 146/65  Resp:  [18-19] 18  SpO2:  [94 %-99 %] 96 %  O2 Device: None (Room air)    Body mass index is 21.9 kg/m².     Input and Output Summary (last 24 hours):     Intake/Output Summary (Last 24 hours) at 3/5/2025 1351  Last data filed at 3/5/2025 1256  Gross per 24 hour   Intake 0 ml   Output 775 ml   Net -775 ml       Physical Exam  Vitals and nursing note reviewed.   Constitutional:       General: He is not in acute distress.     Appearance: He is normal weight. He is not ill-appearing, toxic-appearing or diaphoretic.   HENT:      Head: Normocephalic.      Nose: Nose normal.      Mouth/Throat:      Mouth: Mucous membranes are moist.      Pharynx: Oropharynx is clear.   Eyes:      General: No scleral icterus.     Conjunctiva/sclera: Conjunctivae normal.      Pupils: Pupils are equal, round, and reactive to light.   Cardiovascular:      Rate and Rhythm: Normal rate and regular rhythm.      Heart sounds: No murmur heard.     No friction rub. No gallop.   Pulmonary:      Effort: Pulmonary effort is normal. No respiratory  distress.      Breath sounds: Normal breath sounds. No stridor. No wheezing, rhonchi or rales.   Abdominal:      General: Abdomen is flat.      Palpations: Abdomen is soft.   Genitourinary:     Comments: Gross hematuria appreciated in bedside urinal  Musculoskeletal:         General: Normal range of motion.      Cervical back: Normal range of motion and neck supple.      Right lower leg: No edema.      Left lower leg: No edema.   Lymphadenopathy:      Cervical: No cervical adenopathy.   Skin:     General: Skin is warm.      Coloration: Skin is not jaundiced or pale.      Findings: No bruising, erythema or lesion.   Neurological:      General: No focal deficit present.      Mental Status: He is alert and oriented to person, place, and time. Mental status is at baseline.      Cranial Nerves: No cranial nerve deficit.      Motor: No weakness.   Psychiatric:         Mood and Affect: Mood normal.         Behavior: Behavior normal.         Thought Content: Thought content normal.           Lab Results: I have reviewed the following results:   Results from last 7 days   Lab Units 03/05/25  0534 03/04/25  1448   WBC Thousand/uL 5.54 6.40   HEMOGLOBIN g/dL 8.1* 9.4*   HEMATOCRIT % 24.7* 29.1*   PLATELETS Thousands/uL 134* 179   SEGS PCT %  --  70   LYMPHO PCT %  --  19   MONO PCT %  --  9   EOS PCT %  --  1     Results from last 7 days   Lab Units 03/05/25  0534   SODIUM mmol/L 141   POTASSIUM mmol/L 4.4   CHLORIDE mmol/L 110*   CO2 mmol/L 25   BUN mg/dL 38*   CREATININE mg/dL 1.73*   ANION GAP mmol/L 6   CALCIUM mg/dL 8.4   ALBUMIN g/dL 3.1*   TOTAL BILIRUBIN mg/dL 1.09*   ALK PHOS U/L 50   ALT U/L 10   AST U/L 27   GLUCOSE RANDOM mg/dL 99     Results from last 7 days   Lab Units 03/04/25  1448   INR  1.17                   Recent Cultures (last 7 days):         Imaging Results Review: No pertinent imaging studies reviewed.  Other Study Results Review: No additional pertinent studies reviewed.    Last 24 Hours Medication  List:     Current Facility-Administered Medications:     acetaminophen (TYLENOL) tablet 650 mg, Q4H PRN    albuterol (PROVENTIL HFA,VENTOLIN HFA) inhaler 2 puff, Q6H PRN    amLODIPine (NORVASC) tablet 5 mg, BID    ferrous sulfate tablet 325 mg, Daily With Breakfast    metoprolol tartrate (LOPRESSOR) tablet 50 mg, Daily    ondansetron (ZOFRAN) injection 4 mg, Q4H PRN    Administrative Statements   Today, Patient Was Seen By: Jay Nelson PA-C  I have spent a total time of 35 minutes in caring for this patient on the day of the visit/encounter including Documenting in the medical record, Reviewing/placing orders in the medical record (including tests, medications, and/or procedures), Obtaining or reviewing history  , and Communicating with other healthcare professionals .    **Please Note: This note may have been constructed using a voice recognition system.**

## 2025-03-05 NOTE — PROGRESS NOTES
Assumed care of patient at 2305, agreeable with previous nurse assessment. Patient offers no complaints at this time. Observed in bed watching TV. Safety interventions in place, call bell within reach.

## 2025-03-05 NOTE — UTILIZATION REVIEW
Initial Clinical Review    Admission: Date/Time/Statement:   Admission Orders (From admission, onward)       Ordered        03/04/25 1804  INPATIENT ADMISSION  Once                          Orders Placed This Encounter   Procedures    INPATIENT ADMISSION     Standing Status:   Standing     Number of Occurrences:   1     Level of Care:   Med Surg [16]     Estimated length of stay:   More than 2 Midnights     Certification:   I certify that inpatient services are medically necessary for this patient for a duration of greater than two midnights. See H&P and MD Progress Notes for additional information about the patient's course of treatment.     ED Arrival Information       Expected   -    Arrival   3/4/2025 14:18    Acuity   Urgent              Means of arrival   Ambulance    Escorted by   [facility] ([facility])    Service   Hospitalist    Admission type   Emergency              Arrival complaint   blood in urine             Chief Complaint   Patient presents with    Blood in Urine     Pt reports blood in urine x2 days. Reports sometimes a lot sometimes a little        Initial Presentation: 89 y.o. male to ED presents for Hematuria. Pt had painless dark urine over the past couple days. In ED, he was noted to have bladder density on CT imaging. Reportedly the wife was giving the patient unknowingly aspirin for a cough over the past week.   PMH for Hypertension CKD 4 and asthma   Admit to Inpatient Dx; Gross hematuria  Plan; Urology consult. Plan for OR 3/5  3/4 CT A/P wo contrast; 1.  Indeterminant 8 cm hyperdensity in the dependent portion of the bladder possibly blood clot. Underlying mass lesion not excluded. Further clinical assessment advised.  2.  Prostatomegaly.  3.  Cholelithiasis.  4.  0.4 cm right middle lobe pulmonary nodule. Based on current Fleischner Society 2017 Guidelines on incidental pulmonary nodule, no routine follow-up is needed if the patient is low risk. If the patient is high risk,  optional follow-up chest CT at 12   months can be considered. Considerations related to the patient's age and/or comorbidities may be used to alter these recommendations.    Incidental Finding; Right lung nodule 4 mm.  F/u should be done within 12 months    Anticipated Length of Stay/Certification Statement:  Patient will be admitted on an inpatient basis with an anticipated length of stay of greater than 2 midnights secondary to hematuria with bladder density.     Date: 3/5   Day 2:   Urology cons; Plan for OR today. NPO.     Progress notes; Plan for OR today; clot evacuation fulguration versus transurethral resection of bladder tumor pending intraoperative findings   NPO. Iv fluids.    3/5 OR - S/p CYSTOSCOPY EVACUATION OF CLOTS  LAPAROTOMY EXPLORATORY, BLADDER REPAIR  TRANSURETHRAL RESECTION OF BLADDER TUMOR (TURBT) - 4 CM  Operative Findings:  200 cc clot in retention.  Trabeculation with thin walled diverticula.  Perforation at site of resection with about 2 L fluid deficit at conclusion of resection.  Open laparotomy, fluid evacuation, bladder exploration, bladder repair and closure of diverticulum.    Date: 3/6  Day 3: Has surpassed a 2nd midnight with active treatments and services.  Pt with gross hematuria w/ radiographic evidence of blood product vs mass in bladder. Now s/p cystoscopy evacuation of 200cc clot and bladder tumor removal, converted to open laparotomy with abdominal fluid drainage (2L) and AGATA drain placement. Post-op he was hypotensive, initially responsive to fluids and levophed. Repeat hgb 6.4, 2 units RBCs ordered and began transfusing. Escalated levophed quickly, added vasopressin. Femoral arterial line placed. He became hypoxemic and went into respiratory distress. He subsequently went into cardiac arrest and was intubated. ACLS algorithm was followed. ROSC achieved w/ 25 minutes down time. Family arrived and made the pt level 4 comfort care.     Pt   Date, Time and Cause of Death     Date of Death: 3/6/25  Time of Death: 12:16 AM  Preliminary Cause of Death: Hemorrhagic shock (HCC)      ED Treatment-Medication Administration from 03/04/2025 1418 to 03/04/2025 2055       None            Scheduled Medications:  amLODIPine, 5 mg, Oral, BID  ferrous sulfate, 325 mg, Oral, Daily With Breakfast  metoprolol tartrate, 50 mg, Oral, Daily     Continuous IV Infusions:   NOREPINEPHRINE 4 MG  ML NSS (CMPD ORDER) infusion  Rate: 3.8-112.5 mL/hr Dose: 1-30 mcg/min  Freq: Titrated Route: IV  Last Dose: 10 mcg/min (03/05/25 2205)  Start: 03/05/25 2115 End: 03/06/25 0118    sodium chloride 0.9 % infusion  Rate: 125 mL/hr Dose: 125 mL/hr  Freq: Continuous Route: IV  Start: 03/05/25 1430 End: 03/06/25 0118      ED Triage Vitals   Temperature Pulse Respirations Blood Pressure SpO2 Pain Score   03/04/25 1428 03/04/25 1428 03/04/25 1428 03/04/25 1428 03/04/25 1428 03/04/25 2106   98.8 °F (37.1 °C) 87 18 143/72 99 % No Pain     Weight (last 2 days) before discharge       Date/Time Weight    03/04/25 21:07:02 45.9 (101.19)            Vital Signs (last 3 days) before discharge       Date/Time Temp Pulse Resp BP MAP (mmHg) Arterial Line BP MAP SpO2 FiO2 (%) Calculated FIO2 (%) - Nasal Cannula Nasal Cannula O2 Flow Rate (L/min) O2 Device Patient Position - Orthostatic VS Miguel A Coma Scale Score Pain    03/05/25 2325 94.8 °F (34.9 °C) 74 24 225/95 -- 226/76 129 mmHg 100 % -- -- -- Ventilator -- -- --    03/05/25 2316 95 °F (35 °C) 74 24 178/77 -- 208/71 120 mmHg 100 % 100 -- -- Ventilator -- -- --    03/05/25 2315 95 °F (35 °C) -- -- -- -- -- -- -- -- -- -- -- -- -- --    03/05/25 2312 95 °F (35 °C) 84 -- -- -- 198/69 122 mmHg 89 % -- -- -- -- -- -- --    03/05/25 2310 -- -- -- 125/59 -- -- -- -- -- -- -- -- -- -- --    03/05/25 2309 95 °F (35 °C) 43 -- -- -- 85/25 40 mmHg -- -- -- -- -- -- -- --    03/05/25 2306 95 °F (35 °C) 81 48 -- -- 99/36 56 mmHg 84 % -- -- -- -- -- -- --    03/05/25 2303 95.2 °F (35.1 °C)  115 113 -- -- 51/16 31 mmHg 92 % -- -- -- -- -- -- --    03/05/25 2300 95.2 °F (35.1 °C) 142 143 -- -- 54/11 39 mmHg 95 % -- -- -- -- -- -- --    03/05/25 2257 95.2 °F (35.1 °C) 107 72 -- -- 68/24 40 mmHg 90 % -- -- -- -- -- -- --    03/05/25 2255 -- -- -- 231/171 -- -- -- -- -- -- -- -- -- -- --    03/05/25 2254 95.4 °F (35.2 °C) 129 118 -- -- 67/10 27 mmHg 99 % -- -- -- -- -- -- --    03/05/25 2251 95.4 °F (35.2 °C) 116 115 193/85 -- 69/6 27 mmHg 100 % -- -- -- -- -- -- --    03/05/25 2248 95.4 °F (35.2 °C) 123 119 55/34 -- 58/2 20 mmHg 100 % -- -- -- -- -- -- --    03/05/25 2245 95.4 °F (35.2 °C) 21 19 66/30 -- 24/3 11 mmHg 85 % -- 28 2 L/min Nasal cannula -- -- --    03/05/25 2236 95.5 °F (35.3 °C) 63 48 66/30 -- -- -- 89 % -- 28 2 L/min Nasal cannula -- -- --    03/05/25 2235 95.5 °F (35.3 °C) 63 48 66/30 -- -- -- 94 % -- 28 2 L/min Nasal cannula -- -- --    03/05/25 2230 95.5 °F (35.3 °C) 61 40 71/35 -- -- -- 91 % -- 28 2 L/min Nasal cannula -- -- --    03/05/25 2225 95.5 °F (35.3 °C) 57 6 -- -- -- -- 89 % -- 28 2 L/min Nasal cannula -- -- --    03/05/25 2221 95.5 °F (35.3 °C) 57 5 54/38 -- -- -- 94 % -- 28 2 L/min Nasal cannula -- -- --    03/05/25 2220 95.5 °F (35.3 °C) 56 5 54/38 -- -- -- 88 % -- 28 2 L/min -- -- -- --    03/05/25 2218 95.5 °F (35.3 °C) 56 5 54/38 -- -- -- 97 % -- 28 2 L/min Nasal cannula -- -- --    03/05/25 2200 -- 65 20 54/38 43 -- -- 98 % -- -- -- -- -- -- --    03/05/25 2155 -- 72 22 58/42 47 -- -- 98 % -- -- -- -- -- -- --    03/05/25 2145 -- 84 29 65/48 53 -- -- 96 % -- -- -- -- -- -- --    03/05/25 2121 -- 77 -- 86/51 64 -- -- -- -- -- -- -- -- -- --    03/05/25 2030 95 °F (35 °C) 67 16 129/60 86 -- -- 99 % -- 28 2 L/min Nasal cannula Lying 6 --    03/05/25 1942 97.8 °F (36.6 °C) 64 15 149/80 105 -- -- 97 % -- -- -- -- -- -- --    03/05/25 1937 -- 66 13 156/79 104 -- -- 96 % -- -- -- -- -- -- --    03/05/25 1932 -- 72 17 141/64 92 -- -- 98 % -- -- -- -- -- -- --    03/05/25 1927 --  44 15 189/77 111 -- -- 100 % -- -- -- -- -- -- --    03/05/25 1922 97.1 °F (36.2 °C) 42 12 88/48 66 -- -- 100 % -- -- -- -- -- -- --    03/05/25 1852 -- 50 -- 122/58 84 -- -- 99 % -- -- -- -- -- -- --    03/05/25 1837 97.7 °F (36.5 °C) 50 17 117/57 82 -- -- 98 % -- -- -- -- -- -- --    03/05/25 1822 97.7 °F (36.5 °C) 50 14 120/58 83 -- -- 97 % -- -- -- -- -- -- --    03/05/25 1807 98.2 °F (36.8 °C) 50 16 119/55 81 -- -- 96 % -- -- -- -- -- -- --    03/05/25 1752 98.6 °F (37 °C) 52 17 120/58 84 -- -- 95 % -- 28 2 L/min Nasal cannula -- -- --    03/05/25 1737 99.3 °F (37.4 °C) 53 16 126/60 -- -- -- 97 % -- -- -- None (Room air) -- -- --    03/05/25 1407 -- -- -- -- -- -- -- -- -- -- -- -- -- -- No Pain    03/05/25 0920 -- -- -- -- -- -- -- -- -- -- -- -- -- -- No Pain    03/05/25 09:14:10 -- 75 -- 146/65 92 -- -- 96 % -- -- -- -- -- -- --    03/05/25 0907 -- -- -- -- -- -- -- -- -- -- -- -- -- -- No Pain    03/05/25 08:06:56 98.8 °F (37.1 °C) 77 18 147/65 92 -- -- 97 % -- -- -- None (Room air) -- -- --    03/05/25 0715 -- -- -- -- -- -- -- -- -- -- -- -- -- -- No Pain    03/04/25 21:07:02 98.6 °F (37 °C) 89 18 133/63 86 -- -- 98 % -- -- -- None (Room air) Lying -- --    03/04/25 2106 -- -- -- -- -- -- -- -- -- -- -- -- -- -- No Pain    03/04/25 1900 -- 90 19 140/63 91 -- -- 96 % -- -- -- None (Room air) Sitting -- --    03/04/25 1830 -- 87 18 148/100 -- -- -- 94 % -- -- -- None (Room air) Lying -- --    03/04/25 1630 -- 86 18 143/65 94 -- -- 98 % -- -- -- None (Room air) Lying -- --    03/04/25 1428 98.8 °F (37.1 °C) 87 18 143/72 -- -- -- 99 % -- -- -- None (Room air) -- -- --              Pertinent Labs/Diagnostic Test Results:   Radiology:  CT head wo contrast   Final Interpretation by Marcus Torres MD (03/05 2053)   No acute intracranial abnormality.      Mild chronic microangiopathic ischemic changes.                        Workstation performed: [id number]         CT abdomen pelvis wo contrast   Final  Interpretation by Edy Mills MD (03/04 4338)      1.  Indeterminant 8 cm hyperdensity in the dependent portion of the bladder possibly blood clot. Underlying mass lesion not excluded. Further clinical assessment advised.   2.  Prostatomegaly.   3.  Cholelithiasis.   4.  0.4 cm right middle lobe pulmonary nodule. Based on current Fleischner Society 2017 Guidelines on incidental pulmonary nodule, no routine follow-up is needed if the patient is low risk. If the patient is high risk, optional follow-up chest CT at 12    months can be considered. Considerations related to the patient's age and/or comorbidities may be used to alter these recommendations.      Workstation performed: [id number]           Cardiology:  ECG 12 lead   Final Result by Edy Al MD (03/06 0800)   Undetermined rhythm   Right bundle branch block   Left anterior fascicular block    Bifascicular block    Possible Lateral infarct , age undetermined   Abnormal ECG      Confirmed by Edy Al ([id number]) on 3/6/2025 8:00:32 AM      ECG 12 lead   Final Result by Edy Al MD (03/04 1512)   Sinus tachycardia   Left axis deviation   ST & T wave abnormality, consider lateral ischemia   Abnormal ECG   When compared with ECG of 17-Sep-2018 15:38,   Premature atrial complexes are no longer Present   QRS axis Shifted left   T wave inversion now evident in Lateral leads   Confirmed by Edy Al ([id number]) on 3/4/2025 3:12:04 PM        GI:  No orders to display           Results from last 7 days   Lab Units 03/05/25  2316 03/05/25  2304 03/05/25  2237 03/05/25  2107 03/05/25  1920 03/05/25  0534 03/04/25  1448   WBC Thousand/uL  --   --   --  17.21*  --  5.54 6.40   HEMOGLOBIN g/dL  --   --   --  6.4*  --  8.1* 9.4*   I STAT HEMOGLOBIN g/dl  --   --  5.1*  --  7.1*  --   --    HEMATOCRIT %  --   --   --  19.4*  --  24.7* 29.1*   HEMATOCRIT, ISTAT % <15* <15* 15*  --  21*  --   --    PLATELETS Thousands/uL  --   --   --   "146*  --  134* 179   TOTAL NEUT ABS Thousands/µL  --   --   --  14.85*  --   --  4.53         Results from last 7 days   Lab Units 03/05/25  2316 03/05/25 2304 03/05/25 2237 03/05/25 2107 03/05/25 1920 03/05/25  0534 03/04/25  1448   SODIUM mmol/L  --   --   --   --   --  141 140   POTASSIUM mmol/L  --   --   --   --   --  4.4 4.4   CHLORIDE mmol/L  --   --   --   --   --  110* 108   CO2 mmol/L  --   --   --   --   --  25 21   CO2, I-STAT mmol/L >45* 41* 17*  --  19*  --   --    ANION GAP mmol/L  --   --   --   --   --  6 11   BUN mg/dL  --   --   --   --   --  38* 45*   CREATININE mg/dL  --   --   --   --   --  1.73* 2.13*   EGFR ml/min/1.73sq m  --   --   --   --   --  33 26   CALCIUM mg/dL  --   --   --   --   --  8.4 8.7   CALCIUM, IONIZED mmol/L  --   --   --  0.98*  --   --   --    CALCIUM, IONIZED, ISTAT mmol/L >2.20* 1.97* 1.07*  --  1.00*  --   --      Results from last 7 days   Lab Units 03/05/25  0534   AST U/L 27   ALT U/L 10   ALK PHOS U/L 50   TOTAL PROTEIN g/dL 5.4*   ALBUMIN g/dL 3.1*   TOTAL BILIRUBIN mg/dL 1.09*     Results from last 7 days   Lab Units 03/05/25  1908   POC GLUCOSE mg/dl 143*     Results from last 7 days   Lab Units 03/05/25  0534 03/04/25  1448   GLUCOSE RANDOM mg/dL 99 138             No results found for: \"BETA-HYDROXYBUTYRATE\"       Results from last 7 days   Lab Units 03/05/25 2107   PH RANDALL  7.288*   PCO2 RANDALL mm Hg 25.4*   PO2 RANDALL mm Hg 162.8*   HCO3 RANDALL mmol/L 11.9*   BASE EXC RANDALL mmol/L -13.5   O2 CONTENT RANDALL ml/dL 9.7   O2 HGB, VENOUS % 96.8*     Results from last 7 days   Lab Units 03/05/25  2316 03/05/25 2304 03/05/25 2237 03/05/25 1920   PH, RANDALL I-STAT   --  7.204* 6.920*  --    PCO2, RANDALL ISTAT mm HG  --  96.2* 70.6*  --    PO2, RANDALL ISTAT mm HG  --  106.0* 16.0*  --    HCO3, RANDALL ISTAT mmol/L  --  37.9* 14.5*  --    I STAT BASE EXC mmol/L >30* 11* -16* -7*   I STAT O2 SAT % 100* 96* 9* 97*   ISTAT PH ART  7.645*  --   --  7.347*   I STAT ART PCO2 mm HG 66.8*  --   " --  32.0*   I STAT ART PO2 mm .0*  --   --  98.0   I STAT ART HCO3 mmol/L 72.8*  --   --  17.5*                 Results from last 7 days   Lab Units 03/04/25  1448   PROTIME seconds 15.1*   INR  1.17   PTT seconds 36*           Results from last 7 days   Lab Units 03/06/25  0530   UNIT PRODUCT CODE  [id number]  [id number]   UNIT NUMBER  [id number]  [id number]   UNITABO  A  A   UNITRH  POS  POS   CROSSMATCH  Compatible  Compatible   UNIT DISPENSE STATUS  Presumed Trans  Return to Inv   UNIT PRODUCT VOL ml 300  300       Results from last 7 days   Lab Units 03/04/25  1448   CLARITY UA  Extra Turbid   COLOR UA  Dark Red   SPEC GRAV UA  1.021   PH UA  7.0   GLUCOSE UA mg/dl Negative   KETONES UA mg/dl Negative   BLOOD UA  Large*   PROTEIN UA mg/dl 200 (2+)*   NITRITE UA  Negative   BILIRUBIN UA  Small*   UROBILINOGEN UA (BE) mg/dl <2.0   LEUKOCYTES UA  Negative   WBC UA /hpf None Seen   RBC UA /hpf Innumerable*   BACTERIA UA /hpf Occasional   EPITHELIAL CELLS WET PREP /hpf Occasional         Past Medical History:   Diagnosis Date    Acute cholangitis 10/04/2018    Added automatically from request for surgery [id number]    Cholelithiasis with biliary obstruction 09/28/2018    Clostridium difficile colitis 09/28/2018    Fall at home, initial encounter 02/24/2022    History of bacteremia 10/01/2018    Hypertension     Lower extremity edema 10/10/2018    Medical history unknown     Mild persistent asthma without complication 05/19/2020    Renal disorder      Present on Admission:   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  (HCC)   Chronic kidney disease, stage 4 (severe) (HCC)   Essential hypertension   Mild persistent asthma without complication   Gross hematuria      Admitting Diagnosis: Hematuria [R31.9]  Age/Sex: 89 y.o. male    Network Utilization Review Department  ATTENTION: Please call with any questions or concerns to [phone] and carefully listen to the prompts so that you are directed to the  right person. All voicemails are confidential.   For Discharge needs, contact Care Management DC Support Team at [phone] opt. [phone]  Send all requests for admission clinical reviews, approved or denied determinations and any other requests to dedicated fax number below belonging to the campus where the patient is receiving treatment. List of dedicated fax numbers for the Facilities:  FACILITY NAME UR FAX NUMBER   ADMISSION DENIALS (Administrative/Medical Necessity) [phone]   DISCHARGE SUPPORT TEAM (NETWORK) [phone]   PARENT CHILD HEALTH (Maternity/NICU/Pediatrics) [phone]   [facility] [phone]   [facility] [phone]   [facility] [phone]   [facility] [phone]   [facility] [phone]   [facility] [phone]   [facility] [phone]   [facility] [phone]   [facility] [phone]   [facility] [phone]   [facility] [phone]   [facility] [phone]

## 2025-03-05 NOTE — PERIOPERATIVE NURSING NOTE
Still not responding to any stimuli since arrival to PACU.  Seen at bedside by Dr. Jasmine and then Dr. Mayfield.  VSS. No new order at this time. AGATA drainage 460 ml at this time.  Surgeons aware, ordered to leave AGATA off suction.  Will continue to monitor.

## 2025-03-05 NOTE — ASSESSMENT & PLAN NOTE
Stable.  Baseline creatinine approximately 2.0.    Results from last 7 days   Lab Units 03/04/25  1448   BUN mg/dL 45*   CREATININE mg/dL 2.13*   EGFR ml/min/1.73sq m 26

## 2025-03-05 NOTE — ASSESSMENT & PLAN NOTE
Painless gross hematuria for several days, aspirin use, no other anticoagulants  Urinalysis dark red/maroon urine no leukocytes or nitrites no glucosuria no pyuria.  Unlikely to be urinary tract infection  CT scan abdomen pelvis without contrast shows 2 normal kidneys no stone disease no obstruction no mass.  Prostate is enlarged.  No notable calcifications.  Bladder has approximately 7 cm hyperdensity blood product versus mass.    Patient is voiding on his own, emptying, PVR 68 mL, no clot passage.  No catheter.  Hemoglobin 9.4 consistent with his baseline between 9-10.  He is on longtime iron supplementation.  Avoid unnecessary antiplatelet or anticoagulant.  Patient is n.p.o., diagnostic cystoscopy planned in the OR today, clot evacuation fulguration versus transurethral resection of bladder tumor pending intraoperative findings.    Discussed the procedure with the patient and then with his wife Rosa Maria [phone] via telephone.  Risks include bleeding, retention, incontinence, need for postoperative catheter temporarily, risk of a malignant diagnosis. They agree to proceed.

## 2025-03-05 NOTE — OCCUPATIONAL THERAPY NOTE
Occupational Therapy Evaluation     Patient Name: Arlyn L Follweiler  Today's Date: 3/5/2025  Problem List  Principal Problem:    Gross hematuria  Active Problems:    Essential hypertension    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  (HCC)    Mild persistent asthma without complication    Chronic kidney disease, stage 4 (severe) (HCC)    Nodule of right lung    Past Medical History  Past Medical History:   Diagnosis Date    Acute cholangitis 10/04/2018    Added automatically from request for surgery [id number]    Cholelithiasis with biliary obstruction 09/28/2018    Clostridium difficile colitis 09/28/2018    Fall at home, initial encounter 02/24/2022    History of bacteremia 10/01/2018    Hypertension     Lower extremity edema 10/10/2018    Medical history unknown     Mild persistent asthma without complication 05/19/2020    Renal disorder      Past Surgical History  Past Surgical History:   Procedure Laterality Date    ERCP N/A 09/18/2018    Procedure: ENDOSCOPIC RETROGRADE CHOLANGIOPANCREATOGRAPHY (ERCP) with stent placement;  Surgeon: Alvaro Olivia MD;  Location: [facility];  Service: Gastroenterology    TX ERCP DX COLLECTION SPECIMEN BRUSHING/WASHING N/A 11/23/2018    Procedure: ENDOSCOPIC RETROGRADE CHOLANGIOPANCREATOGRAPHY (ERCP);  Surgeon: Alvaro Olivia MD;  Location: [facility];  Service: Gastroenterology           03/05/25 0907   Note Type   Note type Evaluation   Pain Assessment   Pain Assessment Tool 0-10   Pain Score No Pain   Restrictions/Precautions   Weight Bearing Precautions Per Order No   Other Precautions Chair Alarm;Bed Alarm;Fall Risk   Home Living   Type of Home House   Home Layout Two level;Able to live on main level with bedroom/bathroom  (1 sofy)   Bathroom Shower/Tub Tub/shower unit   Bathroom Toilet Raised   Bathroom Equipment Commode   Home Equipment Walker;Cane   Prior Function   Level of [place] Independent with ADLs;Independent with functional mobility   Lives With Spouse   Falls  "in the last 6 months 0   Lifestyle   Autonomy PTA pt states independence with his ADLs, transfers, ambulation--w/o device; +, occasional home alone, neg falls   Reciprocal Relationships supportive dtr   Service to Others worked as a    Intrinsic Gratification sports   Subjective   Subjective \"I don't walk too much at home.\"   ADL   Where Assessed Edge of bed   Eating Assistance 6  Modified independent   Grooming Assistance 6  Modified Independent   UB Bathing Assistance 5  Supervision/Setup   LB Bathing Assistance 4  Minimal Assistance   UB Dressing Assistance 5  Supervision/Setup   LB Dressing Assistance 4  Minimal Assistance   Toileting Assistance  5  Supervision/Setup   Bed Mobility   Rolling R 6  Modified independent   Rolling L 6  Modified independent   Supine to Sit 6  Modified independent   Sit to Supine 6  Modified independent   Transfers   Sit to Stand 5  Supervision   Additional items Bedrails;Increased time required;Verbal cues   Stand to Sit 5  Supervision   Additional items Increased time required;Verbal cues;Bedrails   Additional Comments bp's=146/65(EOB)   Functional Mobility   Functional Mobility 4  Minimal assistance   Additional Comments x1   Additional items Rolling walker   Balance   Static Sitting Good   Dynamic Sitting Fair +   Static Standing Fair -   Dynamic Standing Poor +   Activity Tolerance   Activity Tolerance Patient limited by fatigue   Medical Staff Made Aware nsg, P.T., CM   RUE Assessment   RUE Assessment X  (limited b/l shr AROM(i.e.flex=40-50*), elbow-distal=WFLs)   RUE Strength   RUE Overall Strength   (shr=3-/5, elbow-distal=3+/5)   LUE Assessment   LUE Assessment X  (limited b/l shr AROM(i.e.flex=40-50*), elbow-distal=WFLs)   LUE Strength   LUE Overall Strength   (shr=3-/5, elbow-distal=3+/5)   Hand Function   Gross Motor Coordination Functional   Fine Motor Coordination Functional   Sensation   Light Touch No apparent deficits   Proprioception " "  Proprioception No apparent deficits   Vision-Basic Assessment   Current Vision   (glasses)   Vision - Complex Assessment   Acuity Able to read clock/calendar on wall without difficulty   Psychosocial   Psychosocial (WDL) WDL   Perception   Inattention/Neglect Appears intact   Cognition   Overall Cognitive Status WFL   Arousal/Participation Alert   Attention Within functional limits   Orientation Level Oriented X4   Memory Decreased recall of precautions   Following Commands Follows one step commands without difficulty   Assessment   Limitation Decreased ADL status;Decreased UE strength;Decreased Safe judgement during ADL;Decreased endurance;Decreased high-level ADLs   Prognosis Good   Assessment Pt is a 89y/o male admitted to the hospital 2* symptoms of hematuria. Pt scheduled for a cystoscopy, evacuation of clots(today). Pt with PMH C-diff, HTN, ERCP. PTA pt states independence with his ADLs, transfers, ambulation--w/o device; +, occasional home alone, neg falls. During initial eval, pt demonstrated slight deficits with his functional balance, functional mobility, ADL status, transfer safety, b/l UE AROM/strength, and activity tolerance(currently fair=15-20mins). Pt would benefit from continued OT tx for the above deficits. 2-5xwk/1-2wks. The patient's raw score on the AM-PAC Daily Activity Inpatient Short Form is 20. A raw score of greater than or equal to 19 suggests the patient may benefit from discharge to home. Please refer to the recommendation of the Occupational Therapist for safe discharge planning.   Goals   Patient Goals \"to go home\"   STG Time Frame   (1-5days)   Short Term Goal #1 Pt will demonstrate improved activity tolerance to good(20-30mins) and standing tolerance to 3-5mins to assist with ADLs.   Short Term Goal #2 Pt will demonstrate mod I with their sit-stand transfers to assist with completion of their LE dressing.   Short Term Goal  Pt will demonstrate proper walker/transfer safety " 100% of the time.   LTG Time Frame   (5-10 days)   Long Term Goal #1 Pt will demonstrate g/g- balance with all functional activities.   Long Term Goal #2 Pt will demonstrate mod I with their UE and LE bathing/dresssing.   Long Term Goal Pt will independently verbalize 2-3 potential fall risks/transfer safety hazards and their appropriate compensation techniques.   Plan   Treatment Interventions ADL retraining;Functional transfer training;UE strengthening/ROM;Endurance training;Patient/family training;Equipment evaluation/education;Compensatory technique education   Goal Expiration Date 03/16/25   OT Treatment Day 0   OT Frequency   (2-5xwk/1-2wks)   Discharge Recommendation   Rehab Resource Intensity Level, OT III (Minimum Resource Intensity)   AM-PAC Daily Activity Inpatient   Lower Body Dressing 3   Bathing 3   Toileting 3   Upper Body Dressing 3   Grooming 4   Eating 4   Daily Activity Raw Score 20   Daily Activity Standardized Score (Calc for Raw Score >=11) 42.03   AM-PAC Applied Cognition Inpatient   Following a Speech/Presentation 4   Understanding Ordinary Conversation 4   Taking Medications 3   Remembering Where Things Are Placed or Put Away 3   Remembering List of 4-5 Errands 3   Taking Care of Complicated Tasks 3   Applied Cognition Raw Score 20   Applied Cognition Standardized Score 41.76   Hermilo Morris

## 2025-03-05 NOTE — CONSULTS
Consultation - Urology   Name: Arlyn L Follweiler 89 y.o. male I MRN: [id number]  Unit/Bed#: [place] [address] -01 I Date of Admission: 3/4/2025   Date of Service: 3/5/2025 I Hospital Day: 1   Inpatient consult to Urology  Consult performed by: Karla Teixeira PA-C  Consult ordered by: Katie Mcclain DO        Physician Requesting Evaluation: Rickey Loza Pe*   Reason for Evaluation / Principal Problem: Hematuria    Assessment & Plan  Gross hematuria  Painless gross hematuria for several days, aspirin use, no other anticoagulants  Urinalysis dark red/maroon urine no leukocytes or nitrites no glucosuria no pyuria.  Unlikely to be urinary tract infection  CT scan abdomen pelvis without contrast shows 2 normal kidneys no stone disease no obstruction no mass.  Prostate is enlarged.  No notable calcifications.  Bladder has approximately 7 cm hyperdensity blood product versus mass.    Patient is voiding on his own, emptying, PVR 68 mL, no clot passage.  No catheter.  Hemoglobin 9.4 consistent with his baseline between 9-10.  He is on longtime iron supplementation.  Avoid unnecessary antiplatelet or anticoagulant.  Patient is n.p.o., diagnostic cystoscopy planned in the OR today, clot evacuation fulguration versus transurethral resection of bladder tumor pending intraoperative findings.    Discussed the procedure with the patient and then with his wife Rosa Maria [phone] via telephone.  Risks include bleeding, retention, incontinence, need for postoperative catheter temporarily, risk of a malignant diagnosis. They agree to proceed.           Urology service will follow.    History of Present Illness   Arlyn L Follweiler is a 89 y.o. male who presents with gross hematuria for 2-3 days.  Not previously known to the urology service.  Describes the urine as maroon.  His wife also noticed bloody urine in the bedsheets. There has been no clot or stone passage..  He has had no pain.  Some of his voids were more  clear andrea/orange yesterday but today again maroon. He takes no blood thinners on a routine basis but has been taking aspirin for about the past week.  He takes iron for chronic anemia.  He denies any prior urologic surgeries or pelvic radiation. Recent PCP visit earlier this week for cerumen impaction- PMH reviewed of that note aortic stenosis anemia ckd thrombocytopenia hyperparathyroidism.      Review of Systems   Constitutional: Negative.  Negative for activity change, appetite change, chills, fever and unexpected weight change.   HENT: Negative.     Respiratory: Negative.  Negative for shortness of breath.    Cardiovascular: Negative.  Negative for chest pain.   Gastrointestinal:  Negative for abdominal pain, diarrhea, nausea and vomiting.   Endocrine: Negative.    Genitourinary:  Negative for decreased urine volume, difficulty urinating, dysuria, flank pain, frequency, hematuria, testicular pain and urgency.   Musculoskeletal: Negative.  Negative for back pain and gait problem.   Skin: Negative.    Allergic/Immunologic: Negative.    Neurological: Negative.    Hematological:  Negative for adenopathy. Does not bruise/bleed easily.     Medical History Review: I have reviewed the patient's PMH, PSH, Social History, Family History, Meds, and Allergies     Objective :  Temp:  [98.6 °F (37 °C)-98.8 °F (37.1 °C)] 98.8 °F (37.1 °C)  HR:  [75-90] 75  BP: (133-148)/() 146/65  Resp:  [18-19] 18  SpO2:  [94 %-99 %] 96 %  O2 Device: None (Room air)    I/O         03/03 0701  03/04 0700 03/04 0701  03/05 0700 03/05 0701  03/06 0700    P.O.  0     Total Intake(mL/kg)  0 (0)     Urine (mL/kg/hr)  425 200 (1.7)    Total Output  425 200    Net  -425 -200                   Physical Exam  Vitals and nursing note reviewed.   Constitutional:       General: He is not in acute distress.     Appearance: Normal appearance. He is well-developed. He is not ill-appearing or diaphoretic.   HENT:      Head: Normocephalic and  "atraumatic.   Cardiovascular:      Rate and Rhythm: Normal rate and regular rhythm.      Pulses: Normal pulses.      Heart sounds: Normal heart sounds.   Pulmonary:      Effort: Pulmonary effort is normal.      Breath sounds: Normal breath sounds.   Abdominal:      General: Abdomen is flat.      Palpations: Abdomen is soft.      Tenderness: There is no abdominal tenderness. There is no right CVA tenderness or left CVA tenderness.      Hernia: A hernia (bilateral inguinal) is present.      Comments: no surgical scars or radiation tattoos noted   Genitourinary:     Comments: uncircumcised penis normal phallus. clear maroon urine in urinal bedside  Musculoskeletal:      Right lower leg: No edema.      Left lower leg: No edema.   Skin:     General: Skin is warm.      Coloration: Skin is not pale.      Findings: No rash.   Neurological:      General: No focal deficit present.      Mental Status: He is alert and oriented to person, place, and time. Mental status is at baseline.   Psychiatric:         Mood and Affect: Mood normal.            Lab Results: I have reviewed the following results:  Recent Labs     03/04/25  1448 03/05/25  0534   WBC 6.40 5.54   HGB 9.4* 8.1*   HCT 29.1* 24.7*    134*   SODIUM 140 141   K 4.4 4.4    110*   CO2 21 25   BUN 45* 38*   CREATININE 2.13* 1.73*   GLUC 138 99   AST  --  27   ALT  --  10   ALB  --  3.1*   TBILI  --  1.09*   ALKPHOS  --  50   PTT 36*  --    INR 1.17  --      Lab Results   Component Value Date    COLORU Dark Red 03/04/2025    CLARITYU Extra Turbid 03/04/2025    SPECGRAV 1.021 03/04/2025    PHUR 7.0 03/04/2025    PHUR 6.5 09/22/2018    LEUKOCYTESUR Negative 03/04/2025    NITRITE Negative 03/04/2025    GLUCOSEU Negative 03/04/2025    KETONESU Negative 03/04/2025    BILIRUBINUR Small (A) 03/04/2025    BLOODU Large (A) 03/04/2025   ,   No results found for: \"URINECX\"    Imaging Results Review: I personally reviewed the following image studies/reports in PACS and " discussed pertinent findings with Radiology: CT abdomen/pelvis. My interpretation of the radiology images/reports is: nonobstructed kidneys no stone disease large heterogeneous mass in posterior bladder blood product vs tumor, appears separate from prostate. The one prior CT a/p wo contrast from 2018 this was not present in the bladder.  Other Study Results Review: No additional pertinent studies reviewed.    VTE Prophylaxis: VTE covered by:    None

## 2025-03-05 NOTE — CASE MANAGEMENT
Case Management Assessment & Discharge Planning Note    Patient name Arlyn L Follweiler  Location [place] [address] /[place] 2 M* MRN [id number]  : 1932 Date 3/5/2025       Current Admission Date: 3/4/2025  Current Admission Diagnosis:Gross hematuria   Patient Active Problem List    Diagnosis Date Noted Date Diagnosed    Gross hematuria 2025     Nodule of right lung 2025     Tongue discoloration 05/15/2024     Hypertensive CKD (chronic kidney disease) 2024     Aortic valve stenosis, critical 2023     Secondary hyperparathyroidism of renal origin (HCC) 10/04/2022     Chronic kidney disease, stage 4 (severe) ([facility]) 2022     Mild persistent asthma without complication 2020     Allergic rhinitis 2018     Blurry vision, left eye 10/10/2018     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  (HCC) 10/01/2018     Ambulatory dysfunction 2018     Essential hypertension      Thrombocytopenia ([facility]) 2018     Presbycusis 2014       LOS (days): 1  Geometric Mean LOS (GMLOS) (days): 3.2  Days to GMLOS:2.4     OBJECTIVE:    Risk of Unplanned Readmission Score: 11.55         Current admission status: Inpatient       Preferred Pharmacy:   [id number] [address]    [address]  [facility] [address]  Phone: [phone] Fax: [phone]    Primary Care Provider: Juanita Gomez DO    Primary Insurance: MICHELLE  REP  Secondary Insurance:     ASSESSMENT:  Active Health Care Proxies       Follweiler, Doris Health Care Representative - Spouse   Primary Phone: [phone] (Home)                 Advance Directives  Does patient have a Health Care POA?:  (unsure)  Primary Contact: Rosa Maria Khanshelbi- Wife         Readmission Root Cause  30 Day Readmission: No    Patient Information  Admitted from:: Home  Mental Status: Alert  During Assessment patient was accompanied by: Not accompanied during assessment  Assessment information provided by::  Patient  Primary Caregiver: Self  Support Systems: Spouse/significant other, Daughter, Family members  County of Residence: [place]  What city do you live in?: [place]  Home entry access options. Select all that apply.: Stairs  Number of steps to enter home.: 4 (3+1)  Do the steps have railings?: Yes  Type of Current Residence: 2 story home (wife/pt live on 1st level of house and do not utilize the 2nd floor)  Upon entering residence, is there a bedroom on the main floor (no further steps)?: Yes  Upon entering residence, is there a bathroom on the main floor (no further steps)?: Yes  Living Arrangements: Lives w/ Spouse/significant other  Is patient a ?: No    Activities of Daily Living Prior to Admission  Functional Status: Independent (He and wife share household duties)  Completes ADLs independently?: Yes  Ambulates independently?: Yes  Does patient use assisted devices?: No  Does patient currently own DME?: Yes  What DME does the patient currently own?: Walker, Straight Cane  Does patient have a history of Outpatient Therapy (PT/OT)?: No  Does the patient have a history of Short-Term Rehab?: Yes ([facility])  Does patient have a history of HHC?: Yes  Does patient currently have HHC?: No         Patient Information Continued  Income Source: Pension/snf  Does patient have prescription coverage?: Yes (Uses [facility] [address]- no issues affording medication)  Does patient have a history of substance abuse?: No  Does patient have a history of Mental Health Diagnosis?: No         Means of Transportation  Means of Transport to [facility]:: Drives Self (his truck is currently in the shop and so his daughter has been driving him/wife to grocery shopping, meddr alec app)          DISCHARGE DETAILS:    Discharge planning discussed with:: pt        CM contacted family/caregiver?: No- see comments  Were Treatment Team discharge recommendations reviewed with patient/caregiver?: Yes  Did patient/caregiver verbalize  understanding of patient care needs?: Yes  Were patient/caregiver advised of the risks associated with not following Treatment Team discharge recommendations?: Yes (Discussed therapy recommendations of HHPT however pt doesn't feel that it is needed as he and his wife get by as they are knowing they are slower moving and need to be careful w/ movement)    Contacts  Patient Contacts: Doris Follweiler-Wife  Relationship to Patient:: Family  Contact Method: Phone  Phone Number: [phone]   Reason/Outcome: Emergency Contact    Requested Home Health Care         Is the patient interested in HHC at discharge?: No    DME Referral Provided  Referral made for DME?: No         Would you like to participate in our [facility] service program?  : No - Declined    Treatment Team Recommendation: Home with Home Health Care  Discharge Destination Plan:: Home  Transport at Discharge : Auto with designated , Family        Transported by (Company and Unit #): Daughter

## 2025-03-05 NOTE — ASSESSMENT & PLAN NOTE
Stable.  Baseline creatinine approximately 2.0.    Results from last 7 days   Lab Units 03/05/25  0534 03/04/25  1448   BUN mg/dL 38* 45*   CREATININE mg/dL 1.73* 2.13*   EGFR ml/min/1.73sq m 33 26

## 2025-03-05 NOTE — PHYSICAL THERAPY NOTE
PT EVALUATION    Pt. Name: Arlyn L Follweiler  Pt. Age: 89 y.o.  MRN: [id number]  LENGTH OF STAY: 1      Admitting Diagnoses:   Hematuria [R31.9]    Past Medical History:   Diagnosis Date    Acute cholangitis 10/04/2018    Added automatically from request for surgery [id number]    Cholelithiasis with biliary obstruction 09/28/2018    Clostridium difficile colitis 09/28/2018    Fall at home, initial encounter 02/24/2022    History of bacteremia 10/01/2018    Hypertension     Lower extremity edema 10/10/2018    Medical history unknown     Mild persistent asthma without complication 05/19/2020    Renal disorder        Past Surgical History:   Procedure Laterality Date    ERCP N/A 09/18/2018    Procedure: ENDOSCOPIC RETROGRADE CHOLANGIOPANCREATOGRAPHY (ERCP) with stent placement;  Surgeon: Alvaro Olivia MD;  Location: [facility];  Service: Gastroenterology    [place] ERCP DX COLLECTION SPECIMEN BRUSHING/WASHING N/A 11/23/2018    Procedure: ENDOSCOPIC RETROGRADE CHOLANGIOPANCREATOGRAPHY (ERCP);  Surgeon: Alvaro Olivia MD;  Location: [facility];  Service: Gastroenterology       Imaging Studies:  CT abdomen pelvis wo contrast   Final Result by Edy Mills MD (03/04 0168)      1.  Indeterminant 8 cm hyperdensity in the dependent portion of the bladder possibly blood clot. Underlying mass lesion not excluded. Further clinical assessment advised.   2.  Prostatomegaly.   3.  Cholelithiasis.   4.  0.4 cm right middle lobe pulmonary nodule. Based on current Fleischner Society 2017 Guidelines on incidental pulmonary nodule, no routine follow-up is needed if the patient is low risk. If the patient is high risk, optional follow-up chest CT at 12    months can be considered. Considerations related to the patient's age and/or comorbidities may be used to alter these recommendations.      Workstation performed: [id number]               03/05/25 0920   PT Last Visit   PT Visit Date 03/05/25   Note Type   Note type Evaluation    Pain Assessment   Pain Score No Pain   Restrictions/Precautions   Weight Bearing Precautions Per Order No   Other Precautions Chair Alarm;Bed Alarm;Fall Risk   Home Living   Type of Home House   Home Layout Two level;Stairs to enter without rails;Performs ADLs on one level;Able to live on main level with bedroom/bathroom  (1STE; pt has 1st floor set up)   Bathroom Shower/Tub Tub/shower unit   Bathroom Toilet Raised   Bathroom Equipment Commode   Home Equipment Walker;Cane   Prior Function   Level of [place] Independent with functional mobility;Independent with ADLs  (ambulates w/o AD)   Lives With Spouse   Receives Help From Family   Falls in the last 6 months 0   Vocational Retired   Comments (+)    General   Family/Caregiver Present No   Cognition   Overall Cognitive Status WFL   Arousal/Participation Alert   Orientation Level Oriented X4   Following Commands Follows one step commands without difficulty   Comments pleasant & cooperative   Subjective   Subjective Pt agreeable to PT/OT evals.   RUE Assessment   RUE Assessment   (refer to OT)   LUE Assessment   LUE Assessment   (refer to OT)   RLE Assessment   RLE Assessment WFL  (4-/5 grossly)   LLE Assessment   LLE Assessment WFL  (4-/5 grossly)   Bed Mobility   Supine to Sit 6  Modified independent   Additional items HOB elevated;Bedrails   Sit to Supine 6  Modified independent   Additional items HOB elevated   Transfers   Sit to Stand 5  Supervision   Additional items Bedrails   Stand to Sit 5  Supervision   Additional items Bedrails   Additional Comments w/ RW; cues for techniques & safety   Ambulation/Elevation   Gait pattern Wide CROW;Decreased foot clearance;Short stride;Excessively slow  (tremulous)   Gait Assistance 4  Minimal assist   Additional items Assist x 1;Verbal cues;Tactile cues   Assistive Device Rolling walker   Distance 80'x1   Ambulation/Elevation Additional Comments unsteady gait but no gross LOB noted; trial w/o AD briefly but  "noted inc unsteadiness hence continued to use RW   Balance   Static Sitting Good   Dynamic Sitting Fair +   Static Standing Fair -  (w/ RW)   Dynamic Standing Poor +  (w/ RW)   Ambulatory Poor +  (w/ RW)   Endurance Deficit   Endurance Deficit Yes   Endurance Deficit Description fatigue   Activity Tolerance   Activity Tolerance Patient limited by fatigue;Treatment limited secondary to medical complications (Comment)   Medical Staff Made Aware OTR Hermilo   Nurse Made Aware yes   Assessment   Prognosis Good   Problem List Decreased strength;Decreased endurance;Impaired balance;Decreased mobility;Impaired judgement   Assessment Pt. 89 y.o.male admitted for Gross hematuria. Pt for cystoscopy today per chart. Pt referred to PT for mobility assessment & D/C planning. Please see above for information re: home set-up & PLOF as well as objective findings during PT assessment. PTA, pt reports being I w/o AD. On eval, pt functioning below baseline hence will continue skilled PT to improve function & safety. Pt require modified I w/ bed mobility, S for transfers & minAx1 for amb w/ RW + cues for techniques & safety. Gait deviations as above but no gross LOB noted.  (+) fatigue after amb but relieved at rest. The patient's AM-PAC Basic Mobility Inpatient Short Form Raw Score is 20. A Raw score of greater than 16 suggests the patient may benefit from discharge to home. Please also refer to the recommendation of the Physical Therapist for safe discharge planning. From PT standpoint, will recommend Level III (minimum resource intensity) rehab services and inc family support at D/C, pending progress. No SOB & dizziness reported t/o session. Nsg staff most recent vital signs as follows: /65   Pulse 75   Temp 98.8 °F (37.1 °C)   Resp 18   Ht 4' 9\" (1.448 m)   Wt 45.9 kg (101 lb 3.1 oz)   SpO2 96%   BMI 21.90 kg/m² . At end of session, pt back in bed in stable condition, call bell & phone in reach, bed alarm activated. Fall " precautions reinforced w/ good understanding. CM to follow. Nsg staff to continue to mobilized pt (OOB in chair for all meals & ambulate in room/unit) as tolerated to prevent further decline in function. Will also recommend Restorative for daily amb &/or daily OOB in chair as appropriate. Nsg notified. Co-eval was necessary to complete this PT eval for the pt's best interest given pt's medical acuity & complexity.   Goals   Patient Goals to go home   STG Expiration Date 03/19/25   Short Term Goal #1 Goals to be met in 14 days; pt will be able to: 1) inc strength & balance by 1/2 grade to improve overall functional mobility & dec fall risk; 2) inc bed mobility to I for pt to be able to get in/OOB safely w/ proper techniques 100% of the time, to dec caregiver burden & safely function at home; 3) inc transfers to modified I for pt to transition safely from one surface to another w/o % of the time, to dec caregiver burden & safely function at home; 4) inc amb w/ RW approx. >350' w/ modified I for pt to ambulate community distances w/o any % of the time, to dec caregiver burden & safely function at home; 5) negotiate stairs w/ modified I for inc safety during stair mgt inside/outside of home & dec caregiver burden; 6) pt/caregiver ed   PT Treatment Day 0   Plan   Treatment/Interventions Functional transfer training;LE strengthening/ROM;Elevations;Therapeutic exercise;Endurance training;Patient/family training;Bed mobility;Gait training;Spoke to nursing;OT   PT Frequency 3-5x/wk   Discharge Recommendation   Rehab Resource Intensity Level, PT III (Minimum Resource Intensity)  (pending progress)   Equipment Recommended Walker  (pt has)   Additional Comments restorative for daily amb   AM-PAC Basic Mobility Inpatient   Turning in Flat Bed Without Bedrails 4   Lying on Back to Sitting on Edge of Flat Bed Without Bedrails 4   Moving Bed to Chair 3   Standing Up From Chair Using Arms 3   Walk in Room 3   Climb 3-5  Stairs With Railing 3   Basic Mobility Inpatient Raw Score 20   Basic Mobility Standardized Score 43.99   [facility] Highest Level Of Mobility   -HL Goal 6: Walk 10 steps or more   -HLM Achieved 7: Walk 25 feet or more   End of Consult   Patient Position at End of Consult Supine;Bed/Chair alarm activated;All needs within reach   End of Consult Comments Pt in stable condition. All needs in reach. Bed alarm activated & connected to call bell system.   Hx/personal factors: co-morbidities, inaccessible home, dec caregiver support, advanced age, use of AD, fall risk, and assist w/ ADL's  Examination: dec mobility, dec balance, dec endurance, dec amb, risk for falls  Clinical: unpredictable (ongoing medical status, abnormal lab values, risk for falls, and imaging test/result pending)  Complexity: high    Mynor Lynch

## 2025-03-05 NOTE — ASSESSMENT & PLAN NOTE
Hemoglobin stable.  Continue ferrous sulfate    Results from last 7 days   Lab Units 03/04/25  1448   HEMOGLOBIN g/dL 9.4*

## 2025-03-05 NOTE — ASSESSMENT & PLAN NOTE
History of hypertension CKD 4 and asthma who presented to the hospital for gross hematuria  Patient reported dark urine over the past couple days.  Denies any abdominal pain  Reportedly wife was giving patient aspirin to the patient unknowingly for a cough  Due to evidence of large mass/density on CT imaging and bladder, urology planning cystoscopy today  Urology -clot evacuation fulguration versus transurethral resection of bladder tumor pending intraoperative findings

## 2025-03-05 NOTE — INCIDENTAL FINDINGS
The following findings require follow up:  Radiographic finding   Finding: Right lung nodule 4 mm   Follow up required: Repeat imaging   Follow up should be done within 12 month(s)    Please notify the following clinician to assist with the follow up:   Dr. Juanita Gomez DO

## 2025-03-05 NOTE — PLAN OF CARE
Problem: HEMATOLOGIC - ADULT  Goal: Maintains hematologic stability  Description: INTERVENTIONS  - Assess for signs and symptoms of bleeding or hemorrhage  - Monitor labs  - Administer supportive blood products/factors as ordered and appropriate  Outcome: Progressing     Problem: GENITOURINARY - ADULT  Goal: Absence of urinary retention  Description: INTERVENTIONS:  - Assess patient’s ability to void and empty bladder  - Monitor I/O  - Bladder scan as needed  - Discuss with physician/AP medications to alleviate retention as needed  - Discuss catheterization for long term situations as appropriate  Outcome: Progressing     Problem: SAFETY ADULT  Goal: Patient will remain free of falls  Description: INTERVENTIONS:  - Educate patient/family on patient safety including physical limitations  - Instruct patient to call for assistance with activity   - Consult OT/PT to assist with strengthening/mobility   - Keep Call bell within reach  - Keep bed low and locked with side rails adjusted as appropriate  - Keep care items and personal belongings within reach  - Initiate and maintain comfort rounds  - Make Fall Risk Sign visible to staff  - Apply yellow socks and bracelet for high fall risk patients  - Consider moving patient to room near nurses station  Outcome: Progressing     Problem: Knowledge Deficit  Goal: Patient/family/caregiver demonstrates understanding of disease process, treatment plan, medications, and discharge instructions  Description: Complete learning assessment and assess knowledge base.  Interventions:  - Provide teaching at level of understanding  - Provide teaching via preferred learning methods  Outcome: Progressing     Problem: DISCHARGE PLANNING  Goal: Discharge to home or other facility with appropriate resources  Description: INTERVENTIONS:  - Identify barriers to discharge w/patient and caregiver  - Arrange for needed discharge resources and transportation as appropriate  - Identify discharge  learning needs (meds, wound care, etc.)  - Arrange for interpretive services to assist at discharge as needed  - Refer to Case Management Department for coordinating discharge planning if the patient needs post-hospital services based on physician/advanced practitioner order or complex needs related to functional status, cognitive ability, or social support system  Outcome: Progressing

## 2025-03-05 NOTE — ASSESSMENT & PLAN NOTE
History of hypertension CKD 4 and asthma who presented to the hospital for gross hematuria  Patient reported dark urine over the past couple days.  Denies any abdominal pain  Reportedly wife was giving patient aspirin to the patient unknowingly for a cough  Due to evidence of large mass/density on CT imaging and bladder, urology planning cystoscopy tomorrow

## 2025-03-05 NOTE — PLAN OF CARE
Problem: GENITOURINARY - ADULT  Goal: Maintains or returns to baseline urinary function  Description: INTERVENTIONS:  - Assess urinary function  - Encourage oral fluids to ensure adequate hydration if ordered  - Administer IV fluids as ordered to ensure adequate hydration  - Administer ordered medications as needed  - Offer frequent toileting  - Follow urinary retention protocol if ordered  Outcome: Progressing  Goal: Absence of urinary retention  Description: INTERVENTIONS:  - Assess patient’s ability to void and empty bladder  - Monitor I/O  - Bladder scan as needed  - Discuss with physician/AP medications to alleviate retention as needed  - Discuss catheterization for long term situations as appropriate  Outcome: Progressing  Goal: Urinary catheter remains patent  Description: INTERVENTIONS:  - Assess patency of urinary catheter  - If patient has a chronic sandoval, consider changing catheter if non-functioning  - Follow guidelines for intermittent irrigation of non-functioning urinary catheter  Outcome: Progressing     Problem: INFECTION - ADULT  Goal: Absence or prevention of progression during hospitalization  Description: INTERVENTIONS:  - Assess and monitor for signs and symptoms of infection  - Monitor lab/diagnostic results  - Monitor all insertion sites, i.e. indwelling lines, tubes, and drains  - Monitor endotracheal if appropriate and nasal secretions for changes in amount and color  - [place] appropriate cooling/warming therapies per order  - Administer medications as ordered  - Instruct and encourage patient and family to use good hand hygiene technique  - Identify and instruct in appropriate isolation precautions for identified infection/condition  Outcome: Progressing  Goal: Absence of fever/infection during neutropenic period  Description: INTERVENTIONS:  - Monitor WBC    Outcome: Progressing     Problem: SAFETY ADULT  Goal: Patient will remain free of falls  Description: INTERVENTIONS:  - Educate  patient/family on patient safety including physical limitations  - Instruct patient to call for assistance with activity   - Consult OT/PT to assist with strengthening/mobility   - Keep Call bell within reach  - Keep bed low and locked with side rails adjusted as appropriate  - Keep care items and personal belongings within reach  - Initiate and maintain comfort rounds  - Make Fall Risk Sign visible to staff  - Offer Toileting every  Hours, in advance of need  - Initiate/Maintain alarm  - Obtain necessary fall risk management equipment:   - Apply yellow socks and bracelet for high fall risk patients  - Consider moving patient to room near nurses station  Outcome: Progressing  Goal: Maintain or return to baseline ADL function  Description: INTERVENTIONS:  -  Assess patient's ability to carry out ADLs; assess patient's baseline for ADL function and identify physical deficits which impact ability to perform ADLs (bathing, care of mouth/teeth, toileting, grooming, dressing, etc.)  - Assess/evaluate cause of self-care deficits   - Assess range of motion  - Assess patient's mobility; develop plan if impaired  - Assess patient's need for assistive devices and provide as appropriate  - Encourage maximum independence but intervene and supervise when necessary  - Involve family in performance of ADLs  - Assess for home care needs following discharge   - Consider OT consult to assist with ADL evaluation and planning for discharge  - Provide patient education as appropriate  Outcome: Progressing  Goal: Maintains/Returns to pre admission functional level  Description: INTERVENTIONS:  - Perform AM-PAC 6 Click Basic Mobility/ Daily Activity assessment daily.  - Set and communicate daily mobility goal to care team and patient/family/caregiver.   - Collaborate with rehabilitation services on mobility goals if consulted  - Perform Range of Motion  times a day.  - Reposition patient every  hours.  - Dangle patient  times a day  -  Stand patient  times a day  - Ambulate patient  times a day  - Out of bed to chair  times a day   - Out of bed for meals  times a day  - Out of bed for toileting  - Record patient progress and toleration of activity level   Outcome: Progressing

## 2025-03-05 NOTE — PLAN OF CARE
Problem: OCCUPATIONAL THERAPY ADULT  Goal: Performs self-care activities at highest level of function for planned discharge setting.  See evaluation for individualized goals.  Description: Treatment Interventions: ADL retraining, Functional transfer training, UE strengthening/ROM, Endurance training, Patient/family training, Equipment evaluation/education, Compensatory technique education          See flowsheet documentation for full assessment, interventions and recommendations.   Note: Limitation: Decreased ADL status, Decreased UE strength, Decreased Safe judgement during ADL, Decreased endurance, Decreased high-level ADLs  Prognosis: Good  Assessment: Pt is a 89y/o male admitted to the hospital 2* symptoms of hematuria. Pt scheduled for a cystoscopy, evacuation of clots(today). Pt with PMH C-diff, HTN, ERCP. PTA pt states independence with his ADLs, transfers, ambulation--w/o device; +, occasional home alone, neg falls. During initial eval, pt demonstrated slight deficits with his functional balance, functional mobility, ADL status, transfer safety, b/l UE AROM/strength, and activity tolerance(currently fair=15-20mins). Pt would benefit from continued OT tx for the above deficits. 2-5xwk/1-2wks. The patient's raw score on the AM-PAC Daily Activity Inpatient Short Form is 20. A raw score of greater than or equal to 19 suggests the patient may benefit from discharge to home. Please refer to the recommendation of the Occupational Therapist for safe discharge planning.     Rehab Resource Intensity Level, OT: III (Minimum Resource Intensity)

## 2025-03-05 NOTE — PLAN OF CARE
"  Problem: PHYSICAL THERAPY ADULT  Goal: Performs mobility at highest level of function for planned discharge setting.  See evaluation for individualized goals.  Description: Treatment/Interventions: Functional transfer training, LE strengthening/ROM, Elevations, Therapeutic exercise, Endurance training, Patient/family training, Bed mobility, Gait training, Spoke to nursing, OT  Equipment Recommended: Walker (pt has)       See flowsheet documentation for full assessment, interventions and recommendations.  Note: Prognosis: Good  Problem List: Decreased strength, Decreased endurance, Impaired balance, Decreased mobility, Impaired judgement  Assessment: Pt. 89 y.o.male admitted for Gross hematuria. Pt for cystoscopy today per chart. Pt referred to PT for mobility assessment & D/C planning. Please see above for information re: home set-up & PLOF as well as objective findings during PT assessment. PTA, pt reports being I w/o AD. On eval, pt functioning below baseline hence will continue skilled PT to improve function & safety. Pt require modified I w/ bed mobility, S for transfers & minAx1 for amb w/ RW + cues for techniques & safety. Gait deviations as above but no gross LOB noted.  (+) fatigue after amb but relieved at rest. The patient's AM-PAC Basic Mobility Inpatient Short Form Raw Score is 20. A Raw score of greater than 16 suggests the patient may benefit from discharge to home. Please also refer to the recommendation of the Physical Therapist for safe discharge planning. From PT standpoint, will recommend Level III (minimum resource intensity) rehab services and inc family support at D/C, pending progress. No SOB & dizziness reported t/o session. Nsg staff most recent vital signs as follows: /65   Pulse 75   Temp 98.8 °F (37.1 °C)   Resp 18   Ht 4' 9\" (1.448 m)   Wt 45.9 kg (101 lb 3.1 oz)   SpO2 96%   BMI 21.90 kg/m² . At end of session, pt back in bed in stable condition, call bell & phone in " reach, bed alarm activated. Fall precautions reinforced w/ good understanding. CM to follow. Nsg staff to continue to mobilized pt (OOB in chair for all meals & ambulate in room/unit) as tolerated to prevent further decline in function. Will also recommend Restorative for daily amb &/or daily OOB in chair as appropriate. Nsg notified. Co-eval was necessary to complete this PT eval for the pt's best interest given pt's medical acuity & complexity.        Rehab Resource Intensity Level, PT: III (Minimum Resource Intensity) (pending progress)    See flowsheet documentation for full assessment.

## 2025-03-05 NOTE — OP NOTE
OPERATIVE REPORT  PATIENT NAME: Arlyn L Follweiler    :  1932  MRN: [id number]  Pt Location: [facility] OR ROOM 02    SURGERY DATE: 3/5/2025    Surgeons and Role:     * Oscar Khoury MD - Primary     * Amy Prieto PA-C - Assisting     * Gloria Musa PA-C - Assisting  NOTE:  There were no qualified teaching residents to assist with this case    *The physician assistant was medically necessary and integral during the entire procedure to help maintain retraction irrigation suction and instrument insertion during the procedure      Preop Diagnosis:  Hematuria [R31.9]    Post-Op Diagnosis Codes:     * Hematuria [R31.9]    Procedure(s):  CYSTOSCOPY EVACUATION OF CLOTS  LAPAROTOMY EXPLORATORY, BLADDER REPAIR  TRANSURETHRAL RESECTION OF BLADDER TUMOR (TURBT) - 4 CM    Specimen(s):  ID Type Source Tests Collected by Time Destination   1 : Bladder tumor Tissue Urinary Bladder TISSUE EXAM Oscar hKoury MD 3/5/2025 1512    2 : Bladder tumor deep Tissue Urinary Bladder TISSUE EXAM Oscar Khoury MD 3/5/2025 1512        Estimated Blood Loss:   Minimal    Drains:  Closed/Suction Drain Lateral LLQ 10 Fr. (Active)   Number of days: 0       Urethral Catheter Three way 24 Fr. (Active)   Number of days: 0       Anesthesia Type:   General    Operative Indications:  Hematuria [R31.9]  Clot Retention    Operative Findings:  200 cc clot in retention.  Trabeculation with thin walled diverticula.  Perforation at site of resection with about 2 L fluid deficit at conclusion of resection.  Open laparotomy, fluid evacuation, bladder exploration, bladder repair and closure of diverticulum.      Complications:   None    Procedure and Technique:  Patient was identified, brought to the operating room, and placed on the table in supine position.  After induction of general anesthesia he is placed in dorsolithotomy position and prepped and draped in usual sterile fashion.  Formal timeout is performed.    Urethra was  dilated to 26 [place].  Resectoscope with visual obturator was placed.  Prostate is somewhat enlarged but traversed easily.  No visibility of the bladder due to clot.  Ellik evacuator's were utilized to irrigate out the clot.  At least 200 cc of clot were retrieved.  The bladder is severely trabeculated and difficult to identify ureteral orifices however I was able to identify them eventually.  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  This measured about 4 cm in diameter.  Transurethral resection of bladder tumor was performed to completion.  Portions of the tumor located between large trabeculations.  This appeared invasive but in hindsight may have been a diverticulum and complete resection caused perforation.    Bladder wall appeared thin.  Hemostasis was achieved and the patient's abdomen was assessed and it seemed to be tense.  We calculated fluids and we had about a 2 L deficit.  Additionally, a right inguinal hernia was bulging, apparently with fluid.  I then placed a three-way catheter over a wire into the bladder with 15 mL in the balloon.  We determined that open laparotomy was necessary to drain the abdomen and close the perforation.    I attempted to call the patient's family members but there was no answer.    Patient was then reprepped abdominally.  A low midline incision was made from symphysis pubis up to the level of the umbilicus.  This was taken down through fascia and into the retroperitoneal space.  There was clearly bulging and fluid beneath this.  The peritoneum was carefully opened and fluid was drained initially about 500 mL were drained.  There was also retroperitoneal fluid.  The retroperitoneum was opened on the right side of this was bulging more than the left surprisingly.  Additional fluid was drained from here as well.  The peritoneum was then stripped off of the posterior wall of the bladder and the bladder was dropped anteriorly as well and  mobilized laterally.  At this point additional fluid was retrieved.  At the end we suctioned out just over 2 L of fluid.    We then backfilled the bladder and the identified the opening.  There was actually a diverticulum noted at the dome as well as the leak from the patient's left side consistent with the location of the tumor.  Both of these areas were oversewn in 2 layers with Vicryl suture.  The bladder was then filled tested with 250 mL and there was no leak.    A AGATA drain was placed through a separate stab wound in the left lower quadrant.  The fascia was then closed with running PDS.  Skin was irrigated and closed with skin clips and sterile dressing was applied.  Patient tolerated procedure well and was transferred to the recovery room awake alert in stable condition.   I was present for the entire procedure.    Patient Disposition:  PACU     Plan: Maintain [place] catheter for 2 weeks.  AGATA drain for 48 hours and perhaps longer if there is still a mild urine leak.  Advance diet as tolerated.         SIGNATURE: Oscar Khoury MD  DATE: March 5, 2025  TIME: 5:22 PM

## 2025-03-05 NOTE — H&P
H&P - Hospitalist   Name: Arlyn L Follweiler 89 y.o. male I MRN: [id number]  Unit/Bed#: ED-26 I Date of Admission: 3/4/2025   Date of Service: 3/4/2025 I Hospital Day: 0     Assessment & Plan  Gross hematuria  History of hypertension CKD 4 and asthma who presented to the hospital for gross hematuria  Patient reported dark urine over the past couple days.  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  Continue ferrous sulfate    Results from last 7 days   Lab Units 03/04/25  1448   HEMOGLOBIN g/dL 9.4*     Nodule of right lung  4 mm: Could consider CT imaging in 1 year  Mild persistent asthma without complication  No exacerbation.  Albuterol as needed  Chronic kidney disease, stage 4 (severe) (HCC)  Stable.  Baseline creatinine approximately 2.0.    Results from last 7 days   Lab Units 03/04/25  1448   BUN mg/dL 45*   CREATININE mg/dL 2.13*   EGFR ml/min/1.73sq m 26       VTE Pharmacologic Prophylaxis: VTE Score: 3 Moderate Risk (Score 3-4) - Pharmacological DVT Prophylaxis Contraindicated. Sequential Compression Devices Ordered.  Code Status: Level 1 - Full Code   Discussion with family:     Anticipated Length of Stay: Patient will be admitted on an inpatient basis with an anticipated length of stay of greater than 2 midnights secondary to hematuria with bladder density.    Chief Complaint:     Blood in Urine (Pt reports blood in urine x2 days. Reports sometimes a lot sometimes a little )    History of Present Illness  Arlyn L Follweiler is a 89 y.o. male with a PMH of hypertension CKD 4 and asthma who presents with hematuria.  The patient has had painless dark urine over the past couple days.  Denies any previous  episode.  No fevers chills or chest pain.  In the ED he was noted to have bladder density on CT imaging.  Plan of care coordinated by ED with urology for plans for cystoscopy/fulguration tomorrow.  Reportedly the wife was giving the patient unknowingly aspirin for a cough over the past week.    Review of Systems   Constitutional:  Negative for chills and fever.   HENT:  Negative for facial swelling and trouble swallowing.         Difficulty hearing   Eyes:  Negative for visual disturbance.   Respiratory:  Negative for shortness of breath.    Cardiovascular:  Negative for chest pain and palpitations.   Gastrointestinal:  Negative for abdominal distention, abdominal pain, diarrhea, nausea and vomiting.   Genitourinary:  Positive for hematuria.   Musculoskeletal:  Negative for back pain and myalgias.   Skin:  Negative for rash.   Neurological:  Negative for facial asymmetry and speech difficulty.   Psychiatric/Behavioral:  The patient is not nervous/anxious.    All other systems reviewed and are negative.      Past Medical and Surgical History:   Past Medical History:   Diagnosis Date    Acute cholangitis 10/04/2018    Added automatically from request for surgery [id number]    Cholelithiasis with biliary obstruction 09/28/2018    Clostridium difficile colitis 09/28/2018    Fall at home, initial encounter 02/24/2022    History of bacteremia 10/01/2018    Hypertension     Lower extremity edema 10/10/2018    Medical history unknown     Mild persistent asthma without complication 05/19/2020    Renal disorder      Past Surgical History:   Procedure Laterality Date    ERCP N/A 09/18/2018    Procedure: ENDOSCOPIC RETROGRADE CHOLANGIOPANCREATOGRAPHY (ERCP) with stent placement;  Surgeon: Alvaro Olivia MD;  Location: [facility];  Service: Gastroenterology    FL ERCP DX COLLECTION SPECIMEN BRUSHING/WASHING N/A 11/23/2018    Procedure: ENDOSCOPIC RETROGRADE CHOLANGIOPANCREATOGRAPHY (ERCP);  Surgeon: Alvaro Olivia MD;  Location: [facility]  "LAB;  Service: Gastroenterology     Meds/Allergies:  Allergies: No Known Allergies  Prior to Admission Medications   Prescriptions Last Dose Informant Patient Reported? Taking?   FeroSul 325 (65 Fe) MG tablet 3/3/2025  No Yes   Sig: TAKE 1 TABLET BY MOUTH DAILY WITH BREAKFAST   albuterol (PROVENTIL HFA,VENTOLIN HFA) 90 mcg/act inhaler Past Week Self No Yes   Sig: Inhale 2 puffs every 6 (six) hours as needed for wheezing   amLODIPine (NORVASC) 5 mg tablet 3/3/2025  No Yes   Sig: TAKE 1 TABLET(5 MG) BY MOUTH TWICE DAILY   carbamide peroxide (DEBROX) 6.5 % otic solution Unknown  No No   Sig: Administer 5 drops into both ears 2 (two) times a day   metoprolol tartrate (LOPRESSOR) 50 mg tablet 3/3/2025  No Yes   Sig: TAKE 1 TABLET(50 MG) BY MOUTH DAILY   mupirocin (BACTROBAN) 2 % ointment Unknown  No No   Sig: Apply topically 3 (three) times a day      Facility-Administered Medications: None     Social History:     Social History     Socioeconomic History    Marital status: /Civil Union     Spouse name: Not on file    Number of children: Not on file    Years of education: Not on file    Highest education level: Not on file   Occupational History    Not on file   Tobacco Use    Smoking status: Former     Types: Cigars     Passive exposure: Past    Smokeless tobacco: Former    Tobacco comments:     states quit about 50 years ago, cigars \"a few per week\"    Vaping Use    Vaping status: Never Used   Substance and Sexual Activity    Alcohol use: No     Comment: denies ETOH use    Drug use: No     Comment: denies    Sexual activity: Never   Other Topics Concern    Not on file   Social History Narrative    Not on file     Social Drivers of Health     Financial Resource Strain: High Risk (5/8/2024)    Overall Financial Resource Strain (CARDIA)     Difficulty of Paying Living Expenses: Hard   Food Insecurity: Food Insecurity Present (5/8/2024)    Nursing - Inadequate Food Risk Classification     Worried About Running Out of " Food in the Last Year: Often true     Ran Out of Food in the Last Year: Often true     Ran Out of Food in the Last Year: Not on file   Transportation Needs: No Transportation Needs (2/7/2024)    PRAPARE - Transportation     Lack of Transportation (Medical): No     Lack of Transportation (Non-Medical): No   Physical Activity: Not on file   Stress: Not on file   Social Connections: Not on file   Intimate Partner Violence: Not on file   Housing Stability: Low Risk  (5/8/2024)    Housing Stability Vital Sign     Unable to Pay for Housing in the Last Year: No     Number of Times Moved in the Last Year: 1     Homeless in the Last Year: No     Patient Pre-hospital Living Situation: Home  Patient Pre-hospital Level of Mobility:   Patient Pre-hospital Diet Restrictions:     Objective   Vitals:   Blood Pressure: 140/63 (03/04/25 1900)  Pulse: 90 (03/04/25 1900)  Temperature: 98.8 °F (37.1 °C) (03/04/25 1428)  Respirations: 19 (03/04/25 1900)  SpO2: 96 % (03/04/25 1900)    Physical Exam  Vitals reviewed.   Constitutional:       General: He is not in acute distress.  HENT:      Head: Atraumatic.   Eyes:      General: No scleral icterus.     Extraocular Movements: Extraocular movements intact.   Cardiovascular:      Rate and Rhythm: Regular rhythm.      Heart sounds: Murmur heard.      No gallop.   Pulmonary:      Effort: Pulmonary effort is normal. No respiratory distress.   Abdominal:      General: Bowel sounds are normal.      Palpations: Abdomen is soft.      Tenderness: There is no abdominal tenderness. There is no guarding.   Musculoskeletal:         General: No tenderness or deformity.   Skin:     General: Skin is warm.      Coloration: Skin is not jaundiced.   Neurological:      General: No focal deficit present.      Mental Status: He is alert.      Motor: No weakness.   Psychiatric:         Mood and Affect: Mood normal.         Additional Data:   Lab Results: I have reviewed the following results:  Results from last 7  days   Lab Units 03/04/25  1448   WBC Thousand/uL 6.40   HEMOGLOBIN g/dL 9.4*   HEMATOCRIT % 29.1*   PLATELETS Thousands/uL 179   SEGS PCT % 70   LYMPHO PCT % 19   MONO PCT % 9   EOS PCT % 1     Results from last 7 days   Lab Units 03/04/25  1448   SODIUM mmol/L 140   POTASSIUM mmol/L 4.4   CHLORIDE mmol/L 108   CO2 mmol/L 21   ANION GAP mmol/L 11   BUN mg/dL 45*   CREATININE mg/dL 2.13*   CALCIUM mg/dL 8.7   EGFR ml/min/1.73sq m 26   GLUCOSE RANDOM mg/dL 138     Results from last 7 days   Lab Units 03/04/25  1448   INR  1.17                          Results from last 7 days   Lab Units 03/04/25  1448   COLOR UA  Dark Red   CLARITY UA  Extra Turbid   SPEC GRAV UA  1.021   PH UA  7.0   LEUKOCYTES UA  Negative   NITRITE UA  Negative   GLUCOSE UA mg/dl Negative   KETONES UA mg/dl Negative   BILIRUBIN UA  Small*   BLOOD UA  Large*      Results from last 7 days   Lab Units 03/04/25  1448   RBC UA /hpf Innumerable*   WBC UA /hpf None Seen   EPITHELIAL CELLS WET PREP /hpf Occasional   BACTERIA UA /hpf Occasional            Lines/Drains  Invasive Devices       Peripheral Intravenous Line  Duration             Peripheral IV 03/04/25 Right Antecubital <1 day                    Imaging:   Personally reviewed the following image studies in PACS and associated radiology reports:  CT abdomen pelvis wo contrast  Result Date: 3/4/2025  Impression: 1.  Indeterminant 8 cm hyperdensity in the dependent portion of the bladder possibly blood clot. Underlying mass lesion not excluded. Further clinical assessment advised. 2.  Prostatomegaly. 3.  Cholelithiasis. 4.  0.4 cm right middle lobe pulmonary nodule. Based on current Fleischner Society 2017 Guidelines on incidental pulmonary nodule, no routine follow-up is needed if the patient is low risk. If the patient is high risk, optional follow-up chest CT at 12 months can be considered. Considerations related to the patient's age and/or comorbidities may be used to alter these  recommendations. Workstation performed: [id number]       EKG, Pathology, and Other Studies Reviewed on Admission:   EKG  Result Date: 03/04/25  Personally reviewed strips with impression of: Sinus tachycardia 104 bpm    Administrative Statements       ** Please Note: This note has been constructed using a voice recognition system. **

## 2025-03-05 NOTE — ASSESSMENT & PLAN NOTE
Hemoglobin downtrending  Baseline hemoglobin appears to be about 10-11  Hemoglobin today 8.1 patient is continuing to have bleed  Continue ferrous sulfate, undergoing cystoscopy today    Results from last 7 days   Lab Units 03/05/25  0534 03/04/25  1448   HEMOGLOBIN g/dL 8.1* 9.4*

## 2025-03-06 NOTE — PLAN OF CARE
Problem: GENITOURINARY - ADULT  Goal: Maintains or returns to baseline urinary function  Description: INTERVENTIONS:  - Assess urinary function  - Encourage oral fluids to ensure adequate hydration if ordered  - Administer IV fluids as ordered to ensure adequate hydration  - Administer ordered medications as needed  - Offer frequent toileting  - Follow urinary retention protocol if ordered  Outcome: Not Progressing  Goal: Absence of urinary retention  Description: INTERVENTIONS:  - Assess patient’s ability to void and empty bladder  - Monitor I/O  - Bladder scan as needed  - Discuss with physician/AP medications to alleviate retention as needed  - Discuss catheterization for long term situations as appropriate  Outcome: Not Progressing  Goal: Urinary catheter remains patent  Description: INTERVENTIONS:  - Assess patency of urinary catheter  - If patient has a chronic sandoval, consider changing catheter if non-functioning  - Follow guidelines for intermittent irrigation of non-functioning urinary catheter  Outcome: Progressing     Problem: INFECTION - ADULT  Goal: Absence or prevention of progression during hospitalization  Description: INTERVENTIONS:  - Assess and monitor for signs and symptoms of infection  - Monitor lab/diagnostic results  - Monitor all insertion sites, i.e. indwelling lines, tubes, and drains  - Monitor endotracheal if appropriate and nasal secretions for changes in amount and color  - [place] appropriate cooling/warming therapies per order  - Administer medications as ordered  - Instruct and encourage patient and family to use good hand hygiene technique  - Identify and instruct in appropriate isolation precautions for identified infection/condition  Outcome: Progressing  Goal: Absence of fever/infection during neutropenic period  Description: INTERVENTIONS:  - Monitor WBC    Outcome: Progressing     Problem: SAFETY ADULT  Goal: Patient will remain free of falls  Description: INTERVENTIONS:  -  Educate patient/family on patient safety including physical limitations  - Instruct patient to call for assistance with activity   - Consult OT/PT to assist with strengthening/mobility   - Keep Call bell within reach  - Keep bed low and locked with side rails adjusted as appropriate  - Keep care items and personal belongings within reach  - Initiate and maintain comfort rounds  - Make Fall Risk Sign visible to staff  - Offer Toileting every 2 Hours, in advance of need  - Initiate/Maintain bed alarm  - Obtain necessary fall risk management equipment:   - Apply yellow socks and bracelet for high fall risk patients  - Consider moving patient to room near nurses station  Outcome: Progressing  Goal: Maintain or return to baseline ADL function  Description: INTERVENTIONS:  -  Assess patient's ability to carry out ADLs; assess patient's baseline for ADL function and identify physical deficits which impact ability to perform ADLs (bathing, care of mouth/teeth, toileting, grooming, dressing, etc.)  - Assess/evaluate cause of self-care deficits   - Assess range of motion  - Assess patient's mobility; develop plan if impaired  - Assess patient's need for assistive devices and provide as appropriate  - Encourage maximum independence but intervene and supervise when necessary  - Involve family in performance of ADLs  - Assess for home care needs following discharge   - Consider OT consult to assist with ADL evaluation and planning for discharge  - Provide patient education as appropriate  Outcome: Progressing  Goal: Maintains/Returns to pre admission functional level  Description: INTERVENTIONS:  - Perform AM-PAC 6 Click Basic Mobility/ Daily Activity assessment daily.  - Set and communicate daily mobility goal to care team and patient/family/caregiver.   - Collaborate with rehabilitation services on mobility goals if consulted  - Perform Range of Motion 4 times a day.  - Reposition patient every 2 hours.  - Dangle patient 3  times a day  - Stand patient 3 times a day  - Ambulate patient 3 times a day  - Out of bed to chair 3 times a day   - Out of bed for meals 3 times a day  - Out of bed for toileting  - Record patient progress and toleration of activity level   Outcome: Progressing     Problem: DISCHARGE PLANNING  Goal: Discharge to home or other facility with appropriate resources  Description: INTERVENTIONS:  - Identify barriers to discharge w/patient and caregiver  - Arrange for needed discharge resources and transportation as appropriate  - Identify discharge learning needs (meds, wound care, etc.)  - Arrange for interpretive services to assist at discharge as needed  - Refer to Case Management Department for coordinating discharge planning if the patient needs post-hospital services based on physician/advanced practitioner order or complex needs related to functional status, cognitive ability, or social support system  Outcome: Progressing     Problem: Knowledge Deficit  Goal: Patient/family/caregiver demonstrates understanding of disease process, treatment plan, medications, and discharge instructions  Description: Complete learning assessment and assess knowledge base.  Interventions:  - Provide teaching at level of understanding  - Provide teaching via preferred learning methods  Outcome: Not Progressing     Problem: HEMATOLOGIC - ADULT  Goal: Maintains hematologic stability  Description: INTERVENTIONS  - Assess for signs and symptoms of bleeding or hemorrhage  - Monitor labs  - Administer supportive blood products/factors as ordered and appropriate  Outcome: Not Progressing

## 2025-03-06 NOTE — ANESTHESIA POST-OP FOLLOW-UP NOTE
Patient: Arlyn L Follweiler  Procedure(s):  CYSTOSCOPY EVACUATION OF CLOTS  LAPAROTOMY EXPLORATORY  TRANSURETHRAL RESECTION OF BLADDER TUMOR (TURBT)  Vitals:    03/05/25 1942   BP: 149/80   Pulse: 64   Resp: 15   Temp: 97.8 °F (36.6 °C)   SpO2: 97%         Patients wife updated via phone, CT head results not available but discussed that as the next step in plan. Transferred to ICU HD stable. Handoff to ICU bedside nurse and overnight PA, discussed via epic chat with surgeon and ICU attending.

## 2025-03-06 NOTE — ASSESSMENT & PLAN NOTE
Post-op hypotension  Hemorrhagic vs. Hypovolemic vs septic  Initially fluid responsive, escalated vasopressors quickly   Bcx, U/a ordred  2u PRBC prepared and transfusing  500cc NS bolus given, 30cc/kg not given due to concern for volume overload due to critical aortic stenosis  Levophed targeting MAP> 65

## 2025-03-06 NOTE — SEPSIS NOTE
"  Sepsis Note   Arlyn L Follweiler 89 y.o. male MRN: [id number]  Unit/Bed#: ICU 06 Encounter: [id number]       Initial Sepsis Screening       Row Name 03/06/25 0001                Is the patient's history suggestive of a new or worsening infection? Yes (Proceed)  -JE        Suspected source of infection acute abdominal infection  -JE        Indicate SIRS criteria Hyperthemia > 38.3C (100.9F) OR Hypothermia <36C (96.8F);Leukocytosis (WBC > 29224 IJL) OR Leukopenia (WBC <4000 IJL) OR Bandemia (WBC >10% bands)  -JE        Are two or more of the above signs & symptoms of infection both present and new to the patient? Yes (Proceed)  -JE        Assess for evidence of organ dysfunction: Are any of the below criteria present within 6 hours of suspected infection and SIRS criteria that are NOT considered to be chronic conditions? SBP < 90;MAP < 65;SBP decrease > 40 from baseline;Urine output < 0.5 mL/kg/hour for 2 hours  -JE        Date of presentation of severe sepsis 03/06/25  -JE        Time of presentation of severe sepsis 2150 -JE        Date of presentation of septic shock 03/06/25  -JE        Time of presentation of septic shock 2155  -JE        Fluid Resuscitation: A lesser volume than 30 ml/kg IV fluid will be given  -JE        The 30 mL/kg fluid bolus was not given to the patient despite having hypotension, a lactate of >= 4 mmol/L, or documentation of septic shock secondary to: Concern for fluid/volume overload  Critical aortic stenosis  -JE        Instead of the 30 ml/kg fluid bolus, the following volume of crystalloid fluid will be ordered: 500 ml  -JE        Of the following fluid type: NSS  -JE        Is the patient is persistently hypotensive in the hour after fluid bolus administration? If yes, patient meets criteria for vasopressor use. YES  -JE        Sepsis Note: Click \"NEXT\" below (NOT \"close\") to generate sepsis note based on above information. --                  User Key  (r) = Recorded By, (t) = " Taken By, (c) = Cosigned By      Initials Name Provider Type    LILY Cage PA-C Physician Assistant                        Body mass index is 21.89 kg/m².  Wt Readings from Last 1 Encounters:   03/04/25 45.9 kg (101 lb 3.1 oz)     IBW (Ideal Body Weight): 43.12 kg    Ideal body weight: 48.3 kg (106 lb 6.5 oz)      Cannot rule out possible intra-abdominal infection. Bcx, u/a, and lactate sent. Hypotension - given 500cc NS. 30cc/kg goal not given due to concern for volume overload with critical aortic stenosis. Zosyn ordered. Vasopressors started to target MAP > 65

## 2025-03-06 NOTE — ANESTHESIA POSTPROCEDURE EVALUATION
Post-Op Assessment Note    CV Status:  Stable  Pain Score: 0         Mental Status:  Unresponsive (narcan didnt resolve, BG ok, HD stable, istat with hgb 7 and Na 122, co2 and spo2 ok, head ct done, transferred to ICU)   Hydration Status:  Stable   PONV Controlled:  None   Airway Patency:  Patent     Post Op Vitals Reviewed: Yes      Staff: Anesthesiologist           Last Filed PACU Vitals:  Vitals Value Taken Time   Temp 97.8 °F (36.6 °C) 03/05/25 1942   Pulse 66 03/05/25 1947   /66 03/05/25 1946   Resp 14 03/05/25 1947   SpO2 99 % 03/05/25 1947   Vitals shown include unfiled device data.    Modified Annabella:     Vitals Value Taken Time   Activity 0 03/05/25 1752   Respiration 1 03/05/25 1752   Circulation 2 03/05/25 1752   Consciousness 0 03/05/25 1752   Oxygen Saturation 1 03/05/25 1752     Modified Annabella Score: 4

## 2025-03-06 NOTE — ASSESSMENT & PLAN NOTE
Lab Results   Component Value Date    EGFR 33 03/05/2025    EGFR 26 03/04/2025    EGFR 29 05/06/2024    CREATININE 1.73 (H) 03/05/2025    CREATININE 2.13 (H) 03/04/2025    CREATININE 1.92 (H) 05/06/2024   Baseline hemoglobin 7.5-8  .  I-STAT hemoglobin 7.1  Repeat CBC 6.4  2 units red blood cells ordered and transfused  CBC

## 2025-03-06 NOTE — HOSPITAL COURSE
89 y/o M hx HTN, CKD4, anemia of chronic disease, asthma, HFpEF (G1DD), Severe aortic stenosis, hyperparathyroidism who was admitted 3/4 for gross hematuria w/ radiographic evidence of blood product vs mass in bladder. Now s/p cystoscopy evacuation of 200cc clot and bladder tumor, turned into ex-lap with abdominal fluid drainage (2L) and AGATA drain placement. Post-op he was hypotensive responsive to fluids and levophed, now HD stable off pressors. AGATA drain w/ 460cc sanguinous output. He didn't wake up from anesthesia, gas unremarkable, Hgb drop from 8.0 to 7.1 and Na drop from 141 to 122. CTH completed, waiting for read. Sending labs now, may give him a unit of blood and start him on NS to correct Na.

## 2025-03-06 NOTE — RESPIRATORY THERAPY NOTE
Assisted with intubation at bedside. S/p cardiac arrest. ROSC obtained and pt was placed on ventilator with noted settings per provider.        03/05/25 3860   Respiratory Assessment   Assessment Type Assess only   General Appearance Sedated   Respiratory Pattern Assisted   Chest Assessment Chest expansion symmetrical   Oxygen Therapy/Pulse Ox   O2 Device Ventilator   O2 Therapy Oxygen humidified   FiO2 (%) 100   SpO2 100 %   SpO2 Activity At Rest   $ Pulse Oximetry Spot Check Charge Completed   Respiratory Charges    $ Intubation/Intubation Assist Yes   $ Resuscitation CPR Yes

## 2025-03-06 NOTE — RESPIRATORY THERAPY NOTE
"Assisted with intubation at bedside. S/p cardiact arrest. ROSC obtained and  pt was placed on ventilator.        03/05/25 2316   Respiratory Assessment   Assessment Type Assess only   General Appearance Sedated   Respiratory Pattern Assisted   Chest Assessment Chest expansion symmetrical   Vent Information   Vent ID [id number]   Vent type Hernandez G5   Hernandez Vent Mode (S)CMV   $ Vent Charge-INITIAL Yes   Ventilator Start Yes   $ Pulse Oximetry Spot Check Charge Completed   SpO2 100 %   (S)CMV Settings   Resp Rate (BPM) 24 BPM   VT (mL) 350 mL   FIO2 (%) 100 %   PEEP (cmH2O) 6 cmH2O   I:E Ratio 1:1.5   Insp Time (%) 1 %   Flow Trigger (LPM) 2   Humidification Heater   Heater Temperature (Set) 98.6 °F (37 °C)   Pause Time (%) 0 %   (S)CMV Actuals   Resp Rate (BPM) 24 BPM   VT (mL) 371   MV 8.9   MAP (cmH2O) 12 cmH2O   Peak Pressure (cmH2O) 24 cmH2O   I:E Ratio (Obs) 1:1.5   Insp Resistance 23   Heater Temperature (Obs) 98.6 °F (37 °C)   Static Compliance (mL/cmH20) 48.7 mL/cmH2O   (S)CMV Alarms   High Peak Pressure (cmH2O) 40   Low Pressure (cmH2O) 5 cm H2O   High Resp Rate (BPM) 40 BPM   Low Resp Rate (BPM) 8 BPM   High MV (L/min) 20 L/min   Low MV (L/min) 3 L/min   High VT (mL) 1000 mL   Low VT (mL) 250 mL   Leak (%) 0 %   Apnea Time (s) 20 S   Maintenance   Alarm (pink) cable attached No   Resuscitation bag with peep valve at bedside Yes   Water bag changed No   Circuit changed No   Daily Screen   Patient safety screen outcome: Failed   Not Ready for Weaning due to: Underline problem not resolved   IHI Ventilator Associated Pneumonia Bundle   Daily Assessment of Readiness to Extubate Not applicable (Comment)   Head of Bed Elevated HOB 30   Adult IBW/VT Calculations   Height 4' 9.01\" (1.448 m)   IBW (Ideal Body Weight) 43.12 kg   Range VT 6 ML/Kg 258.72 mL/kg   Range VT 8 ML/Kg 344.96 mL/kg   ETT  Oral;Hi-Lo;Cuffed 7 mm   Placement Date/Time: 03/05/25 2248   Previously placed by?: Pulmonary  Mask Ventilation: " Ventilated by mask (1)  Preoxygenated: Yes  Type: Oral;Hi-Lo;Cuffed  Tube Size: 7 mm  Location: Oral  Placement Verification: Auscultation;Chest x-ray;End tidal C...   Secured at (cm) 23   Measured from Lips   Secured Location Left   Secured by Commercial tube haynes   Site Condition Dry   Cuff Pressure (color) Green   HI-LO Suction  Continuous low suction   HI-LO Secretions Moderate   HI-LO Intervention Patent   Respiratory Charges    $ Intubation/Intubation Assist Yes   $ Resuscitation CPR Yes

## 2025-03-06 NOTE — DEATH NOTE
INPATIENT DEATH NOTE  Arlyn L Follweiler 89 y.o. male MRN: [id number]  Unit/Bed#: ICU 06 Encounter: [id number]    Date, Time and Cause of Death    Date of Death: 3/6/25  Time of Death: 12:16 AM  Preliminary Cause of Death: Hemorrhagic shock (HCC)  Entered by: Osmany Cage PA-C[JE1.1]       Attribution       JE1.1 Eric Cage PA-C 25 00:29             Patient's Information  Pronounced by: Osmany Cage PA-C  Did the patient's death occur in the ED?: No  Did the patient's death occur in the OR?: No  Did the patient's death occur less than 10 days post-op?: Yes  Did the patient's death occur within 24 hours of admission?: No  Was code status DNR at the time of death?: Yes    PHYSICAL EXAM:  Unresponsive to noxious stimuli, Spontaneous respirations absent, Breath sounds absent, Carotid pulse absent, Heart sounds absent, Pupillary light reflex absent, and Corneal blink reflex absent    Medical Examiner notification criteria:   within 24 hours of surgery / procedure / anesthesia   Medical Examiner's office notified?:  Yes   Medical Examiner accepted case?:  No  Name of Medical Examiner: Sheila Oneill     Family Notification  Was the family notified?: Yes  Date Notified: 25  Time Notified: 0016  Notified by: Osmany Cage PA-C  Name of Family Notified of Death: Geraldine   Relationship to Patient: Daughter  Family Notification Route: At bedside  Was the family told to contact a  home?: Yes  Name of  Home:: TBD    Autopsy Options:  Post-mortem examination declined by next of kin    Primary Service Attending Physician notified?:  yes - Attending:  Amadeo Read MD    Physician/Resident responsible for completing Discharge Summary:  Osmany Cage PA-C

## 2025-03-06 NOTE — PERIOPERATIVE NURSING NOTE
Dr. Mayfield at bedside. Medicated for bradycardic and hypotensive episodes.  Pt starts showing facial grimace. Arms and neck show some resistant. Few verbal groaning. Not follow command.  Seen by ICU doctor at bedside right now.

## 2025-03-06 NOTE — ASSESSMENT & PLAN NOTE
A/e/b ct a/p: 0.4 cm right middle lobe pulmonary nodule. Based on current Fleischner Society 2017 Guidelines on incidental pulmonary nodule, no routine follow-up is needed if the patient is low risk. If the patient is high risk, optional follow-up chest CT at 12   months can be considered.

## 2025-03-06 NOTE — ASSESSMENT & PLAN NOTE
Lab Results   Component Value Date    EGFR 33 03/05/2025    EGFR 26 03/04/2025    EGFR 29 05/06/2024    CREATININE 1.73 (H) 03/05/2025    CREATININE 2.13 (H) 03/04/2025    CREATININE 1.92 (H) 05/06/2024   Baseline creatinine 2.1  Admission creatinine 2.13  Avoid hypotension nephrotoxins  Trend BMP

## 2025-03-06 NOTE — PROCEDURES
Arterial Line Insertion    Date/Time: 3/5/2025 9:52 PM    Performed by: Eric Cage PA-C  Authorized by: Eric Cage PA-C    Patient location:  Bedside and ICU  Consent:     Consent obtained:  Emergent situation and verbal    Consent given by: daughterGeraldine.    Risks discussed:  Bleeding, ischemia, infection and pain  Universal protocol:     Site/side marked: yes      Immediately prior to procedure a time out was called: yes      Patient identity confirmed:  Hospital-assigned identification number and arm band  Indications:     Indications: hemodynamic monitoring and continuous blood pressure monitoring    Pre-procedure details:     Skin preparation:  Chlorhexidine    Preparation: Patient was prepped and draped in sterile fashion    Anesthesia (see MAR for exact dosages):     Anesthesia method:  None  Procedure details:     Location / Tip of Catheter:  Femoral    Laterality:  Right    Placement technique:  Percutaneous and ultrasound guided    Ultrasound image availability:  Not obtained due to urgency    Sterile ultrasound techniques: Sterile gel and sterile probe covers were used      Number of attempts:  1    Successful placement: yes      Transducer: waveform confirmed    Post-procedure details:     Post-procedure:  Sterile dressing applied and sutured    Patient tolerance of procedure:  Tolerated well, no immediate complications

## 2025-03-06 NOTE — ASSESSMENT & PLAN NOTE
Likely due to hemorrhagic shock  ACLS algorithm followed, ROSC achieved   See code narrator for further details  Made comfort care after family arrived

## 2025-03-06 NOTE — PROCEDURES
Intubation    Date/Time: 3/5/2025 10:48 PM    Performed by: Eric Cage PA-C  Authorized by: Eric Cage PA-C    Patient location:  Bedside  Consent:     Consent obtained:  Emergent situation  Pre-procedure details:     Patient status:  Unresponsive    Pretreatment medications:  None    Paralytics:  Succinylcholine  Indications:     Indications for intubation: respiratory distress, respiratory failure and airway protection    Procedure details:     Preoxygenation:  Bag valve mask    CPR in progress: yes      Intubation method:  Oral    Oral intubation technique:  Direct    Laryngoscope blade:  Mac 3    Tube size (mm):  7.0    Tube type:  Cuffed    Number of attempts:  1    Cricoid pressure: yes      Tube visualized through cords: yes    Placement assessment:     ETT to lip:  22    Tube secured with:  ETT haynes    Breath sounds:  Equal    Placement verification: chest rise, condensation, direct visualization, equal breath sounds, ETCO2 detector and tube exhalation    Comments:      Pt was hypoxemic and in respiratory distress. Anesthesia was at bedside and while preparing for paralysis pt arrested, chest compressions were started and pt was subsequently intubated.

## 2025-03-06 NOTE — ASSESSMENT & PLAN NOTE
Due to bladder tumor and clot retention  POD 0 S/p cystoscopy, clot evac, tumor removal conversion to open laparotomy w/ fluid evacuation, bladder exploration, bladder repair and closure of diverticulum   Abdominal AGATA drain in place   Ruvalcaba in place

## 2025-03-06 NOTE — DISCHARGE SUMMARY
"Discharge Summary - Critical Care/ICU   Name: Arlyn L Follweiler 89 y.o. male I MRN: [id number]  Unit/Bed#: ICU 06 I Date of Admission: 3/4/2025   Date of Service: 3/6/2025 I Hospital Day: 2    Admission Date: 3/4/2025   Admitting Diagnosis: Hematuria [R31.9]  Discharge Date: 03/06/25    HPI:  As per Mitchell Thomas on 3/4 \"Arlyn L Follweiler is a 89 y.o. male with a PMH of hypertension CKD 4 and asthma who presents with hematuria.  The patient has had painless dark urine over the past couple days.  Denies any previous episode.  No fevers chills or chest pain.  In the ED he was noted to have bladder density on CT imaging.  Plan of care coordinated by ED with urology for plans for cystoscopy/fulguration tomorrow.  Reportedly the wife was giving the patient unknowingly aspirin for a cough over the past week.\"      Procedures Performed:   Orders Placed This Encounter   Procedures    Intubation       Summary of Hospital Course: Hospital Course: Arlyn L Follweiler is a 89 y/o M hx HTN, CKD4, anemia of chronic disease, asthma, HFpEF (G1DD), Severe aortic stenosis, hyperparathyroidism who was admitted 3/4 for gross hematuria w/ radiographic evidence of blood product vs mass in bladder. Now s/p cystoscopy evacuation of 200cc clot and bladder tumor removal, converted to open laparotomy with abdominal fluid drainage (2L) and AGATA drain placement. Post-op he was hypotensive, initially responsive to fluids and levophed. Repeat hgb 6.4, 2 units RBCs ordered and began transfusing. Escalated levophed quickly, added vasopressin. Femoral arterial line placed. He became hypoxemic and went into respiratory distress. He subsequently went into cardiac arrest and was intubated. ACLS algorithm was followed. ROSC achieved w/ 25 minutes down time. Family arrived and made the pt level 4 comfort care.      Significant Findings, Care, Treatment and Services Provided: Cystoscopy w/ bladder tumor removal conversion to open laparotomy w/ abdominal " fluid removal and AGATA drain placement. Treated for hemorrhagic vs. Hypovolemic shock.     Complications: Hemorrhagic shock, cardiac arrest    Disposition:      Final Diagnosis: Hemorrhagic shock    Medical Problems       Resolved Problems  Date Reviewed: 3/6/2025   None         Condition at Time of Death: Critical  Date, Time and Cause of Death    Date of Death: 3/6/25  Time of Death: 12:16 AM  Preliminary Cause of Death: Hemorrhagic shock (HCC)  Entered by: Osmany Cage PA-C[JE1.1]       Attribution       JE1.1 Eric Cage PA-C 25 00:29            Death Note:  INPATIENT DEATH NOTE  Arlyn L Follweiler 89 y.o. male MRN: [id number]  Unit/Bed#: ICU 06 Encounter: [id number]    Date, Time and Cause of Death    Date of Death: 3/6/25  Time of Death: 12:16 AM  Preliminary Cause of Death: Hemorrhagic shock (HCC)  Entered by: Osmany Cage PA-C[JE1.1]       Attribution       JE1.1 Eric Cage PA-C 25 00:29             Patient's Information  Pronounced by: Osmany Cage PA-C  Did the patient's death occur in the ED?: No  Did the patient's death occur in the OR?: No  Did the patient's death occur less than 10 days post-op?: Yes  Did the patient's death occur within 24 hours of admission?: No  Was code status DNR at the time of death?: Yes    PHYSICAL EXAM:  Unresponsive to noxious stimuli, Spontaneous respirations absent, Breath sounds absent, Carotid pulse absent, Heart sounds absent, Pupillary light reflex absent, and Corneal blink reflex absent    Medical Examiner notification criteria:   within 24 hours of surgery / procedure / anesthesia   Medical Examiner's office notified?:  Yes   Medical Examiner accepted case?:  No  Name of Medical Examiner: Sheila Oneill     Family Notification  Was the family notified?: Yes  Date Notified: 25  Time Notified: 0016  Notified by: Osmany Cage PA-C  Name of Family Notified of Death: Geraldine   Relationship to Patient: Daughter  Family  Notification Route: At bedside  Was the family told to contact a  home?: Yes  Name of  Home:: TBD    Autopsy Options:  Post-mortem examination declined by next of kin    Primary Service Attending Physician notified?:  yes - Attending:  Amadeo Read MD    Physician/Resident responsible for completing Discharge Summary:  Osmany Cage PA-C

## 2025-03-06 NOTE — ANESTHESIA PROCEDURE NOTES
Anesthesia Notable Event    Date/Time: 3/5/2025 8:52 PM    Patient location during procedure: PACU  Performed by: Audrey Mayfield MD  Authorized by: Swati Tay DO    Anesthesia notable event Post Operative: acute change in neurological exam and unanticipated transfer to ICU  Difficult to rouse, minimal responsiveness, transferred to ICU for probable symptomatic hyponatremia

## 2025-03-07 NOTE — UTILIZATION REVIEW
NOTIFICATION OF ADMISSION DISCHARGE   This is a Notification of Discharge from [facility]. Please be advised that this patient has been discharge from our facility. Below you will find the admission and discharge date and time including the patient’s disposition.   UTILIZATION REVIEW CONTACT:  Luzmaria Keith  Utilization   Network Utilization Review Department  Phone: [phone] x carefully listen to the prompts. All voicemails are confidential.  Email: [email or web address]     ADMISSION INFORMATION  PRESENTATION DATE: 3/4/2025  2:18 PM  OBERVATION ADMISSION DATE: N/A  INPATIENT ADMISSION DATE: 3/4/25  6:04 PM   DISCHARGE DATE: 3/6/2025  3:48 AM   DISPOSITION:    Network Utilization Review Department  ATTENTION: Please call with any questions or concerns to [phone] and carefully listen to the prompts so that you are directed to the right person. All voicemails are confidential.   For Discharge needs, contact Care Management DC Support Team at [phone] opt. 2  Send all requests for admission clinical reviews, approved or denied determinations and any other requests to dedicated fax number below belonging to the campus where the patient is receiving treatment. List of dedicated fax numbers for the Facilities:  FACILITY NAME UR FAX NUMBER   ADMISSION DENIALS (Administrative/Medical Necessity) [phone]   DISCHARGE SUPPORT TEAM ([facility]) [phone]   PARENT CHILD HEALTH (Maternity/NICU/Pediatrics) [phone]   [facility] [phone]   [facility] [phone]   [facility] [phone]   [facility] [phone]   [facility] [phone]   [facility] [phone]   [facility] [phone]   [facility] [phone]   [facility]  [facility] [phone]   [facility] [phone]   [facility] [phone]   [facility] [phone]

## 2025-03-10 PROCEDURE — 88307 TISSUE EXAM BY PATHOLOGIST: CPT | Performed by: PATHOLOGY

## (undated) DEVICE — INVIEW CLEAR LEGGINGS: Brand: CONVERTORS

## (undated) DEVICE — SUT ETHILON 2-0 FS 18 IN 664H

## (undated) DEVICE — SUT PDS II 1 CTX 36 IN Z371T

## (undated) DEVICE — POOLE SUCTION HANDLE: Brand: CARDINAL HEALTH

## (undated) DEVICE — GLOVE INDICATOR PI UNDERGLOVE SZ 8 BLUE

## (undated) DEVICE — GAUZE SPONGES,16 PLY: Brand: CURITY

## (undated) DEVICE — JP PERF DRN SIL FLT 10MM FULL: Brand: CARDINAL HEALTH

## (undated) DEVICE — GLOVE SRG BIOGEL 7.5

## (undated) DEVICE — Device: Brand: OLYMPUS

## (undated) DEVICE — REM POLYHESIVE ADULT PATIENT RETURN ELECTRODE: Brand: VALLEYLAB

## (undated) DEVICE — JACKSON-PRATT 100CC BULB RESERVOIR: Brand: CARDINAL HEALTH

## (undated) DEVICE — SPHINCTEROTOME: Brand: DREAMTOME™ RX 44

## (undated) DEVICE — GLOVE SRG BIOGEL ORTHOPEDIC 7.5

## (undated) DEVICE — SPECIMEN CONTAINER STERILE PEEL PACK

## (undated) DEVICE — SUT PDS II 1 CT-1 27 IN Z347H

## (undated) DEVICE — SCD SEQUENTIAL COMPRESSION COMFORT SLEEVE MEDIUM KNEE LENGTH: Brand: KENDALL SCD

## (undated) DEVICE — INSULATED BLADE ELECTRODE;CAUTION: FOR MANUFACTURING, PROCESSING, OR REPACKING.: Brand: EDGE

## (undated) DEVICE — CATHETER PLUG WITH CAP: Brand: DOVER

## (undated) DEVICE — RETRIEVAL BALLOON CATHETER: Brand: EXTRACTOR™ PRO RX

## (undated) DEVICE — BAG URINE DRAINAGE 4000ML CONTINUOUS IRR

## (undated) DEVICE — BAG URINE DRAINAGE 2000ML ANTI RFLX LF

## (undated) DEVICE — ESOPHAGEAL/PYLORIC/BILIARY WIREGUIDED BALLOON DILATATION CATHETER: Brand: CRE™ PRO

## (undated) DEVICE — SLIM BODY SKIN STAPLER: Brand: APPOSE ULC

## (undated) DEVICE — HIGH PERFORMANCE GUIDEWIRE: Brand: DREAMWIRE

## (undated) DEVICE — DELIVERY SYSTEM NAVFLX 10FR X 202.5CM

## (undated) DEVICE — PACK TUR

## (undated) DEVICE — TUBING SUCTION 5MM X 12 FT

## (undated) DEVICE — STERILE GENERAL LAPAROSCOPY PK: Brand: CARDINAL HEALTH

## (undated) DEVICE — BALLOON DILATATION CATHETER: Brand: HURRICANE™ RX

## (undated) DEVICE — Device

## (undated) DEVICE — SUT VICRYL 2-0 SH 27 IN UNDYED J417H

## (undated) DEVICE — EVACUATOR BLADDER ELLIK DISP STRL

## (undated) DEVICE — 4-PORT MANIFOLD: Brand: NEPTUNE 2

## (undated) DEVICE — UROLOGIC DRAIN BAG: Brand: UNBRANDED